# Patient Record
Sex: MALE | Race: WHITE
[De-identification: names, ages, dates, MRNs, and addresses within clinical notes are randomized per-mention and may not be internally consistent; named-entity substitution may affect disease eponyms.]

---

## 2017-01-23 ENCOUNTER — HOSPITAL ENCOUNTER (OUTPATIENT)
Dept: HOSPITAL 53 - M LAB REF | Age: 82
End: 2017-01-23
Attending: INTERNAL MEDICINE
Payer: MEDICARE

## 2017-01-23 DIAGNOSIS — E79.0: Primary | ICD-10-CM

## 2017-05-03 ENCOUNTER — HOSPITAL ENCOUNTER (OUTPATIENT)
Dept: HOSPITAL 53 - M LAB REF | Age: 82
End: 2017-05-03
Attending: INTERNAL MEDICINE
Payer: MEDICARE

## 2017-05-03 DIAGNOSIS — E79.0: Primary | ICD-10-CM

## 2017-07-17 ENCOUNTER — HOSPITAL ENCOUNTER (OUTPATIENT)
Dept: HOSPITAL 53 - M LABDRWAD | Age: 82
End: 2017-07-17
Attending: PHYSICIAN ASSISTANT
Payer: MEDICARE

## 2017-07-17 DIAGNOSIS — R53.83: Primary | ICD-10-CM

## 2017-07-17 LAB
ALBUMIN SERPL BCG-MCNC: 3.2 GM/DL (ref 3.2–5.2)
ALBUMIN/GLOB SERPL: 0.78 {RATIO} (ref 1–1.93)
ALP SERPL-CCNC: 79 U/L (ref 45–117)
ALT SERPL W P-5'-P-CCNC: 18 U/L (ref 12–78)
ANION GAP SERPL CALC-SCNC: 7 MEQ/L (ref 8–16)
AST SERPL-CCNC: 15 U/L (ref 15–37)
BASOPHILS # BLD AUTO: 0.1 K/MM3 (ref 0–0.2)
BASOPHILS NFR BLD AUTO: 0.8 % (ref 0–1)
BILIRUB SERPL-MCNC: 0.7 MG/DL (ref 0.2–1)
BUN SERPL-MCNC: 27 MG/DL (ref 7–18)
CALCIUM SERPL-MCNC: 9 MG/DL (ref 8.8–10.2)
CHLORIDE SERPL-SCNC: 104 MEQ/L (ref 98–107)
CO2 SERPL-SCNC: 27 MEQ/L (ref 21–32)
CREAT SERPL-MCNC: 1.67 MG/DL (ref 0.7–1.3)
EOSINOPHIL # BLD AUTO: 0.3 K/MM3 (ref 0–0.5)
EOSINOPHIL NFR BLD AUTO: 3.5 % (ref 0–3)
ERYTHROCYTE [DISTWIDTH] IN BLOOD BY AUTOMATED COUNT: 13.6 % (ref 11.5–14.5)
GFR SERPL CREATININE-BSD FRML MDRD: 42.4 ML/MIN/{1.73_M2} (ref 35–?)
GLUCOSE SERPL-MCNC: 96 MG/DL (ref 83–110)
LARGE UNSTAINED CELL #: 0.1 K/MM3 (ref 0–0.4)
LARGE UNSTAINED CELL %: 0.9 % (ref 0–4)
LYMPHOCYTES # BLD AUTO: 1.4 K/MM3 (ref 1.5–4.5)
LYMPHOCYTES NFR BLD AUTO: 16.7 % (ref 24–44)
MCH RBC QN AUTO: 33.7 PG (ref 27–33)
MCHC RBC AUTO-ENTMCNC: 32.7 G/DL (ref 32–36.5)
MCV RBC AUTO: 103 FL (ref 80–96)
MONOCYTES # BLD AUTO: 0.5 K/MM3 (ref 0–0.8)
MONOCYTES NFR BLD AUTO: 6.9 % (ref 0–5)
NEUTROPHILS # BLD AUTO: 5.6 K/MM3 (ref 1.8–7.7)
NEUTROPHILS NFR BLD AUTO: 71.3 % (ref 36–66)
PLATELET # BLD AUTO: 304 K/MM3 (ref 150–450)
POTASSIUM SERPL-SCNC: 4.3 MEQ/L (ref 3.5–5.1)
PROT SERPL-MCNC: 7.3 GM/DL (ref 6.4–8.2)
SODIUM SERPL-SCNC: 138 MEQ/L (ref 136–145)
WBC # BLD AUTO: 7.9 K/MM3 (ref 4–10)

## 2017-07-20 LAB
B BURGDOR IGG+IGM SER-ACNC: <0.91 ISR (ref 0–0.9)
B BURGDOR IGM SER IA-ACNC: 1.52 INDEX (ref 0–0.79)

## 2017-07-28 ENCOUNTER — HOSPITAL ENCOUNTER (EMERGENCY)
Dept: HOSPITAL 53 - M ED | Age: 82
Discharge: HOME | End: 2017-07-28
Payer: MEDICARE

## 2017-07-28 VITALS — DIASTOLIC BLOOD PRESSURE: 74 MMHG

## 2017-07-28 VITALS — WEIGHT: 166.34 LBS | BODY MASS INDEX: 26.73 KG/M2 | HEIGHT: 66 IN

## 2017-07-28 VITALS — SYSTOLIC BLOOD PRESSURE: 120 MMHG

## 2017-07-28 DIAGNOSIS — Z87.891: ICD-10-CM

## 2017-07-28 DIAGNOSIS — Z79.02: ICD-10-CM

## 2017-07-28 DIAGNOSIS — K21.9: ICD-10-CM

## 2017-07-28 DIAGNOSIS — Z86.73: ICD-10-CM

## 2017-07-28 DIAGNOSIS — Z79.82: ICD-10-CM

## 2017-07-28 DIAGNOSIS — A69.23: Primary | ICD-10-CM

## 2017-07-28 DIAGNOSIS — Z95.1: ICD-10-CM

## 2017-07-28 DIAGNOSIS — Z79.899: ICD-10-CM

## 2017-07-28 DIAGNOSIS — I10: ICD-10-CM

## 2017-07-28 LAB
ALBUMIN SERPL BCG-MCNC: 2.6 GM/DL (ref 3.2–5.2)
ALBUMIN/GLOB SERPL: 0.62 {RATIO} (ref 1–1.93)
ALP SERPL-CCNC: 78 U/L (ref 45–117)
ALT SERPL W P-5'-P-CCNC: 20 U/L (ref 12–78)
ANION GAP SERPL CALC-SCNC: 9 MEQ/L (ref 8–16)
AST SERPL-CCNC: 12 U/L (ref 15–37)
BASOPHILS # BLD AUTO: 0.1 K/MM3 (ref 0–0.2)
BASOPHILS NFR BLD AUTO: 0.6 % (ref 0–1)
BILIRUB CONJ SERPL-MCNC: 0.3 MG/DL (ref 0–0.2)
BILIRUB SERPL-MCNC: 0.9 MG/DL (ref 0.2–1)
BUN SERPL-MCNC: 28 MG/DL (ref 7–18)
CALCIUM SERPL-MCNC: 9.3 MG/DL (ref 8.8–10.2)
CHLORIDE SERPL-SCNC: 106 MEQ/L (ref 98–107)
CO2 SERPL-SCNC: 25 MEQ/L (ref 21–32)
CREAT SERPL-MCNC: 1.52 MG/DL (ref 0.7–1.3)
EOSINOPHIL # BLD AUTO: 0 K/MM3 (ref 0–0.5)
EOSINOPHIL NFR BLD AUTO: 0.2 % (ref 0–3)
ERYTHROCYTE [DISTWIDTH] IN BLOOD BY AUTOMATED COUNT: 13.7 % (ref 11.5–14.5)
ERYTHROCYTE [SEDIMENTATION RATE] IN BLOOD BY WESTERGREN METHOD: 80 MM/HR (ref 0–20)
GFR SERPL CREATININE-BSD FRML MDRD: 47.2 ML/MIN/{1.73_M2} (ref 35–?)
GLUCOSE SERPL-MCNC: 108 MG/DL (ref 83–110)
INR PPP: 1.28
LARGE UNSTAINED CELL #: 0.1 K/MM3 (ref 0–0.4)
LARGE UNSTAINED CELL %: 1 % (ref 0–4)
LYMPHOCYTES # BLD AUTO: 1.1 K/MM3 (ref 1.5–4.5)
LYMPHOCYTES NFR BLD AUTO: 10.7 % (ref 24–44)
MCH RBC QN AUTO: 33.5 PG (ref 27–33)
MCHC RBC AUTO-ENTMCNC: 32.9 G/DL (ref 32–36.5)
MCV RBC AUTO: 101.9 FL (ref 80–96)
MONOCYTES # BLD AUTO: 0.7 K/MM3 (ref 0–0.8)
MONOCYTES NFR BLD AUTO: 6.4 % (ref 0–5)
NEUTROPHILS # BLD AUTO: 8.5 K/MM3 (ref 1.8–7.7)
NEUTROPHILS NFR BLD AUTO: 81 % (ref 36–66)
PLATELET # BLD AUTO: 327 K/MM3 (ref 150–450)
POTASSIUM SERPL-SCNC: 4.2 MEQ/L (ref 3.5–5.1)
PROT SERPL-MCNC: 6.8 GM/DL (ref 6.4–8.2)
SODIUM SERPL-SCNC: 140 MEQ/L (ref 136–145)
WBC # BLD AUTO: 10.5 K/MM3 (ref 4–10)

## 2017-07-30 NOTE — ECGEPIP
Stationary ECG Study

                           [facility] - ED

                                       

                                       Test Date:    2017

Pat Name:     FELISA SANCHEZ           Department:   

Patient ID:   [id number]                 Room:         -

Gender:       M                        Technician:   AMY

:          [id number]               Requested By: Oneal Reid PA-C

Order Number: [id number]     Reading MD:   Toney De La Torre

                                 Measurements

Intervals                              Axis          

Rate:         69                       P:            -59

IL:           122                      QRS:          14

QRSD:         98                       T:            30

QT:           404                                    

QTc:          433                                    

                           Interpretive Statements

SINUS RHYTHM WITH OCCASIONAL VENTRICULAR PREMATURE COMPLEXES

NSTTW ABNORMALITIES

SIMILAR TO 10/29/12

Electronically Signed On 2017 2:14:08 EDT by Toney De La Torre

## 2017-08-02 ENCOUNTER — HOSPITAL ENCOUNTER (OUTPATIENT)
Dept: HOSPITAL 53 - M LAB REF | Age: 82
End: 2017-08-02
Attending: NURSE PRACTITIONER
Payer: MEDICARE

## 2017-08-02 DIAGNOSIS — R79.82: Primary | ICD-10-CM

## 2017-08-02 DIAGNOSIS — I12.9: ICD-10-CM

## 2017-08-02 DIAGNOSIS — N18.3: ICD-10-CM

## 2017-08-02 LAB — VIT B12 SERPL-MCNC: 257 PG/ML (ref 247–911)

## 2017-08-07 ENCOUNTER — HOSPITAL ENCOUNTER (INPATIENT)
Dept: HOSPITAL 53 - M ED | Age: 82
LOS: 4 days | Discharge: NURSING, REHAB OR LONG-TERM CARE | DRG: 65 | End: 2017-08-11
Attending: HOSPITALIST | Admitting: HOSPITALIST
Payer: MEDICARE

## 2017-08-07 VITALS — DIASTOLIC BLOOD PRESSURE: 70 MMHG | SYSTOLIC BLOOD PRESSURE: 153 MMHG

## 2017-08-07 VITALS — HEIGHT: 66 IN | BODY MASS INDEX: 24.59 KG/M2 | WEIGHT: 153 LBS

## 2017-08-07 VITALS — SYSTOLIC BLOOD PRESSURE: 137 MMHG | DIASTOLIC BLOOD PRESSURE: 87 MMHG

## 2017-08-07 VITALS — DIASTOLIC BLOOD PRESSURE: 65 MMHG | SYSTOLIC BLOOD PRESSURE: 126 MMHG

## 2017-08-07 VITALS — DIASTOLIC BLOOD PRESSURE: 67 MMHG | SYSTOLIC BLOOD PRESSURE: 140 MMHG

## 2017-08-07 VITALS — SYSTOLIC BLOOD PRESSURE: 99 MMHG | DIASTOLIC BLOOD PRESSURE: 55 MMHG

## 2017-08-07 DIAGNOSIS — Z79.02: ICD-10-CM

## 2017-08-07 DIAGNOSIS — E78.5: ICD-10-CM

## 2017-08-07 DIAGNOSIS — D50.9: ICD-10-CM

## 2017-08-07 DIAGNOSIS — K21.9: ICD-10-CM

## 2017-08-07 DIAGNOSIS — Z98.62: ICD-10-CM

## 2017-08-07 DIAGNOSIS — I12.9: ICD-10-CM

## 2017-08-07 DIAGNOSIS — N18.3: ICD-10-CM

## 2017-08-07 DIAGNOSIS — I95.1: ICD-10-CM

## 2017-08-07 DIAGNOSIS — Z79.82: ICD-10-CM

## 2017-08-07 DIAGNOSIS — G20: ICD-10-CM

## 2017-08-07 DIAGNOSIS — M48.06: ICD-10-CM

## 2017-08-07 DIAGNOSIS — M10.9: ICD-10-CM

## 2017-08-07 DIAGNOSIS — E55.9: ICD-10-CM

## 2017-08-07 DIAGNOSIS — A69.20: ICD-10-CM

## 2017-08-07 DIAGNOSIS — R29.6: ICD-10-CM

## 2017-08-07 DIAGNOSIS — R26.81: ICD-10-CM

## 2017-08-07 DIAGNOSIS — Z79.899: ICD-10-CM

## 2017-08-07 DIAGNOSIS — Z95.1: ICD-10-CM

## 2017-08-07 DIAGNOSIS — R63.4: ICD-10-CM

## 2017-08-07 DIAGNOSIS — F39: ICD-10-CM

## 2017-08-07 DIAGNOSIS — N40.0: ICD-10-CM

## 2017-08-07 DIAGNOSIS — K59.00: ICD-10-CM

## 2017-08-07 DIAGNOSIS — I63.9: Primary | ICD-10-CM

## 2017-08-07 LAB
ALBUMIN SERPL BCG-MCNC: 2.4 GM/DL (ref 3.2–5.2)
ALBUMIN/GLOB SERPL: 0.49 {RATIO} (ref 1–1.93)
ALP SERPL-CCNC: 81 U/L (ref 45–117)
ALT SERPL W P-5'-P-CCNC: 26 U/L (ref 12–78)
ANION GAP SERPL CALC-SCNC: 11 MEQ/L (ref 8–16)
AST SERPL-CCNC: 25 U/L (ref 15–37)
BASOPHILS # BLD AUTO: 0 K/MM3 (ref 0–0.2)
BASOPHILS NFR BLD AUTO: 0.3 % (ref 0–1)
BILIRUB CONJ SERPL-MCNC: 0.3 MG/DL (ref 0–0.2)
BILIRUB SERPL-MCNC: 0.7 MG/DL (ref 0.2–1)
BUN SERPL-MCNC: 31 MG/DL (ref 7–18)
CALCIUM SERPL-MCNC: 9.3 MG/DL (ref 8.8–10.2)
CHLORIDE SERPL-SCNC: 98 MEQ/L (ref 98–107)
CO2 SERPL-SCNC: 25 MEQ/L (ref 21–32)
CREAT SERPL-MCNC: 1.6 MG/DL (ref 0.7–1.3)
EOSINOPHIL # BLD AUTO: 0 K/MM3 (ref 0–0.5)
EOSINOPHIL NFR BLD AUTO: 0.4 % (ref 0–3)
ERYTHROCYTE [DISTWIDTH] IN BLOOD BY AUTOMATED COUNT: 13.9 % (ref 11.5–14.5)
ERYTHROCYTE [SEDIMENTATION RATE] IN BLOOD BY WESTERGREN METHOD: 107 MM/HR (ref 0–20)
FERRITIN SERPL-MCNC: 2691 NG/ML (ref 26–388)
FOLATE SERPL-MCNC: 6.8 NG/ML (ref 5.4–?)
GFR SERPL CREATININE-BSD FRML MDRD: 44.5 ML/MIN/{1.73_M2} (ref 35–?)
GLUCOSE SERPL-MCNC: 140 MG/DL (ref 83–110)
IRON SATN MFR SERPL: 20.4 % (ref 19.7–50)
LARGE UNSTAINED CELL #: 0.1 K/MM3 (ref 0–0.4)
LARGE UNSTAINED CELL %: 0.6 % (ref 0–4)
LYMPHOCYTES # BLD AUTO: 0.7 K/MM3 (ref 1.5–4.5)
LYMPHOCYTES NFR BLD AUTO: 6.1 % (ref 24–44)
MAGNESIUM SERPL-MCNC: 2.4 MG/DL (ref 1.8–2.4)
MCH RBC QN AUTO: 33.8 PG (ref 27–33)
MCHC RBC AUTO-ENTMCNC: 33 G/DL (ref 32–36.5)
MCV RBC AUTO: 102.5 FL (ref 80–96)
MONOCYTES # BLD AUTO: 0.8 K/MM3 (ref 0–0.8)
MONOCYTES NFR BLD AUTO: 7.6 % (ref 0–5)
NEUTROPHILS # BLD AUTO: 9.2 K/MM3 (ref 1.8–7.7)
NEUTROPHILS NFR BLD AUTO: 85 % (ref 36–66)
PLATELET # BLD AUTO: 396 K/MM3 (ref 150–450)
POTASSIUM SERPL-SCNC: 4.4 MEQ/L (ref 3.5–5.1)
PROT SERPL-MCNC: 7.3 GM/DL (ref 6.4–8.2)
SODIUM SERPL-SCNC: 134 MEQ/L (ref 136–145)
TIBC SERPL-MCNC: 137 UG/DL (ref 250–450)
VIT B12 SERPL-MCNC: 313 PG/ML (ref 247–911)
WBC # BLD AUTO: 10.8 K/MM3 (ref 4–10)

## 2017-08-07 RX ADMIN — ENOXAPARIN SODIUM SCH MG: 30 INJECTION SUBCUTANEOUS at 18:37

## 2017-08-07 RX ADMIN — MAGNESIUM OXIDE SCH MG: 400 TABLET ORAL at 21:52

## 2017-08-07 RX ADMIN — TAMSULOSIN HYDROCHLORIDE SCH MG: 0.4 CAPSULE ORAL at 18:34

## 2017-08-07 RX ADMIN — ASPIRIN SCH MG: 81 TABLET ORAL at 18:34

## 2017-08-07 RX ADMIN — ALLOPURINOL SCH MG: 300 TABLET ORAL at 18:35

## 2017-08-07 RX ADMIN — AMOXICILLIN SCH MG: 500 CAPSULE ORAL at 18:38

## 2017-08-07 RX ADMIN — SIMVASTATIN SCH MG: 20 TABLET, FILM COATED ORAL at 18:37

## 2017-08-07 RX ADMIN — AMOXICILLIN SCH MG: 500 CAPSULE ORAL at 21:51

## 2017-08-07 RX ADMIN — Medication SCH UNITS: at 18:35

## 2017-08-07 RX ADMIN — CLOPIDOGREL BISULFATE SCH MG: 75 TABLET ORAL at 18:35

## 2017-08-07 RX ADMIN — PANTOPRAZOLE SODIUM SCH MG: 40 TABLET, DELAYED RELEASE ORAL at 18:35

## 2017-08-07 RX ADMIN — DOCUSATE SODIUM SCH MG: 100 CAPSULE, LIQUID FILLED ORAL at 21:51

## 2017-08-07 RX ADMIN — CLOTRIMAZOLE SCH MG: 10 LOZENGE ORAL at 16:00

## 2017-08-07 RX ADMIN — CLOTRIMAZOLE SCH MG: 10 LOZENGE ORAL at 21:51

## 2017-08-07 RX ADMIN — MIRTAZAPINE SCH MG: 15 TABLET, FILM COATED ORAL at 21:51

## 2017-08-07 NOTE — REP
CT Head without contrast

 

HISTORY:  Trauma

 

COMPARISON: 08/02/2017

 

Areas of decreased attenuation are present in the periventricular white matter.

This represents small-vessel ischemic disease.  There is no intraparenchymal

hemorrhage, acute infarct,  mass or midline shift.  The ventricular system and

cortical sulci as well as subarachnoid space in the posterior fossa are dilated

consistent with moderate volume loss.  There is no extra cerebral collection.

There is no fracture.  The visualized sinuses are clear.

 

IMPRESSION:

1.  Small vessel ischemic disease.

2.  Moderate volume loss.

 

 

 

 

Signed by

Lc Lemus MD 08/07/2017 10:15 A

## 2017-08-07 NOTE — HPEPDOC
Medical History and Physical


Date of Admission


8/7/17





History and Physical





ATTENDING: Dr. Washington


PCP: Dr Wall





CC: Fall





HPI: 81yoM with a past medical history significant for HTN, HLD,GERD, who 

reports he fell in the bathroom and hit his back on the vanity, Dtr reported he 

had hit his head as well. Reported that he had fallen 3 times in past 1 week. 

Daughter states he has been getting up in the morning to go to the bathroom and 

not using his walker. He typically loses his balance and has been falling. They 

state his symptoms began approximately 1 week ago when he was seen in the 

emergency department 7/28/17 and was diagnosed with Lyme disease. His daughter 

states he has lost 20 pounds in the past 3 weeks. Generally he has been feeling 

fatigued and achy. He reports generalized weakness however no paresthesias. 

Denies blurred vision, diplopia, vertigo, dysarthria. He does report some 

difficulty swallowing. He does not cough when swallowing.


Denies any fevers, chills,HA, CP, SOB, cough, palpitations, abdominal pain, N/V/

D or changes in bowel or bladder habits. 


Upon presentation to the hospital the patient was found to have acute CVA, thus 

the hospitalist team was consulted.





PMHx: 


HTN


HLD


GERD


BPH


CKD3 baseline 1.4-1.6


H/O TIA 10/12


H/O kidney stones


unsteady gait/H/O fall. Pt declines to use walker at home


Lyme disease





PSHX:


CABG


Fempop bypass


AAA repair


Rt inguinal hernia


appendectomy








SOCHX:


Resides in:  [facility]


Marital Status:  


Kids: 5


Employment: retired milk hauler


Tobacco use: denies


ETOH: denies


Illicit Drugs: Denies


Recent travel: denies


Advanced directives: None





FAMHX:





Siblings: Alive, hypertension, CAD, lung cancer


Children: Alive, breast cancer


Unexpected deaths due to medical reasons: None.





ROS:


As noted in HPI, otherwise 11pt ROS of systems reviewed and remarkable only for 

pt seen in ED 


7/28/17 for joint pains and was treated for Lyme disease. 


                 


PE:      


GEN:  81yoM, appears stated age. Well-nourished, well developed. No acute 

distress. Alert and oriented x 3. Pleasant, interactive. 


HEENT: Normocephalic, atraumatic. Pupils are equal, round, and reactive to 

light. Extraocular movements are intact. No nystagmus appreciated. Sclera are 

nonicteric. Conjunctiva without injection. Nose midline. Nasal turbinates 

without bogginess.  No facial asymmetry. Moist mucous membranes. Dentition 

fair. Pharynx pink and moist, no cobblestoning. Neck supple, trachea midline. 

No lymphadenopathy or thyromegaly appreciated. 


CHEST: Regular rate and rhythm, +S1, +S2


LUNGS: Clear to auscultation bilaterally. No wheezes, rales, or rhonchi. 

Breathing appears symmetric and easy. Patient is speaking in full sentences. No 

accessory muscle use.


ABD: Round, soft, non-tender, non-distended. +Bowel sounds throughout. No 

rebound or guarding. No costovertebral angle tenderness.


EXT: Pulses 2+ bilaterally dorsalis pedis and radial. No lower extremity edema 

appreciated.


SKIN: Pink, dry, warm. Capillary refill <2sec. There  is an ecchymotic area 

noted upper thoracic area.


NEURO: Alert and oriented x 3. Cranial nerves III-XII are intact. No focal 

deficits appreciated. 





MRI brain


1.  Punctate acute right frontal lobe infarction .


 2.  Old left cerebellar lacunar infarction.


 3.  Small vessel ischemic disease.


 4.  Moderate volume loss.





CT: head


1.  Small vessel ischemic disease.


2.  Moderate volume loss





CT C spine


1.  There is no acute fracture or subluxation.


 2.  There is cervical spondylosis the C1-2 through C7-T1 levels.





EKG: pending





XR Thoracic


Diffuse degenerative changes without evidence of acute fracture or dislocation.








A&P:  81yoM with a past medical history significant for HTN, HLD,GERD, who 

reports he fell in the bathroomand hit his back on the vanity, Dtr reported he 

had hit his head as well. Reported that he had fallen 3 times in past 1 week


1.  The patient will be admitted to PCU for at least 2 midnights to Dr. Washington'

s service.


2. Acute CVA. 


Neurology consulted, spoke with Dr Eaton who will see the pt. 


Continue lovastatin 40 mg daily. Add fasting lipids. 


PT/OT/speech therapy. 


Carotid U/S pending


TTE pending. 


3. Lyme disease. Patient currently on amoxicillin 500 mg Q8hrs,D9/10. Tramadol 

as needed for joint pain. Possibly consider ID opinion when available. 


4. CAD/CABG. Serial CIP/Troponin. 


EKG pending. 


Continue statin. Continue aspirin 81 mg/Plavix 75 mg daily.


5. HTN. Hold HCTZ. Monitor blood pressures. Check orthostatic VS as well. 


6. HLD. Continue statin. Check fasting lipids in AM. 


7 BPH. Continue tamsulosin.


8. CKD 3. Baseline 1.4-1.6. Monitor. 


9. Gout. Continue allopurinol. 


10. Hypomagnesemia. Cont supplement. Check Mag level. 


11. Anemia. Check Fe Studies, B12. folate. Stool OB. No previous colonoscopy on 

record. 








DVT prophylaxis.


The patient is a Full code





Vital Signs





Vital Signs








  Date Time  Temp Pulse Resp B/P (MAP) Pulse Ox O2 Delivery O2 Flow Rate FiO2


 


8/7/17 12:52 99.2 76 16 122/61 (81) 97 Room Air  











Laboratory Data


Labs 24H


Laboratory Tests 2


8/7/17 09:35: 


Anion Gap 11, Glomerular Filtration Rate 44.5, Calcium Level 9.3, Aspartate 

Amino Transf (AST/SGOT) 25, Alanine Aminotransferase (ALT/SGPT) 26, Alkaline 

Phosphatase 81, Total Bilirubin 0.7, Direct Bilirubin 0.3H, C-Reactive Protein, 

Quantitative 14.00H, Total Protein 7.3, Albumin 2.4L, Albumin/Globulin Ratio 

0.49L


8/7/17 09:36: 


White Blood Count 10.8H, Red Blood Count 3.10L, Hemoglobin 10.5L, Hematocrit 

31.8L, Mean Corpuscular Volume 102.5H, Mean Corpuscular Hemoglobin 33.8H, Mean 

Corpuscular Hemoglobin Concent 33.0, Red Cell Distribution Width 13.9, Platelet 

Count 396, Neutrophils (%) (Auto) 85.0H, Lymphocytes (%) (Auto) 6.1L, Monocytes 

(%) (Auto) 7.6H, Eosinophils (%) (Auto) 0.4, Basophils (%) (Auto) 0.3, 

Neutrophils # (Auto) 9.2H, Lymphocytes # (Auto) 0.7L, Monocytes # (Auto) 0.8, 

Eosinophils # (Auto) 0.0, Basophils # (Auto) 0.0, Large Unclassified Cells % 0.6

, Large Unclassified Cells # 0.1, Erythrocyte Sedimentation Rate 107H


CBC/BMP











Item Value  Date Time


 


Lyme Disease IgG/IgM Antibodies <0.91 ISR 7/17/17 1403


 


Lyme Disease IgG Ab 18 kDa Band Absent 7/17/17 1403


 


Lyme Disease IgG Ab 23 kDa Band Absent 7/17/17 1403


 


Lyme Disease IgG Ab 28 kDa Band Absent 7/17/17 1403


 


Lyme Disease IgG Ab 30 kDa Band Absent 7/17/17 1403


 


Lyme Disease IgG Ab 39 kDa Band Absent 7/17/17 1403


 


Lyme Disease IgG Ab 41 kDa Band Absent 7/17/17 1403


 


Lyme Disease IgG Ab 45 kDa Band Absent 7/17/17 1403


 


Lyme Disease IgG Ab 58 kDa Band Absent 7/17/17 1403


 


Lyme Disease IgG Ab 66 kDa Band Absent 7/17/17 1403


 


Lyme Disease IgG Ab 93 kDa Band Absent 7/17/17 1403


 


Lyme Disease IgG West Blot Interp Negative 7/17/17 1403


 


Lyme Disease IgM Ab Quantitation 1.52 index H 7/17/17 1403


 


Lyme Disease IgM Ab 23 kDa Band Present A 7/17/17 1403


 


Lyme Disease IgM Ab 39 kDa Band Absent 7/17/17 1403


 


Lyme Disease IgM Ab 41 kDa Band Present A 7/17/17 1403


 


Lyme Disease IgM West Blot Interp Positive A 7/17/17 1403








Laboratory Tests


8/7/17 09:35








8/7/17 09:36








Red Blood Count 3.10 L, Mean Corpuscular Volume 102.5 H, Mean Corpuscular 

Hemoglobin 33.8 H, Mean Corpuscular Hemoglobin Concent 33.0, Red Cell 

Distribution Width 13.9, Neutrophils (%) (Auto) 85.0 H, Lymphocytes (%) (Auto) 

6.1 L, Monocytes (%) (Auto) 7.6 H, Eosinophils (%) (Auto) 0.4, Basophils (%) (

Auto) 0.3, Neutrophils # (Auto) 9.2 H, Lymphocytes # (Auto) 0.7 L, Monocytes # (

Auto) 0.8, Eosinophils # (Auto) 0.0, Basophils # (Auto) 0.0





Home Medications


Scheduled


Allopurinol (Zyloprim) 300 Mg Tab, 300 MG PO DAILY


Amoxicillin (Amoxicillin) 500 Mg Cap, 500 MG PO Q8H


   FILLED 7/28/17 FOR 10 DAYS 


Aspirin (Aspirin EC) 81 Mg Tab, 81 MG PO DAILY


Cholecalciferol (Vitamin D) 1,000 Unit Tab, 1,000 UNIT PO DAILY


Clopidogrel Bisulfate (Plavix) 75 Mg Tab, 75 MG PO DAILY


Clotrimazole (Clotrimazole) 10 Mg Troc, 10 MG MT TID


   FILLED 8/4/17 FOR 10 DAYS 


Colchicine (Colchicine) 0.6 Mg Tab, 0.6 MG PO DAILY


Docusate Sodium (Colace) 100 Mg Cap, 100 MG PO BID


Hydrochlorothiazide (Hydrochlorothiazide) 25 Mg Tab, 12.5 MG PO DAILY


Lansoprazole (Lansoprazole) 15 Mg Cap, 15 MG PO QPM


Lovastatin (Lovastatin) 40 Mg Tab, 40 MG PO DAILY


   HAS BEEN ON HOLD DUE TO JOINT PAIN AND TRYING TO DETERMINE IF THIS IS THE 

CAUSE 


Magnesium Oxide (Magnesium Oxide 400) 400 Mg Tab, 400 MG PO BID


Mirtazapine (Mirtazapine) 7.5 Mg Tab, 7.5 MG PO QHS


Tamsulosin Hydrochloride (Flomax) 0.4 Mg Cap, 0.4 MG PO DAILY





Scheduled PRN


Tramadol HCl (Tramadol HCl) 50 Mg Tab, 50 MG PO QID PRN for PAIN





Allergies


Coded Allergies:  


     No Known Drug Allergy (Verified  Allergy, Unknown, 8/7/17)











Keely Viveros Aug 7, 2017 14:15

## 2017-08-07 NOTE — REP
THORACIC SPINE:

 

Three views of the thoracic spine are performed in the AP and lateral

projections.  There is no compression fracture or malalignment  with normal

thoracic kyphosis.  Diffuse bridging osteophytosis is noted particularly in the

mid to lower thoracic spine. There is mild disc space narrowing and subchondral

sclerosis at all levels.   Posterior elements appear intact.

 

IMPRESSION:

 

Diffuse degenerative changes without evidence of acute fracture or dislocation.

 

 

Signed by

Samuel May MD 08/08/2017 08:38 A

## 2017-08-07 NOTE — REP
BILATERAL CAROTID DUPLEX ULTRASOUND:  08/07/2017.

 

Clinical history:  CVA

 

Findings:  No prior study.  Standard duplex techniques show the right common

carotid with scattered soft plaque and intimal thickening.  There is some mixed

plaque in the distal common carotid and bulb extending into the ICA and ECA.

 

The left internal carotid also shows intimal thickening with some soft plaque and

then mixed plaque in the mid and distal CCA, bulb and into both ICA and ECA on

the left.

 

Peak velocities chart

 

                        Right        Left

 

CCA systolic                   1.82 m/s                  0.88 m/s

 

ICA systolic            0.63 m/s     0.63 m/s

 

ICA diastolic                  0.17 m/s                  0.17 m/s

 

ECA systolic                   0.60 m/s                  0.88 m/s

 

ICA/CCA ratio                           0.35       0.71

 

The Doppler waveform analysis does not show significant spectral broadening or

filling in of the systolic window for either the right or the left internal

carotid.  However, there is an irregular rhythm.  Vertebral artery flow is

cranial direction on the left. It cannot be identified on the right.

 

Impression:

 

1.  Atherosclerotic plaque with less than 50% stenosis of the ICA on both sides,

no hemodynamically significant or flow restricting lesion.  High peak velocity in

the right CCA suggests a more proximal right CCA stenosis.  ICAs do not have such

data suggesting severe stenosis.

 

2.  Cranial direction of flow in the left vertebral artery, the right is not

seen.

 

3.  Atherosclerotic plaque in the distal CCA, bulb and proximal ICAs on both

sides.

 

 

Signed by

Alex Gibbs MD 08/08/2017 10:24 A

## 2017-08-07 NOTE — REP
CT CERVICAL SPINE WITHOUT CONTRAST:

 

HISTORY:  Trauma.

 

There is no acute fracture or subluxation.  Disc bulges are present at the C2-3

and C3-4 levels.  Disc bulges with associated osteophyte formation are present at

the C4-5 through C6-7 levels. There is minimal narrowing of the spinal canal.

Uncinate process and/or facet hypertrophy are present at the C2-3 through C7-T1

levels. These finding produce minimal to moderate narrowing of the neural

foramina.  The C3-4 through C7-T1 intervertebral discs are decreased in height

consistent with disc degeneration.  Bridging osteophytes are present on C3

through T1.  Anterior osteophytes are present at the C1-2 level.

 

IMPRESSION:

 

1.  There is no acute fracture or subluxation.

 

2.  There is cervical spondylosis the C1-2 through C7-T1 levels.

 

 

Signed by

Lc Lemus MD 08/07/2017 11:04 A

## 2017-08-07 NOTE — REP
MRI BRAIN WITHOUT CONTRAST:

 

HISTORY:  Infarction.

 

COMPARISON:  10/29/2012.

 

A punctate focus of increased signal intensity on diffusion and T2-weighted

images is present in the right frontal lobe.  This is decreased in signal

intensity on ADC images and is consistent with an acute infarction.  A small area

of increased signal intensity on T2-weighted images is present in the left

cerebellum. This represents an old lacunar infarction.  Several punctate areas of

increased signal intensity on T2-weighted images are present in the

periventricular and subcortical white matter and cerebellum.  This represents

small vessel ischemic disease.  There is no intraparenchymal hemorrhage, mass or

midline shift.  The ventricular system and cortical sulci, as well as

subarachnoid space of the posterior fossa are dilated consistent with moderate

volume loss. There is no extracerebral collection.  The sinuses are clear.

 

IMPRESSION:

 

1.  Punctate acute right frontal lobe infarction .

 

2.  Old left cerebellar lacunar infarction.

 

3.  Small vessel ischemic disease.

 

4.  Moderate volume loss.

 

 

Signed by

Lc Lemus MD 08/07/2017 12:30 P

## 2017-08-08 VITALS — DIASTOLIC BLOOD PRESSURE: 60 MMHG | SYSTOLIC BLOOD PRESSURE: 123 MMHG

## 2017-08-08 VITALS — DIASTOLIC BLOOD PRESSURE: 56 MMHG | SYSTOLIC BLOOD PRESSURE: 136 MMHG

## 2017-08-08 VITALS — SYSTOLIC BLOOD PRESSURE: 135 MMHG | DIASTOLIC BLOOD PRESSURE: 70 MMHG

## 2017-08-08 VITALS — SYSTOLIC BLOOD PRESSURE: 178 MMHG | DIASTOLIC BLOOD PRESSURE: 83 MMHG

## 2017-08-08 VITALS — SYSTOLIC BLOOD PRESSURE: 67 MMHG | DIASTOLIC BLOOD PRESSURE: 46 MMHG

## 2017-08-08 VITALS — DIASTOLIC BLOOD PRESSURE: 62 MMHG | SYSTOLIC BLOOD PRESSURE: 130 MMHG

## 2017-08-08 VITALS — DIASTOLIC BLOOD PRESSURE: 58 MMHG | SYSTOLIC BLOOD PRESSURE: 126 MMHG

## 2017-08-08 VITALS — DIASTOLIC BLOOD PRESSURE: 71 MMHG | SYSTOLIC BLOOD PRESSURE: 111 MMHG

## 2017-08-08 VITALS — SYSTOLIC BLOOD PRESSURE: 138 MMHG | DIASTOLIC BLOOD PRESSURE: 66 MMHG

## 2017-08-08 LAB
ALBUMIN SERPL BCG-MCNC: 2 GM/DL (ref 3.2–5.2)
ALBUMIN/GLOB SERPL: 0.5 {RATIO} (ref 1–1.93)
ALP SERPL-CCNC: 64 U/L (ref 45–117)
ALT SERPL W P-5'-P-CCNC: 17 U/L (ref 12–78)
ANION GAP SERPL CALC-SCNC: 9 MEQ/L (ref 8–16)
AST SERPL-CCNC: 15 U/L (ref 15–37)
BASOPHILS # BLD AUTO: 0 K/MM3 (ref 0–0.2)
BASOPHILS NFR BLD AUTO: 0.5 % (ref 0–1)
BILIRUB SERPL-MCNC: 0.5 MG/DL (ref 0.2–1)
BUN SERPL-MCNC: 27 MG/DL (ref 7–18)
CALCIUM SERPL-MCNC: 8.5 MG/DL (ref 8.8–10.2)
CHLORIDE SERPL-SCNC: 104 MEQ/L (ref 98–107)
CHOLEST SERPL-MCNC: 57 MG/DL (ref ?–200)
CO2 SERPL-SCNC: 26 MEQ/L (ref 21–32)
CREAT SERPL-MCNC: 1.3 MG/DL (ref 0.7–1.3)
EOSINOPHIL # BLD AUTO: 0.1 K/MM3 (ref 0–0.5)
EOSINOPHIL NFR BLD AUTO: 1.7 % (ref 0–3)
ERYTHROCYTE [DISTWIDTH] IN BLOOD BY AUTOMATED COUNT: 14.2 % (ref 11.5–14.5)
EST. AVERAGE GLUCOSE BLD GHB EST-MCNC: 120 MG/DL (ref 60–110)
GFR SERPL CREATININE-BSD FRML MDRD: 56.5 ML/MIN/{1.73_M2} (ref 35–?)
GLUCOSE SERPL-MCNC: 103 MG/DL (ref 83–110)
LARGE UNSTAINED CELL #: 0.1 K/MM3 (ref 0–0.4)
LARGE UNSTAINED CELL %: 0.7 % (ref 0–4)
LYMPHOCYTES # BLD AUTO: 0.9 K/MM3 (ref 1.5–4.5)
LYMPHOCYTES NFR BLD AUTO: 11 % (ref 24–44)
MAGNESIUM SERPL-MCNC: 2.4 MG/DL (ref 1.8–2.4)
MCH RBC QN AUTO: 32.7 PG (ref 27–33)
MCHC RBC AUTO-ENTMCNC: 32.2 G/DL (ref 32–36.5)
MCV RBC AUTO: 101.5 FL (ref 80–96)
MONOCYTES # BLD AUTO: 0.6 K/MM3 (ref 0–0.8)
MONOCYTES NFR BLD AUTO: 7.4 % (ref 0–5)
NEUTROPHILS # BLD AUTO: 6.2 K/MM3 (ref 1.8–7.7)
NEUTROPHILS NFR BLD AUTO: 78.8 % (ref 36–66)
PLATELET # BLD AUTO: 330 K/MM3 (ref 150–450)
POTASSIUM SERPL-SCNC: 4.1 MEQ/L (ref 3.5–5.1)
PROT SERPL-MCNC: 6 GM/DL (ref 6.4–8.2)
SODIUM SERPL-SCNC: 139 MEQ/L (ref 136–145)
TRIGL SERPL-MCNC: 61 MG/DL (ref ?–150)
URATE SERPL-MCNC: 3.4 MG/DL (ref 3.5–7.2)
WBC # BLD AUTO: 7.9 K/MM3 (ref 4–10)

## 2017-08-08 RX ADMIN — ALLOPURINOL SCH MG: 300 TABLET ORAL at 09:59

## 2017-08-08 RX ADMIN — PANTOPRAZOLE SODIUM SCH MG: 40 TABLET, DELAYED RELEASE ORAL at 09:58

## 2017-08-08 RX ADMIN — Medication SCH UNITS: at 09:59

## 2017-08-08 RX ADMIN — CLOTRIMAZOLE SCH MG: 10 LOZENGE ORAL at 10:02

## 2017-08-08 RX ADMIN — MIRTAZAPINE SCH MG: 15 TABLET, FILM COATED ORAL at 22:40

## 2017-08-08 RX ADMIN — AMOXICILLIN SCH MG: 500 CAPSULE ORAL at 06:01

## 2017-08-08 RX ADMIN — SIMVASTATIN SCH MG: 20 TABLET, FILM COATED ORAL at 09:59

## 2017-08-08 RX ADMIN — MAGNESIUM OXIDE SCH MG: 400 TABLET ORAL at 22:08

## 2017-08-08 RX ADMIN — FERROUS SULFATE TAB 325 MG (65 MG ELEMENTAL FE) SCH MG: 325 (65 FE) TAB at 22:07

## 2017-08-08 RX ADMIN — CLOPIDOGREL BISULFATE SCH MG: 75 TABLET ORAL at 09:58

## 2017-08-08 RX ADMIN — FERROUS SULFATE TAB 325 MG (65 MG ELEMENTAL FE) SCH MG: 325 (65 FE) TAB at 09:00

## 2017-08-08 RX ADMIN — ENOXAPARIN SODIUM SCH MG: 30 INJECTION SUBCUTANEOUS at 09:58

## 2017-08-08 RX ADMIN — DOCUSATE SODIUM SCH MG: 100 CAPSULE, LIQUID FILLED ORAL at 09:59

## 2017-08-08 RX ADMIN — ASPIRIN SCH MG: 81 TABLET ORAL at 09:59

## 2017-08-08 RX ADMIN — MAGNESIUM OXIDE SCH MG: 400 TABLET ORAL at 09:00

## 2017-08-08 RX ADMIN — TAMSULOSIN HYDROCHLORIDE SCH MG: 0.4 CAPSULE ORAL at 09:58

## 2017-08-08 RX ADMIN — CLOTRIMAZOLE SCH MG: 10 LOZENGE ORAL at 18:53

## 2017-08-08 RX ADMIN — DOCUSATE SODIUM SCH MG: 100 CAPSULE, LIQUID FILLED ORAL at 22:07

## 2017-08-08 RX ADMIN — CARBIDOPA AND LEVODOPA SCH TAB: 25; 100 TABLET ORAL at 18:52

## 2017-08-08 RX ADMIN — CLOTRIMAZOLE SCH MG: 10 LOZENGE ORAL at 22:08

## 2017-08-08 NOTE — IPN
DATE OF EXAMINATION:  08/08/2017

 

SUBJECTIVE:  The patient tells me that he is feeling weak.  He denies shortness

of breath, but he tells me that when he stands up or gets up, he does feel weak.

He denies lightheadedness, dizziness, chest pain, paralysis, paresthesias.

or slurred speech.

 

OBJECTIVE:

VITAL SIGNS:  Temperature is 98, pulse 65, respiratory rate 20, blood pressure

(BP) 123/60, oxygen (O2) saturation 97% on room air.

GENERAL:  He is a pleasant elderly  man sitting up in bed eating

breakfast.  He does not appear to be in acute distress.

HEENT:  Cranial nerves II-XII are grossly intact.  Appears to be mildly

dysarthric.  His tongue is midline.

CARDIOVASCULAR EXAMINATION:  S1, S2, regular.

RESPIRATORY EXAMINATION:  Is clear.

ABDOMINAL EXAMINATION:  Is benign.

EXTREMITIES:  No clubbing, cyanosis, or edema.  He has 5/5 strength in all four

extremities.

NEUROLOGICAL EXAMINATION:  Appears to be nonfocal.

 

LABORATORY STUDIES:

WBC 7.9, hemoglobin 8.8 down from 10.5, hematocrit 27.2, platelet count 330,

erythrocyte sedimentation rate (ESR) was 107 yesterday.

 

Chemistry panel:  Sodium 139, potassium 4.1, chloride 104, bicarbonate 26, BUN

27, creatinine 1.3 down from 1.6, hemoglobin A1c of 5.8.  He has a TSH within

normal limits.  Multiple sets of cardiac enzymes, which are negative.  A CRP

which was 14 yesterday.  Lipid panel with a low non-HDL.  His B12 and folate

appear to be within normal limits.

 

His MCV is elevated at 101.5.

 

His iron level is low.  His total iron-binding capacity (TIBC) is low.  His

ferritin is elevated.

 

IMAGING:

The patient did have a thoracic spine review that revealed diffuse degenerative

changes without evidence of acute fracture or dislocations.

 

He did have a CT scan of the head that revealed small vessel ischemic disease,

moderate volume loss.

 

He had a cervical spine CT that revealed no acute fracture or subluxation.  
There

was cervical spondyloses at C1-2 through C7-T1.

 

He did have an MRI of the brain that revealed punctate acute right frontal lobe

infarction, old left cerebellar lacunar infarction, small vessel ischemic

disease, and moderate volume loss.

 

He did have a vascular ultrasound that revealed no hemodynamically significant 
or

flow restricting lesions on the ICA of both sides.  High peak velocity in

the right CCA suggests a more proximal right CCA stenosis atherosclerotic plaque

in the distal CCA.

 

ASSESSMENT AND PLAN:  This is an 81-year-old man with multifactorial gait

difficulties.

 

PROBLEMS:

 

1.  Multifactorial gait difficulty and small right frontal acute ischemic 
stroke.

Dr. Eaton's help, of neurology, is greatly appreciated.  The patient has

lumbosacral spinal stenosis.  The patient was reportedly told he was not a

surgical candidate and failed pain management in the past.  The family declined

MRI of the lumbosacral spine, as they do not want to pursue any surgical 
options.

The patient is on aspirin, Plavix, and Zocor.  Physical therapy (PT) and

occupational therapy (OT) have been ordered, as well as a speech therapy

evaluation.  The patient remains on telemetry.  We are awaiting an

echocardiogram, which has been ordered.  Dr. Eaton could not exclude parkinsonism.

Sinemet will possibly be considered once his acute symptoms have resolved.  He

will have to followup with neurology on the outpatient setting.

 

2.  Constipation.  Continue with Colace.

 

3.  Insomnia.  Continue with Remeron.

 

4.  Lyme disease.  The patient is on amoxicillin.  Continue.

 

5.  Gout.  Continue with allopurinol.

 

6.  Vitamin D deficiency.  Continue with supplementation.

 

7.  Benign prostatic hypertrophy (BPH).  Continue with Flomax.

 

8.  Gastroesophageal reflux disease.  Continue with Protonix.

 

9.  Deep venous thrombosis (DVT) prophylaxis.  The patient is on Lovenox.

 

10.  Iron-deficiency anemia.  Will start the patient on iron supplementation.  
He

should have an outpatient colonoscopy.

SUAD

## 2017-08-08 NOTE — CR
DATE OF CONSULTATION: 08/07/2017

 

REFERRING PHYSICIAN:  Michelle Washington MD

 

REASON FOR CONSULTATION:  Imbalance and incoordination.

 

HISTORY OF PRESENT ILLNESS:  Rafael Lou is an 81-year-old man with history of

hypertension, acid reflux, chronic back pain, who was at his baseline state of

health until three weeks ago when he developed more joint pain and started

falling down.  He has fallen three times in the last one week.  They have been

more in the morning when he is trying to get out of bed to go to bathroom.  He

does not use walker.  He denies any loss of consciousness.  He was diagnosed with

Lyme disease 5 years ago and was appropriately treated.  He was again diagnosed

with Lyme disease in the emergency department and was started on doxycycline,

which was recently changed to amoxicillin.  He has difficulty swallowing.  He has

lost 20 pounds of weight over the last couple of months.  He denies any neck

pain.  He denies any headaches.  He denies any loss of consciousness or urinary

incontinence.  The patient has history of chronic back pain.  He was diagnosed

with lumbosacral spinal stenosis many years ago.  He was seen by Dr. Petey Queen,

who did not recommend lumbosacral laminectomy.  He was going to pain clinic for

epidural injections which did not help his back pain.

 

PAST MEDICAL HISTORY:

1.  Hypertension.

2.  Benign prostate enlargement

3.  Acid reflux

4.  Chronic kidney disease

5.  History of stroke in 2012

6.  Kidney stones.

5.  Lyme disease

6.  Coronary artery bypass graft

7.  Peripheral arterial bypass graft in legs

8.  Abdominal aortic aneurysm repair

9.  Inguinal hernia

10. Appendectomy.

 

SOCIAL HISTORY:  He denies smoking, alcohol or illicit drugs.  He lives with his

wife.

 

FAMILY HISTORY:  One of his daughters had breast cancer.  One sibling had lung

cancer.

 

REVIEW OF SYSTEMS:  All systems were reviewed and were found to be

noncontributory except as mentioned history present illness.

 

HOME MEDICATIONS:

-  allopurinol 300 mg by mouth daily

- amoxicillin 5 mg by mouth every 8 hours

- aspirin 81 mg by mouth daily

- Plavix 75 mg by mouth daily

- vitamin D3 1000 units by mouth daily

- colchicine 0.6 mg by mouth daily

- hydrochlorothiazide 25 mg by mouth half a tablet daily

- Prevacid 15 mg by mouth daily

- lovastatin 40 mg by mouth daily

- Remeron 7.5 mg by mouth at bedtime (q.h.s.)

- Flomax 0.4 mg by mouth daily

- tramadol 50 mg by mouth four times a day as needed.

 

PHYSICAL EXAMINATION:  Temperature 99.2, pulse 76, respiratory 16, blood pressure

122/61, 97% saturation on room air.

HEART:  Regular rate and rhythm.

LUNGS:  clear to auscultation.

ABDOMEN:  Soft, nontender, nondistended.

NEUROLOGIC EXAM:  The patient is awake, alert, oriented to place, person and

time.  Normal speech, comprehension and repetition.  Extraocular muscles are

intact.  No facial weakness. Tongue  Uvula midline.  5/5 strength in all four

extremities.  Deep tendon flexes are 2+ throughout.  Plantars are downgoing.  He

has normal light touch, pinprick, vibration sensation, etc in his feet.  His gait

is unsteady and mildly shuffling.  I do not see clear rigidity or tremor.  He

cannot do tandem walking.  His Romberg testing is mildly positive.

 

DIAGNOSTIC STUDIES:  His Magnetic Resonance Imaging (MRI) scan of brain showed a

small right frontal acute ischemic stroke.  His Lyme IgM Western blot is positive

in addition to positive MONICA.  His white blood count (WBC) was 10.8, hematocrit

31.8, platelet count 396, creatinine 1.6, vitamin B12 313, folic acid 6.8 and TSH

was 1.4.

 

ASSESSMENT:

1.  Multifactorial gait difficulty.

2.  Small right frontal acute ischemic stroke.

3.  Lyme disease which likely is not the cause of his imbalance and

incoordination.

4.  Lumbosacral spinal stenosis for which the patient was not a surgical

candidate in past and failed pain management.

 

PLAN:

1.  Physical and occupational therapy.

2.  Telemetry monitoring and echocardiogram.

3.  Continue aspirin 81 mg by mouth daily and Plavix 75 mg by mouth daily.

4.  I cannot exclude parkinsonism in his current state and that will be

reassessed during his hospitalization once his acute symptoms improve.  Trial of

Sinemet 25/100 by mouth four times a day will also be considered.

5.  Family declined Magnetic Resonance Imaging (MRI) scan of his lumbosacral

spine as they do not want to pursue any surgical options.  He has multilevel

degenerative cervical changes seen on CT scan of cervical spine.

6.  Follow with our office in 2-3 weeks after hospital discharge.

## 2017-08-09 VITALS — DIASTOLIC BLOOD PRESSURE: 71 MMHG | SYSTOLIC BLOOD PRESSURE: 137 MMHG

## 2017-08-09 VITALS — SYSTOLIC BLOOD PRESSURE: 107 MMHG | DIASTOLIC BLOOD PRESSURE: 68 MMHG

## 2017-08-09 VITALS — SYSTOLIC BLOOD PRESSURE: 119 MMHG | DIASTOLIC BLOOD PRESSURE: 58 MMHG

## 2017-08-09 VITALS — SYSTOLIC BLOOD PRESSURE: 102 MMHG | DIASTOLIC BLOOD PRESSURE: 80 MMHG

## 2017-08-09 VITALS — DIASTOLIC BLOOD PRESSURE: 66 MMHG | SYSTOLIC BLOOD PRESSURE: 130 MMHG

## 2017-08-09 VITALS — SYSTOLIC BLOOD PRESSURE: 102 MMHG | DIASTOLIC BLOOD PRESSURE: 60 MMHG

## 2017-08-09 LAB
ALBUMIN SERPL BCG-MCNC: 2 GM/DL (ref 3.2–5.2)
ALBUMIN/GLOB SERPL: 0.49 {RATIO} (ref 1–1.93)
ALP SERPL-CCNC: 66 U/L (ref 45–117)
ALT SERPL W P-5'-P-CCNC: 8 U/L (ref 12–78)
ANION GAP SERPL CALC-SCNC: 6 MEQ/L (ref 8–16)
AST SERPL-CCNC: 15 U/L (ref 15–37)
BASOPHILS # BLD AUTO: 0 K/MM3 (ref 0–0.2)
BASOPHILS NFR BLD AUTO: 0.7 % (ref 0–1)
BILIRUB SERPL-MCNC: 0.5 MG/DL (ref 0.2–1)
BUN SERPL-MCNC: 24 MG/DL (ref 7–18)
CALCIUM SERPL-MCNC: 8.3 MG/DL (ref 8.8–10.2)
CHLORIDE SERPL-SCNC: 105 MEQ/L (ref 98–107)
CO2 SERPL-SCNC: 27 MEQ/L (ref 21–32)
CREAT SERPL-MCNC: 1.3 MG/DL (ref 0.7–1.3)
EOSINOPHIL # BLD AUTO: 0.2 K/MM3 (ref 0–0.5)
EOSINOPHIL NFR BLD AUTO: 2.3 % (ref 0–3)
ERYTHROCYTE [DISTWIDTH] IN BLOOD BY AUTOMATED COUNT: 14.2 % (ref 11.5–14.5)
GFR SERPL CREATININE-BSD FRML MDRD: 56.5 ML/MIN/{1.73_M2} (ref 35–?)
GLUCOSE SERPL-MCNC: 103 MG/DL (ref 83–110)
LARGE UNSTAINED CELL #: 0.1 K/MM3 (ref 0–0.4)
LARGE UNSTAINED CELL %: 1.5 % (ref 0–4)
LYMPHOCYTES # BLD AUTO: 1.1 K/MM3 (ref 1.5–4.5)
LYMPHOCYTES NFR BLD AUTO: 15.6 % (ref 24–44)
MAGNESIUM SERPL-MCNC: 2.6 MG/DL (ref 1.8–2.4)
MCH RBC QN AUTO: 33 PG (ref 27–33)
MCHC RBC AUTO-ENTMCNC: 32.6 G/DL (ref 32–36.5)
MCV RBC AUTO: 101.1 FL (ref 80–96)
MONOCYTES # BLD AUTO: 0.5 K/MM3 (ref 0–0.8)
MONOCYTES NFR BLD AUTO: 7.4 % (ref 0–5)
NEUTROPHILS # BLD AUTO: 4.9 K/MM3 (ref 1.8–7.7)
NEUTROPHILS NFR BLD AUTO: 72.5 % (ref 36–66)
PLATELET # BLD AUTO: 340 K/MM3 (ref 150–450)
POTASSIUM SERPL-SCNC: 4.1 MEQ/L (ref 3.5–5.1)
PROT SERPL-MCNC: 6.1 GM/DL (ref 6.4–8.2)
SODIUM SERPL-SCNC: 138 MEQ/L (ref 136–145)
WBC # BLD AUTO: 6.7 K/MM3 (ref 4–10)

## 2017-08-09 PROCEDURE — 30253N1: ICD-10-PCS | Performed by: HOSPITALIST

## 2017-08-09 RX ADMIN — SIMVASTATIN SCH MG: 20 TABLET, FILM COATED ORAL at 10:00

## 2017-08-09 RX ADMIN — DOCUSATE SODIUM SCH MG: 100 CAPSULE, LIQUID FILLED ORAL at 10:00

## 2017-08-09 RX ADMIN — DOCUSATE SODIUM SCH MG: 100 CAPSULE, LIQUID FILLED ORAL at 21:00

## 2017-08-09 RX ADMIN — CARBIDOPA AND LEVODOPA SCH TAB: 25; 100 TABLET ORAL at 16:47

## 2017-08-09 RX ADMIN — ASPIRIN SCH MG: 81 TABLET ORAL at 10:00

## 2017-08-09 RX ADMIN — MIRTAZAPINE SCH MG: 15 TABLET, FILM COATED ORAL at 21:15

## 2017-08-09 RX ADMIN — CLOTRIMAZOLE SCH MG: 10 LOZENGE ORAL at 16:47

## 2017-08-09 RX ADMIN — TAMSULOSIN HYDROCHLORIDE SCH MG: 0.4 CAPSULE ORAL at 10:00

## 2017-08-09 RX ADMIN — CARBIDOPA AND LEVODOPA SCH TAB: 25; 100 TABLET ORAL at 13:43

## 2017-08-09 RX ADMIN — Medication SCH UNITS: at 10:00

## 2017-08-09 RX ADMIN — PANTOPRAZOLE SODIUM SCH MG: 40 TABLET, DELAYED RELEASE ORAL at 10:00

## 2017-08-09 RX ADMIN — CLOPIDOGREL BISULFATE SCH MG: 75 TABLET ORAL at 10:00

## 2017-08-09 RX ADMIN — ALLOPURINOL SCH MG: 300 TABLET ORAL at 10:00

## 2017-08-09 RX ADMIN — DOCUSATE SODIUM,SENNOSIDES SCH TAB: 50; 8.6 TABLET, FILM COATED ORAL at 21:15

## 2017-08-09 RX ADMIN — CARBIDOPA AND LEVODOPA SCH TAB: 25; 100 TABLET ORAL at 10:00

## 2017-08-09 RX ADMIN — MAGNESIUM OXIDE SCH MG: 400 TABLET ORAL at 09:00

## 2017-08-09 RX ADMIN — CLOTRIMAZOLE SCH MG: 10 LOZENGE ORAL at 21:15

## 2017-08-09 RX ADMIN — MAGNESIUM OXIDE SCH MG: 400 TABLET ORAL at 21:15

## 2017-08-09 RX ADMIN — CLOTRIMAZOLE SCH MG: 10 LOZENGE ORAL at 10:01

## 2017-08-09 RX ADMIN — FERROUS SULFATE TAB 325 MG (65 MG ELEMENTAL FE) SCH MG: 325 (65 FE) TAB at 10:00

## 2017-08-09 RX ADMIN — ENOXAPARIN SODIUM SCH MG: 30 INJECTION SUBCUTANEOUS at 09:59

## 2017-08-09 RX ADMIN — FERROUS SULFATE TAB 325 MG (65 MG ELEMENTAL FE) SCH MG: 325 (65 FE) TAB at 21:15

## 2017-08-09 RX ADMIN — DOCUSATE SODIUM,SENNOSIDES SCH TAB: 50; 8.6 TABLET, FILM COATED ORAL at 13:43

## 2017-08-09 RX ADMIN — POLYETHYLENE GLYCOL 3350 SCH PKT: 17 POWDER, FOR SOLUTION ORAL at 21:14

## 2017-08-09 NOTE — ECHO
DATE OF PROCEDURE: 08/08/2017

 

REFERRING PHYSICIAN:  Dr. Michelle Washington.

 

INDICATION:  Cerebrovascular accident.  History of coronary artery bypass graft

(CABG).

 

HEIGHT:  67 inches.

WEIGHT: 156 pounds.

 

DIMENSION:

IVS: 1.2

LV: 4.4

LVPW: 1.2

LA: 5.5

Aorta: 3.7

RV: 3.6

LV systolic: 3.2

 

FINDINGS: The study is of acceptable technical quality.  Left ventricle is of

normal size and grossly normal contractility.  There is septal wall motion

abnormality likely related to prior open heart surgery.  Right ventricle is also

normal size and systolic function.  Left atrium is severely enlarged.  Right

atrium appears grossly normal.  Aortic valve is mildly sclerotic but it has three

cusps and normal contractility.  Mitral and tricuspid valves appear normal.

Pulmonic valve was not well seen.  No pericardial effusion is noted.  Inferior

vena cava was not visualized.  Aortic root is normal.  Aortic arch and abdominal

aorta were not well seen.

 

Doppler interrogation reveals no significant aortic stenosis or insufficiency.

There is mild or mild-to-moderate mitral insufficiency and only trace tricuspid

insufficiency.  Estimated pulmonary artery pressure is within normal limits, but

based on fair quality of TR jet and consequently should not be considered

completely reliable.

 

Evaluation of the diastolic function is inconclusive.  There is normal mitral

inflow pattern and normal flow in pulmonary veins but tissue Doppler velocities

were not obtained.

 

CONCLUSIONS:

 

1.  Study is of acceptable technical quality.

2.  Normal LV size with mild left ventricular hypertrophy and grossly preserved

LV systolic function.

3.  Mild-to-moderate mitral insufficiency.

4.  No further significant valvular disease.

5.  Unable to estimate central venous pressure and pulmonary artery pressure.

 

COMMENT: Subacute bacterial endocarditis (SBE) prophylaxis is not recommended.

Study does not provide obvious explanation for cerebrovascular accident.

## 2017-08-09 NOTE — IPN
DATE:  08/09/2017

 

SUBJECTIVE:  Today the patient tells me that he feels fine.  He tells me that he

did have a fall last evening when he stood up from the toilet.  He denies

lightheadedness or dizziness upon standing and he did not lose consciousness or

hit his head.  He tells me that he does not feel lightheadedness or dizziness

when he stands this morning either and that he feels well without any 
complaints.

 

OBJECTIVE:  Vital signs while lying supine:  Blood pressure is 138/68 with a

pulse of 89.  While standing his blood pressure is 68/51 with pulse of 57.

Temperature is 98.9, pulse 55, respiratory rate 18, oxygen saturation 96% on 
room

air.

General:  He is a frail, elderly,  male lying flat in bed.  He did not

appear to be in any acute distress.  He is awake, alert, oriented times three.

HEENT:  Cranial nerves II through XII appear to be grossly intact.  He has moist

mucous membranes and elevation of CVP.

Cardiovascular exam:  S1, S2, regular.

Respiratory exam: Fairly clear.

Abdominal exam:  Benign.  Somewhat scaphoid.

Extremities:  No clubbing, cyanosis or edema.  He does appear wasted.

 

LABORATORY STUDIES:  WBC 6.7, hemoglobin 8.7, hematocrit 26.7, platelet count

340.  .  Chemistry panel: Sodium 138, potassium 4.1, chloride 105,

bicarbonate 27, BUN 24, creatinine 1.3.  Magnesium is 2.6.  Both sets of cardiac

enzymes are negative.  Folate, B12, and TSH levels are all within normal limits.

No new imaging.

 

ASSESSMENT AND PLAN:  This is an 81-year-old man with multifactorial gait

difficulties.

 

PROBLEMS:

1.  Multifactorial gait difficulties.  Dr. Eaton's help

was greatly appreciated.  The patient has a small right frontal acute ischemic

stroke.  Patient has lumbosacral spinal stenosis and he was told he is not a

surgical candidate.  Family declined an MRI of the lumbosacral region.  Patient

is also notably orthostatic.  There is also concern the patient may have

Parkinson's and as such, he has begun a trial of Sinemet.  The patient is also

anemic with a hemoglobin in the 8s and he does have symptoms of orthostasis. All

of the above are likely contributing factors to his gait difficulty and falls.

 

2.  Acute ischemic stroke.  No clear etiology is determined at this time.  He is

on Plavix, aspirin, Zocor.  Physical therapy and occupational therapy are 
working

with him.  Speech therapy has recommended mechanical soft diet.  Dr. Eaton's help

has been appreciated.

 

3.  Suspected Parkinsonism.  The patient is orthostatic, hypotensive.  He does

have some Parkinsonism features.  He has been started on Sinemet yesterday

evening.  He only received one dose and did not notice any significant change in

his symptoms.  Will continue to monitor closely.

 

4.  Symptomatic anemia.  The patient had a blood transfusion apparently 1 month

ago he tells me from his primary care provider Dr. Wall.  He tells me he had 
a

colonoscopy many, many years ago and does not remember the results.  Given that

he has had weight loss, poor appetite and is orthostatic, I will transfuse him

two units of packed red blood cells and I have asked Dr. Matamoros of general

surgery to evaluate him for the need for potential colonoscopy to rule out a

colonic malignancy given that he has symptomatic iron deficiency anemia and an

elderly man.

 

5.  Lumbosacral spinal stenosis.  Patient was told he was not a surgical

candidate in the past.  Family declined further work and further imaging.

Patient continues to work with physical therapy.

 

6.  Non-sustained ventricular tachycardia.  The patient had several episodes of

tachycardia and revealed a preserved ejection fraction.  His electrolytes have

been optimized.  Simply monitor for now.  He is asymptomatic during brief

episodes of PVCs.

 

7.  Chronic constipation, continue with bowel regimen.

 

8.  Lyme disease.  The patient completed a 10 day course of amoxicillin as per

outpatient provider.

 

9.  Mood disorder.  The patient is on Remeron at night.  This could be a

contributing factor to his orthostatics, however, he is on a very low dose.  For

now, we will try a trial of fluid resuscitation with blood products and continue

to monitor.  If his orthostatics fail to improve, could consider discontinuing

this medication.

 

10.  Gout.  Continue with allopurinol.

 

11.  Vitamin D deficiency.  Continue with supplementation.

 

12.  Decreased oral intake.  Unclear etiology.  He is on a soft mechanical diet.

He has no evidence of dysphagia.  He has had a swallow evaluation but I do have

concern for an occult malignancy and as such, colonoscopy has been requested by

Dr. Matamoros.

 

13.  Benign prostatic hypertrophy (BPH), the patient is on Flomax.

 

14.  Gastroesophageal reflux disease, patient is on Protonix.

 

15.  Deep venous thrombosis (DVT) prophylaxis.  Patient is on Lovenox.

 

Will continue to follow this patient very closely.

MTDD

## 2017-08-09 NOTE — CR
DATE OF CONSULTATION:  08/09/2017

 

REASON FOR CONSULTATION: Iron-deficient anemia.

 

HISTORY OF PRESENT ILLNESS: The patient is an 81-year-old male with a history of

hypertension, hyperlipidemia, gastroesophageal reflux disease (GERD), stroke, and

coronary artery disease presents to the hospital on 08/07/2017 with a history of

multiple falls and weakness as well as a 20-pound weight loss over the last 3

weeks.  He currently is being treated for a new onset of frontal lobe stroke.

However, during this admission they have noticed increasing drop of his

hemoglobin. He has had an outpatient transfusion by Dr. Wall just over a month

ago and his hemoglobin has now dropped back down to 8.7.  He is positive

orthostatic hypotension and will be receiving two more units of blood this

afternoon. Because of this anemia that is without a source, I was asked to see

him for possible colonoscopy and endoscopy.  There is no signs of difficulty

swallowing and coughing with swallowing from the records.  However, the patient

denies any difficulty with eating or swallowing to me and he says that he has

just had a lack of appetite for last 3 weeks.  He does admit to the weight loss,

but again he says it is not because he eating, it just because of lack of

interest in food. Denies any fevers or chills.  No night sweats.  No family

history of colon cancer or colon diseases.  He thinks he had a colonoscopy many

years ago, but is not sure of what it was or what they found. No signs of any

blood in his stool.  No coughing up blood.  No vomiting blood.  I talked to him

about a colonoscopy and at this point he is completely uninterested.

 

PAST MEDICAL HISTORY:

Hypertension.

Hyperlipidemia.

GERD.

Benign prostatic hypertrophy (BPH).

Chronic kidney disease.

History of transient ischemic attack (TIA).

History of kidney stones.

Unsteady gait.

Lyme disease.

 

PAST SURGICAL HISTORY:

Coronary artery bypass graft (CABG).

Fem-pop bypass.

Abdominal aortic aneurysm repair.

Right inguinal hernia.

Appendectomy.

 

SOCIAL HISTORY: Denies drug, alcohol, tobacco usage.

 

FAMILY HISTORY: Noncontributory.

 

REVIEW OF SYSTEMS: Pertinent positive and negatives stated in the HPI.

 

PHYSICAL EXAMINATION:

General:  Patient is awake and alert, oriented times three.  Vitals:  Temperature

98, pulse 80, respirations 18, blood pressure 119/58, pulse oximetry 94% on room

air.  HEENT:  Pupils equal, round, react to light and accommodation.  Heart:

S1-S2, regular rate and rhythm. Lungs:  Clear to auscultation bilaterally.

Abdomen:  Soft, nontender, nondistended. Bowel sounds positive. Extremities: No

clubbing, cyanosis or edema.

 

LABORATORY DATA: White count 6.7, hemoglobin 10.5 on admission, down to 8.7

today, platelets 340.  Iron is 28, total iron-binding concentration is 137,

albumin is 2.

 

IMAGING STUDIES:

Brain MRI shows a punctate acute right frontal lobe infarct, old left cerebellar

lacunar infarct. Small vessel ischemic disease and moderate volume loss.

 

ASSESSMENT/PLAN: Patient is an 81-year-old male currently being hospitalized for

stroke.  He has weight loss of over 20 pounds in the past month as well as loss

of appetite.  There is possibility of some dysphagia and he has got

iron-deficient anemia that he has already received one outpatient transfusion for

and he has receiving another one today due to orthostatic hypotension.  I was

asked to evaluate him for upper and lower endoscopy to find a source for this

anemia.  However, after discussing this with the patient, he is uninterested at

this time. I had this conversation with him as well as his wife and his daughter

in the room and none of them seemed interested in having any endoscopy completed.

So at this point we will continue to watch the patient; if he does well after

this transfusion then he will likely be discharged home.  However, if he shows

signs of continued blood loss then I would recommend he see me as an outpatient

to have this endoscopy completed.

## 2017-08-09 NOTE — ECGEPIP
Stationary ECG Study

                           [facility] - ED

                                       

                                       Test Date:    2017

Pat Name:     FELISA SANCHEZ           Department:   

Patient ID:   [id number]                 Room:         -

Gender:       M                        Technician:   rn

:          [id number]               Requested By: Toney DENNEY

Order Number: [id number]     Reading MD:   Maja Lundborg-Gray

                                 Measurements

Intervals                              Axis          

Rate:         69                       P:            

MO:           0                        QRS:          7

QRSD:         94                       T:            46

QT:           406                                    

QTc:          436                                    

                           Interpretive Statements

PROBABLE NSR WIHT PACS

MINIMAL ST DEPRESSION

ABNORMAL RHYTHM ECG

DELAYED R WAVE PROGRESSION

 

 17 - NONSPECIFIC ST T WAVE CHANGES

 

Electronically Signed On 2017 19:56:40 EDT by Maja Lundborg-Gray

## 2017-08-10 VITALS — SYSTOLIC BLOOD PRESSURE: 76 MMHG | DIASTOLIC BLOOD PRESSURE: 50 MMHG

## 2017-08-10 VITALS — DIASTOLIC BLOOD PRESSURE: 79 MMHG | SYSTOLIC BLOOD PRESSURE: 143 MMHG

## 2017-08-10 VITALS — DIASTOLIC BLOOD PRESSURE: 72 MMHG | SYSTOLIC BLOOD PRESSURE: 129 MMHG

## 2017-08-10 VITALS — SYSTOLIC BLOOD PRESSURE: 114 MMHG | DIASTOLIC BLOOD PRESSURE: 64 MMHG

## 2017-08-10 LAB
ALBUMIN SERPL BCG-MCNC: 2.1 GM/DL (ref 3.2–5.2)
ALBUMIN/GLOB SERPL: 0.49 {RATIO} (ref 1–1.93)
ALP SERPL-CCNC: 70 U/L (ref 45–117)
ALT SERPL W P-5'-P-CCNC: 9 U/L (ref 12–78)
ANION GAP SERPL CALC-SCNC: 9 MEQ/L (ref 8–16)
AST SERPL-CCNC: 14 U/L (ref 15–37)
BASOPHILS # BLD AUTO: 0.1 K/MM3 (ref 0–0.2)
BASOPHILS NFR BLD AUTO: 0.6 % (ref 0–1)
BILIRUB SERPL-MCNC: 0.9 MG/DL (ref 0.2–1)
BUN SERPL-MCNC: 20 MG/DL (ref 7–18)
CALCIUM SERPL-MCNC: 8.6 MG/DL (ref 8.8–10.2)
CHLORIDE SERPL-SCNC: 107 MEQ/L (ref 98–107)
CO2 SERPL-SCNC: 25 MEQ/L (ref 21–32)
CREAT SERPL-MCNC: 1.24 MG/DL (ref 0.7–1.3)
EOSINOPHIL # BLD AUTO: 0.1 K/MM3 (ref 0–0.5)
EOSINOPHIL NFR BLD AUTO: 0.7 % (ref 0–3)
ERYTHROCYTE [DISTWIDTH] IN BLOOD BY AUTOMATED COUNT: 16.4 % (ref 11.5–14.5)
GFR SERPL CREATININE-BSD FRML MDRD: 59.7 ML/MIN/{1.73_M2} (ref 35–?)
GLUCOSE SERPL-MCNC: 108 MG/DL (ref 83–110)
LARGE UNSTAINED CELL #: 0.1 K/MM3 (ref 0–0.4)
LARGE UNSTAINED CELL %: 0.9 % (ref 0–4)
LYMPHOCYTES # BLD AUTO: 1.1 K/MM3 (ref 1.5–4.5)
LYMPHOCYTES NFR BLD AUTO: 10.9 % (ref 24–44)
MAGNESIUM SERPL-MCNC: 2.6 MG/DL (ref 1.8–2.4)
MCH RBC QN AUTO: 32.7 PG (ref 27–33)
MCHC RBC AUTO-ENTMCNC: 32.8 G/DL (ref 32–36.5)
MCV RBC AUTO: 99.8 FL (ref 80–96)
MONOCYTES # BLD AUTO: 0.5 K/MM3 (ref 0–0.8)
MONOCYTES NFR BLD AUTO: 5.5 % (ref 0–5)
NEUTROPHILS # BLD AUTO: 7.6 K/MM3 (ref 1.8–7.7)
NEUTROPHILS NFR BLD AUTO: 81.3 % (ref 36–66)
PLATELET # BLD AUTO: 326 K/MM3 (ref 150–450)
POTASSIUM SERPL-SCNC: 4.3 MEQ/L (ref 3.5–5.1)
PROT SERPL-MCNC: 6.4 GM/DL (ref 6.4–8.2)
SODIUM SERPL-SCNC: 141 MEQ/L (ref 136–145)
WBC # BLD AUTO: 9.3 K/MM3 (ref 4–10)

## 2017-08-10 RX ADMIN — DOCUSATE SODIUM SCH MG: 100 CAPSULE, LIQUID FILLED ORAL at 20:30

## 2017-08-10 RX ADMIN — DOCUSATE SODIUM,SENNOSIDES SCH TAB: 50; 8.6 TABLET, FILM COATED ORAL at 20:30

## 2017-08-10 RX ADMIN — CLOTRIMAZOLE SCH MG: 10 LOZENGE ORAL at 17:12

## 2017-08-10 RX ADMIN — FERROUS SULFATE TAB 325 MG (65 MG ELEMENTAL FE) SCH MG: 325 (65 FE) TAB at 09:37

## 2017-08-10 RX ADMIN — CARBIDOPA AND LEVODOPA SCH TAB: 25; 100 TABLET ORAL at 12:18

## 2017-08-10 RX ADMIN — CARBIDOPA AND LEVODOPA SCH TAB: 25; 100 TABLET ORAL at 09:37

## 2017-08-10 RX ADMIN — DOCUSATE SODIUM SCH MG: 100 CAPSULE, LIQUID FILLED ORAL at 09:38

## 2017-08-10 RX ADMIN — ASPIRIN SCH MG: 81 TABLET ORAL at 09:38

## 2017-08-10 RX ADMIN — CLOTRIMAZOLE SCH MG: 10 LOZENGE ORAL at 20:30

## 2017-08-10 RX ADMIN — POLYETHYLENE GLYCOL 3350 SCH PKT: 17 POWDER, FOR SOLUTION ORAL at 09:36

## 2017-08-10 RX ADMIN — SIMVASTATIN SCH MG: 20 TABLET, FILM COATED ORAL at 09:38

## 2017-08-10 RX ADMIN — CARBIDOPA AND LEVODOPA SCH TAB: 25; 100 TABLET ORAL at 17:12

## 2017-08-10 RX ADMIN — TAMSULOSIN HYDROCHLORIDE SCH MG: 0.4 CAPSULE ORAL at 09:38

## 2017-08-10 RX ADMIN — CLOPIDOGREL BISULFATE SCH MG: 75 TABLET ORAL at 09:38

## 2017-08-10 RX ADMIN — ENOXAPARIN SODIUM SCH MG: 30 INJECTION SUBCUTANEOUS at 09:37

## 2017-08-10 RX ADMIN — DOCUSATE SODIUM,SENNOSIDES SCH TAB: 50; 8.6 TABLET, FILM COATED ORAL at 09:37

## 2017-08-10 RX ADMIN — MAGNESIUM OXIDE SCH MG: 400 TABLET ORAL at 12:10

## 2017-08-10 RX ADMIN — Medication SCH UNITS: at 09:38

## 2017-08-10 RX ADMIN — POLYETHYLENE GLYCOL 3350 SCH PKT: 17 POWDER, FOR SOLUTION ORAL at 20:30

## 2017-08-10 RX ADMIN — CLOTRIMAZOLE SCH MG: 10 LOZENGE ORAL at 09:38

## 2017-08-10 RX ADMIN — PANTOPRAZOLE SODIUM SCH MG: 40 TABLET, DELAYED RELEASE ORAL at 09:38

## 2017-08-10 RX ADMIN — ALLOPURINOL SCH MG: 300 TABLET ORAL at 09:38

## 2017-08-10 RX ADMIN — FERROUS SULFATE TAB 325 MG (65 MG ELEMENTAL FE) SCH MG: 325 (65 FE) TAB at 20:30

## 2017-08-10 NOTE — IPN
DATE:  08/10/2017

 

SUBJECTIVE:  Today the patient tells me that he still feels weak, although he

feels somewhat better after getting blood.  Denies chest pain, shortness of

breath, fevers, chills, nausea, vomiting.  He still complains of poor appetite,

unsteadiness on his feet.

 

OBJECTIVE:

VITAL SIGNS:  Temperature 98.6, pulse 84, respiratory rate 20, blood pressure

114/64, oxygen saturation 100% on room air.

General:  He is a pleasant, elderly,  male lying flat in bed.  He is

accompanied by his daughter, who is at [facility].  He does not appear to be in any

acute distress.

HEENT:  Cranial nerves II through XII are grossly intact.  He has moist mucous

membranes.  No elevation of central venous pressure.

Cardiovascular exam:  S1, S2, regular.

Respiratory exam: Fairly clear.

Abdominal exam:  Benign.

Extremities:  No clubbing, cyanosis or edema.

 

LABORATORY STUDIES:  WBC 9.3, hemoglobin 11.1, up from 8.7, hematocrit 33.8,

platelet count 326.

 

Chemistry panel:  Sodium 141, potassium 4.3, chloride 107, bicarbonate 25, BUN

20, creatinine 1.2.

 

Occult stool for blood was positive.

 

No new imaging.

 

ASSESSMENT AND PLAN:  This is an 81-year-old man with multifactorial gait

difficulties.

 

PROBLEMS:

1.  Multifactorial gait difficulties.  Dr. Eaton's help was greatly appreciated.

Many contributing factors.  He has had a small acute right frontal ischemic

stroke, he has lumbosacral spinal stenosis and declined interventions and further

evaluation regarding it in the past.  There is also concern that he may have

Parkinson's, as well as he has been orthostatic and iron deficiency anemia. He is

continuing to work with physical therapy.  I have encouraged physical therapy to

work with him today.

 

2.  Acute ischemic stroke.  No clear etiology.  He is on aspirin, Plavix and

Zocor. He continues to work with physical therapy and occupational therapy.  Dr. Eaton's help is appreciated.

 

3.  Suspected Parkinsonism.  He has orthostasis, maybe related to this.  He has

been started on Sinemet.  He does not notice any significant change in his

complaints.  We will defer to neurology.

 

4.  Symptomatic anemia.  He did receive a transfusion of 2 units yesterday.  He

is still orthostatic this morning following that.  As such, I feel that it is

less likely that he is volume depleted and this more likely related an autonomic

dysfunction. A B12 level and TSH were within normal limits.  At this time, I will

discontinue his mirtazapine, as it could be potential etiology.  If this fails to

improve, I suspect that it is likely related to autonomic dysfunction and will

warrant further outpatient workup.

 

5.  Lumbosacral spinal stenosis.  Patient has been told that he is not a surgical

candidate in the past.  Family declined further workup, including MRI at this

time. As outlined above.

 

6.  With weight loss, poor oral intake, decreased appetite, iron deficiency

anemia, occult stool positive for blood, there is certainly concern for colon

cancer. I have asked Dr. Matamoros to see the patient and consider a colonoscopy.

At this time, the patient declines it.  He is considering do not resuscitate and

do not intubate, but is not prepared to sign the paperwork.  It seems as though

he is very limited in what medical interventions he wants.  Given his advanced

age, it is certainly a conversation that should be completed; however, he is

declining this now and it can be considered in the outpatient setting.

 

7.  Lyme disease.  The patient completed a 10 day course of amoxicillin as per

outpatient provider.

 

8.  Mood disorder.  We are discontinuing his Remeron as outlined above.  Should

his orthostatics fail to improve after discontinuing this low dose of Remeron,

could consider restarting it.

 

9  Gout.  Continue with allopurinol.

 

10.  Vitamin D deficiency.  Continue with supplementation.

 

11.   Benign prostatic hypertrophy (BPH), the patient is on Flomax.

 

12.  Gastroesophageal reflux disease, patient is on Protonix.

 

13.  Deep venous thrombosis (DVT) prophylaxis.  He is on Lovenox.

 

Awaiting physical therapy (PT) recommendations.  He may be a candidate for

Physical Medicine and Rehabilitation (PM R).  WE will discuss further with

neurology.  He may be able to be discharged in the next 24 to 48 hours.

## 2017-08-11 ENCOUNTER — HOSPITAL ENCOUNTER (INPATIENT)
Dept: HOSPITAL 53 - M PM&R | Age: 82
LOS: 7 days | Discharge: HOME HEALTH | DRG: 57 | End: 2017-08-18
Attending: PHYSICAL MEDICINE & REHABILITATION | Admitting: PHYSICAL MEDICINE & REHABILITATION
Payer: MEDICARE

## 2017-08-11 VITALS — DIASTOLIC BLOOD PRESSURE: 69 MMHG | SYSTOLIC BLOOD PRESSURE: 132 MMHG

## 2017-08-11 VITALS — DIASTOLIC BLOOD PRESSURE: 70 MMHG | SYSTOLIC BLOOD PRESSURE: 114 MMHG

## 2017-08-11 VITALS — SYSTOLIC BLOOD PRESSURE: 142 MMHG | DIASTOLIC BLOOD PRESSURE: 66 MMHG

## 2017-08-11 VITALS — DIASTOLIC BLOOD PRESSURE: 63 MMHG | SYSTOLIC BLOOD PRESSURE: 134 MMHG

## 2017-08-11 VITALS — SYSTOLIC BLOOD PRESSURE: 124 MMHG | DIASTOLIC BLOOD PRESSURE: 73 MMHG

## 2017-08-11 VITALS — SYSTOLIC BLOOD PRESSURE: 154 MMHG | DIASTOLIC BLOOD PRESSURE: 75 MMHG

## 2017-08-11 DIAGNOSIS — Z79.899: ICD-10-CM

## 2017-08-11 DIAGNOSIS — N18.3: ICD-10-CM

## 2017-08-11 DIAGNOSIS — Z98.62: ICD-10-CM

## 2017-08-11 DIAGNOSIS — G20: ICD-10-CM

## 2017-08-11 DIAGNOSIS — I69.398: Primary | ICD-10-CM

## 2017-08-11 DIAGNOSIS — B37.0: ICD-10-CM

## 2017-08-11 DIAGNOSIS — K21.9: ICD-10-CM

## 2017-08-11 DIAGNOSIS — D50.9: ICD-10-CM

## 2017-08-11 DIAGNOSIS — I25.10: ICD-10-CM

## 2017-08-11 DIAGNOSIS — Z95.1: ICD-10-CM

## 2017-08-11 DIAGNOSIS — Z87.442: ICD-10-CM

## 2017-08-11 DIAGNOSIS — N40.1: ICD-10-CM

## 2017-08-11 DIAGNOSIS — R33.9: ICD-10-CM

## 2017-08-11 DIAGNOSIS — Z66: ICD-10-CM

## 2017-08-11 DIAGNOSIS — R26.81: ICD-10-CM

## 2017-08-11 DIAGNOSIS — Z79.02: ICD-10-CM

## 2017-08-11 DIAGNOSIS — E83.42: ICD-10-CM

## 2017-08-11 DIAGNOSIS — R29.6: ICD-10-CM

## 2017-08-11 DIAGNOSIS — E88.09: ICD-10-CM

## 2017-08-11 DIAGNOSIS — M48.07: ICD-10-CM

## 2017-08-11 DIAGNOSIS — Z79.891: ICD-10-CM

## 2017-08-11 LAB
ALBUMIN SERPL BCG-MCNC: 2.2 GM/DL (ref 3.2–5.2)
ALBUMIN/GLOB SERPL: 0.51 {RATIO} (ref 1–1.93)
ALP SERPL-CCNC: 72 U/L (ref 45–117)
ALT SERPL W P-5'-P-CCNC: 11 U/L (ref 12–78)
ANION GAP SERPL CALC-SCNC: 7 MEQ/L (ref 8–16)
AST SERPL-CCNC: 13 U/L (ref 15–37)
BASOPHILS # BLD AUTO: 0 K/MM3 (ref 0–0.2)
BASOPHILS NFR BLD AUTO: 0.6 % (ref 0–1)
BILIRUB SERPL-MCNC: 0.5 MG/DL (ref 0.2–1)
BUN SERPL-MCNC: 22 MG/DL (ref 7–18)
CALCIUM SERPL-MCNC: 8.4 MG/DL (ref 8.8–10.2)
CHLORIDE SERPL-SCNC: 109 MEQ/L (ref 98–107)
CO2 SERPL-SCNC: 28 MEQ/L (ref 21–32)
CREAT SERPL-MCNC: 1.28 MG/DL (ref 0.7–1.3)
EOSINOPHIL # BLD AUTO: 0.2 K/MM3 (ref 0–0.5)
EOSINOPHIL NFR BLD AUTO: 2.2 % (ref 0–3)
ERYTHROCYTE [DISTWIDTH] IN BLOOD BY AUTOMATED COUNT: 15.9 % (ref 11.5–14.5)
GFR SERPL CREATININE-BSD FRML MDRD: 57.6 ML/MIN/{1.73_M2} (ref 35–?)
GLUCOSE SERPL-MCNC: 99 MG/DL (ref 83–110)
LARGE UNSTAINED CELL #: 0.1 K/MM3 (ref 0–0.4)
LARGE UNSTAINED CELL %: 0.9 % (ref 0–4)
LYMPHOCYTES # BLD AUTO: 0.9 K/MM3 (ref 1.5–4.5)
LYMPHOCYTES NFR BLD AUTO: 12 % (ref 24–44)
MAGNESIUM SERPL-MCNC: 2.7 MG/DL (ref 1.8–2.4)
MCH RBC QN AUTO: 32.1 PG (ref 27–33)
MCHC RBC AUTO-ENTMCNC: 32 G/DL (ref 32–36.5)
MCV RBC AUTO: 100.2 FL (ref 80–96)
MONOCYTES # BLD AUTO: 0.6 K/MM3 (ref 0–0.8)
MONOCYTES NFR BLD AUTO: 7.3 % (ref 0–5)
NEUTROPHILS # BLD AUTO: 5.9 K/MM3 (ref 1.8–7.7)
NEUTROPHILS NFR BLD AUTO: 77 % (ref 36–66)
PLATELET # BLD AUTO: 398 K/MM3 (ref 150–450)
POTASSIUM SERPL-SCNC: 4.5 MEQ/L (ref 3.5–5.1)
PROT SERPL-MCNC: 6.5 GM/DL (ref 6.4–8.2)
SODIUM SERPL-SCNC: 144 MEQ/L (ref 136–145)
WBC # BLD AUTO: 7.6 K/MM3 (ref 4–10)

## 2017-08-11 RX ADMIN — DOCUSATE SODIUM,SENNOSIDES SCH TAB: 50; 8.6 TABLET, FILM COATED ORAL at 21:20

## 2017-08-11 RX ADMIN — CLOPIDOGREL BISULFATE SCH MG: 75 TABLET ORAL at 08:34

## 2017-08-11 RX ADMIN — DOCUSATE SODIUM SCH MG: 100 CAPSULE, LIQUID FILLED ORAL at 21:20

## 2017-08-11 RX ADMIN — CLOTRIMAZOLE SCH MG: 10 LOZENGE ORAL at 08:35

## 2017-08-11 RX ADMIN — CARBIDOPA AND LEVODOPA SCH TAB: 25; 100 TABLET ORAL at 12:23

## 2017-08-11 RX ADMIN — POLYETHYLENE GLYCOL 3350 SCH PKT: 17 POWDER, FOR SOLUTION ORAL at 08:35

## 2017-08-11 RX ADMIN — Medication SCH UNITS: at 08:34

## 2017-08-11 RX ADMIN — CARBIDOPA AND LEVODOPA SCH TAB: 25; 100 TABLET ORAL at 08:34

## 2017-08-11 RX ADMIN — ALLOPURINOL SCH MG: 300 TABLET ORAL at 08:34

## 2017-08-11 RX ADMIN — ENOXAPARIN SODIUM SCH MG: 30 INJECTION SUBCUTANEOUS at 08:35

## 2017-08-11 RX ADMIN — PANTOPRAZOLE SODIUM SCH MG: 40 TABLET, DELAYED RELEASE ORAL at 08:34

## 2017-08-11 RX ADMIN — DOCUSATE SODIUM SCH MG: 100 CAPSULE, LIQUID FILLED ORAL at 08:35

## 2017-08-11 RX ADMIN — FERROUS SULFATE TAB 325 MG (65 MG ELEMENTAL FE) SCH MG: 325 (65 FE) TAB at 21:20

## 2017-08-11 RX ADMIN — CARBIDOPA AND LEVODOPA SCH TAB: 25; 100 TABLET ORAL at 17:33

## 2017-08-11 RX ADMIN — SIMVASTATIN SCH MG: 20 TABLET, FILM COATED ORAL at 08:34

## 2017-08-11 RX ADMIN — FERROUS SULFATE TAB 325 MG (65 MG ELEMENTAL FE) SCH MG: 325 (65 FE) TAB at 08:35

## 2017-08-11 RX ADMIN — DOCUSATE SODIUM,SENNOSIDES SCH TAB: 50; 8.6 TABLET, FILM COATED ORAL at 08:35

## 2017-08-11 RX ADMIN — TAMSULOSIN HYDROCHLORIDE SCH MG: 0.4 CAPSULE ORAL at 08:34

## 2017-08-11 RX ADMIN — ASPIRIN SCH MG: 81 TABLET ORAL at 08:34

## 2017-08-12 VITALS — DIASTOLIC BLOOD PRESSURE: 77 MMHG | SYSTOLIC BLOOD PRESSURE: 121 MMHG

## 2017-08-12 VITALS — DIASTOLIC BLOOD PRESSURE: 68 MMHG | SYSTOLIC BLOOD PRESSURE: 116 MMHG

## 2017-08-12 VITALS — SYSTOLIC BLOOD PRESSURE: 108 MMHG | DIASTOLIC BLOOD PRESSURE: 61 MMHG

## 2017-08-12 LAB
ALBUMIN SERPL BCG-MCNC: 2.2 GM/DL (ref 3.2–5.2)
ALBUMIN/GLOB SERPL: 0.61 {RATIO} (ref 1–1.93)
ALP SERPL-CCNC: 73 U/L (ref 45–117)
ALT SERPL W P-5'-P-CCNC: 10 U/L (ref 12–78)
ANION GAP SERPL CALC-SCNC: 9 MEQ/L (ref 8–16)
AST SERPL-CCNC: 11 U/L (ref 15–37)
BASOPHILS # BLD AUTO: 0.1 K/MM3 (ref 0–0.2)
BASOPHILS NFR BLD AUTO: 0.8 % (ref 0–1)
BILIRUB SERPL-MCNC: 0.6 MG/DL (ref 0.2–1)
BUN SERPL-MCNC: 23 MG/DL (ref 7–18)
CALCIUM SERPL-MCNC: 8.7 MG/DL (ref 8.8–10.2)
CHLORIDE SERPL-SCNC: 105 MEQ/L (ref 98–107)
CO2 SERPL-SCNC: 26 MEQ/L (ref 21–32)
CREAT SERPL-MCNC: 1.19 MG/DL (ref 0.7–1.3)
EOSINOPHIL # BLD AUTO: 0.2 K/MM3 (ref 0–0.5)
EOSINOPHIL NFR BLD AUTO: 2.4 % (ref 0–3)
ERYTHROCYTE [DISTWIDTH] IN BLOOD BY AUTOMATED COUNT: 16.1 % (ref 11.5–14.5)
GFR SERPL CREATININE-BSD FRML MDRD: > 60 ML/MIN/{1.73_M2} (ref 35–?)
GLUCOSE SERPL-MCNC: 89 MG/DL (ref 83–110)
LARGE UNSTAINED CELL #: 0.1 K/MM3 (ref 0–0.4)
LARGE UNSTAINED CELL %: 0.8 % (ref 0–4)
LYMPHOCYTES # BLD AUTO: 1.1 K/MM3 (ref 1.5–4.5)
LYMPHOCYTES NFR BLD AUTO: 12.3 % (ref 24–44)
MAGNESIUM SERPL-MCNC: 2.4 MG/DL (ref 1.8–2.4)
MCH RBC QN AUTO: 31.8 PG (ref 27–33)
MCHC RBC AUTO-ENTMCNC: 31.9 G/DL (ref 32–36.5)
MCV RBC AUTO: 99.7 FL (ref 80–96)
MONOCYTES # BLD AUTO: 0.5 K/MM3 (ref 0–0.8)
MONOCYTES NFR BLD AUTO: 6.4 % (ref 0–5)
NEUTROPHILS # BLD AUTO: 6.5 K/MM3 (ref 1.8–7.7)
NEUTROPHILS NFR BLD AUTO: 77.4 % (ref 36–66)
PLATELET # BLD AUTO: 361 K/MM3 (ref 150–450)
POTASSIUM SERPL-SCNC: 4.8 MEQ/L (ref 3.5–5.1)
PROT SERPL-MCNC: 5.8 GM/DL (ref 6.4–8.2)
SODIUM SERPL-SCNC: 140 MEQ/L (ref 136–145)
WBC # BLD AUTO: 8.3 K/MM3 (ref 4–10)

## 2017-08-12 RX ADMIN — ASPIRIN SCH MG: 81 TABLET ORAL at 09:14

## 2017-08-12 RX ADMIN — DOCUSATE SODIUM,SENNOSIDES SCH TAB: 50; 8.6 TABLET, FILM COATED ORAL at 20:25

## 2017-08-12 RX ADMIN — ALLOPURINOL SCH MG: 300 TABLET ORAL at 09:14

## 2017-08-12 RX ADMIN — FERROUS SULFATE TAB 325 MG (65 MG ELEMENTAL FE) SCH MG: 325 (65 FE) TAB at 09:14

## 2017-08-12 RX ADMIN — DOCUSATE SODIUM,SENNOSIDES SCH TAB: 50; 8.6 TABLET, FILM COATED ORAL at 09:14

## 2017-08-12 RX ADMIN — ENOXAPARIN SODIUM SCH MG: 30 INJECTION SUBCUTANEOUS at 09:15

## 2017-08-12 RX ADMIN — SIMVASTATIN SCH MG: 40 TABLET, FILM COATED ORAL at 09:14

## 2017-08-12 RX ADMIN — FERROUS SULFATE TAB 325 MG (65 MG ELEMENTAL FE) SCH MG: 325 (65 FE) TAB at 20:25

## 2017-08-12 RX ADMIN — CLOPIDOGREL BISULFATE SCH MG: 75 TABLET ORAL at 09:14

## 2017-08-12 RX ADMIN — CARBIDOPA AND LEVODOPA SCH TAB: 25; 100 TABLET ORAL at 12:45

## 2017-08-12 RX ADMIN — Medication SCH UNITS: at 09:14

## 2017-08-12 RX ADMIN — TAMSULOSIN HYDROCHLORIDE SCH MG: 0.4 CAPSULE ORAL at 09:14

## 2017-08-12 RX ADMIN — CARBIDOPA AND LEVODOPA SCH TAB: 25; 100 TABLET ORAL at 09:14

## 2017-08-12 RX ADMIN — DOCUSATE SODIUM SCH MG: 100 CAPSULE, LIQUID FILLED ORAL at 09:14

## 2017-08-12 RX ADMIN — PANTOPRAZOLE SODIUM SCH MG: 40 TABLET, DELAYED RELEASE ORAL at 09:14

## 2017-08-12 RX ADMIN — CARBIDOPA AND LEVODOPA SCH TAB: 25; 100 TABLET ORAL at 16:39

## 2017-08-12 RX ADMIN — DOCUSATE SODIUM SCH MG: 100 CAPSULE, LIQUID FILLED ORAL at 20:25

## 2017-08-12 NOTE — DSES
DATE OF ADMISSION:  08/07/2917

DATE OF DISCHARGE:  08/11/2017

 

CONSULTS:

1.  Dr. Eaton, neurology.

2.  Dr. Matamoros, general surgery.

 

DISCHARGE DIAGNOSIS:  Multifactorial gait difficulties.

 

SECONDARY DIAGNOSES:

1.  Acute right frontal cerebrovascular accident (CVA).

2.  Suspected Parkinson's with orthostasis.

3.  Symptomatic anemia.

4.  Lumbosacral spinal stenosis.

5.  Lyme disease.

6.  Mood disorder.

7.  Gout.

8.  Vitamin D deficiency.

9.  Benign prostatic hypertrophy (BPH).

10.  Gastroesophageal reflux disease.

 

HOSPITAL COURSE:  The patient is an 81-year-old man who presented on 08/07/2017,

who had a fall in the bathroom and hit his back on the vanity.  Patient

reportedly had three falls in the last one week and then he has reportedly been

getting up and going to the bathroom without using his walker.  Patient has

recently been seen in the emergency room on 07/28/2017 and diagnosed with Lyme

disease.  He also has had significant weight loss over the last several weeks.

Patient was admitted to the hospitalist service on telemetry unit.  He did have a

supraventricular tachycardia (SVT).  An echocardiogram did not reveal any gross

abnormalities.  He had preserved ejection fraction, mild to moderate mitral

insufficiency.  He was seen by Dr. Eaton of neurology who thought that he had

multifactorial gait difficulty secondary to many of the above:  Parkinsonism,

lumbosacral spinal stenosis, orthostasis, symptomatic anemia, acute ischemic

stroke.

 

SUBJECTIVE:  This morning, the patient reports he is feeling well.  He has no

complaints.  He denies feeling lightheaded or dizzy when standing up.

 

OBJECTIVE:

VITAL SIGNS:  Temperature 97, pulse 82, respiratory rate 20, blood pressure

142/66 while laying supine and standing 89/50, oxygen saturation 99% on room

air.

GENERAL:  He is a pleasant, elderly  man sitting up eating breakfast.

He does not appear to be in any acute distress.  He is awake, alert, oriented

times three.

HEENT:  Cranial nerves II-XII are grossly intact.  He has moist mucous membranes.

He does have a flat, masklike face.

CARDIOVASCULAR EXAMINATION:  S1, S2.  Regular rhythm.

CHEST EXAMINATION:  Clear.

ABDOMINAL EXAMINATION:  Benign.

EXTREMITIES:  No clubbing, cyanosis or edema.  There is some cogwheel rigidity

appreciated.

 

LABORATORY STUDIES:

WBC 7.6, hemoglobin 11.2, .2, platelet count 398.

 

Chemistry panel:  Sodium 144, potassium 4.5, chloride 109, bicarbonate 28, BUN

22, creatinine 1.2.

 

He had three sets of cardiac enzymes, which were negative.

 

Folate and B12 levels are within normal limits.

 

He did have a stool occult for blood, which was positive.

 

He had a vascular ultrasound which revealed less than 50% stenosis of the

internal carotid artery (ICA).  No hemodynamically significant flow.  Proximal

right common carotid artery (CCA) stenosis with high peak velocity.  Cranial

direction flow of the left vertebral artery.  Atherosclerotic plaque in the

distal common carotid artery.

 

Patient had an MRI of the brain, which revealed punctate acute right frontal lobe

infarction, old left cerebellar lacunar infarction.

 

Cervical CT revealed spondylosis C1-2 through C7-T1.  No fractures or

subluxation.

 

Patient did have a CT scan of the head that reveals small vessel ischemic disease

and moderate volume loss.

 

He had a thoracic spine x-ray that revealed diffuse degenerative changes without

evidence of acute fracture or dislocation.

 

ASSESSMENT AND PLAN:

This is an 81-year-old man with an acute cerebrovascular accident (CVA) and

multifactorial gait difficulty.

 

PROBLEMS:

1.  Multifactorial gait difficulty.  Dr. Eaton's help is appreciated.  Many

contributing factors, including Parkinsonism, new diagnosis with orthostasis,

lumbosacral spinal stenosis, iron deficiency anemia.

 

2.  Acute cerebrovascular accident (CVA).  No clear etiology.  He is on aspirin,

Plavix and Zocor.  Dr. Eaton of neurology's help is greatly appreciated.  Will be

on secondary prevention.  He will follow up with neurology in the outpatient

setting.  He continues to work with physical therapy (PT) and occupational

therapy (OT).  A swallowing evaluation was conducted while he was here.  He will

be discharged to physical medicine and rehabilitation (PM and R) to continue

physical therapy.

 

3.  Suspected Parkinsonism.  He has been started on Sinemet as a trial.  He will

follow up with neurology.  His orthostasis may be related to this. All

medications have been reviewed and optimized.  He will require gait training with

physical medicine and rehabilitation (PM and R) and continued rehabilitation.

 

4.  Symptomatic anemia.  He received 2 units of packed red blood cells while in

hospital.  It has not improved his orthostasis, but he did feel better after it.

He has had weight loss and poor oral intake.  Given his occult stool for being

positive, there is certainly concern for an occult malignancy.  I did discuss

this in great length with the patient and his daughter.  They declined any

colonoscopy at this time, which was offered by Dr. Matamoros.  When explained the

severity of the situation, they suggested they might be more willing to do it on

the outpatient setting, but not while in the hospital.  I did discuss code status

with the patient.  He did express to me he wished to be a DO NOT RESUSCITATE/DO

NOT INTUBATE, but he was never prepared to sign any forms, despite numerous

visits and conversations.  He wanted to discuss further with family and, although

much of family has been bedside during these conversations, he wished to discuss

it with other family.

 

5.  Lyme disease.  The patient completed a 10-day course of amoxicillin, as per

prescribed prior to his hospitalization.

 

6.  Lumbosacral spinal stenosis.  Patient has been told he is not a surgical

candidate in the past and the family declined any further workup including MRI of

the lumbosacral spine at this time.

 

7.  Mood disorder.  Patient was admitted to the hospital on Remeron, but as he

did have orthostasis, we will discontinue this and monitor orthostatics in the

outpatient setting and see if his orthostatics improve with discontinuing of this

medication.

 

8.  Gout.  Continue allopurinol.

 

9.  Vitamin D deficiency.  Continue with supplementation.

 

10.  Benign prostatic hypertrophy (BPH).  Continue with Flomax.

 

11.  Gastroesophageal reflux disease.  He is on Protonix.

 

12.  Deep venous thrombosis (DVT) prophylaxis.  He has been on Lovenox.

 

DISPOSITION:  The patient is being discharged to physical medicine and

rehabilitation (PM and R).  I did give an oral assignment to Dr. Andino and

discussed the case with Dr. Eaton and Dr. Matamoros.  He is to follow up with

neurology in two weeks.  Follow up with Dr. Matamoros in one month and his primary

care provider (PCP) within seven days.  Activity as tolerated.  Diet is soft

mechanical.

 

MEDICATIONS AT TIME OF DISCHARGE:

-  Sinemet 250/100 one tablet three times a day

-  ferrous sulfate 325 mg twice a day

-  simvastatin 40 mg daily

-  allopurinol 300 mg daily

-  aspirin 81 mg daily

-  vitamin D 1000 units daily

-  Plavix 75 mg daily

-  clotrimazole 10 mg three times a day

-  Colace 100 mg twice a day

-  lansoprazole 50 mg every evening

-  magnesium oxide 400 mg twice a day

-  tamsulosin 0.4 mg daily

-  tramadol 50 mg four times a day

 

Greater than 30 spent organizing disposition.

## 2017-08-12 NOTE — PMRHPE
DATE OF ADMISSION:   08/11/2017

 

REASON FOR ADMISSION:  Rehabilitation of right frontal CVA with likely new onset

Parkinson's disease causing recurrent falls and physical trauma.  The patient is

also status post old cerebellar CVA.

 

HISTORY OF PRESENT ILLNESS:  The patient is an 81-year-old right handed white

male who has fallen three times in the last 2 weeks and even fell off the toilet

while inpatient.  The patient had hit his head.  CT scan showed the old

cerebellar and new small right frontal CVA, along with no hemorrhaging or signs

of traumatic brain injury.  However, the patient has been noted to have 
increased

tone in the upper and lower extremities, shaky gait, and has been plagued by

orthostatic hypotension when moved fairly rapidly from sitting to standing or

from lying to sitting.  The patient has been assessed for a variety of reasons

and no clear source has been found, that combined with tone and occasional

spontaneous movements suggest the patient may have Parkinson's disease and is 
now

being started on a trial of Sinemet.  The patient has recently been seen and 
felt

to have exposure to Lyme disease and has completed an antibiotic course for 
that.

 

 

PAST MEDICAL HISTORY:

Includes:

1.  Atherosclerotic cardiovascular disease, including hypertension,

hyperlipidemia, transient ischemic attack in October 2012.

2.  Gastroesophageal reflux disease (GERD).

3.  Benign prostatic hypertrophy (BPH).

4.  Chronic kidney disease stage III with baseline creatinine of 1.4 to 1.6.

5.  History of kidney stones.

6.  Recent Lyme disease.

 

PAST SURGICAL HISTORY:

Includes:

1.  Coronary artery bypass graft (CABG).  The family believes it is three

vessel.

2.  Status post femoral-popliteal bypass.

3.  Abdominal aortic aneurysm repair.

4.  Right inguinal hernia repair.

5.  Appendectomy.

 

ALLERGIES:  No known drug allergies.

 

MEDICATIONS:  On admission:

- Tylenol

- allopurinol for gout

- enteric coated aspirin

- Sinemet 25/100 three times a day

- Plavix 75 mg daily

- Senokot S one tablet twice a day

- Colace 100 mg twice a day

- Lovenox for deep vein thrombosis (DVT) prevention, 30 mg daily

- iron sulfate 325 mg twice a day for anemia

- milk of magnesia 30 mg as needed for constipation

- omeprazole 40 mg daily for GERD

- MiraLAX one packet daily as needed for constipation

- simvastatin 40 mg daily for hyperlipidemia

- tamsulosin 0.4 mg daily for benign prostatic hypertrophy (BPH)

- Tramadol 50 mg every 6 hours as needed for pain

- vitamin D 1000 units daily

 

REVIEW OF SYSTEMS:  Basically, those things reviewed in the history above.

Otherwise, negative 11-point review of systems except for the arthritis pain in

multiple joints related to Lyme disease and osteoarthritis consistent with age.

 

PHYSICAL EXAMINATION:

The patient is slightly below average height, 5 feet 7 inches, 72 kg, elderly,

white male who looks stated age with extensive balding with a little bit of hair

left at the temples and graying of the hair.

HEENT: Pupils are equal, round and reactive to light and accommodation at

approximately 4 mm in diameter.  Extraocular motions are intact.  Hearing is

fair.  Oropharynx shows a little bit of thrush on the posterior tongue, 
otherwise

no significant lesions.  Nasal passages are open with straight septum.

NECK:  Supple.  Thyroid is normal size and texture.  No carotid bruits are

appreciated.

LUNGS:  Clear in all fields to auscultation.

CORONARY:  Shows a regular rate and rhythm with normal S1, S2 with a little bit

of holosystolic murmur, about grade 3 present.  2/4 bilateral radial pulses.

ABDOMEN:  Benign with normal bowel sounds in all quadrants.  No masses present.

A little bit tympany distributed throughout.

EXTREMITIES:  Upper and lower extremities with functional range of motion;

however, the patient does have cogwheeling tone on movement to the upper and

also, to a lesser degree, lower extremities.

NEUROLOGIC:  The patient is alert and oriented to person, place, general time 
and

general situation.  Speech is clear, coherent and appropriate.  Affect is

pleasant and cooperative.  Memory was not tested.  Daughter tends to fill in the

answers for him anyway.  Motor strength is 4+ to 5 in the upper and lower

extremities.  Rapid alternating movements are decreased.  The patient tends to

have rolling continuous motions with his thumbs and questionable mild resting

tremor.  However, clearly cogwheeling in bilateral arms.  Deep tendon reflexes

are 1 to 2 out of 4 bilateral upper and lower extremities.  Light touch is 
intact

to bilateral upper and lower extremities.

 

ASSESSMENT AND PLAN:

1.  Rehabilitation of neurologic deficits leading to falls, most likely a

combination of CVA and new onset very likely Parkinson's disease.  The patient

chemically is being treated with anti Parkinson medication, Sinemet, but

additionally balance training with physical and occupational therapy using

adaptive equipment for better support, such as a walker and stretching and tone

control techniques should be beneficial to this gentleman.

 

2.  Atherosclerotic cardiovascular diseae.  Medicine consult has been sent.  
They

are familiar with the patient.  We will continue aspirin for clot prevention,

along with the Lovenox for deep vein thrombosis (DVT) prevention at this time 
and

the tamsulosin, which is also helping blood pressure, as well as the urinary

retention from the benign prostatic hypertrophy (BPH).  Also for prevention of

clots, the patient will continue on Plavix 75 mg daily.

 

3.  Chronic kidney disease with a history of kidney stones and elevated

creatinine.  We will continue the patient on current diet and have medicine

service advise if any changes need to be made.

 

POST ADMISSION PHYSICAL EVALUATION:  Patient is consistent with his preadmission

screening and definitely needs to learn appropriate compensation to avoid future

recurrent falls.  I am hopeful that we can get appropriate support hose to try

and keep him from having drop outs of his blood pressure and whether or not we

are able to get a good response to Parkinson medications for the autonomic

dysfunction, but also teaching the patient to make slow adjustments between

levels and other techniques, such as muscle tightening in the legs to prevent a

drop in pressure may be of help and avoid these falls.  I do think that the

patient should be able to tolerate the three hours of therapy today and

anticipating discharging him to home with family.  I think his prognosis is fair

to good.  I estimate his length of stay to be approximately 10 days.

 

Time spent on chart review, history and physical and documentation is greater

than 70 minutes.

SUAD

## 2017-08-13 VITALS — DIASTOLIC BLOOD PRESSURE: 76 MMHG | SYSTOLIC BLOOD PRESSURE: 150 MMHG

## 2017-08-13 VITALS — SYSTOLIC BLOOD PRESSURE: 126 MMHG | DIASTOLIC BLOOD PRESSURE: 69 MMHG

## 2017-08-13 VITALS — SYSTOLIC BLOOD PRESSURE: 121 MMHG | DIASTOLIC BLOOD PRESSURE: 61 MMHG

## 2017-08-13 RX ADMIN — FERROUS SULFATE TAB 325 MG (65 MG ELEMENTAL FE) SCH MG: 325 (65 FE) TAB at 09:17

## 2017-08-13 RX ADMIN — ALLOPURINOL SCH MG: 300 TABLET ORAL at 09:16

## 2017-08-13 RX ADMIN — DOCUSATE SODIUM,SENNOSIDES SCH TAB: 50; 8.6 TABLET, FILM COATED ORAL at 09:17

## 2017-08-13 RX ADMIN — CLOPIDOGREL BISULFATE SCH MG: 75 TABLET ORAL at 09:16

## 2017-08-13 RX ADMIN — CARBIDOPA AND LEVODOPA SCH TAB: 25; 100 TABLET ORAL at 16:12

## 2017-08-13 RX ADMIN — DOCUSATE SODIUM SCH MG: 100 CAPSULE, LIQUID FILLED ORAL at 09:16

## 2017-08-13 RX ADMIN — DOCUSATE SODIUM,SENNOSIDES SCH TAB: 50; 8.6 TABLET, FILM COATED ORAL at 19:47

## 2017-08-13 RX ADMIN — TAMSULOSIN HYDROCHLORIDE SCH MG: 0.4 CAPSULE ORAL at 09:16

## 2017-08-13 RX ADMIN — PANTOPRAZOLE SODIUM SCH MG: 40 TABLET, DELAYED RELEASE ORAL at 09:16

## 2017-08-13 RX ADMIN — FERROUS SULFATE TAB 325 MG (65 MG ELEMENTAL FE) SCH MG: 325 (65 FE) TAB at 19:47

## 2017-08-13 RX ADMIN — ASPIRIN SCH MG: 81 TABLET ORAL at 09:17

## 2017-08-13 RX ADMIN — Medication SCH UNITS: at 09:16

## 2017-08-13 RX ADMIN — ENOXAPARIN SODIUM SCH MG: 30 INJECTION SUBCUTANEOUS at 09:16

## 2017-08-13 RX ADMIN — CARBIDOPA AND LEVODOPA SCH TAB: 25; 100 TABLET ORAL at 09:16

## 2017-08-13 RX ADMIN — CARBIDOPA AND LEVODOPA SCH TAB: 25; 100 TABLET ORAL at 12:34

## 2017-08-13 RX ADMIN — DOCUSATE SODIUM SCH MG: 100 CAPSULE, LIQUID FILLED ORAL at 19:47

## 2017-08-13 RX ADMIN — SIMVASTATIN SCH MG: 40 TABLET, FILM COATED ORAL at 09:16

## 2017-08-14 VITALS — SYSTOLIC BLOOD PRESSURE: 114 MMHG | DIASTOLIC BLOOD PRESSURE: 76 MMHG

## 2017-08-14 VITALS — SYSTOLIC BLOOD PRESSURE: 123 MMHG | DIASTOLIC BLOOD PRESSURE: 64 MMHG

## 2017-08-14 LAB
ERYTHROCYTE [DISTWIDTH] IN BLOOD BY AUTOMATED COUNT: 16 % (ref 11.5–14.5)
MCH RBC QN AUTO: 32.3 PG (ref 27–33)
MCHC RBC AUTO-ENTMCNC: 32 G/DL (ref 32–36.5)
MCV RBC AUTO: 100.8 FL (ref 80–96)
PLATELET # BLD AUTO: 337 K/MM3 (ref 150–450)
WBC # BLD AUTO: 7 K/MM3 (ref 4–10)

## 2017-08-14 RX ADMIN — ASPIRIN SCH MG: 81 TABLET ORAL at 08:54

## 2017-08-14 RX ADMIN — FERROUS SULFATE TAB 325 MG (65 MG ELEMENTAL FE) SCH MG: 325 (65 FE) TAB at 08:54

## 2017-08-14 RX ADMIN — CARBIDOPA AND LEVODOPA SCH TAB: 25; 100 TABLET ORAL at 08:53

## 2017-08-14 RX ADMIN — PANTOPRAZOLE SODIUM SCH MG: 40 TABLET, DELAYED RELEASE ORAL at 08:54

## 2017-08-14 RX ADMIN — SIMVASTATIN SCH MG: 40 TABLET, FILM COATED ORAL at 08:53

## 2017-08-14 RX ADMIN — CARBIDOPA AND LEVODOPA SCH TAB: 25; 100 TABLET ORAL at 16:27

## 2017-08-14 RX ADMIN — FERROUS SULFATE TAB 325 MG (65 MG ELEMENTAL FE) SCH MG: 325 (65 FE) TAB at 21:11

## 2017-08-14 RX ADMIN — DOCUSATE SODIUM,SENNOSIDES SCH TAB: 50; 8.6 TABLET, FILM COATED ORAL at 08:53

## 2017-08-14 RX ADMIN — TAMSULOSIN HYDROCHLORIDE SCH MG: 0.4 CAPSULE ORAL at 08:53

## 2017-08-14 RX ADMIN — CARBIDOPA AND LEVODOPA SCH TAB: 25; 100 TABLET ORAL at 13:02

## 2017-08-14 RX ADMIN — DOCUSATE SODIUM SCH MG: 100 CAPSULE, LIQUID FILLED ORAL at 21:11

## 2017-08-14 RX ADMIN — DOCUSATE SODIUM SCH MG: 100 CAPSULE, LIQUID FILLED ORAL at 08:53

## 2017-08-14 RX ADMIN — ENOXAPARIN SODIUM SCH MG: 30 INJECTION SUBCUTANEOUS at 08:53

## 2017-08-14 RX ADMIN — DOCUSATE SODIUM,SENNOSIDES SCH TAB: 50; 8.6 TABLET, FILM COATED ORAL at 21:11

## 2017-08-14 RX ADMIN — Medication SCH UNITS: at 08:54

## 2017-08-14 RX ADMIN — CLOPIDOGREL BISULFATE SCH MG: 75 TABLET ORAL at 08:54

## 2017-08-14 RX ADMIN — ALLOPURINOL SCH MG: 300 TABLET ORAL at 08:53

## 2017-08-14 NOTE — IPNPDOC
Physiatrist Progress Note


DATE OF SERVICE:  8/14/17





DATE OF ADMISSION: Aug 11, 2017 at 14:55 


INPATIENT REHABILITATION ADMISSION DAY: #4





SUBJECTIVE: Patient is a The patient is an 81-year-old right handed white male 

who has fallen three times in the last 2 weeks and even fell off the toilet


while inpatient.  The patient had hit his head.  CT scan showed the old 

cerebellar and new small right frontal CVA, along with no hemorrhaging or signs


of traumatic brain injury. However, patient with Parkinsonian features and 

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





ALLERGIES: See Below





MEDICATIONS: Reviewed, see below.





OBJECTIVE:


VITAL SIGNS: Please see below.


PHYSICAL EXAMINATION:


GENERAL: All elderly white male who is alert and general well oriented in no 

acute distress. Patient seen in his room standing using a standard walker 

gaiting his blood pressure checked by occupational therapy.


HEENT: Normocephalic/atraumatic with no tremors or pill-rolling.


CARDIOVASCULAR: Regular rate and rhythm with a holosystolic murmur. 2 out 4 

bilateral radial pulses. Blood pressure on initial standing 84/37 with patient 

denying a lightheadedness and a heart rate of 82. After sitting for a minute 

blood pressure 100/57 with a pulse of 80.


LUNGS: All fields clear to auscultation.


ABDOMEN: Benign with normal bowel sounds in all quadrants.


NEUROLOGICAL: Patient alert and oriented to person place and most elements of 

time with good understanding of situation. Clock drawing test however shows 

organizational problems and skewing to the right. Patient continues to have 

cogwheeling tone in the upper extremities but improved from Friday. However 

balance was stable with the walker. There is a mild resting tremor and some 

element of intention tremor present on upper extremity actions.


SKIN: Grossly intact.





LABORATORY DATA: Reviewed. Please see below.





MICROBIOLOGY: Please see below.





IMAGING: No new imaging.





DVT prophylaxis ordered?: Lovenox and SANTO hose. We'll look to get 20 to 30 mm 

compression thigh high support hose which will will be more helpful and she'll 

also keep blood pressure from dropping as much when patient sits up and then 

stands up.





ASSESSMENT AND PLAN:


1. Rehabilitation of CVA and Parkinson's disease: Patient clearly has features 

of Parkinson's and very little of his deficits in mobility and safety seem to 

be consistent with right frontal lobe CVA. Patient is participating well in 

physical and occupational therapy and showing gains in endurance and balance.


Rehabilitation team rounds: In light of the clock drawing test and some other 

deficits patient has shown I will go ahead and have speech-language pathology 

did a more formal cognitive assessment of this gentleman. Overall patient is 

doing very well and should reach discharge to home goals with family around 

August 18th. It is anticipated he will need a commode. In light of the stroke 

and the very likely nature of having Parkinson's disease I do not feel patient 

is appropriate to return to driving until cleared by the state of [place]..





2. Hypotension: Patient seems to be not dropping as low and tolerating it better

, but definitely if given a little bit time at each level and doing some muscle 

contractions in the leg doesn't seem to drop blood pressure is much. I 

definitely think that support hose will make a difference for this gentleman 

will try to get him some appropriate thigh high 20-30 mg compression hose.





3. Anemia: Overall seems reasonably stable with a hemoglobin of 10.5 and 

hematocrit of 32.8%. Of concern is the macrocytosis which we will continue to 

watch.





4. Hypo-albumenemia: Currently 2.3 and we will watch this.





5. Magnesium: Currently elevated at 2.5 we will reassess this.





TIME SPENT: Chart Review, examination and documentation required greater than 

25 minutes.


Allergies


Coded Allergies:  


     No Known Drug Allergy (Verified  Allergy, Unknown, 8/7/17)





Vital Signs





Vital Signs








  Date Time  Temp Pulse Resp B/P (MAP) Pulse Ox O2 Delivery O2 Flow Rate FiO2


 


8/14/17 14:53 98.9 87 20 114/76 (89) 96 Room Air  











Laboratory Data


CBC/BMP


Laboratory Tests


8/14/17 07:43








Red Blood Count 3.26 L, Mean Corpuscular Volume 100.8 H, Mean Corpuscular 

Hemoglobin 32.3, Mean Corpuscular Hemoglobin Concent 32.0, Red Cell 

Distribution Width 16.0 H


Labs 24H


Laboratory Tests 2


8/14/17 07:43: Magnesium Level 2.5H





Current Medications


Current Medications





Current Medications


Acetaminophen (Tylenol Tab) 650 mg Q6HP  PRN PO PAIN OR FEVER;  Start 8/11/17 

at 15:45;  Stop 9/10/17 at 15:44


Allopurinol (Zyloprim) 300 mg DAILY PO  Last administered on 8/14/17at 08:53;  

Start 8/12/17 at 09:00;  Stop 9/11/17 at 08:59


Aspirin (Ecotrin) 81 mg DAILY PO  Last administered on 8/14/17at 08:54;  Start 8 /12/17 at 09:00;  Stop 9/11/17 at 08:59


Carbidopa/Levodopa (Sinemet 25/100) 1 tab TID@0800,1200,1600 PO  Last 

administered on 8/14/17at 16:27;  Start 8/11/17 at 16:00;  Stop 9/10/17 at 15:59


Clopidogrel Bisulfate (PLAVix) 75 mg DAILY PO  Last administered on 8/14/17at 08

:54;  Start 8/12/17 at 09:00;  Stop 9/11/17 at 08:59


Docusate Sodium (Colace) 100 mg BID PO  Last administered on 8/14/17at 08:53;  

Start 8/11/17 at 21:00;  Stop 9/10/17 at 20:59


Enoxaparin Sodium (Lovenox) 30 mg DAILY SC  Last administered on 8/14/17at 08:53

;  Start 8/12/17 at 09:00;  Stop 8/17/17 at 08:59


Ferrous Sulfate (Ferrous Sulfate) 325 mg BID PO  Last administered on 8/14/17at 

08:54;  Start 8/11/17 at 21:00;  Stop 9/10/17 at 20:59


Magnesium Hydroxide (Milk Of Magnesia) 30 ml BIDP  PRN PO CONSTIPATION;  Start 8 /11/17 at 15:45;  Stop 9/10/17 at 15:44


Pantoprazole Sodium (Protonix) 40 mg DAILY PO  Last administered on 8/14/17at 08

:54;  Start 8/12/17 at 09:00;  Stop 9/11/17 at 08:59


Polyethylene Glycol (Miralax) 1 pkt DAILY  PRN PO CONSTIPATION;  Start 8/11/17 

at 15:45;  Stop 9/10/17 at 15:44


Senna/Docusate Sodium (Senokot S) 1 tab BID PO  Last administered on 8/14/17at 

08:53;  Start 8/11/17 at 21:00;  Stop 9/10/17 at 20:59


Simvastatin (Zocor) 40 mg DAILY PO  Last administered on 8/14/17at 08:53;  

Start 8/12/17 at 09:00;  Stop 9/11/17 at 08:59


Tamsulosin HCl (Flomax) 0.4 mg DAILY PO  Last administered on 8/14/17at 08:53;  

Start 8/12/17 at 09:00;  Stop 9/11/17 at 08:59


Tramadol HCl (Ultram) 50 mg Q6HP  PRN PO PAIN;  Start 8/11/17 at 15:45;  Stop 8/ 18/17 at 15:44


Vitamin D (Vitamin D) 1,000 units DAILY PO  Last administered on 8/14/17at 08:54

;  Start 8/12/17 at 09:00;  Stop 9/11/17 at 08:59











ALEX MCWILLIAMS MD Aug 14, 2017 17:31

## 2017-08-14 NOTE — IPNPDOC
Date Seen


The patient was seen on 8/14/17.





Progress Note


HPI: 81yoM transferred to ARU to the care of Dr Andino S/P acute Right frontal 

CVA. Pt completed 10 day treatment for  Lyme disease. Was seen by Neurology 

with suspected Parkinsonism with orthostasis. 


Denies any fevers, chills,HA, CP, SOB, cough, palpitations, abdominal pain, N/V/

D or changes in bowel or bladder habits. 


Hospitalist consulted for medical management. 





PMHx: 


HTN


HLD


GERD


BPH


CKD3 baseline 1.4-1.6


H/O TIA 10/12


H/O kidney stones


unsteady gait/H/O fall. Pt declines to use walker at home


Lyme disease


Suspected Parkinson's disease


Fe def anemia. Declined colonoscopy. 


LS Spinal stenosis. 





PSHX:


CABG


Fempop bypass


AAA repair


Rt inguinal hernia


appendectomy





                 


PE:      


GEN:  81yoM, appears stated age. Well-nourished, well developed. No acute 

distress. Alert and oriented x 3. Pleasant, interactive. 


HEENT: Normocephalic, atraumatic. Sclera are nonicteric. Conjunctiva without 

injection. Nose midline.  No facial asymmetry. Moist mucous membranes. 

Dentition fair. Pharynx pink and moist. Neck supple, trachea midline. 


CHEST: Regular rate and rhythm, +S1, +S2


LUNGS: Clear to auscultation bilaterally. No wheezes, rales, or rhonchi. 


ABD: Round, soft, non-tender, non-distended. +Bowel sounds throughout. No 

rebound or guarding. No costovertebral angle tenderness.


EXT: Pulses 2+ bilaterally dorsalis pedis and radial. No lower extremity edema 

appreciated.


SKIN: Pink, dry, warm. Capillary refill <2sec. There  is an ecchymotic area 

noted upper thoracic area.


NEURO: Alert and oriented x 3. Cranial nerves III-XII are intact. No focal 

deficits appreciated. 





MRI brain


1.  Punctate acute right frontal lobe infarction .


 2.  Old left cerebellar lacunar infarction.


 3.  Small vessel ischemic disease.


 4.  Moderate volume loss.





CT: head


1.  Small vessel ischemic disease.


2.  Moderate volume loss





CT C spine


1.  There is no acute fracture or subluxation.


 2.  There is cervical spondylosis the C1-2 through C7-T1 levels.








XR Thoracic


Diffuse degenerative changes without evidence of acute fracture or dislocation.





Carotid U/S


1.  Atherosclerotic plaque with less than 50% stenosis of the ICA on both sides,


no hemodynamically significant or flow restricting lesion.  High peak velocity 

in


the right CCA suggests a more proximal right CCA stenosis.  ICAs do not have 

such


data suggesting severe stenosis.


 2.  Cranial direction of flow in the left vertebral artery, the right is not


seen.


 3.  Atherosclerotic plaque in the distal CCA, bulb and proximal ICAs on both


sides.


 








A&P:  81yoM transferred to ARU to the care of Dr Andino S/P acute Right frontal 

CVA. Pt completed treatment for  Lyme disease. Was seen by Neurology with 

suspected Parkinson's disease with orthostasis. 


1.  The patient is admitted to ARU to Dr. Andino's service.


2. Acute CVA. 


ASA 81mg daily


Plavix 75 mg daily. 


Zocor 40 mg daily


Outpt F/U with Neurology. 


PT/OT/speech therapy as per Dr Andino. 


pain control as per Dr Andino. 


DVT prophylaxis. As per Dr Andino. Lovenox. 


3. Lyme disease. Patient completed 10 day course of amoxicillin. 


4. CAD/CABG. 


Zocor 40 mg daily. 


Continue aspirin 81 mg/Plavix 75 mg daily.


5. HTN. Monitor blood pressures. Monitor orthostatic VS as well. 


6. HLD. Continue statin. 


7 BPH. Continue tamsulosin.


8. CKD 3. Baseline 1.4-1.6. Monitor. 


9. Gout. Continue allopurinol. 


10. Suspected Parkinsonism. 


Mgmt as per Neurology. Sinemet TID. Oupt f/u. 


Possibly also contributing to orthostasis. Apply TEDS during day. 


Monitor. 


11. Fe def Anemia. 


Cont Fe supplement. 2 u PRBC 8/9/17. Stool OB pos. No previous colonoscopy on 

record. This was discussed with Pt and Dtr as per D/C Summary. Pt was seen by 

Dr Matamoros. Pt declines colonoscopy however possibly considering as outpt.





VS, I&O, 24H, Fishbone


Vital Signs/I&O





Vital Signs








  Date Time  Temp Pulse Resp B/P (MAP) Pulse Ox O2 Delivery O2 Flow Rate FiO2


 


8/13/17 20:00 99.3 62 18 121/61 (81) 97 Room Air  














I&O- Last 24 Hours up to 6 AM 


 


 8/14/17





 05:59


 


Intake Total 1200 ml


 


Output Total 200 ml


 


Balance 1000 ml











Laboratory Data


24H LABS


Laboratory Tests 2


8/14/17 07:43: Magnesium Level 2.5H


CBC/BMP


Laboratory Tests


8/14/17 07:43








Red Blood Count 3.26 L, Mean Corpuscular Volume 100.8 H, Mean Corpuscular 

Hemoglobin 32.3, Mean Corpuscular Hemoglobin Concent 32.0, Red Cell 

Distribution Width 16.0 H











Keely Viveros Aug 14, 2017 11:15

## 2017-08-15 VITALS — SYSTOLIC BLOOD PRESSURE: 117 MMHG | DIASTOLIC BLOOD PRESSURE: 61 MMHG

## 2017-08-15 VITALS — SYSTOLIC BLOOD PRESSURE: 118 MMHG | DIASTOLIC BLOOD PRESSURE: 60 MMHG

## 2017-08-15 VITALS — DIASTOLIC BLOOD PRESSURE: 74 MMHG | SYSTOLIC BLOOD PRESSURE: 141 MMHG

## 2017-08-15 RX ADMIN — Medication SCH UNITS: at 08:23

## 2017-08-15 RX ADMIN — DOCUSATE SODIUM SCH MG: 100 CAPSULE, LIQUID FILLED ORAL at 20:33

## 2017-08-15 RX ADMIN — FERROUS SULFATE TAB 325 MG (65 MG ELEMENTAL FE) SCH MG: 325 (65 FE) TAB at 08:23

## 2017-08-15 RX ADMIN — DOCUSATE SODIUM,SENNOSIDES SCH TAB: 50; 8.6 TABLET, FILM COATED ORAL at 08:22

## 2017-08-15 RX ADMIN — ENOXAPARIN SODIUM SCH MG: 30 INJECTION SUBCUTANEOUS at 08:22

## 2017-08-15 RX ADMIN — TAMSULOSIN HYDROCHLORIDE SCH MG: 0.4 CAPSULE ORAL at 08:23

## 2017-08-15 RX ADMIN — SIMVASTATIN SCH MG: 40 TABLET, FILM COATED ORAL at 08:23

## 2017-08-15 RX ADMIN — CLOPIDOGREL BISULFATE SCH MG: 75 TABLET ORAL at 08:23

## 2017-08-15 RX ADMIN — CARBIDOPA AND LEVODOPA SCH TAB: 25; 100 TABLET ORAL at 08:23

## 2017-08-15 RX ADMIN — DOCUSATE SODIUM,SENNOSIDES SCH TAB: 50; 8.6 TABLET, FILM COATED ORAL at 20:33

## 2017-08-15 RX ADMIN — ALLOPURINOL SCH MG: 300 TABLET ORAL at 08:23

## 2017-08-15 RX ADMIN — CARBIDOPA AND LEVODOPA SCH TAB: 25; 100 TABLET ORAL at 12:12

## 2017-08-15 RX ADMIN — DOCUSATE SODIUM SCH MG: 100 CAPSULE, LIQUID FILLED ORAL at 08:23

## 2017-08-15 RX ADMIN — ASPIRIN SCH MG: 81 TABLET ORAL at 08:23

## 2017-08-15 RX ADMIN — FERROUS SULFATE TAB 325 MG (65 MG ELEMENTAL FE) SCH MG: 325 (65 FE) TAB at 20:33

## 2017-08-15 RX ADMIN — CARBIDOPA AND LEVODOPA SCH TAB: 25; 100 TABLET ORAL at 16:54

## 2017-08-15 RX ADMIN — PANTOPRAZOLE SODIUM SCH MG: 40 TABLET, DELAYED RELEASE ORAL at 08:23

## 2017-08-16 VITALS — SYSTOLIC BLOOD PRESSURE: 138 MMHG | DIASTOLIC BLOOD PRESSURE: 76 MMHG

## 2017-08-16 VITALS — SYSTOLIC BLOOD PRESSURE: 126 MMHG | DIASTOLIC BLOOD PRESSURE: 72 MMHG

## 2017-08-16 VITALS — DIASTOLIC BLOOD PRESSURE: 76 MMHG | SYSTOLIC BLOOD PRESSURE: 140 MMHG

## 2017-08-16 RX ADMIN — ALLOPURINOL SCH MG: 300 TABLET ORAL at 08:39

## 2017-08-16 RX ADMIN — PANTOPRAZOLE SODIUM SCH MG: 40 TABLET, DELAYED RELEASE ORAL at 08:39

## 2017-08-16 RX ADMIN — CARBIDOPA AND LEVODOPA SCH TAB: 25; 100 TABLET ORAL at 12:07

## 2017-08-16 RX ADMIN — DOCUSATE SODIUM SCH MG: 100 CAPSULE, LIQUID FILLED ORAL at 21:21

## 2017-08-16 RX ADMIN — ASPIRIN SCH MG: 81 TABLET ORAL at 08:39

## 2017-08-16 RX ADMIN — DOCUSATE SODIUM,SENNOSIDES SCH TAB: 50; 8.6 TABLET, FILM COATED ORAL at 21:21

## 2017-08-16 RX ADMIN — Medication SCH UNITS: at 08:39

## 2017-08-16 RX ADMIN — FERROUS SULFATE TAB 325 MG (65 MG ELEMENTAL FE) SCH MG: 325 (65 FE) TAB at 08:39

## 2017-08-16 RX ADMIN — FERROUS SULFATE TAB 325 MG (65 MG ELEMENTAL FE) SCH MG: 325 (65 FE) TAB at 21:21

## 2017-08-16 RX ADMIN — CARBIDOPA AND LEVODOPA SCH TAB: 25; 100 TABLET ORAL at 15:36

## 2017-08-16 RX ADMIN — ENOXAPARIN SODIUM SCH MG: 30 INJECTION SUBCUTANEOUS at 08:38

## 2017-08-16 RX ADMIN — DOCUSATE SODIUM,SENNOSIDES SCH TAB: 50; 8.6 TABLET, FILM COATED ORAL at 08:38

## 2017-08-16 RX ADMIN — CLOPIDOGREL BISULFATE SCH MG: 75 TABLET ORAL at 08:38

## 2017-08-16 RX ADMIN — DOCUSATE SODIUM SCH MG: 100 CAPSULE, LIQUID FILLED ORAL at 08:38

## 2017-08-16 RX ADMIN — TAMSULOSIN HYDROCHLORIDE SCH MG: 0.4 CAPSULE ORAL at 08:38

## 2017-08-16 RX ADMIN — CARBIDOPA AND LEVODOPA SCH TAB: 25; 100 TABLET ORAL at 08:38

## 2017-08-16 RX ADMIN — SIMVASTATIN SCH MG: 40 TABLET, FILM COATED ORAL at 08:38

## 2017-08-16 NOTE — IPNPDOC
Physiatrist Progress Note


DATE OF SERVICE:  8/16/17





DATE OF ADMISSION: Aug 11, 2017 at 14:55 


INPATIENT REHABILITATION ADMISSION DAY: #6 





SUBJECTIVE: Patient is a The patient is an 81-year-old right handed white male 

who has fallen three times in the last 2 weeks and even fell off the toilet


while inpatient.  The patient had hit his head.  CT scan showed the old 

cerebellar and new small right frontal CVA, along with no hemorrhaging or signs


of traumatic brain injury. However, patient with Parkinsonian features and 

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 He denies any recent 

lightheadedness or dizziness. No pain complaints.





ALLERGIES: See Below





MEDICATIONS: Reviewed, see below.





OBJECTIVE:


VITAL SIGNS: Please see below.


PHYSICAL EXAMINATION:


GENERAL: All elderly white male who is alert and general well oriented in no 

acute distress. Patient seen in his room standing using a standard walker 

gaiting his blood pressure checked by occupational therapy.


HEENT: Normocephalic/atraumatic with no tremors or pill-rolling.


CARDIOVASCULAR: Generally a regular rate and rhythm with a holosystolic murmur 

and today some PVC beats every 15 to 20 seconds. 2 out 4 bilateral radial 

pulses.


LUNGS: All fields clear to auscultation.


ABDOMEN: Benign with normal bowel sounds in all quadrants.


NEUROLOGICAL: Patient alert and oriented to person place and most elements of 

time with good understanding of situation. Clock drawing test however showed 

organizational problems and skewing to the right. Patient continues to have 

cogwheeling tone in the upper extremities. However balance was stable with the 

walker. There is a mild resting tremor and some element of intention tremor 

present on upper extremity actions.


SKIN: Grossly intact.





LABORATORY DATA: Reviewed. Please see below.





MICROBIOLOGY: Please see below.





IMAGING: No new imaging.





DVT prophylaxis ordered?: Lovenox and SANTO hose. We'll look to get 20 to 30 mm 

compression thigh high support hose which will will be more helpful and she'll 

also keep blood pressure from dropping as much when patient sits up and then 

stands up.





ASSESSMENT AND PLAN:


1. Rehabilitation of CVA and Parkinson's disease: Patient clearly has features 

of Parkinson's and very little of his deficits in mobility and safety seem to 

be consistent with right frontal lobe CVA. Patient is participating well in 

physical and occupational therapy and showing gains in endurance and balance.


Rehabilitation team rounds: In light of the clock drawing test and some other 

deficits patient has shown I will go ahead and have speech-language pathology 

did a more formal cognitive assessment of this gentleman. Overall patient is 

doing very well and should reach discharge to home goals with family around 

August 18th. It is anticipated he will need a commode. In light of the stroke 

and the very likely that he has Parkinson's disease, I do not feel patient is 

appropriate to return to driving until cleared by the state of [place]. 

Patient progressing well, and will look to d/c to home with family on Friday 8/ 18.





2. Hypotension: Patient seems to be not dropping as low and tolerating it better

, but definitely if given a little bit time at each level and doing some muscle 

contractions in the leg doesn't seem to drop blood pressure as much. I 

definitely think that support hose will make a difference for this gentleman 

will try to get him some appropriate thigh high 20-30 mg compression hose, but 

for now Ace wrapping. Thigh high SANTO hose inadequate, but since being on Sinemet

, BP not dropping as much and no complaints of lightheadedness.





3. Anemia: Overall seems reasonably stable with a hemoglobin of 10.5 and 

hematocrit of 32.8% on 8/14/17. Of concern is the macrocytosis which we will 

continue to watch.





4. Hypo-albuminemia:  On 8/14/17 was 2.3 and we will watch this.





5. Magnesium: 8/15/17 normal at 2.3, we will watch this.





TIME SPENT: Chart Review, examination and documentation required greater than 

25 minutes.


Allergies


Coded Allergies:  


     No Known Drug Allergy (Verified  Allergy, Unknown, 8/7/17)





Vital Signs





Vital Signs








  Date Time  Temp Pulse Resp B/P (MAP) Pulse Ox O2 Delivery O2 Flow Rate FiO2


 


8/16/17 14:00 97.5 84 17 126/72 (90) 99 Room Air  











Current Medications


Current Medications





Current Medications


Acetaminophen (Tylenol Tab) 650 mg Q6HP  PRN PO PAIN OR FEVER;  Start 8/11/17 

at 15:45;  Stop 9/10/17 at 15:44


Allopurinol (Zyloprim) 300 mg DAILY PO  Last administered on 8/16/17at 08:39;  

Start 8/12/17 at 09:00;  Stop 9/11/17 at 08:59


Aspirin (Ecotrin) 81 mg DAILY PO  Last administered on 8/16/17at 08:39;  Start 8 /12/17 at 09:00;  Stop 9/11/17 at 08:59


Carbidopa/Levodopa (Sinemet 25/100) 1 tab TID@0800,1200,1600 PO  Last 

administered on 8/16/17at 12:07;  Start 8/11/17 at 16:00;  Stop 9/10/17 at 15:59


Clopidogrel Bisulfate (PLAVix) 75 mg DAILY PO  Last administered on 8/16/17at 08

:38;  Start 8/12/17 at 09:00;  Stop 9/11/17 at 08:59


Docusate Sodium (Colace) 100 mg BID PO  Last administered on 8/16/17at 08:38;  

Start 8/11/17 at 21:00;  Stop 9/10/17 at 20:59


Enoxaparin Sodium (Lovenox) 30 mg DAILY SC  Last administered on 8/16/17at 08:38

;  Start 8/12/17 at 09:00;  Stop 8/22/17 at 23:55


Ferrous Sulfate (Ferrous Sulfate) 325 mg BID PO  Last administered on 8/16/17at 

08:39;  Start 8/11/17 at 21:00;  Stop 9/10/17 at 20:59


Magnesium Hydroxide (Milk Of Magnesia) 30 ml BIDP  PRN PO CONSTIPATION;  Start 8 /11/17 at 15:45;  Stop 9/10/17 at 15:44


Pantoprazole Sodium (Protonix) 40 mg DAILY PO  Last administered on 8/16/17at 08

:39;  Start 8/12/17 at 09:00;  Stop 9/11/17 at 08:59


Polyethylene Glycol (Miralax) 1 pkt DAILY  PRN PO CONSTIPATION;  Start 8/11/17 

at 15:45;  Stop 9/10/17 at 15:44


Senna/Docusate Sodium (Senokot S) 1 tab BID PO  Last administered on 8/16/17at 

08:38;  Start 8/11/17 at 21:00;  Stop 9/10/17 at 20:59


Simvastatin (Zocor) 40 mg DAILY PO  Last administered on 8/16/17at 08:38;  

Start 8/12/17 at 09:00;  Stop 9/11/17 at 08:59


Tamsulosin HCl (Flomax) 0.4 mg DAILY PO  Last administered on 8/16/17at 08:38;  

Start 8/12/17 at 09:00;  Stop 9/11/17 at 08:59


Tramadol HCl (Ultram) 50 mg Q6HP  PRN PO PAIN;  Start 8/11/17 at 15:45;  Stop 8/ 21/17 at 23:55


Vitamin D (Vitamin D) 1,000 units DAILY PO  Last administered on 8/16/17at 08:39

;  Start 8/12/17 at 09:00;  Stop 9/11/17 at 08:59











ALEX MCWILLIAMS MD Aug 16, 2017 14:56

## 2017-08-17 VITALS — DIASTOLIC BLOOD PRESSURE: 76 MMHG | SYSTOLIC BLOOD PRESSURE: 102 MMHG

## 2017-08-17 VITALS — SYSTOLIC BLOOD PRESSURE: 138 MMHG | DIASTOLIC BLOOD PRESSURE: 65 MMHG

## 2017-08-17 VITALS — DIASTOLIC BLOOD PRESSURE: 71 MMHG | SYSTOLIC BLOOD PRESSURE: 115 MMHG

## 2017-08-17 VITALS — DIASTOLIC BLOOD PRESSURE: 69 MMHG | SYSTOLIC BLOOD PRESSURE: 131 MMHG

## 2017-08-17 LAB
ERYTHROCYTE [DISTWIDTH] IN BLOOD BY AUTOMATED COUNT: 15.6 % (ref 11.5–14.5)
MCH RBC QN AUTO: 33 PG (ref 27–33)
MCHC RBC AUTO-ENTMCNC: 33.6 G/DL (ref 32–36.5)
MCV RBC AUTO: 98.4 FL (ref 80–96)
PLATELET # BLD AUTO: 300 K/MM3 (ref 150–450)
WBC # BLD AUTO: 7 K/MM3 (ref 4–10)

## 2017-08-17 RX ADMIN — CLOPIDOGREL BISULFATE SCH MG: 75 TABLET ORAL at 09:16

## 2017-08-17 RX ADMIN — DOCUSATE SODIUM SCH MG: 100 CAPSULE, LIQUID FILLED ORAL at 09:17

## 2017-08-17 RX ADMIN — TAMSULOSIN HYDROCHLORIDE SCH MG: 0.4 CAPSULE ORAL at 09:16

## 2017-08-17 RX ADMIN — DOCUSATE SODIUM,SENNOSIDES SCH TAB: 50; 8.6 TABLET, FILM COATED ORAL at 09:16

## 2017-08-17 RX ADMIN — CARBIDOPA AND LEVODOPA SCH TAB: 25; 100 TABLET ORAL at 15:47

## 2017-08-17 RX ADMIN — DOCUSATE SODIUM,SENNOSIDES SCH TAB: 50; 8.6 TABLET, FILM COATED ORAL at 20:25

## 2017-08-17 RX ADMIN — FERROUS SULFATE TAB 325 MG (65 MG ELEMENTAL FE) SCH MG: 325 (65 FE) TAB at 09:17

## 2017-08-17 RX ADMIN — ENOXAPARIN SODIUM SCH MG: 30 INJECTION SUBCUTANEOUS at 09:17

## 2017-08-17 RX ADMIN — CARBIDOPA AND LEVODOPA SCH TAB: 25; 100 TABLET ORAL at 11:27

## 2017-08-17 RX ADMIN — ALLOPURINOL SCH MG: 300 TABLET ORAL at 09:17

## 2017-08-17 RX ADMIN — FERROUS SULFATE TAB 325 MG (65 MG ELEMENTAL FE) SCH MG: 325 (65 FE) TAB at 20:25

## 2017-08-17 RX ADMIN — PANTOPRAZOLE SODIUM SCH MG: 40 TABLET, DELAYED RELEASE ORAL at 09:17

## 2017-08-17 RX ADMIN — ASPIRIN SCH MG: 81 TABLET ORAL at 09:16

## 2017-08-17 RX ADMIN — Medication SCH UNITS: at 09:16

## 2017-08-17 RX ADMIN — DOCUSATE SODIUM SCH MG: 100 CAPSULE, LIQUID FILLED ORAL at 20:25

## 2017-08-17 RX ADMIN — SIMVASTATIN SCH MG: 40 TABLET, FILM COATED ORAL at 09:17

## 2017-08-17 RX ADMIN — CARBIDOPA AND LEVODOPA SCH TAB: 25; 100 TABLET ORAL at 09:16

## 2017-08-17 NOTE — IPNPDOC
Physiatrist Progress Note


DATE OF SERVICE:  8/17/17


DATE OF ADMISSION: Aug 11, 2017 at 14:55 


INPATIENT REHABILITATION ADMISSION DAY: #7 





SUBJECTIVE: Patient is a The patient is an 81-year-old right handed white male 

who has fallen three times in the last 2 weeks and even fell off the toilet


while inpatient.  The patient had hit his head.  CT scan showed the old 

cerebellar and new small right frontal CVA, along with no hemorrhaging or signs


of traumatic brain injury. However, patient with Parkinsonian features and 

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 He denies any recent 

lightheadedness or dizziness. No pain complaints.





ALLERGIES: See Below





MEDICATIONS: Reviewed, see below.





OBJECTIVE:


VITAL SIGNS: Please see below.


PHYSICAL EXAMINATION:


GENERAL: All elderly white male who is alert and general well oriented in no 

acute distress. Patient seen in his room standing using a standard walker 

gaiting his blood pressure checked by occupational therapy.


HEENT: Normocephalic/atraumatic with no tremors or pill-rolling.


CARDIOVASCULAR: Generally a regular rate and rhythm with a holosystolic murmur 

and today some PVC beats every 15 to 20 seconds. 2 out 4 bilateral radial 

pulses.


LUNGS: All fields clear to auscultation.


ABDOMEN: Benign with normal bowel sounds in all quadrants.


NEUROLOGICAL: Patient alert and oriented to person place and most elements of 

time with good understanding of situation. Clock drawing test however showed 

organizational problems and skewing to the right, but less today in SLP. 

Patient continues to have cogwheeling tone in the upper extremities. However 

balance was stable with the walker. There is a mild resting tremor and some 

element of intention tremor present on upper extremity actions.


SKIN: Grossly intact.





LABORATORY DATA: Reviewed. Please see below.





MICROBIOLOGY: Please see below.





IMAGING: No new imaging.





DVT prophylaxis ordered?: Lovenox and SANTO hose. We'll looking to get 20 to 30 

mm compression thigh high support hose which will will be more helpful and they 

should also keep blood pressure from dropping as much when patient sits up and 

then stands up.





ASSESSMENT AND PLAN:


1. Rehabilitation of CVA and Parkinson's disease: Patient clearly has features 

of Parkinson's and very little of his deficits in mobility and safety seem to 

be consistent with right frontal lobe CVA. Patient is participating well in 

physical and occupational therapy and showing gains in endurance and balance.


Rehabilitation team rounds: In light of the clock drawing test and some other 

deficits patient has shown I will go ahead and have speech-language pathology 

did a more formal cognitive assessment of this gentleman. Overall patient is 

doing very well and should reach discharge to home goals with family around 

August 18th. It is anticipated he will need a commode. In light of the stroke 

and the very likely that he has Parkinson's disease, I do not feel patient is 

appropriate to return to driving until cleared by the state of [place]. 

Patient progressing well, and will look to d/c to home with family on Friday 8/ 18.





2. Hypotension: Patient seems to be not dropping as low and tolerating it better

, but definitely if given a little bit time at each level and doing some muscle 

contractions in the leg doesn't seem to drop blood pressure as much. I 

definitely think that support hose will make a difference for this gentleman 

will try to get him some appropriate thigh high 20-30 mg compression hose, but 

for now Ace wrapping. Thigh high SANTO hose inadequate, but since being on Sinemet

, BP not dropping as much and no complaints of lightheadedness.





3. Anemia: Overall seems reasonably stable with a hemoglobin of 10.5 and 

hematocrit of 31.3% on 8/17/17. Of concern is the macrocytosis which we will 

continue to watch.





4. Hypo-albuminemia:  On 8/14/17 was 2.3 and we will watch this.





5. Magnesium: 8/15/17 normal at 2.3, we will watch this.





TIME SPENT: Chart Review, examination and documentation required greater than 

25 minutes.


Allergies


Coded Allergies:  


     No Known Drug Allergy (Verified  Allergy, Unknown, 8/7/17)





Vital Signs





Vital Signs








  Date Time  Temp Pulse Resp B/P (MAP) Pulse Ox O2 Delivery O2 Flow Rate FiO2


 


8/17/17 06:00 99.1 63 18 131/69 (89) 97 Room Air  











Laboratory Data


CBC/BMP


Laboratory Tests


8/17/17 07:11








Red Blood Count 3.18 L, Mean Corpuscular Volume 98.4 H, Mean Corpuscular 

Hemoglobin 33.0, Mean Corpuscular Hemoglobin Concent 33.6, Red Cell 

Distribution Width 15.6 H





Current Medications


Current Medications





Current Medications


Acetaminophen (Tylenol Tab) 650 mg Q6HP  PRN PO PAIN OR FEVER;  Start 8/11/17 

at 15:45;  Stop 9/10/17 at 15:44


Allopurinol (Zyloprim) 300 mg DAILY PO  Last administered on 8/17/17at 09:17;  

Start 8/12/17 at 09:00;  Stop 9/11/17 at 08:59


Aspirin (Ecotrin) 81 mg DAILY PO  Last administered on 8/17/17at 09:16;  Start 8 /12/17 at 09:00;  Stop 9/11/17 at 08:59


Carbidopa/Levodopa (Sinemet 25/100) 1 tab TID@0800,1200,1600 PO  Last 

administered on 8/17/17at 09:16;  Start 8/11/17 at 16:00;  Stop 9/10/17 at 15:59


Clopidogrel Bisulfate (PLAVix) 75 mg DAILY PO  Last administered on 8/17/17at 09

:16;  Start 8/12/17 at 09:00;  Stop 9/11/17 at 08:59


Docusate Sodium (Colace) 100 mg BID PO  Last administered on 8/17/17at 09:17;  

Start 8/11/17 at 21:00;  Stop 9/10/17 at 20:59


Enoxaparin Sodium (Lovenox) 30 mg DAILY SC  Last administered on 8/17/17at 09:17

;  Start 8/12/17 at 09:00;  Stop 8/22/17 at 23:55


Ferrous Sulfate (Ferrous Sulfate) 325 mg BID PO  Last administered on 8/17/17at 

09:17;  Start 8/11/17 at 21:00;  Stop 9/10/17 at 20:59


Magnesium Hydroxide (Milk Of Magnesia) 30 ml BIDP  PRN PO CONSTIPATION;  Start 8 /11/17 at 15:45;  Stop 9/10/17 at 15:44


Pantoprazole Sodium (Protonix) 40 mg DAILY PO  Last administered on 8/17/17at 09

:17;  Start 8/12/17 at 09:00;  Stop 9/11/17 at 08:59


Polyethylene Glycol (Miralax) 1 pkt DAILY  PRN PO CONSTIPATION;  Start 8/11/17 

at 15:45;  Stop 9/10/17 at 15:44


Senna/Docusate Sodium (Senokot S) 1 tab BID PO  Last administered on 8/17/17at 

09:16;  Start 8/11/17 at 21:00;  Stop 9/10/17 at 20:59


Simvastatin (Zocor) 40 mg DAILY PO  Last administered on 8/17/17at 09:17;  

Start 8/12/17 at 09:00;  Stop 9/11/17 at 08:59


Tamsulosin HCl (Flomax) 0.4 mg DAILY PO  Last administered on 8/17/17at 09:16;  

Start 8/12/17 at 09:00;  Stop 9/11/17 at 08:59


Tramadol HCl (Ultram) 50 mg Q6HP  PRN PO PAIN;  Start 8/11/17 at 15:45;  Stop 8/ 21/17 at 23:55


Vitamin D (Vitamin D) 1,000 units DAILY PO  Last administered on 8/17/17at 09:16

;  Start 8/12/17 at 09:00;  Stop 9/11/17 at 08:59











ALEX MCWILLIAMS MD Aug 17, 2017 10:49

## 2017-08-18 VITALS — DIASTOLIC BLOOD PRESSURE: 64 MMHG | SYSTOLIC BLOOD PRESSURE: 130 MMHG

## 2017-08-18 RX ADMIN — CLOPIDOGREL BISULFATE SCH MG: 75 TABLET ORAL at 08:11

## 2017-08-18 RX ADMIN — Medication SCH UNITS: at 08:12

## 2017-08-18 RX ADMIN — ENOXAPARIN SODIUM SCH MG: 30 INJECTION SUBCUTANEOUS at 08:13

## 2017-08-18 RX ADMIN — DOCUSATE SODIUM SCH MG: 100 CAPSULE, LIQUID FILLED ORAL at 08:13

## 2017-08-18 RX ADMIN — FERROUS SULFATE TAB 325 MG (65 MG ELEMENTAL FE) SCH MG: 325 (65 FE) TAB at 08:11

## 2017-08-18 RX ADMIN — TAMSULOSIN HYDROCHLORIDE SCH MG: 0.4 CAPSULE ORAL at 08:11

## 2017-08-18 RX ADMIN — PANTOPRAZOLE SODIUM SCH MG: 40 TABLET, DELAYED RELEASE ORAL at 08:13

## 2017-08-18 RX ADMIN — ASPIRIN SCH MG: 81 TABLET ORAL at 08:11

## 2017-08-18 RX ADMIN — SIMVASTATIN SCH MG: 40 TABLET, FILM COATED ORAL at 08:12

## 2017-08-18 RX ADMIN — DOCUSATE SODIUM,SENNOSIDES SCH TAB: 50; 8.6 TABLET, FILM COATED ORAL at 08:12

## 2017-08-18 RX ADMIN — CARBIDOPA AND LEVODOPA SCH TAB: 25; 100 TABLET ORAL at 08:12

## 2017-08-18 RX ADMIN — ALLOPURINOL SCH MG: 300 TABLET ORAL at 08:11

## 2017-08-20 NOTE — PMRDS
DATE OF ADMISSION:  08/11/2017

DATE OF DISCHARGE: 08/18/2017

 

DISCHARGE DIAGNOSES:

1. Rehabilitation of right frontal cerebrovascular accident (CVA) with apparent

new-onset Parkinson's disease causing recurrent falls and physical trauma.

2. History of old cerebellar CVA along with atherosclerotic cardiovascular

disease including hypertension, hyperlipidemia, transient ischemic attack.

3. Recent Lyme disease completing a course of antibiotics during this admission.

4. Gastroesophageal reflux disease (GERD).

5. Benign prostatic hypertrophy (BPH) with urinary retention.

6. Chronic kidney disease stage III.

7. History of kidney disease.

8. Past surgical history includes coronary artery bypass grafting of three

vessels, status post femoral-popliteal bypass, status post abdominal aortic

aneurysm repairs, status post right inguinal hernia repair, and status post

appendectomy.

 

PROCEDURES PERFORMED DURING THIS ADMISSION: None.

 

DIAGNOSTIC LABORATORY DATA:

The patient with mild to moderate anemia consistent showing hemoglobin and

hematocrit (H and H) of around 10.5 and 31.3 for hematocrit with elevation of 
the

MCV most recently 98.4, and RDW elevated at 15.6, and platelet count normal at

300. Chemistry showing magnesium elevated at one point to 2.5, but otherwise has

been in the normal range, including most recent at 2.3 with normal electrolytes,

BUN of 23 and creatinine of 1.19 on admission to the unit. Normal liver function

tests and diminished albumin of 2.2, with secondary decline in calcium at 8.7.

 

HOSPITAL COURSE:

Patient admitted on 08/11/2017, for evaluation which includes physical therapy,

occupational therapy and speech therapy, along with rehabilitation nursing,

physiatry and medicine. Examination has basically been consistent with

Parkinson's disease, and there is some concern with the anemia of possibility of

bowel cancer; however, the patient's family have declined further evaluation and

workup though Dr. Matamoros would like a followup visit as an outpatient, and we

will arrange that. The patient in participating in occupational therapy (OT) has

gone from standby and contact guard assistance levels in mobility with only fair

plus standing balances to modified independent in transferring, standby

assistance to independent in bathing, dressing and grooming, and modified

independent in toileting with good sitting and standing balances. Physical

therapy showing ambulation distance initially of 150 feet with contact guard

assistance not up to attempting stairs, to standby assistance greater than 150

feet, increase in repetitive exercises, and able to do 14 stairs with a rail on

the right side ascending or bilateral rales both ascending and descending. 
Speech

therapy cognitive lingual assessment and work with patient to provide structure

and mnemonic techniques to facilitate memory and self organization.

 

DISCHARGE MEDICATIONS:

- allopurinol 300 mg for gout

- aspirin 81 mg a day for clot prevention

- vitamin D 1000 international units daily

- Plavix 75 mg daily for clot prevention and improved perfusion

- Colace 100 mg twice a day for bowel program

- iron sulfate 325 mg twice a day for anemia

- lansoprazole 15 mg a day for GERD protection

- simvastatin 40 mg daily for cholesterol control

- Sinemet 25/100 one tablet three times a day

- tamsulosin hydrochloride 0.4 mg at bedtime for urinary retention and BPH.

 

DISCHARGE INSTRUCTIONS:

The patient is to followup with his primary care, Dr. Matamoros, and also Dr. Eaton 
in

neurology for further assessment of what is probably Parkinson's disease. The

patient is discharged on a regular diet.

 

TIME SPENT ON DISCHARGE: Greater than 35 minutes.

[facility]

## 2017-08-21 ENCOUNTER — HOSPITAL ENCOUNTER (OUTPATIENT)
Dept: HOSPITAL 53 - M LAB REF | Age: 82
End: 2017-08-21
Attending: PHYSICAL MEDICINE & REHABILITATION
Payer: MEDICARE

## 2017-08-21 DIAGNOSIS — N17.9: ICD-10-CM

## 2017-08-21 DIAGNOSIS — D64.9: Primary | ICD-10-CM

## 2017-08-21 LAB
ANION GAP SERPL CALC-SCNC: 8 MEQ/L (ref 8–16)
BUN SERPL-MCNC: 27 MG/DL (ref 7–18)
CALCIUM SERPL-MCNC: 9.1 MG/DL (ref 8.8–10.2)
CHLORIDE SERPL-SCNC: 105 MEQ/L (ref 98–107)
CO2 SERPL-SCNC: 27 MEQ/L (ref 21–32)
CREAT SERPL-MCNC: 1.34 MG/DL (ref 0.7–1.3)
ERYTHROCYTE [DISTWIDTH] IN BLOOD BY AUTOMATED COUNT: 15.8 % (ref 11.5–14.5)
GFR SERPL CREATININE-BSD FRML MDRD: 54.6 ML/MIN/{1.73_M2} (ref 35–?)
GLUCOSE SERPL-MCNC: 127 MG/DL (ref 83–110)
MCH RBC QN AUTO: 31.9 PG (ref 27–33)
MCHC RBC AUTO-ENTMCNC: 31.4 G/DL (ref 32–36.5)
MCV RBC AUTO: 101.5 FL (ref 80–96)
PLATELET # BLD AUTO: 312 K/MM3 (ref 150–450)
POTASSIUM SERPL-SCNC: 4.5 MEQ/L (ref 3.5–5.1)
SODIUM SERPL-SCNC: 140 MEQ/L (ref 136–145)
WBC # BLD AUTO: 8.4 K/MM3 (ref 4–10)

## 2017-10-17 ENCOUNTER — HOSPITAL ENCOUNTER (OUTPATIENT)
Dept: HOSPITAL 53 - M WUC | Age: 82
End: 2017-10-17
Attending: PHYSICIAN ASSISTANT
Payer: MEDICARE

## 2017-10-17 DIAGNOSIS — M10.00: Primary | ICD-10-CM

## 2017-10-17 LAB
ALBUMIN SERPL BCG-MCNC: 3 GM/DL (ref 3.2–5.2)
ANION GAP SERPL CALC-SCNC: 8 MEQ/L (ref 8–16)
BUN SERPL-MCNC: 15 MG/DL (ref 7–18)
CALCIUM SERPL-MCNC: 9.5 MG/DL (ref 8.8–10.2)
CHLORIDE SERPL-SCNC: 105 MEQ/L (ref 98–107)
CO2 SERPL-SCNC: 25 MEQ/L (ref 21–32)
CREAT SERPL-MCNC: 1.28 MG/DL (ref 0.7–1.3)
GFR SERPL CREATININE-BSD FRML MDRD: 57.6 ML/MIN/{1.73_M2} (ref 35–?)
GLUCOSE SERPL-MCNC: 96 MG/DL (ref 83–110)
PHOSPHATE SERPL-MCNC: 3.4 MG/DL (ref 2.5–4.9)
POTASSIUM SERPL-SCNC: 4.5 MEQ/L (ref 3.5–5.1)
SODIUM SERPL-SCNC: 138 MEQ/L (ref 136–145)
URATE SERPL-MCNC: 3.8 MG/DL (ref 3.5–7.2)

## 2018-02-05 ENCOUNTER — HOSPITAL ENCOUNTER (OUTPATIENT)
Dept: HOSPITAL 53 - M LAB REF | Age: 83
End: 2018-02-05
Attending: INTERNAL MEDICINE
Payer: MEDICARE

## 2018-02-05 DIAGNOSIS — E79.0: Primary | ICD-10-CM

## 2018-02-05 DIAGNOSIS — D64.9: ICD-10-CM

## 2018-02-05 LAB
IRON (FE): 28 UG/DL (ref 65–175)
IRON SATN MFR SERPL: 17.6 % (ref 19.7–50)
TOTAL IRON BINDING CAPACITY: 159 UG/DL (ref 250–450)
URIC ACID: 3.6 MG/DL (ref 3.5–7.2)

## 2018-02-05 PROCEDURE — 83550 IRON BINDING TEST: CPT

## 2018-05-07 ENCOUNTER — HOSPITAL ENCOUNTER (OUTPATIENT)
Dept: HOSPITAL 53 - M LAB REF | Age: 83
End: 2018-05-07
Attending: INTERNAL MEDICINE
Payer: MEDICARE

## 2018-05-07 DIAGNOSIS — D64.9: Primary | ICD-10-CM

## 2018-05-07 LAB
IRON (FE): 45 UG/DL (ref 65–175)
IRON SATN MFR SERPL: 29 % (ref 19.7–50)
TOTAL IRON BINDING CAPACITY: 155 UG/DL (ref 250–450)

## 2018-05-07 PROCEDURE — 83550 IRON BINDING TEST: CPT

## 2018-09-24 ENCOUNTER — HOSPITAL ENCOUNTER (OUTPATIENT)
Dept: HOSPITAL 53 - M LAB REF | Age: 83
End: 2018-09-24
Attending: INTERNAL MEDICINE
Payer: MEDICARE

## 2018-09-24 DIAGNOSIS — E79.0: Primary | ICD-10-CM

## 2018-09-24 LAB — URIC ACID: 7.2 MG/DL (ref 3.5–7.2)

## 2018-09-24 PROCEDURE — 84550 ASSAY OF BLOOD/URIC ACID: CPT

## 2018-11-08 ENCOUNTER — HOSPITAL ENCOUNTER (OUTPATIENT)
Dept: HOSPITAL 53 - M SDC | Age: 83
Discharge: HOME | End: 2018-11-08
Attending: OPHTHALMOLOGY
Payer: MEDICARE

## 2018-11-08 DIAGNOSIS — N40.0: ICD-10-CM

## 2018-11-08 DIAGNOSIS — Z79.82: ICD-10-CM

## 2018-11-08 DIAGNOSIS — Z86.73: ICD-10-CM

## 2018-11-08 DIAGNOSIS — Z79.01: ICD-10-CM

## 2018-11-08 DIAGNOSIS — K21.9: ICD-10-CM

## 2018-11-08 DIAGNOSIS — H25.12: Primary | ICD-10-CM

## 2018-11-08 DIAGNOSIS — F03.90: ICD-10-CM

## 2018-11-08 DIAGNOSIS — M10.9: ICD-10-CM

## 2018-11-08 DIAGNOSIS — I10: ICD-10-CM

## 2018-11-08 PROCEDURE — 66984 XCAPSL CTRC RMVL W/O ECP: CPT

## 2018-11-08 RX ADMIN — PHENYLEPHRINE HYDROCHLORIDE 1 DROP: 25 SOLUTION/ DROPS OPHTHALMIC at 07:52

## 2018-11-08 RX ADMIN — BALANCED SALT SOLUTION 1 ML: 6.4; .75; .48; .3; 3.9; 1.7 SOLUTION OPHTHALMIC at 09:23

## 2018-11-08 RX ADMIN — TROPICAMIDE 1 DROP: 10 SOLUTION/ DROPS OPHTHALMIC at 07:52

## 2018-11-08 RX ADMIN — SODIUM CHONDROITIN SULFATE / SODIUM HYALURONATE 1 EA: 0.55-0.5 INJECTION INTRAOCULAR at 09:23

## 2018-11-08 RX ADMIN — PROPARACAINE HYDROCHLORIDE 1 DROP: 5 SOLUTION/ DROPS OPHTHALMIC at 07:52

## 2018-11-08 RX ADMIN — OFLOXACIN 1 DROP: 3 SOLUTION/ DROPS OPHTHALMIC at 07:52

## 2018-11-08 RX ADMIN — CEFUROXIME 1 MG: 750 INJECTION, POWDER, FOR SOLUTION INTRAMUSCULAR; INTRAVENOUS at 09:24

## 2018-11-08 RX ADMIN — Medication 1 ML: at 09:18

## 2018-11-08 RX ADMIN — POVIDONE-IODINE 1 DROP: 5 SOLUTION OPHTHALMIC at 09:13

## 2018-11-15 ENCOUNTER — HOSPITAL ENCOUNTER (OUTPATIENT)
Dept: HOSPITAL 53 - M SDC | Age: 83
Discharge: HOME | End: 2018-11-15
Attending: OPHTHALMOLOGY
Payer: MEDICARE

## 2018-11-15 DIAGNOSIS — Z95.1: ICD-10-CM

## 2018-11-15 DIAGNOSIS — I71.4: ICD-10-CM

## 2018-11-15 DIAGNOSIS — Z79.01: ICD-10-CM

## 2018-11-15 DIAGNOSIS — N40.0: ICD-10-CM

## 2018-11-15 DIAGNOSIS — Z87.891: ICD-10-CM

## 2018-11-15 DIAGNOSIS — H25.11: Primary | ICD-10-CM

## 2018-11-15 DIAGNOSIS — Z95.828: ICD-10-CM

## 2018-11-15 DIAGNOSIS — Z98.42: ICD-10-CM

## 2018-11-15 DIAGNOSIS — K21.9: ICD-10-CM

## 2018-11-15 DIAGNOSIS — Z79.82: ICD-10-CM

## 2018-11-15 DIAGNOSIS — Z79.899: ICD-10-CM

## 2018-11-15 DIAGNOSIS — I73.9: ICD-10-CM

## 2018-11-15 DIAGNOSIS — D64.9: ICD-10-CM

## 2018-11-15 DIAGNOSIS — M10.9: ICD-10-CM

## 2018-11-15 DIAGNOSIS — Z86.73: ICD-10-CM

## 2018-11-15 DIAGNOSIS — G20: ICD-10-CM

## 2018-11-15 DIAGNOSIS — F03.90: ICD-10-CM

## 2018-11-15 DIAGNOSIS — I10: ICD-10-CM

## 2018-11-15 DIAGNOSIS — M12.9: ICD-10-CM

## 2018-11-15 PROCEDURE — 66984 XCAPSL CTRC RMVL W/O ECP: CPT

## 2018-11-15 RX ADMIN — POVIDONE-IODINE 1 DROP: 5 SOLUTION OPHTHALMIC at 11:30

## 2018-11-15 RX ADMIN — PROPARACAINE HYDROCHLORIDE 1 DROP: 5 SOLUTION/ DROPS OPHTHALMIC at 10:24

## 2018-11-15 RX ADMIN — SODIUM CHONDROITIN SULFATE / SODIUM HYALURONATE 1 EA: 0.55-0.5 INJECTION INTRAOCULAR at 11:35

## 2018-11-15 RX ADMIN — PHENYLEPHRINE HYDROCHLORIDE 1 DROP: 25 SOLUTION/ DROPS OPHTHALMIC at 10:24

## 2018-11-15 RX ADMIN — CEFUROXIME 1 MG: 750 INJECTION, POWDER, FOR SOLUTION INTRAMUSCULAR; INTRAVENOUS at 11:35

## 2018-11-15 RX ADMIN — Medication 1 ML: at 11:35

## 2018-11-15 RX ADMIN — OFLOXACIN 1 DROP: 3 SOLUTION/ DROPS OPHTHALMIC at 10:25

## 2018-11-15 RX ADMIN — BALANCED SALT SOLUTION 1 ML: 6.4; .75; .48; .3; 3.9; 1.7 SOLUTION OPHTHALMIC at 11:35

## 2018-11-15 RX ADMIN — TROPICAMIDE 1 DROP: 10 SOLUTION/ DROPS OPHTHALMIC at 10:24

## 2019-09-10 ENCOUNTER — HOSPITAL ENCOUNTER (OUTPATIENT)
Dept: HOSPITAL 53 - M LAB REF | Age: 84
End: 2019-09-10
Attending: INTERNAL MEDICINE
Payer: MEDICARE

## 2019-09-10 DIAGNOSIS — D64.9: Primary | ICD-10-CM

## 2019-09-10 LAB
IRON SATN MFR SERPL: 37.4 % (ref 19.7–50)
IRON SERPL-MCNC: 86 UG/DL (ref 65–175)
TIBC SERPL-MCNC: 230 UG/DL (ref 250–450)

## 2019-09-22 ENCOUNTER — HOSPITAL ENCOUNTER (INPATIENT)
Dept: HOSPITAL 53 - M ED | Age: 84
LOS: 11 days | Discharge: HOME HEALTH | DRG: 371 | End: 2019-10-03
Attending: INTERNAL MEDICINE | Admitting: INTERNAL MEDICINE
Payer: MEDICARE

## 2019-09-22 VITALS — BODY MASS INDEX: 29.76 KG/M2 | HEIGHT: 67 IN | WEIGHT: 189.6 LBS

## 2019-09-22 DIAGNOSIS — Z95.1: ICD-10-CM

## 2019-09-22 DIAGNOSIS — A04.0: ICD-10-CM

## 2019-09-22 DIAGNOSIS — I13.0: ICD-10-CM

## 2019-09-22 DIAGNOSIS — N40.0: ICD-10-CM

## 2019-09-22 DIAGNOSIS — D53.9: ICD-10-CM

## 2019-09-22 DIAGNOSIS — G20: ICD-10-CM

## 2019-09-22 DIAGNOSIS — I73.9: ICD-10-CM

## 2019-09-22 DIAGNOSIS — I50.33: ICD-10-CM

## 2019-09-22 DIAGNOSIS — I48.0: ICD-10-CM

## 2019-09-22 DIAGNOSIS — I25.10: ICD-10-CM

## 2019-09-22 DIAGNOSIS — I27.81: ICD-10-CM

## 2019-09-22 DIAGNOSIS — G47.00: ICD-10-CM

## 2019-09-22 DIAGNOSIS — N18.3: ICD-10-CM

## 2019-09-22 DIAGNOSIS — I50.814: ICD-10-CM

## 2019-09-22 DIAGNOSIS — E86.0: ICD-10-CM

## 2019-09-22 DIAGNOSIS — A04.72: Primary | ICD-10-CM

## 2019-09-22 DIAGNOSIS — Z79.899: ICD-10-CM

## 2019-09-22 DIAGNOSIS — A04.6: ICD-10-CM

## 2019-09-22 DIAGNOSIS — Z79.02: ICD-10-CM

## 2019-09-22 DIAGNOSIS — M47.816: ICD-10-CM

## 2019-09-22 DIAGNOSIS — Z86.73: ICD-10-CM

## 2019-09-22 DIAGNOSIS — N17.9: ICD-10-CM

## 2019-09-22 LAB
ALBUMIN SERPL BCG-MCNC: 3.4 GM/DL (ref 3.2–5.2)
ALT SERPL W P-5'-P-CCNC: 9 U/L (ref 12–78)
BASOPHILS # BLD AUTO: 0.1 10^3/UL (ref 0–0.2)
BASOPHILS NFR BLD AUTO: 0.5 % (ref 0–1)
BILIRUB CONJ SERPL-MCNC: 0.2 MG/DL (ref 0–0.2)
BILIRUB SERPL-MCNC: 0.8 MG/DL (ref 0.2–1)
BUN SERPL-MCNC: 22 MG/DL (ref 7–18)
CALCIUM SERPL-MCNC: 8.8 MG/DL (ref 8.8–10.2)
CHLORIDE SERPL-SCNC: 107 MEQ/L (ref 98–107)
CK MB CFR.DF SERPL CALC: 1.22
CK MB SERPL-MCNC: 2.4 NG/ML (ref ?–3.6)
CK SERPL-CCNC: 197 U/L (ref 39–308)
CO2 SERPL-SCNC: 19 MEQ/L (ref 21–32)
CREAT SERPL-MCNC: 2.08 MG/DL (ref 0.7–1.3)
EOSINOPHIL # BLD AUTO: 0 10^3/UL (ref 0–0.5)
EOSINOPHIL NFR BLD AUTO: 0.2 % (ref 0–3)
ETHANOL SERPL-MCNC: 0 % (ref 0–0.01)
GFR SERPL CREATININE-BSD FRML MDRD: 32.7 ML/MIN/{1.73_M2} (ref 35–?)
GLUCOSE SERPL-MCNC: 103 MG/DL (ref 70–100)
HCT VFR BLD AUTO: 34.8 % (ref 42–52)
HGB BLD-MCNC: 10.9 G/DL (ref 13.5–17.5)
LYMPHOCYTES # BLD AUTO: 0.9 10^3/UL (ref 1.5–5)
LYMPHOCYTES NFR BLD AUTO: 9 % (ref 24–44)
MCH RBC QN AUTO: 33.7 PG (ref 27–33)
MCHC RBC AUTO-ENTMCNC: 31.3 G/DL (ref 32–36.5)
MCV RBC AUTO: 107.7 FL (ref 80–96)
MONOCYTES # BLD AUTO: 0.9 10^3/UL (ref 0–0.8)
MONOCYTES NFR BLD AUTO: 9.7 % (ref 0–5)
NEUTROPHILS # BLD AUTO: 7.6 10^3/UL (ref 1.5–8.5)
NEUTROPHILS NFR BLD AUTO: 79.6 % (ref 36–66)
PLATELET # BLD AUTO: 149 10^3/UL (ref 150–450)
POTASSIUM SERPL-SCNC: 4.3 MEQ/L (ref 3.5–5.1)
PROT SERPL-MCNC: 7.3 GM/DL (ref 6.4–8.2)
RBC # BLD AUTO: 3.23 10^6/UL (ref 4.3–6.1)
SODIUM SERPL-SCNC: 138 MEQ/L (ref 136–145)
TROPONIN I SERPL-MCNC: 0.04 NG/ML (ref ?–0.1)
TSH SERPL DL<=0.005 MIU/L-ACNC: 1.18 UIU/ML (ref 0.36–3.74)
WBC # BLD AUTO: 9.6 10^3/UL (ref 4–10)

## 2019-09-22 RX ADMIN — DIATRIZOATE MEGLUMINE AND DIATRIZOATE SODIUM SCH ML: 600; 100 SOLUTION ORAL; RECTAL at 23:12

## 2019-09-22 RX ADMIN — DIATRIZOATE MEGLUMINE AND DIATRIZOATE SODIUM SCH ML: 600; 100 SOLUTION ORAL; RECTAL at 22:20

## 2019-09-22 NOTE — REPVR
PROCEDURE INFORMATION:

  Exam:  US Duplex Bilateral Lower Extremity Veins

  Exam date and time:  9/22/2019 9:19 PM

  Clinical history:  83 years old, male; Edema, localized; Lower extremity, 

bilateral; Additional info: B/l lower extremity swelling



TECHNIQUE:

  Imaging protocol:  Real-time duplex ultrasound of the Bilateral Lower 

Extremities with 2-D gray scale, color Doppler flow and spectral waveform 

analysis with image documentation. Complete exam focused on the bilateral lower 

extremity veins.



COMPARISON:  No relevant prior studies available.



FINDINGS: 



Vascular structures evaluated include bilateral common femoral, superficial 

femoral, greater saphenous and popliteal veins into the proximal calf.



Doppler evaluation shows normal venous flow with respiratory variation and 

augmentation with distal compression. No intraluminal filling defect. Doppler 

waveforms and flow directionality are normal. 



No abnormal focal fluid collections present. 



IMPRESSION:



No evidence of deep venous thrombosis in visualized lower extremity veins.  





Electronically signed by: Joe Nieto On 09/22/2019  21:41:37 PM

## 2019-09-22 NOTE — REPVR
PROCEDURE INFORMATION: 

Exam: CT Head Without Contrast 

Exam date and time: 9/22/2019 8:12 PM 

Clinical history: 83 years old, male; Numbness / parasthesia and speech 

disturbance; Bilateral; Dysphasia; Additional info: Possible stroke 



TECHNIQUE: 

Imaging protocol: Computed tomography of the head without contrast. 

Radiation optimization: All CT scans at this facility use at least one of these 

dose optimization techniques: automated exposure control; mA and/or kV 

adjustment per patient size (includes targeted exams where dose is matched to 

clinical indication); or iterative reconstruction. 

Other technique: STROKE PROTOCOL was implemented. 



COMPARISON: 

CT Head without contrast 8/7/2017 9:57 AM 



FINDINGS: 

Brain: No intracranial mass, focal mass effect or midline shift. No acute 

intracranial hemorrhage. Mild decreased attenuation in periventricular/centrum 

semiovale white matter. No focal effacement of cortical sulci to indicate acute 

cortical infarct. Punctate remote left cerebellar lacunar infarct. 

Ventricles: Prominent ventricles and CSF spaces suggest parenchymal volume 

loss. 

Bones/joints: No calvarial fracture or destructive process. 

Sinuses: Visualized paranasal sinuses are unremarkable. 

Mastoid air cells: Mastoid air cells are normally aerated. 

Orbits: Visualized globes and orbits are unremarkable. 

Soft tissues: No focal extracranial soft tissue swelling. 



IMPRESSION: 

1. No acute intracranial abnormality. 

2. Atrophy and chronic microangiopathic change in supratentorial white matter. 



ASSESSMENT: 

ASPECTS ([place] Stroke Program Early CT Score) is 10



Electronically signed by: Joe Nieto On 09/22/2019  20:19:49 PM

## 2019-09-23 VITALS — SYSTOLIC BLOOD PRESSURE: 138 MMHG | DIASTOLIC BLOOD PRESSURE: 79 MMHG

## 2019-09-23 VITALS — DIASTOLIC BLOOD PRESSURE: 54 MMHG | SYSTOLIC BLOOD PRESSURE: 141 MMHG

## 2019-09-23 VITALS — DIASTOLIC BLOOD PRESSURE: 76 MMHG | SYSTOLIC BLOOD PRESSURE: 126 MMHG

## 2019-09-23 VITALS — DIASTOLIC BLOOD PRESSURE: 63 MMHG | SYSTOLIC BLOOD PRESSURE: 131 MMHG

## 2019-09-23 LAB
ALBUMIN SERPL BCG-MCNC: 2.9 GM/DL (ref 3.2–5.2)
ALT SERPL W P-5'-P-CCNC: 12 U/L (ref 12–78)
BILIRUB SERPL-MCNC: 0.6 MG/DL (ref 0.2–1)
BUN SERPL-MCNC: 21 MG/DL (ref 7–18)
CALCIUM SERPL-MCNC: 8.8 MG/DL (ref 8.8–10.2)
CHLORIDE SERPL-SCNC: 107 MEQ/L (ref 98–107)
CO2 SERPL-SCNC: 24 MEQ/L (ref 21–32)
CREAT SERPL-MCNC: 1.68 MG/DL (ref 0.7–1.3)
GFR SERPL CREATININE-BSD FRML MDRD: 41.9 ML/MIN/{1.73_M2} (ref 35–?)
GLUCOSE SERPL-MCNC: 90 MG/DL (ref 70–100)
HCT VFR BLD AUTO: 30.8 % (ref 42–52)
HCT VFR BLD AUTO: 30.8 % (ref 42–52)
HGB BLD-MCNC: 9.9 G/DL (ref 13.5–17.5)
MCH RBC QN AUTO: 33.9 PG (ref 27–33)
MCHC RBC AUTO-ENTMCNC: 32.1 G/DL (ref 32–36.5)
MCV RBC AUTO: 105.5 FL (ref 80–96)
PLATELET # BLD AUTO: 146 10^3/UL (ref 150–450)
POTASSIUM SERPL-SCNC: 4.4 MEQ/L (ref 3.5–5.1)
PROT SERPL-MCNC: 6.6 GM/DL (ref 6.4–8.2)
RBC # BLD AUTO: 2.92 10^6/UL (ref 4.3–6.1)
SODIUM SERPL-SCNC: 138 MEQ/L (ref 136–145)
VIT B12 SERPL-MCNC: 326 PG/ML (ref 247–911)
WBC # BLD AUTO: 7.3 10^3/UL (ref 4–10)

## 2019-09-23 RX ADMIN — VANCOMYCIN HYDROCHLORIDE SCH MG: KIT at 05:05

## 2019-09-23 RX ADMIN — VANCOMYCIN HYDROCHLORIDE SCH MG: KIT at 13:36

## 2019-09-23 RX ADMIN — CARBIDOPA AND LEVODOPA SCH TAB: 25; 100 TABLET ORAL at 18:33

## 2019-09-23 RX ADMIN — CIPROFLOXACIN SCH MG: 250 TABLET, FILM COATED ORAL at 18:33

## 2019-09-23 RX ADMIN — SODIUM CHLORIDE SCH MLS/HR: 9 INJECTION, SOLUTION INTRAVENOUS at 16:42

## 2019-09-23 RX ADMIN — VANCOMYCIN HYDROCHLORIDE SCH MG: KIT at 18:34

## 2019-09-23 RX ADMIN — ASPIRIN SCH MG: 81 TABLET ORAL at 09:25

## 2019-09-23 RX ADMIN — CARBIDOPA AND LEVODOPA SCH TAB: 25; 100 TABLET ORAL at 09:25

## 2019-09-23 RX ADMIN — TAMSULOSIN HYDROCHLORIDE SCH MG: 0.4 CAPSULE ORAL at 18:33

## 2019-09-23 RX ADMIN — FERROUS SULFATE TAB 325 MG (65 MG ELEMENTAL FE) SCH MG: 325 (65 FE) TAB at 09:25

## 2019-09-23 RX ADMIN — MIRTAZAPINE SCH MG: 15 TABLET, FILM COATED ORAL at 18:33

## 2019-09-23 RX ADMIN — CLOPIDOGREL BISULFATE SCH MG: 75 TABLET ORAL at 09:25

## 2019-09-23 RX ADMIN — ENOXAPARIN SODIUM SCH MG: 30 INJECTION SUBCUTANEOUS at 09:26

## 2019-09-23 NOTE — REP
Portable chest, 08:39 p.m., single AP view with the patient upright:

Comparison is 10/29/2012.

The lung fields are clear.  Cardiac size is upper normal for portable

positioning, unchanged.  The mynor, mediastinum, skeletal structures are

unchanged. There is a hiatal hernia.  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

unchanged.

There are sternotomy wires, unchanged.

Impression:

There are no acute cardiopulmonary findings.

 

 

 

Electronically Signed by

Samuel Diaz MD 09/23/2019 07:43 A

## 2019-09-23 NOTE — REPVR
PROCEDURE INFORMATION: 

Exam: CT Abdomen and Pelvis Without Contrast 

Exam date and time: 9/22/2019 11:37 PM 

Clinical history: 83 years old, male; Abdominal pain; Additional info: N/v/d, 

loss of appetite 



TECHNIQUE: 

Imaging protocol: Computed tomography of the abdomen and pelvis without 

contrast. 

Radiation optimization: All CT scans at this facility use at least one of these 

dose optimization techniques: automated exposure control; mA and/or kV 

adjustment per patient size (includes targeted exams where dose is matched to 

clinical indication); or iterative reconstruction. 



COMPARISON: 

No relevant prior studies available. 



FINDINGS: 

Lungs: Atelectatic change is present at the left lung base. 

Mediastinum: Hiatal hernia is present. 



Liver: Noncontrast liver shows no obvious lesion. 

Gallbladder and bile ducts: Gallstones are present in the gallbladder lumen. No 

adjacent fluid or duct dilatation. 

Pancreas: Noncontrast pancreas shows no obvious mass or adjacent fluid. 

Spleen: Noncontrast spleen shows no obvious focal deformity. 

Adrenals: Adrenal glands are normal in appearance. 

Kidneys and ureters: Kidneys demonstrate no obstruction. Punctate right renal 

stone Simple fluid density 2.3 cm exophytic right renal cyst 

Stomach and bowel: No evidence of small bowel obstruction. Diverticular changes 

are present within the colon without inflammation. 

Appendix: Appendix is not seen. No RLQ inflammation to suggest appendicitis. 

Intraperitoneal space: No pneumoperitoneum. 

Vasculature: Aortoiliac endograft is present, spanning a 5 cm abdominal aortic 

aneurysm. Femoral-femoral graft is present with adjacent probable scarring. 

Lymph nodes: No enlarged lymph nodes. 



Bladder: Urinary bladder appears normal. 

Bones/joints: Bony structures show no acute fracture or destructive process. 

Soft tissues: Unremarkable. 

Other findings: Evaluation of solid organs is limited without IV contrast. Exam 

is limited by artifact from patient being scanned with arms at the sides. 

Images are limited due to patient motion. 



IMPRESSION: 

1. No acute bowel process. 

2. Colonic diverticulosis without evidence of active inflammation. 

3. Nonobstructive right renal calculus. 

4. Large hiatal hernia with adjacent atelectasis. 



Electronically signed by: Joe Nieto On 09/23/2019  00:01:18 AM

## 2019-09-23 NOTE — ECGEPIP
[facility] - ED

                                       

                                       Test Date:    2019

Pat Name:     FELISA SANCHEZ           Department:   

Patient ID:   [id number]                 Room:         [place] [address]

Gender:       Male                     Technician:   COTY

:          1935               Requested By: FENG CLAYTON

Order Number: [id number]     Reading MD:   Kenan Diehl

                                 Measurements

Intervals                              Axis          

Rate:         81                       P:            

ND:           0                        QRS:          7

QRSD:         102                      T:            10

QT:           388                                    

QTc:          452                                    

                           Interpretive Statements

ATRIAL FIBRILLATION

MODERATE ST DEPRESSION

Previous tracing done 17 was sinus rhythm

Baseline artifact

Electronically Signed on 2019 21:17:44 EDT by Kenan Diehl

## 2019-09-23 NOTE — HPEPDOC
General


Date of Admission


Sep 23, 2019 at 01:13


Date of Service:  Sep 23, 2019


Chief Complaint


The patient is a 83-year-old male admitted with a reason for visit of Acute 

Kidney Injury.


Source:  Patient, Family


Exam Limitations:  No limitations


Timing/Duration:  Day(s)


Severity:  Moderate





History of Present Illness


Mr. Lou is an 83 years old man with hx/o Parkinson's disease, CVA and PAD. 

He was brought to ER tonight for evaluation of multiple problems: right arm 

difficulty, leaning to the right, unable to walk, three episodes of loose watery

diarrhea and poor oral intake (no dysphagia). This happened today. Recently he 

has been generally weak; baseline leg swelling has worsened; he used to take 

diuretic, but is not prescribed anymore. Pt has slurry voice which is old.





Pt denies chest pain, fever, chills, SOB or cough.





In ER, pt had normal vitals. Mental status is good. CT head, CXT are negative 

for any acute process. US of the legs are negative for DVT. Labs are significant

for Cr 2.08 (baseline 1.2) and Lactate of 3.4. Tongue and skin are dry. There is

JVD. Pt has received half liter of IV fluid int he ER.





Home Medications


Scheduled


Acetaminophen (Tylenol Extra Strength) 500 Mg Tablet, 1,000 MG PO BID, 

(Reported)


Aspirin (Aspirin EC) 81 Mg Tab, 81 MG PO DAILY, (Reported)


Carbidopa/Levodopa (Sinemet  mg Tablet) 1 Each Tablet, 1 TAB PO BID, 

(Reported)


Clopidogrel Bisulfate (Plavix) 75 Mg Tablet, 75 MG PO DAILY, (Reported)


Ferrous Sulfate (Ferrous Sulfate) 325 Mg Tablet, 325 MG PO DAILY, (Reported)


Lansoprazole (Lansoprazole) 15 Mg Cap, 15 MG PO QPM, (Reported)


Magnesium Oxide (Magnesium) 400 Mg Cap, 400 MG PO DAILY, (Reported)


Mirtazapine (Remeron) 15 Mg Tablet, 15 MG PO QPM, (Reported)


Tamsulosin HCl (Flomax) 0.4 Mg Capsule, 0.4 MG PO QPM, (Reported)





Allergies


Coded Allergies:  


     No Known Drug Allergies (Verified  Allergy, Unknown, 9/22/19)





Past Medical History


Medical History


Parkinson's disease, CVA, PAD, CKD 3, Anemia, BPH





Family History


Significant Family History:  No pertinent family hx





Social History


* Smoker:  Denies


Alcohol:  Denies


Drugs:  denies





A-FIB/CHADSVASC


A-FIB History


Current/History of A-Fib/PAF?:  No





Review of Systems


Constitutional:  Reports: Weakness; 


   Denies: Chills, Fever, Malaise


Eyes:  Denies: Pain, Vision change


ENT:  Denies: Head Aches, Ear Pain, Dysphagia, Sore Throat


Skin:  Denies: Rash, Lesions, Breakdown


Pulmonary:  Denies: Dyspnea, Cough, Pleuritic Chest Pain


Cardiovascular:  Reports: Edema; 


   Denies: Chest Pain, Palpitations, Paroxysmal Noc. Dyspnea


Gastrointestinal:  Reports: Diarrhea; 


   Denies: Nausea, Vomiting, Abdominal Pain


Genitourinary:  Denies: Dysuria, Frequency


Hematologic:  Denies: Bruising, Bleeding Excessively


Endocrine:  Denies: Polydipsia, Polyphagia


Musculoskeletal:  Denies: Neck Pain, Back Pain


Neurological:  Reports: Weakness; 


   Denies: Numbness, Change in speech, Confusion, Seizures


Psych:  Reports: Mood Normal; 


   Denies: Anxiety, Depression





Physical Examination


General Exam:  Positive: Alert, Cooperative, No Acute Distress


Eye Exam:  Positive: PERRLA, Conjunctiva & lids normal


ENT Exam:  Positive: Atraumatic, Other ENT (dry oral mucosa)


Neck Exam:  Positive: Supple, JVD


Chest Exam:  Positive: Clear to auscultation, Normal air movement


Heart Exam:  Positive: Rate Normal, Regular Rhythm; 


   Negative: Murmurs


Abdomen Exam:  Positive: Normal bowel sounds, Soft; 


   Negative: Tenderness


Extremity Exam:  Positive: Edema (3+ edema of both legs), Normal pulses; 


   Negative: Tenderness


Skin Exam:  Positive: Other skin issue (poor skin turgor); 


   Negative: Rash


Neuro Exam:  Positive: Normal Speech (chronic slurred speech), Strength at 5/5 

X4 ext, Normal Tone, Cranial Nerves 3-12 NL, Reflexes 2+


Psych Exam:  Positive: Mental status NL, Mood NL, Memory Intact, Oriented x 3; 


   Negative: Anxiety





Vital Signs





Vital Signs








  Date Time  Temp Pulse Resp B/P (MAP) Pulse Ox O2 Delivery O2 Flow Rate FiO2


 


9/23/19 01:45  63  151/78 (102) 94   


 


9/23/19 01:30   20     


 


9/22/19 20:04 99.0       


 


9/22/19 19:54      Room Air  











Laboratory Data


Labs 24H


Laboratory Tests 2


9/22/19 19:55: 


Immature Granulocyte % (Auto) 1.0, White Blood Count 9.6, Red Blood Count 3.23L,

Hemoglobin 10.9L, Hematocrit 34.8L, Mean Corpuscular Volume 107.7H, Mean 

Corpuscular Hemoglobin 33.7H, Mean Corpuscular Hemoglobin Concent 31.3L, Red 

Cell Distribution Width 14.2, Platelet Count 149L, Neutrophils (%) (Auto) 79.6H,

Lymphocytes (%) (Auto) 9.0L, Monocytes (%) (Auto) 9.7H, Eosinophils (%) (Auto) 

0.2, Basophils (%) (Auto) 0.5, Neutrophils # (Auto) 7.6, Lymphocytes # (Auto) 

0.9L, Monocytes # (Auto) 0.9H, Eosinophils # (Auto) 0.0, Basophils # (Auto) 0.1,

Nucleated Red Blood Cells % (auto) 0.0, Anion Gap 12, Glomerular Filtration Rate

32.7L, Lactic Acid Level 3.4*H, Calcium Level 8.8, Aspartate Amino Transf 

(AST/SGOT) 13, Alanine Aminotransferase (ALT/SGPT) 9L, Alkaline Phosphatase 74, 

Total Bilirubin 0.8, Direct Bilirubin 0.2, Total Creatine Kinase 197, Creatine 

Kinase MB 2.4, Creatine Kinase MB Relative Index 1.22, Troponin I 0.04, Total 

Protein 7.3, Albumin 3.4, Albumin/Globulin Ratio 0.87L, Thyroid Stimulating 

Hormone (TSH) 1.180, Ethyl Alcohol Level 0.003


9/22/19 20:36: Bedside Glucose (Misc Panel) 108


9/23/19 01:03: Lactic Acid Followup at 4 Hours 1.1


CBC/BMP


Laboratory Tests


9/22/19 19:55








Red Blood Count 3.23 L, Mean Corpuscular Volume 107.7 H, Mean Corpuscular Hemo

globin 33.7 H, Mean Corpuscular Hemoglobin Concent 31.3 L, Red Cell Distribution

Width 14.2, Neutrophils (%) (Auto) 79.6 H, Lymphocytes (%) (Auto) 9.0 L, 

Monocytes (%) (Auto) 9.7 H, Eosinophils (%) (Auto) 0.2, Basophils (%) (Auto) 

0.5, Neutrophils # (Auto) 7.6, Lymphocytes # (Auto) 0.9 L, Monocytes # (Auto) 

0.9 H, Eosinophils # (Auto) 0.0, Basophils # (Auto) 0.1


Microbiology





Microbiology


9/23/19 Blood Culture, Received


          Pending


9/22/19 Blood Culture, Received


          Pending


9/23/19 Gastrointestinal Tract Panel (PCR), Received


          Pending





 Assessment/Plan





Peripheral Edema, JVD in the setting of CALLUM; Suspect CHF


- Admit to inpatient


- Avoid IV fluid for now


- low dose Lasix


- Check BNP


- Echo in the morning


- I/O, daily weight





Balance Problem, worsening of Parkinson's or r/o stroke


- MRI brain in the morning


- neuro watch


- Pt/OT





Continue home meds for other chronic conditions.





Plan / VTE


VTE Prophylaxis Ordered?:  Yes





Plan


Diet:  Continue Current


Activity:  Continue Current


Therapy:  PT, OT


Diagnostics:  Repeat Labs in AM


Anticipated Discharge:  Home With Services











JAMES SPARKS MD                  Sep 23, 2019 02:15

## 2019-09-23 NOTE — REP
MRI brain without contrast:

 

History:  Right-sided balance problems.  Acute kidney injury.

 

Technique:  Axial and sagittal imaging planes are utilized for T1 and T2-weighted

scans.  Sequences include spin-echo, fast spin echo, FLAIR, and diffusion

weighted sequences.

 

Comparison MRI study of the brain is from a August 7, 2017.  Comparison CT study

September 22, 2019.

 

MRI findings:  No bony calvarial lesion is seen.  Craniocervical junction upper

cervical cord are unremarkable.  There is a lacunar infarct again noted in the

left cerebellar hemisphere.  This is unchanged.  There is generalized volume loss

as noted previously.  Diffusion weighted scans show no evidence to suggest acute

ischemia.  The recently noted punctate focus of a restricted diffusion in the

right frontal lobe is no longer visible.  No new evidence of restricted diffusion

is seen to suggest an acute infarct.  No mass or midline shift is observed.

 

Impression:

 

Moderate to marked generalized volume loss.  No evidence of acute infarct, bleed,

mass or midline shift.  Old lacunar infarct left cerebellar hemisphere.

 

 

Electronically Signed by

Venu Gamez MD 09/24/2019 08:47 A

## 2019-09-23 NOTE — IPNPDOC
Subjective


Date Seen


The patient was seen on 9/23/19.





Subjective


Chief Complaint/HPI


Diarrhea persists.  Afebrile, hemodynamics are stable, does not feel any better 

after admission.





Objective


Physical Examination


General Exam:  Positive: Alert, Cooperative, Other (appears ill)


Eye Exam:  Positive: PERRLA, Conjunctiva & lids normal


ENT Exam:  Positive: Atraumatic, Other ENT (very dry oral mucosa, oral ulcers 

noted)


Neck Exam:  Positive: Supple, JVD


Chest Exam:  Positive: Clear to auscultation, Normal air movement


Heart Exam:  Positive: Rate Normal, Regular Rhythm; 


   Negative: Murmurs


Telemetry:  Positive: Atrial fibrillation (rate controlled afib overnight)


Abdomen Exam:  Positive: Normal bowel sounds, Soft, Other (diarrhea); 


   Negative: Tenderness


Extremity Exam:  Positive: Edema (2+ edema of legs and ankles), Normal pulses; 


   Negative: Tenderness


Skin Exam:  Positive: Other skin issue (poor skin turgor); 


   Negative: Rash


Neuro Exam:  Positive: Normal Speech (chronic slurred speech), Strength at 5/5 

X4 ext, Normal Tone, Cranial Nerves 3-12 NL, Reflexes 2+


Psych Exam:  Positive: Mental status NL, Memory Intact, Oriented x 3; 


   Negative: Anxiety





Assessment /Plan


Assessment


# Acute Infectious diarrhea (GI panel +ve for c.diff, enterohemorrhagic e. coli,

and yersinia)


- continue vanco 125 mg q 6 orally


- cipro 250 mg bid


- no evidence of STEC continue with abx


- LA has normalized from 3.4 to 1


- gentle hydration ns @ 75 ml/hr





# Right sided/Generalized weakness


- MRI brain pending to r/o CVA


- Likely due to acute diarrhea/dehydration


- PT/OT


- May need placement at discharge





# Bilateral LE swelling 


- Echo 2017 reviewed and normal


- repeat Echo pending, NT-proBNP 4025, CXR clear


- holding lasix with acute volume loss associated with diarrhea 


- B/l venous duplex normal on admission





# Acute on Chronic renal failure/CKD stage 3 (baseline creat 1.4-1.6)


- creat improved overnight with fluid bolus


- start IVFs @ 75 ml/hr and monitor 


- avoid nephrotoxins





# Rate controlled Paroxysmal atrial fib likely exaccerbated by acute 

diarrhea/dehydration


- CHADS2-Vasc2 = 5, will need transition to coumadin or renal dose eliquis 

during this hospitalization once acute issues resolve


- Echo pending


- TSH 1.18


- not on rate control medications





# Parkinson's Disease


- continue Sinemet, monitor for toxic side effects





# Chronic CVA


- continue asa + Plavix, consider transition to OAC if patient desires.





# Chronic CAD - stable


# S/p 3V CABG


# S/p AAA repair


# S/p fem-pop bypass for PAD





# Chronic anemia with macrocytosis


- continue iron tablets


- check b12, folate levels





# BPH


- continue Flomax





# Insomnia


- continue Remeron





# Hx of Lyme disease


- treated with amoxicillin in the past





# DVT prophylaxis


- SCDs + lovenox 30 mg





Plan/VTE


VTE Prophylaxis Ordered?:  Yes


VTE Exclusion Mechanical Proph:  N/A:VTE Prophy Ordered


VTE Exclusion Pharmacological:  N/A:VTE Prophy Ordered





VS, I&O, 24H, Fishbone


Vital Signs/I&O





Vital Signs








  Date Time  Temp Pulse Resp B/P (MAP) Pulse Ox O2 Delivery O2 Flow Rate FiO2


 


9/23/19 14:00 98.9 62 18 131/63 (85) 92   


 


9/22/19 19:54      Room Air  














I&O- Last 24 Hours up to 6 AM 


 


 9/23/19





 06:00


 


Intake Total 500 ml


 


Output Total 500 ml


 


Balance 0 ml











Laboratory Data


24H LABS


Laboratory Tests 2


9/22/19 19:55: 


Immature Granulocyte % (Auto) 1.0, White Blood Count 9.6, Red Blood Count 3.23L,

Hemoglobin 10.9L, Hematocrit 34.8L, Mean Corpuscular Volume 107.7H, Mean 

Corpuscular Hemoglobin 33.7H, Mean Corpuscular Hemoglobin Concent 31.3L, Red 

Cell Distribution Width 14.2, Platelet Count 149L, Neutrophils (%) (Auto) 79.6H,

Lymphocytes (%) (Auto) 9.0L, Monocytes (%) (Auto) 9.7H, Eosinophils (%) (Auto) 

0.2, Basophils (%) (Auto) 0.5, Neutrophils # (Auto) 7.6, Lymphocytes # (Auto) 

0.9L, Monocytes # (Auto) 0.9H, Eosinophils # (Auto) 0.0, Basophils # (Auto) 0.1,

Nucleated Red Blood Cells % (auto) 0.0, Anion Gap 12, Glomerular Filtration Rate

32.7L, Lactic Acid Level 3.4*H, Calcium Level 8.8, Aspartate Amino Transf 

(AST/SGOT) 13, Alanine Aminotransferase (ALT/SGPT) 9L, Alkaline Phosphatase 74, 

Total Bilirubin 0.8, Direct Bilirubin 0.2, Total Creatine Kinase 197, Creatine 

Kinase MB 2.4, Creatine Kinase MB Relative Index 1.22, Troponin I 0.04, 

NT-Pro-B-Type Natriuretic Peptide 4025H, Total Protein 7.3, Albumin 3.4, 

Albumin/Globulin Ratio 0.87L, Thyroid Stimulating Hormone (TSH) 1.180, Ethyl 

Alcohol Level 0.003


9/22/19 20:36: Bedside Glucose (Misc Panel) 108


9/23/19 01:03: Lactic Acid Followup at 4 Hours 1.1


9/23/19 02:55: 


Urine Color YELLOW, Urine Appearance HAZY, Urine pH 5.0, Urine Specific Gravity 

1.015, Urine Protein NEGATIVE, Urine Glucose (UA) NEGATIVE, Urine Ketones 

NEGATIVE, Urine Blood 1+H, Urine Nitrite NEGATIVE, Urine Bilirubin NEGATIVE, 

Urine Urobilinogen 0.2, Urine Leukocyte Esterase TRACEH, Urine WBC (Auto) 2, 

Urine RBC (Auto) 2, Urine Hyaline Casts (Auto) 6, Urine Bacteria (Auto) 

NEGATIVE, Urine Squamous Epithelial Cells 0, Urine Transitional Epithelial Cells

<1, Urine Mucus (Auto) SMALL, Urine Sperm (Auto) 


9/23/19 08:08: 


Nucleated Red Blood Cells % (auto) 0.0, Anion Gap 7L, Glomerular Filtration Rate

41.9, Blood Urea Nitrogen 21H, Creatinine 1.68H, Sodium Level 138, Potassium 

Level 4.4, Chloride Level 107, Carbon Dioxide Level 24, Calcium Level 8.8, 

Aspartate Amino Transf (AST/SGOT) 17, Alanine Aminotransferase (ALT/SGPT) 12, 

Alkaline Phosphatase 62, Total Bilirubin 0.6, Total Protein 6.6, Albumin 2.9L, 

Albumin/Globulin Ratio 0.78L


CBC/BMP


Laboratory Tests


9/22/19 19:55








Red Blood Count 3.23 L, Mean Corpuscular Volume 107.7 H, Mean Corpuscular 

Hemoglobin 33.7 H, Mean Corpuscular Hemoglobin Concent 31.3 L, Red Cell 

Distribution Width 14.2, Neutrophils (%) (Auto) 79.6 H, Lymphocytes (%) (Auto) 

9.0 L, Monocytes (%) (Auto) 9.7 H, Eosinophils (%) (Auto) 0.2, Basophils (%) 

(Auto) 0.5, Neutrophils # (Auto) 7.6, Lymphocytes # (Auto) 0.9 L, Monocytes # 

(Auto) 0.9 H, Eosinophils # (Auto) 0.0, Basophils # (Auto) 0.1





9/23/19 08:08








Red Blood Count 2.92 L, Mean Corpuscular Volume 105.5 H, Mean Corpuscular 

Hemoglobin 33.9 H, Mean Corpuscular Hemoglobin Concent 32.1, Red Cell 

Distribution Width 14.0, Calcium Level 8.8, Aspartate Amino Transf (AST/SGOT) 

17, Alanine Aminotransferase (ALT/SGPT) 12, Alkaline Phosphatase 62, Total 

Bilirubin 0.6, Total Protein 6.6, Albumin 2.9 L


Microbiology





Microbiology


9/23/19 Blood Culture, Received


          Pending


9/22/19 Blood Culture, Received


          Pending


9/23/19 Gastrointestinal Tract Panel (PCR) - Final, Complete


          Clostridium Difficile A/B


          Yersinia Enterocolitica Sp


          Enteropathogenic E.coli


9/23/19 Urine Culture, Received


          Pending











MELVIN CERDA MD             Sep 23, 2019 16:49

## 2019-09-24 VITALS — DIASTOLIC BLOOD PRESSURE: 88 MMHG | SYSTOLIC BLOOD PRESSURE: 147 MMHG

## 2019-09-24 VITALS — SYSTOLIC BLOOD PRESSURE: 119 MMHG | DIASTOLIC BLOOD PRESSURE: 74 MMHG

## 2019-09-24 VITALS — SYSTOLIC BLOOD PRESSURE: 153 MMHG | DIASTOLIC BLOOD PRESSURE: 86 MMHG

## 2019-09-24 LAB
BUN SERPL-MCNC: 19 MG/DL (ref 7–18)
CALCIUM SERPL-MCNC: 9.1 MG/DL (ref 8.8–10.2)
CHLORIDE SERPL-SCNC: 106 MEQ/L (ref 98–107)
CO2 SERPL-SCNC: 23 MEQ/L (ref 21–32)
CREAT SERPL-MCNC: 1.62 MG/DL (ref 0.7–1.3)
FOLATE RBC-MCNC: 593 NG/ML (ref 280–791)
GFR SERPL CREATININE-BSD FRML MDRD: 43.6 ML/MIN/{1.73_M2} (ref 35–?)
GLUCOSE SERPL-MCNC: 84 MG/DL (ref 70–100)
HCT VFR BLD AUTO: 32.1 % (ref 42–52)
HGB BLD-MCNC: 10.2 G/DL (ref 13.5–17.5)
MAGNESIUM SERPL-MCNC: 2 MG/DL (ref 1.8–2.4)
MCH RBC QN AUTO: 33.9 PG (ref 27–33)
MCHC RBC AUTO-ENTMCNC: 31.8 G/DL (ref 32–36.5)
MCV RBC AUTO: 106.6 FL (ref 80–96)
PLATELET # BLD AUTO: 153 10^3/UL (ref 150–450)
POTASSIUM SERPL-SCNC: 4.3 MEQ/L (ref 3.5–5.1)
RBC # BLD AUTO: 3.01 10^6/UL (ref 4.3–6.1)
SODIUM SERPL-SCNC: 137 MEQ/L (ref 136–145)
WBC # BLD AUTO: 5.8 10^3/UL (ref 4–10)

## 2019-09-24 RX ADMIN — SODIUM CHLORIDE SCH MLS/HR: 9 INJECTION, SOLUTION INTRAVENOUS at 05:33

## 2019-09-24 RX ADMIN — TAMSULOSIN HYDROCHLORIDE SCH MG: 0.4 CAPSULE ORAL at 18:50

## 2019-09-24 RX ADMIN — CIPROFLOXACIN SCH MG: 250 TABLET, FILM COATED ORAL at 05:32

## 2019-09-24 RX ADMIN — VANCOMYCIN HYDROCHLORIDE SCH MG: KIT at 05:32

## 2019-09-24 RX ADMIN — CLOPIDOGREL BISULFATE SCH MG: 75 TABLET ORAL at 08:13

## 2019-09-24 RX ADMIN — CARBIDOPA AND LEVODOPA SCH TAB: 25; 100 TABLET ORAL at 18:49

## 2019-09-24 RX ADMIN — CIPROFLOXACIN SCH MG: 250 TABLET, FILM COATED ORAL at 18:50

## 2019-09-24 RX ADMIN — MIRTAZAPINE SCH MG: 15 TABLET, FILM COATED ORAL at 18:49

## 2019-09-24 RX ADMIN — FERROUS SULFATE TAB 325 MG (65 MG ELEMENTAL FE) SCH MG: 325 (65 FE) TAB at 08:12

## 2019-09-24 RX ADMIN — ENOXAPARIN SODIUM SCH MG: 30 INJECTION SUBCUTANEOUS at 08:13

## 2019-09-24 RX ADMIN — CARBIDOPA AND LEVODOPA SCH TAB: 25; 100 TABLET ORAL at 08:12

## 2019-09-24 RX ADMIN — VANCOMYCIN HYDROCHLORIDE SCH MG: KIT at 00:31

## 2019-09-24 RX ADMIN — VANCOMYCIN HYDROCHLORIDE SCH MG: KIT at 18:49

## 2019-09-24 RX ADMIN — VANCOMYCIN HYDROCHLORIDE SCH MG: KIT at 23:26

## 2019-09-24 RX ADMIN — ASPIRIN SCH MG: 81 TABLET ORAL at 08:12

## 2019-09-24 RX ADMIN — VANCOMYCIN HYDROCHLORIDE SCH MG: KIT at 12:35

## 2019-09-24 NOTE — IPNPDOC
Text Note


Date of Service


The patient was seen on 9/24/19.





NOTE


Subjective:


Feels better this morning


Afebrile, hemodynamically stable





Objective


Physical Examination


General Exam: Awake, alert, sitting up and visiting with family, cooperative


Eye Exam:  Positive: PERRLA, EOMI, anicteric


ENT Exam:  Atraumatic, MMM, shallow oral ulcers 


Neck Exam:  Supple


Chest Exam:  CTAB


Heart Exam:  Rate Normal, Regular Rhythm with a few skipped beats, no murmurs


Telemetry:  Atrial fibrillation (rate controlled afib overnight)


Abdomen Exam:  Normoactive bowel sounds, soft, non tender


Extremity Exam: 2+ LE edema to midshins, 2+ DP normal pulses, non tender


Neuro Exam:  Positive: Chronic slurred speech, otherwise unchanged, Strength at 

5/5 X4 ext, Normal Tone, Cranial Nerves 3-12 NL


Psych Exam:  Positive: Mental status NL, Memory Intact, Oriented x 3; 





Assessment /Plan


84 yo M who was admitted for infectious colitis with C.diff, yersinia and e.coli

on vanc and cipro with improvement of copious diarrhea and dehydration s/p 

fluids, whose couse has been c/b significant asymptomatic ectopy with paroxysmal

AFib and NSVT. This afternoon I discussed his risk for clots with the family and

patient and they were open to the idea of anticoagulation. Will speak with 

cardiology about management since he was placed on ASA/plavix.





Assessment


# Acute Infectious diarrhea (GI panel +ve for c.diff, enterohemorrhagic e. coli,

and yersinia)


- continue vanco 125 mg q 6 orally


- cipro 250 mg bid


- no evidence of STEC continue with abx


- DC gentle hydration ns @ 75 ml/hr given significant volume overloaded state 

and improved PO and no dryness anymore on exam





# Right sided/Generalized weakness


- MRI brain was w/o evidence of an acute CVA


- Likely due to acute diarrhea/dehydration


- PT/OT -> May need placement at discharge





# Bilateral LE swelling 


- Echo 2017 reviewed and normal


- repeat TTE pending, NT-proBNP 4025, CXR clear


- holding lasix with acute volume loss associated with diarrhea, although 

significant LE edema, remains stable on room air without vascular congestion.


- B/l venous duplex normal on admission





# Acute on Chronic renal failure/CKD stage 3 (baseline creat 1.4-1.6)


- creat improved with fluids


- DC fluids at this time


- avoid nephrotoxins





# Rate controlled Paroxysmal atrial fib likely exaccerbated by acute 

diarrhea/dehydration


- CHADS2-Vasc2 = 5, will need transition to renal dose eliquis during this 

hospitalization once acute issues resolve, to discuss this with cardiology since

he is on DAPT


- f/u TTE


- TSH 1.18


- has not required rate control, continue to monitor





# Parkinson's Disease


- continue Sinemet, monitor for toxic side effects





# Chronic CVA


- continue asa + Plavix, consider transition to ASA/eliquis, will discuss with 

cardiology first





# Chronic CAD - stable


# S/p 3V CABG


# S/p AAA repair


# S/p fem-pop bypass for PAD





# Chronic anemia with macrocytosis


- continue iron tablets


- check b12, folate levels





# BPH


- continue Flomax





# Insomnia


- continue Remeron





# Hx of Lyme disease


- treated with amoxicillin in the past





# DVT prophylaxis


- SCDs + lovenox 30 mg





Plan/VTE


VTE Prophylaxis Ordered?:  Yes


VTE Exclusion Mechanical Proph:  N/A:VTE Prophy Ordered


VTE Exclusion Pharmacological:  N/A:VTE Prophy Ordered





VS,Fishbone, I+O


VS, Fishbone, I+O


Laboratory Tests


9/24/19 08:59








Red Blood Count 3.01 L, Mean Corpuscular Volume 106.6 H, Mean Corpuscular 

Hemoglobin 33.9 H, Mean Corpuscular Hemoglobin Concent 31.8 L, Red Cell 

Distribution Width 13.9, Calcium Level 9.1








Vital Signs








  Date Time  Temp Pulse Resp B/P (MAP) Pulse Ox O2 Delivery O2 Flow Rate FiO2


 


9/24/19 14:00 98.0 95 20 153/86 (108) 86   


 


9/22/19 19:54      Room Air  














I&O- Last 24 Hours up to 6 AM 


 


 9/24/19





 06:00


 


Intake Total 1675 ml


 


Output Total 50 ml


 


Balance 1625 ml

















GALDINO BOSS MD   Sep 24, 2019 19:10

## 2019-09-25 VITALS — SYSTOLIC BLOOD PRESSURE: 154 MMHG | DIASTOLIC BLOOD PRESSURE: 82 MMHG

## 2019-09-25 VITALS — DIASTOLIC BLOOD PRESSURE: 69 MMHG | SYSTOLIC BLOOD PRESSURE: 118 MMHG

## 2019-09-25 VITALS — DIASTOLIC BLOOD PRESSURE: 69 MMHG | SYSTOLIC BLOOD PRESSURE: 127 MMHG

## 2019-09-25 LAB
BUN SERPL-MCNC: 22 MG/DL (ref 7–18)
CALCIUM SERPL-MCNC: 9.1 MG/DL (ref 8.8–10.2)
CHLORIDE SERPL-SCNC: 108 MEQ/L (ref 98–107)
CO2 SERPL-SCNC: 27 MEQ/L (ref 21–32)
CREAT SERPL-MCNC: 1.51 MG/DL (ref 0.7–1.3)
GFR SERPL CREATININE-BSD FRML MDRD: 47.3 ML/MIN/{1.73_M2} (ref 35–?)
GLUCOSE SERPL-MCNC: 79 MG/DL (ref 70–100)
HCT VFR BLD AUTO: 30 % (ref 42–52)
HGB BLD-MCNC: 9.6 G/DL (ref 13.5–17.5)
MAGNESIUM SERPL-MCNC: 2.1 MG/DL (ref 1.8–2.4)
MCH RBC QN AUTO: 33.2 PG (ref 27–33)
MCHC RBC AUTO-ENTMCNC: 32 G/DL (ref 32–36.5)
MCV RBC AUTO: 103.8 FL (ref 80–96)
PLATELET # BLD AUTO: 159 10^3/UL (ref 150–450)
POTASSIUM SERPL-SCNC: 4.2 MEQ/L (ref 3.5–5.1)
RBC # BLD AUTO: 2.89 10^6/UL (ref 4.3–6.1)
SODIUM SERPL-SCNC: 141 MEQ/L (ref 136–145)
WBC # BLD AUTO: 4.8 10^3/UL (ref 4–10)

## 2019-09-25 RX ADMIN — CIPROFLOXACIN SCH MG: 250 TABLET, FILM COATED ORAL at 05:21

## 2019-09-25 RX ADMIN — VANCOMYCIN HYDROCHLORIDE SCH MG: KIT at 11:38

## 2019-09-25 RX ADMIN — ASPIRIN SCH MG: 81 TABLET ORAL at 08:23

## 2019-09-25 RX ADMIN — CARBIDOPA AND LEVODOPA SCH TAB: 25; 100 TABLET ORAL at 08:23

## 2019-09-25 RX ADMIN — VANCOMYCIN HYDROCHLORIDE SCH MG: KIT at 05:21

## 2019-09-25 RX ADMIN — AMIODARONE HYDROCHLORIDE SCH MG: 200 TABLET ORAL at 17:55

## 2019-09-25 RX ADMIN — VANCOMYCIN HYDROCHLORIDE SCH MG: KIT at 17:54

## 2019-09-25 RX ADMIN — CLOPIDOGREL BISULFATE SCH MG: 75 TABLET ORAL at 08:22

## 2019-09-25 RX ADMIN — APIXABAN SCH MG: 2.5 TABLET, FILM COATED ORAL at 20:00

## 2019-09-25 RX ADMIN — TAMSULOSIN HYDROCHLORIDE SCH MG: 0.4 CAPSULE ORAL at 17:55

## 2019-09-25 RX ADMIN — FERROUS SULFATE TAB 325 MG (65 MG ELEMENTAL FE) SCH MG: 325 (65 FE) TAB at 08:23

## 2019-09-25 RX ADMIN — CARBIDOPA AND LEVODOPA SCH TAB: 25; 100 TABLET ORAL at 17:55

## 2019-09-25 RX ADMIN — MIRTAZAPINE SCH MG: 15 TABLET, FILM COATED ORAL at 17:55

## 2019-09-25 RX ADMIN — CIPROFLOXACIN SCH MG: 250 TABLET, FILM COATED ORAL at 17:55

## 2019-09-25 RX ADMIN — ENOXAPARIN SODIUM SCH MG: 30 INJECTION SUBCUTANEOUS at 08:23

## 2019-09-25 NOTE — ECGEPIP
[facility]

                                       

                                       Test Date:    2019

Pat Name:     FELISA SANCHEZ           Department:   

Patient ID:   [id number]                 Room:         [place] [address]

Gender:       Male                     Technician:   PAVITHRA

:          1935               Requested By: GALDINO WILSON

Order Number: [id number]     Reading MD:   Pankaj Person

                                 Measurements

Intervals                              Axis          

Rate:         72                       P:            -66

GA:           132                      QRS:          25

QRSD:         108                      T:            3

QT:           411                                    

QTc:          452                                    

                           Interpretive Statements

Atrial fibrillation with controlled ventricular response

Incomplete right bundle branch block

Delayed anterior R wave progression

Nonspecific ST-T wave abnormalities

No significant change when compared to prior tracing of 2019

Electronically Signed on 2019 6:04:13 EDT by Pankaj Person

## 2019-09-25 NOTE — IPNPDOC
Text Note


Date of Service


The patient was seen on 9/25/19.





NOTE


Subjective:


Feels well this morning


Afebrile, hemodynamically stable, no chest pain, shortness of breath or 

palpitations





Objective


Physical Examination


General Exam: Awake, alert, sitting up, eating 


Eye Exam:  Positive: PERRLA, EOMI, anicteric


ENT Exam:  Atraumatic, MMM


Neck Exam:  Supple


Chest Exam:  CTAB


Heart Exam:  Rate Normal, Regular Rhythm, no murmurs


Abdomen Exam:  Normoactive bowel sounds, soft, non tender


Extremity Exam: 2+ LE edema to ankles, 2+ DP normal pulses, non tender


Neuro Exam:  Positive: Normal speech with a slight slurring per baseline, 

Strength at 5/5 X4 ext, Normal Tone, Cranial Nerves 3-12 NL


Psych Exam:  Positive: Mental status NL, Memory Intact, Oriented x 3; 





Assessment /Plan


84 yo M who was admitted for infectious colitis with C.diff, yersinia and e.coli

on vanc and cipro with improvement of copious diarrhea and dehydration s/p 

fluids, whose couse has been c/b significant asymptomatic ectopy with paroxysmal

AFib and NSVT. Consulted cardiology about ectopy and paroxysmal AF management 

since he was placed on ASA/plavix.





Assessment


# Acute Infectious diarrhea (GI panel +ve for c.diff, enterohemorrhagic e. coli,

and yersinia)


- continue vanco 125 mg q 6 orally


- cipro 250 mg bid


- no evidence of STEC continue with abx





# Right sided/Generalized weakness: much improved


- MRI brain was w/o evidence of an acute CVA


- Likely due to acute diarrhea/dehydration


- PT/OT -> May need placement at discharge





# Bilateral LE swelling 


- Echo 2017 reviewed and normal


- repeat TTE pending, NT-proBNP 4025, CXR clear


- restart home lasix that was held with acute volume loss associated with 

diarrhea, that is now resolving


- B/l venous duplex normal on admission





# Acute on Chronic renal failure/CKD stage 3 (baseline creat 1.4-1.6): prerenal 

azotemia in the setting of GI losses 


- creat improved with fluids


- avoid nephrotoxins





# Rate controlled Paroxysmal atrial fib likely exacerbated by acute 

diarrhea/dehydration and infection


- CHADS2-Vasc2 = 5, in speaking with Dr. Mcgee, will start eliquis 2.5 BID and 

discontinue plavix, such that he will be on ASA/eliquis


-Dr. Mcgee also suggested 200 QID amio, but given the patient is also on cipro 

which is QT prolonging, will await his final recs. 


- f/u TTE


- TSH 1.18


- has not required rate control, continue to monitor





# Parkinson's Disease


- continue Sinemet, monitor for toxic side effects





# Chronic CVA


- continue asa, dc Plavix, now transitioning to ASA 81/eliquis 2.5 BID





# Chronic CAD - stable


# S/p 3V CABG


# S/p AAA repair


# S/p fem-pop bypass for PAD





# Chronic anemia with macrocytosis


- continue iron tablets


- check b12, folate levels





# BPH


- continue Flomax





# Insomnia


- continue Remeron





# Hx of Lyme disease


- treated with amoxicillin in the past





# DVT prophylaxis


- SCDs + lovenox 30 mg





DVT prophylaxis: on eliquis


Dispo: pending clinical improvement, stool starting to be formed.





VS,Fishbone, I+O


VS, Fishbone, I+O


Laboratory Tests


9/25/19 05:43








Red Blood Count 2.89 L, Mean Corpuscular Volume 103.8 H, Mean Corpuscular 

Hemoglobin 33.2 H, Mean Corpuscular Hemoglobin Concent 32.0, Red Cell 

Distribution Width 13.7, Calcium Level 9.1








Vital Signs








  Date Time  Temp Pulse Resp B/P (MAP) Pulse Ox O2 Delivery O2 Flow Rate FiO2


 


9/25/19 06:00 98.1 69 17 127/69 (88) 98   


 


9/22/19 19:54      Room Air  














I&O- Last 24 Hours up to 6 AM 


 


 9/25/19





 05:59


 


Intake Total 975 ml


 


Output Total 1025 ml


 


Balance -50 ml

















GALDINO BOSS MD   Sep 25, 2019 14:14

## 2019-09-26 VITALS — SYSTOLIC BLOOD PRESSURE: 158 MMHG | DIASTOLIC BLOOD PRESSURE: 77 MMHG

## 2019-09-26 VITALS — SYSTOLIC BLOOD PRESSURE: 156 MMHG | DIASTOLIC BLOOD PRESSURE: 70 MMHG

## 2019-09-26 VITALS — DIASTOLIC BLOOD PRESSURE: 87 MMHG | SYSTOLIC BLOOD PRESSURE: 157 MMHG

## 2019-09-26 LAB
BUN SERPL-MCNC: 24 MG/DL (ref 7–18)
CALCIUM SERPL-MCNC: 9 MG/DL (ref 8.8–10.2)
CHLORIDE SERPL-SCNC: 109 MEQ/L (ref 98–107)
CO2 SERPL-SCNC: 27 MEQ/L (ref 21–32)
CREAT SERPL-MCNC: 1.54 MG/DL (ref 0.7–1.3)
GFR SERPL CREATININE-BSD FRML MDRD: 46.3 ML/MIN/{1.73_M2} (ref 35–?)
GLUCOSE SERPL-MCNC: 83 MG/DL (ref 70–100)
HCT VFR BLD AUTO: 31.9 % (ref 42–52)
HGB BLD-MCNC: 10 G/DL (ref 13.5–17.5)
MAGNESIUM SERPL-MCNC: 2 MG/DL (ref 1.8–2.4)
MCH RBC QN AUTO: 33.3 PG (ref 27–33)
MCHC RBC AUTO-ENTMCNC: 31.3 G/DL (ref 32–36.5)
MCV RBC AUTO: 106.3 FL (ref 80–96)
PLATELET # BLD AUTO: 189 10^3/UL (ref 150–450)
POTASSIUM SERPL-SCNC: 4.4 MEQ/L (ref 3.5–5.1)
RBC # BLD AUTO: 3 10^6/UL (ref 4.3–6.1)
SODIUM SERPL-SCNC: 141 MEQ/L (ref 136–145)
WBC # BLD AUTO: 4.1 10^3/UL (ref 4–10)

## 2019-09-26 RX ADMIN — AMIODARONE HYDROCHLORIDE SCH MG: 200 TABLET ORAL at 05:13

## 2019-09-26 RX ADMIN — VANCOMYCIN HYDROCHLORIDE SCH MG: KIT at 05:13

## 2019-09-26 RX ADMIN — TAMSULOSIN HYDROCHLORIDE SCH MG: 0.4 CAPSULE ORAL at 17:19

## 2019-09-26 RX ADMIN — APIXABAN SCH MG: 2.5 TABLET, FILM COATED ORAL at 20:23

## 2019-09-26 RX ADMIN — MIRTAZAPINE SCH MG: 15 TABLET, FILM COATED ORAL at 17:19

## 2019-09-26 RX ADMIN — AMIODARONE HYDROCHLORIDE SCH MG: 200 TABLET ORAL at 00:38

## 2019-09-26 RX ADMIN — VANCOMYCIN HYDROCHLORIDE SCH MG: KIT at 17:20

## 2019-09-26 RX ADMIN — ASPIRIN SCH MG: 81 TABLET ORAL at 08:44

## 2019-09-26 RX ADMIN — APIXABAN SCH MG: 2.5 TABLET, FILM COATED ORAL at 08:44

## 2019-09-26 RX ADMIN — VANCOMYCIN HYDROCHLORIDE SCH MG: KIT at 13:11

## 2019-09-26 RX ADMIN — VANCOMYCIN HYDROCHLORIDE SCH MG: KIT at 00:38

## 2019-09-26 RX ADMIN — CARBIDOPA AND LEVODOPA SCH TAB: 25; 100 TABLET ORAL at 17:20

## 2019-09-26 RX ADMIN — FERROUS SULFATE TAB 325 MG (65 MG ELEMENTAL FE) SCH MG: 325 (65 FE) TAB at 08:44

## 2019-09-26 RX ADMIN — CARBIDOPA AND LEVODOPA SCH TAB: 25; 100 TABLET ORAL at 08:44

## 2019-09-26 RX ADMIN — CIPROFLOXACIN SCH MG: 250 TABLET, FILM COATED ORAL at 17:19

## 2019-09-26 RX ADMIN — CIPROFLOXACIN SCH MG: 250 TABLET, FILM COATED ORAL at 05:13

## 2019-09-26 NOTE — IPNPDOC
Text Note


Date of Service


The patient was seen on 9/26/19.





NOTE


Subjective:


Feels well this morning


Afebrile, hemodynamically stable, no chest pain, shortness of breath or 

palpitations





Interim events:


-Dc'd plavix, started on eliqius, also placed on 200 QID amio per Dr. Mcgee for

the frequent ectopy and pAF





Objective:


Physical Examination


General Exam: Awake, alert, sitting up, eating breakfast


Eye Exam:  Positive: PERRLA, EOMI, anicteric


ENT Exam:  Atraumatic, MMM


Neck Exam:  Supple


Chest Exam:  CTAB


Heart Exam:  RRR this AM, no mrg


Abdomen Exam:  Normoactive bowel sounds, soft, non tender


Extremity Exam: 2+ LE edema to ankles, 2+ DP normal pulses, non tender


Neuro Exam:  Normal speech, Strength at 5/5 X4 ext, Cranial Nerves 3-12 NL


Psych Exam:  Positive: Mental status NL, Memory Intact, Oriented x 3 





Assessment /Plan


84 yo M who was admitted for infectious colitis with C.diff, yersinia and e.coli

on vanc and cipro with improvement of copious diarrhea and dehydration s/p 

fluids, whose couse has been c/b significant asymptomatic ectopy with paroxysmal

AFib and NSVT. Consulted cardiology about ectopy and paroxysmal AF management 

since he was placed on ASA/plavix and per Dr. Mcgee, started on amio 200QID as 

well as dc'd plavix and started eliquis, tolerating it well.





Assessment


# Acute Infectious diarrhea (GI panel +ve for c.diff, enterohemorrhagic e. coli,

and yersinia)


- continue vanco 125 mg q 6 orally


- cipro 250 mg bid


- no evidence of STEC continue with abx





# Right sided/Generalized weakness: much improved


- MRI brain was w/o evidence of an acute CVA


- Likely due to acute diarrhea/dehydration


- PT/OT





# Bilateral LE swelling 


- f/u TTE report, NT-proBNP 4025, CXR clear


- B/l venous duplex normal on admission


- monitor, stable on room air





# Acute on Chronic renal failure/CKD stage 3 (baseline creat 1.4-1.6): prerenal 

azotemia in the setting of GI losses 


- creat improved with fluids


- avoid nephrotoxins





# Rate controlled Paroxysmal atrial fib likely exacerbated by acute 

diarrhea/dehydration and infection


- CHADS2-Vasc2 = 5, in speaking with Dr. Mcgee, will start eliquis 2.5 BID and 

discontinue plavix, such that he will be on ASA/eliquis


-Dr. Mcgee also suggested 200 QID amio, but given the patient is also on cipro 

which is QT prolonging, will await his final recs. 


- f/u TTE report


- TSH 1.18


- has not required rate control, continue to monitor, however started on amio 

200 QID per Dr. Mcgee consult 


-Dc'd plavix and started on eliquis 2.5 BID, therefore now on ASA81/eliquis 2.5 

BID





# Parkinson's Disease


- continue Sinemet, monitor for toxic side effects





# Chronic CVA


- continue asa, dc Plavix, now on ASA 81/eliquis 2.5 BID





# Chronic CAD - stable


# S/p 3V CABG


# S/p AAA repair


# S/p fem-pop bypass for PAD





# Chronic anemia with macrocytosis


- continue iron tablets


- check b12, folate levels





# BPH


- continue Flomax





# Insomnia


- continue Remeron





# Hx of Lyme disease


- treated with amoxicillin in the past





# DVT prophylaxis


- SCDs + dc'd lovenox





DVT prophylaxis: on eliquis


Dispo: pending clinical improvement





Stef HERMAN, I+O


VSStef, I+O


Laboratory Tests


9/26/19 06:40








Red Blood Count 3.00 L, Mean Corpuscular Volume 106.3 H, Mean Corpuscular 

Hemoglobin 33.3 H, Mean Corpuscular Hemoglobin Concent 31.3 L, Red Cell 

Distribution Width 13.7, Calcium Level 9.0








Vital Signs








  Date Time  Temp Pulse Resp B/P (MAP) Pulse Ox O2 Delivery O2 Flow Rate FiO2


 


9/26/19 06:00 98.1 77 18 158/77 (104) 94   


 


9/22/19 19:54      Room Air  














I&O- Last 24 Hours up to 6 AM 


 


 9/26/19





 06:00


 


Intake Total 1000 ml


 


Output Total 1025 ml


 


Balance -25 ml

















GALDINO BOSS MD   Sep 26, 2019 08:19

## 2019-09-26 NOTE — CR
DATE OF CONSULTATION:  09/26/2019

 

CARDIOLOGY CONSULTATION

 

REFERRING PHYSICIAN:  Dr. Asuncion Land

 

INDICATION:  Suspected atrial fibrillation and nonsustained ventricular

tachycardia (VT).

 

HISTORY:  This 83-year-old  father, retired resident of [place], [place] has been followed by Dr. Wall for multiple medical problems including

generalized vascular disease (carotid, coronary, aortic and peripheral vascular

disease) and longstanding hypertension.  Has also had a history of worsening

chronic renal insufficiency.  Current limitation is fatigue and lower leg

weakness with history of spinal stenosis.  



Presented to the emergency room September 22, 2019 with change in mental 

status, suspected cerebrovascular accident with possible right arm weakness.  

Had also been having a problem with loose bowel movements, anorexia and 

weakness. Was found to have significant lower leg swelling with chest x-ray 

showing cardiomegaly and pro-BNP level 4025. Was given a dose of Lasix by 

mouth and admitted to a telemetry floor.  An echocardiogram/Doppler study 

was ordered.  Cardiology consultation was placed in light of EKG interpretation 

of atrial fibrillation, suspected recent onset and nonsustained runs of 
ventricular 

tachycardia.

 

At this point, the patient is relatively sedentary, walks only short distances

limited by leg weakness and discomfort.  Will become dyspneic with effort but

denies orthopnea or nocturnal dyspnea.  Has noticed increasing lower leg

swelling.  Had previously been on diuretic therapy, but this was discontinued

some time ago.  He is unaware of a diagnosis of congestive heart failure or

cardiac enlargement.  Coronary bypass surgery 20 years ago without recurrent

chest pain.  Last stress study was years ago.  Treated hypertension since his

50s.  Denies any awareness of his heart action.  Falls have been related to

balance problems.  Denies dizziness or loss of consciousness.  History of

transient ischemic attack 2012 and cerebral infarction August 2017 with CT scans

and MRIs of the brain showing severe cerebral atrophy, evidence of chronic

small-vessel disease, and old left cerebellar lacunar infarct.  This was

unchanged from prior study dating back to 2017.  Denies any current 
claudication.

Unaware of prior rheumatic fever or heart murmur.

 

CORONARY RISK FACTORS:  Advanced age.  Male gender.  Chronic hypertension.

Weight problem.  Remote smoking history.  No diabetes mellitus.  Hyperlipidemia.

 

FAMILY HISTORY:  Not applicable.

 

OTHER PAST MEDICAL/SURGICAL HISTORY: Prior renal colic, osteoporosis, 
lumbosacral

spondylosis with degenerative disc disease and spinal stenosis.  Esophageal

reflux.  Benign prostatic hypertrophy.  Chronic renal insufficiency.  Peripheral

vascular disease.  Abdominal aortic aneurysm, status post endovascular grafting.

Prior iron deficiency anemia.  Prior gout.  Parkinson's disease.

 

MEDICATIONS:  At the time of admission, he was taking aspirin 81 mg daily, 
Plavix

75 mg daily, lansoprazole 15 mg daily, carbidopa-levadopa  one tablet 
twice

a day,  Remeron 15 mg nightly, Flomax 0.4 mg nightly, ferrous sulfate 325 mg by

mouth daily, magnesium oxide 400 mg daily and Tylenol Extra Strength by mouth

twice a day.

 

ALLERGIES:  He had no known allergies.

 

PHYSICAL EXAMINATION:

Constitutional:  Pleasant elderly male, slightly barrel-chested and mildly

overweight, lay comfortably flat.

Vital signs: Heart rate 75 beats per minute and regular with some irregularity.

Blood pressure 156/70 supine, respiratory rate 18, oxygen (O2) saturation 98% on

room air.  Afebrile.  Weight 190 pounds, height 67 inches, body mass index (BMI)

27.7.

Eyes:  Normal conjunctivae and lids.  No xanthelasma.

ENT/mouth:  Edentulous with normal oral moisture.  No central cyanosis.

Neck:  Trachea midline.  Neck veins were elevated 4- 6 cm above the sternal

angle.  Thyroid not enlarged.

Respiratory:  Slightly increased anteroposterior chest diameter with well-healed

sternotomy incision.  Fairly good air entry over both lung fields with no

inspiratory rales.  Slight prolongation of expiration but no audible wheeze.

Cardiovascular:  Apical impulse not palpable.  Heart sounds were somewhat

distant.  Unable to detect S2 splitting.  S4 gallop with systolic ejection 
murmur

grade 1-2 out of 6 along the left sternal border radiating to right base but not

to the neck.  No diastolic murmur or rub.  Normal carotid upstrokes and volume.

No bruits.  Abdominal aorta was not palpable.  Femoral pulses were symmetrical.

Pedal pulses were difficult to palpate through edema fluid.  Asymmetrical

swelling of both lower legs, especially the left related to his remote saphenous

vein donor site incision but also had pitting over his sacrum and lower lumbar

spine posteriorly.

Extremities:  No clubbing, peripheral cyanosis or splinter hemorrhages.

GI:  Soft, nontender abdomen with palpable liver edge but no splenomegaly.

Normal bowel sounds.  Rectal examination not indicated.

Musculoskeletal:  No obvious joint deformities.  Spinal curvature appeared to be

normal.  Proximal muscle weakness but normal tone.

Neurologic/psych:  Bright, alert and oriented, gave a fair history.  Eye, 
facial,

and extremity movements appear to be symmetrical and normal with no involuntary

movements.

Skin:  Some degenerative changes skin both lower legs but no rashes, ecchymotic

lesions, pallor or icterus.

 

INVESTIGATIONS:

Chest x-ray:  Portable upright study taken in the emergency room September 22, 2019 was reviewed independently and shows cardiomegaly even allowing for this

portable technique.  Obvious sternotomy wire sutures and multiple vascular clips

related to his remote bypass surgery.  Slightly prominent pulmonary vasculature

but no localized infiltrate or obvious pleural effusion.  Some degenerative

changes of his thoracic spine.

 

EKGs:  Serial studies were reviewed, officially interpreted as showing atrial

fibrillation in error.  On close scrutiny, there is a P-wave prior to each QRS

complex but of varying morphology consistent with multifocal atrial rhythm.

Somewhat low voltages with incomplete right bundle branch block.  No evidence of

prior infarction but diffuse ST/T-wave abnormalities that are not evolving.

 

ECHOCARDIOGRAM:  September 25, 2019 showed moderate concentric left ventricular

hypertrophy with septal wall motion abnormality due to right ventricular 
pressure

overload, but preserved global left ventricular systolic function, left

ventricular ejection fraction (LVEF) 60%.  Moderately dilated left atrium with

impairment of LV diastolic function and at least mildly elevated estimated mean

left atrial pressure.  Mildly dilated right heart chambers with normal wall

motion and at least moderate pulmonary hypertension.  Mildly dilated inferior

vena cava with reduced respiratory collapse in keeping with elevated central

venous pressure.  Mildly dilated aortic root measuring 4.2 cm with mild aortic

valvular sclerosis without functional abnormality.  There was moderate mitral

annular calcification with only very mild insufficiency.  Normal tricuspid valve

with very mild insufficiency.  No intracardiac mass or pericardial effusion.

 

LABORATORY DATA:  Blood work today shows a hemoglobin of 10.  Normal white blood

cell count and platelet count.  Chemistry today showed electrolyte balance with

BUN 24, creatinine 1.5.  These values are actually somewhat improved from

admission values of 22 and 2.0, albumin was 2.9 on admission but other liver

function studies were normal.  Ultrasensitive TSH was normal at 1.18.  Troponin 
I

level was indeterminate, meaningless in the setting his renal insufficiency.

Pro-BNP level 4025.

 

IMPRESSION/PLAN:

1.  Multifocal atrial rhythm:  Initially reported as atrial fibrillation and

started on amiodarone.  As discussed with her primary provider, there is no need

for amiodarone or oral anticoagulation here.  In fact, amiodarone may provoke

symptomatic bradyarrhythmia.  This rhythm disturbance is usually a manifestation

of sinus node dysfunction and likely a reflection of his cor pulmonale.  He will

be restarted on his combination Plavix and aspirin.

 

2.  Heart failure (diastolic/acute on chronic):  Has echocardiographic evidence

to suggest longstanding hypertensive heart disease as well as pulmonary heart

disease.  I suspect his gradually worsening renal insufficiency is contributing

to his congested state.  Condition of some chronicity in light of his lack of

respiratory symptoms.  I have placed him on a modest salt and fluid intake

restriction and have started low-dose torsemide 10 mg daily with spirolactone

12.5 mg daily.  Angiotensin-converting enzyme  (ACE) inhibitor or angiotensin

receptor blocker somewhat contraindicated in light of his renal insufficiency.

We will monitor his chemistry closely with you, but judging from his available

recorded weights, he has gained approximately 8 kg of fluid since last fall.

 

3.  Abnormal EKG:  Appearance the result of a combination of his body habitus,

pulmonary disease and hypertension.  Fortunately no serial repolarization

changes.

 

4.  Coronary artery disease (native vessel)/post CABG:  Has done remarkably well

with surgical revascularization 20 years ago.  No evidence of acute myocardial

ischemic event.  Beta blocker relatively contraindicated in light of his sinus

node dysfunction.  ACE inhibitor/angiotensin receptor blocker somewhat

contraindicated in light of his renal insufficiency.  We are resuming his

combination aspirin and Plavix therapies.  I would also suggest resuming at 
least

low-dose atorvastatin 20 mg daily.

 

5.  Hypertensive heart disease (benign with heart failure):  Current systolic

blood pressure is slightly suboptimally controlled, but I anticipate the

initiation of diuretic therapy, as mentioned above, will manage this.  We will

continue to monitor his electrolytes and renal function closely with you.

 

6.  Cor pulmonale/right heart failure:  Undoubtedly his right heart failure is

related to his left heart failure, but echocardiographic findings are clearly 
out

of keeping with his left ventricular disease alone.  Has a septal wall motion

abnormality due to right ventricular pressure overload.  We have requested a

nocturnal oximetry be performed.  With his current sedentary situation, we have

started him on low-dose Lovenox as we have stopped his oral anticoagulation.

 

We will plan on monitoring him closely with you for the time being and 
appreciate

the opportunity to participate in his care.

SUAD

## 2019-09-27 VITALS — DIASTOLIC BLOOD PRESSURE: 57 MMHG | SYSTOLIC BLOOD PRESSURE: 146 MMHG

## 2019-09-27 VITALS — DIASTOLIC BLOOD PRESSURE: 66 MMHG | SYSTOLIC BLOOD PRESSURE: 116 MMHG

## 2019-09-27 VITALS — DIASTOLIC BLOOD PRESSURE: 65 MMHG | SYSTOLIC BLOOD PRESSURE: 145 MMHG

## 2019-09-27 LAB
BUN SERPL-MCNC: 24 MG/DL (ref 7–18)
CALCIUM SERPL-MCNC: 8.7 MG/DL (ref 8.8–10.2)
CHLORIDE SERPL-SCNC: 106 MEQ/L (ref 98–107)
CK MB CFR.DF SERPL CALC: 3.28
CK MB SERPL-MCNC: 2 NG/ML (ref ?–3.6)
CK SERPL-CCNC: 61 U/L (ref 39–308)
CO2 SERPL-SCNC: 27 MEQ/L (ref 21–32)
CREAT SERPL-MCNC: 1.51 MG/DL (ref 0.7–1.3)
GFR SERPL CREATININE-BSD FRML MDRD: 47.3 ML/MIN/{1.73_M2} (ref 35–?)
GLUCOSE SERPL-MCNC: 84 MG/DL (ref 70–100)
HCT VFR BLD AUTO: 29.2 % (ref 42–52)
HGB BLD-MCNC: 9.5 G/DL (ref 13.5–17.5)
MAGNESIUM SERPL-MCNC: 2 MG/DL (ref 1.8–2.4)
MCH RBC QN AUTO: 33.2 PG (ref 27–33)
MCHC RBC AUTO-ENTMCNC: 32.5 G/DL (ref 32–36.5)
MCV RBC AUTO: 102.1 FL (ref 80–96)
PLATELET # BLD AUTO: 199 10^3/UL (ref 150–450)
POTASSIUM SERPL-SCNC: 4.1 MEQ/L (ref 3.5–5.1)
RBC # BLD AUTO: 2.86 10^6/UL (ref 4.3–6.1)
SODIUM SERPL-SCNC: 139 MEQ/L (ref 136–145)
TROPONIN I SERPL-MCNC: 0.04 NG/ML (ref ?–0.1)
WBC # BLD AUTO: 4.6 10^3/UL (ref 4–10)

## 2019-09-27 RX ADMIN — TORSEMIDE SCH MG: 10 TABLET ORAL at 08:30

## 2019-09-27 RX ADMIN — VANCOMYCIN HYDROCHLORIDE SCH MG: KIT at 00:03

## 2019-09-27 RX ADMIN — CIPROFLOXACIN SCH MG: 250 TABLET, FILM COATED ORAL at 05:56

## 2019-09-27 RX ADMIN — CARBIDOPA AND LEVODOPA SCH TAB: 25; 100 TABLET ORAL at 17:00

## 2019-09-27 RX ADMIN — ASPIRIN SCH MG: 81 TABLET ORAL at 08:30

## 2019-09-27 RX ADMIN — CIPROFLOXACIN SCH MG: 250 TABLET, FILM COATED ORAL at 17:00

## 2019-09-27 RX ADMIN — ATORVASTATIN CALCIUM SCH MG: 20 TABLET, FILM COATED ORAL at 20:05

## 2019-09-27 RX ADMIN — VANCOMYCIN HYDROCHLORIDE SCH MG: KIT at 17:01

## 2019-09-27 RX ADMIN — MIRTAZAPINE SCH MG: 15 TABLET, FILM COATED ORAL at 17:00

## 2019-09-27 RX ADMIN — CARBIDOPA AND LEVODOPA SCH TAB: 25; 100 TABLET ORAL at 08:29

## 2019-09-27 RX ADMIN — FERROUS SULFATE TAB 325 MG (65 MG ELEMENTAL FE) SCH MG: 325 (65 FE) TAB at 08:30

## 2019-09-27 RX ADMIN — VANCOMYCIN HYDROCHLORIDE SCH MG: KIT at 05:56

## 2019-09-27 RX ADMIN — SPIRONOLACTONE SCH MG: 25 TABLET, FILM COATED ORAL at 08:30

## 2019-09-27 RX ADMIN — TAMSULOSIN HYDROCHLORIDE SCH MG: 0.4 CAPSULE ORAL at 16:59

## 2019-09-27 RX ADMIN — CLOPIDOGREL BISULFATE SCH MG: 75 TABLET ORAL at 08:30

## 2019-09-27 RX ADMIN — VANCOMYCIN HYDROCHLORIDE SCH MG: KIT at 12:54

## 2019-09-27 NOTE — ECGEPIP
[facility]

                                       

                                       Test Date:    2019

Pat Name:     FELISA SANCHEZ           Department:   

Patient ID:   [id number]                 Room:         [place] [address]

Gender:       Male                     Technician:   

:          1935               Requested By: JANES RIVAS 

Order Number: [id number]     Reading MD:   Pankaj Person

                                 Measurements

Intervals                              Axis          

Rate:         80                       P:            

VA:           0                        QRS:          30

QRSD:         96                       T:            6

QT:           407                                    

QTc:          471                                    

                           Interpretive Statements

Atrial fibrillation with controlled ventricular response

Incomplete right bundle branch block

Delayed anterior R wave progression

Nonspecific ST-T wave abnormalities

No significant change when compared to prior tracing of 2019

Electronically Signed on 2019 21:13:19 EDT by Pankaj Person

## 2019-09-27 NOTE — IPNPDOC
Text Note


Date of Service


The patient was seen on 9/27/19.





NOTE


Subjective:


Feels well this morning


Afebrile, hemodynamically stable, no chest pain, shortness of breath or 

palpitations





Interim events:


-Was seen by Dr. Mcgee and determined to not have had any evidence of Afib but 

multifocal atrial arrythmias, therefore eliquis was stopped was restored back to

[id number]/[id number], and amiodarone was stopped as well





Objective:


Physical Examination


General Exam: Awake, alert, sitting up, eating breakfast


Eye Exam:  Positive: PERRLA, EOMI, anicteric


ENT Exam:  Atraumatic, MMM


Neck Exam:  Supple


Chest Exam:  CTAB


Heart Exam:  RRR, no mrg


Abdomen Exam:  Normoactive bowel sounds, soft, non tender


Extremity Exam: 2+ LE edema to ankles, 2+ DP normal pulses, non tender


Neuro Exam:  Normal speech, Strength at 5/5 X4 ext, Cranial Nerves 3-12 NL


Psych Exam:  Positive: Mental status NL, Memory Intact, Oriented x 3 





Assessment /Plan


84 yo M who was admitted for infectious colitis with C.diff, yersinia and e.coli

on vanc and cipro with improvement of copious diarrhea and dehydration s/p 

fluids, whose couse has been c/b significant asymptomatic ectopy with paroxysmal

AFib and NSVT. Consulted cardiology about ectopy and paroxysmal AF management 

since he was placed on ASA/plavix and briefly placed on amio 200QID as well as 

dc'd plavix and started eliquis, however after evaluation by Dr. Mcgee really 

did not have any evidence of Afib and thus amio and eliquis were stopped and 

restarted on plavix, as we as started heart failure optimization as stated 

below. Otherwise doing well, diarrhea resolved, tolerating diet.





Assessment


# Acute Infectious diarrhea (GI panel +ve for c.diff, enterohemorrhagic e. coli,

and yersinia)


- continue vanco 125 mg q 6 orally


- cipro 250 mg bid


- no evidence of STEC continue with abx








#Multifocal atrial rhythm:  Initially reported as atrial fibrillation and 

started on amiodarone and eliquis, now both discontinued per cardiology. 


-Per Dr. Mcgee, his MAT is likely a reflection of his cor pulmonale that was 

noted on TTE, he was therefore restarted on his ASA81/Plavix 75  


 


#Acute on chronic diastolic heart failure:  


-TTE suggestive of longstanding hypertensive heart disease as well as pulmonary 

heart disease and Dr. Mcgee thought his volume overload was also exercabated by

his ongoing CALLUM 


-Was placed on 2g salt restriction and fluid restriction and was started on 

torsemide 10 mg daily with spirolactone 12.5 mg daily.  


-No ACEi/ARB in the setting of his ongoing CALLUM.





# Acute on Chronic renal failure/CKD stage 3 (baseline creat 1.4-1.6): prerenal 

azotemia in the setting of GI losses 


- creat improved with fluids


- avoid nephrotoxins





# Right sided/Generalized weakness: much improved


- MRI brain was w/o evidence of an acute CVA


- Likely due to acute diarrhea/dehydration


- PT/OT





# Parkinson's Disease


- continue Sinemet, monitor for toxic side effects





# Chronic CVA


- continue asa, restart Plavix now that eliquis 2.5 BID is discontinued





# Chronic CAD - stable


# S/p 3V CABG, continue restart statin therapy, 20 lipitor, while on DAPT


# S/p AAA repair


# S/p fem-pop bypass for PAD





# Chronic anemia with macrocytosis


- continue iron tablets


- check b12, folate levels





# BPH


- continue Flomax





# Insomnia


- continue Remeron





# Hx of Lyme disease


- treated with amoxicillin in the past





# DVT prophylaxis


- SCDs + lovenox





DVT prophylaxis: on lovenox


Dispo: pending clinical improvement





VS,Fishbone, I+O


VS, Fishbone, I+O


Laboratory Tests


9/27/19 06:30








Red Blood Count 2.86 L, Mean Corpuscular Volume 102.1 H, Mean Corpuscular 

Hemoglobin 33.2 H, Mean Corpuscular Hemoglobin Concent 32.5, Red Cell 

Distribution Width 13.5, Calcium Level 8.7 L








Vital Signs








  Date Time  Temp Pulse Resp B/P (MAP) Pulse Ox O2 Delivery O2 Flow Rate FiO2


 


9/27/19 06:00 98.0 69 20 146/57 (86) 96   


 


9/22/19 19:54      Room Air  














I&O- Last 24 Hours up to 6 AM 


 


 9/27/19





 06:00


 


Intake Total 1380 ml


 


Output Total 1150 ml


 


Balance 230 ml

















GALDINO BOSS MD   Sep 27, 2019 08:14

## 2019-09-27 NOTE — IPN
DATE:  09/27/2019

 

CARDIOLOGY PROGRESS NOTE

 

SUBJECTIVE:  The patient's diarrhea has settled down for the most part.  He has

been up in the room without feeling dizzy.  Currently does not feel short of

breath.  He is happy that his lower leg swelling appears to have improved with

introduction of diuretic therapy earlier today.  Remains free of any chest

discomfort.

 

OBJECTIVE:  Pleasant, elderly male, slightly overweight, sat comfortably on the

edge of the bed.  No obvious pallor or cyanosis.  Normal oral moisture.  Heart

rate 76 beats per minute and irregular, blood pressure 145/65 sitting,

respiratory rate 18, oxygen saturation 100% on room air.  Afebrile.  Neck veins

remain elevated approximately 8 cm above the sternal angle, even visible with him

sitting.  Few bibasilar inspiratory crepitations.  Still has a degree of sacral

pitting but lower leg swelling has improved with 1 mm pitting one-half of the way

up both lower legs, more so on the left than the right related to his prior

saphenous vein surgery for his bypass operation.

 

LABORATORY DATA:  Blood work today showed hemoglobin of 9.5.  Normal white blood

cell count and platelet count.  His electrolytes were in balance, BUN 24,

creatinine 1.5.

 

IMPRESSION / PLAN:

1.  Heart failure (diastolic / acute on chronic):  Has remarkably few symptoms

despite his congestion but systemic edema appears less following introduction of

diuretic therapy earlier today.  No medication change was made today.  Remains on

low-dose torsemide and spironolactone.

 

2.  Multifocal atrial rhythm.  Continues to have a pulse irregularity with

telemetry showing multifocal atrial rhythm not atrial fibrillation.

 

3.  Abnormal EKG:  No repeat study was obtained today.

 

4.  Coronary artery disease (native vessel) / post CABG:  Remains free of

symptomatic myocardial ischemia.  Continues on combination low-dose atorvastatin,

aspirin and Plavix.

 

5.  Hypertensive heart disease (benign with heart failure):  Renal function has

remained stable.  Continues to have a slightly elevated systolic blood pressure

that we anticipate will improve with diuretic therapy.

 

6.  Cor pulmonale / right heart failure.  Nocturnal oxygen saturation / oximetry

will be checked tonight to assess for obstructive sleep apnea.

 

My associate Dr. Riddle will be assuming cardiology service as of [facility].

## 2019-09-28 VITALS — DIASTOLIC BLOOD PRESSURE: 72 MMHG | SYSTOLIC BLOOD PRESSURE: 138 MMHG

## 2019-09-28 VITALS — DIASTOLIC BLOOD PRESSURE: 66 MMHG | SYSTOLIC BLOOD PRESSURE: 126 MMHG

## 2019-09-28 VITALS — DIASTOLIC BLOOD PRESSURE: 75 MMHG | SYSTOLIC BLOOD PRESSURE: 135 MMHG

## 2019-09-28 LAB
BUN SERPL-MCNC: 29 MG/DL (ref 7–18)
CALCIUM SERPL-MCNC: 9.7 MG/DL (ref 8.8–10.2)
CHLORIDE SERPL-SCNC: 102 MEQ/L (ref 98–107)
CO2 SERPL-SCNC: 27 MEQ/L (ref 21–32)
CREAT SERPL-MCNC: 1.74 MG/DL (ref 0.7–1.3)
GFR SERPL CREATININE-BSD FRML MDRD: 40.2 ML/MIN/{1.73_M2} (ref 35–?)
GLUCOSE SERPL-MCNC: 102 MG/DL (ref 70–100)
HCT VFR BLD AUTO: 31.9 % (ref 42–52)
HGB BLD-MCNC: 10.2 G/DL (ref 13.5–17.5)
MAGNESIUM SERPL-MCNC: 2.3 MG/DL (ref 1.8–2.4)
MCH RBC QN AUTO: 32.8 PG (ref 27–33)
MCHC RBC AUTO-ENTMCNC: 32 G/DL (ref 32–36.5)
MCV RBC AUTO: 102.6 FL (ref 80–96)
PLATELET # BLD AUTO: 252 10^3/UL (ref 150–450)
POTASSIUM SERPL-SCNC: 4.4 MEQ/L (ref 3.5–5.1)
RBC # BLD AUTO: 3.11 10^6/UL (ref 4.3–6.1)
SODIUM SERPL-SCNC: 136 MEQ/L (ref 136–145)
WBC # BLD AUTO: 6.2 10^3/UL (ref 4–10)

## 2019-09-28 RX ADMIN — VANCOMYCIN HYDROCHLORIDE SCH MG: KIT at 13:14

## 2019-09-28 RX ADMIN — TAMSULOSIN HYDROCHLORIDE SCH MG: 0.4 CAPSULE ORAL at 18:09

## 2019-09-28 RX ADMIN — ATORVASTATIN CALCIUM SCH MG: 20 TABLET, FILM COATED ORAL at 21:56

## 2019-09-28 RX ADMIN — VANCOMYCIN HYDROCHLORIDE SCH MG: KIT at 05:29

## 2019-09-28 RX ADMIN — CARBIDOPA AND LEVODOPA SCH TAB: 25; 100 TABLET ORAL at 18:09

## 2019-09-28 RX ADMIN — ASPIRIN SCH MG: 81 TABLET ORAL at 09:27

## 2019-09-28 RX ADMIN — CLOPIDOGREL BISULFATE SCH MG: 75 TABLET ORAL at 09:27

## 2019-09-28 RX ADMIN — VANCOMYCIN HYDROCHLORIDE SCH MG: KIT at 18:09

## 2019-09-28 RX ADMIN — CIPROFLOXACIN SCH MG: 250 TABLET, FILM COATED ORAL at 18:09

## 2019-09-28 RX ADMIN — VANCOMYCIN HYDROCHLORIDE SCH MG: KIT at 00:12

## 2019-09-28 RX ADMIN — TORSEMIDE SCH MG: 10 TABLET ORAL at 09:27

## 2019-09-28 RX ADMIN — CARBIDOPA AND LEVODOPA SCH TAB: 25; 100 TABLET ORAL at 09:27

## 2019-09-28 RX ADMIN — CIPROFLOXACIN SCH MG: 250 TABLET, FILM COATED ORAL at 05:29

## 2019-09-28 RX ADMIN — VANCOMYCIN HYDROCHLORIDE SCH MG: KIT at 23:52

## 2019-09-28 RX ADMIN — MIRTAZAPINE SCH MG: 15 TABLET, FILM COATED ORAL at 18:09

## 2019-09-28 RX ADMIN — SPIRONOLACTONE SCH MG: 25 TABLET, FILM COATED ORAL at 09:27

## 2019-09-28 RX ADMIN — FERROUS SULFATE TAB 325 MG (65 MG ELEMENTAL FE) SCH MG: 325 (65 FE) TAB at 09:27

## 2019-09-28 NOTE — IPNPDOC
Text Note


Date of Service


The patient was seen on 9/28/19.





NOTE


Subjective:


Feels well this morning, continues to have asymptomatic ectopy on telemetry


Afebrile, hemodynamically stable, no chest pain, shortness of breath or 

palpitations





Objective:


Physical Examination


General Exam: Awake, alert, sitting up, eating breakfast


Eye Exam:  Positive: PERRLA, EOMI, anicteric


ENT Exam:  Atraumatic, MMM


Neck Exam:  Supple


Chest Exam:  CTAB


Heart Exam:  RRR, no mrg


Abdomen Exam:  Normoactive bowel sounds, soft, non tender


Extremity Exam: 2+ LE edema to ankles, 2+ DP normal pulses, non tender


Neuro Exam:  Normal speech, Strength at 5/5 X4 ext, Cranial Nerves 3-12 NL


Psych Exam:  Positive: Mental status NL, Memory Intact, Oriented x 3 





Assessment /Plan


82 yo M who was admitted for infectious colitis with C.diff, yersinia and e.coli

on vanc and cipro with improvement of copious diarrhea and dehydration s/p 

fluids, whose couse has been c/b significant asymptomatic ectopy with paroxysmal

AFib and NSVT. Consulted cardiology about ectopy and paroxysmal AF management 

since he was placed on ASA/plavix and briefly placed on amio 200QID as well as 

dc'd plavix and started eliquis, however after evaluation by Dr. Mcgee really 

did not have any evidence of Afib and thus amio and eliquis were stopped and 

restarted on plavix, as we as started heart failure optimization as stated 

below. Otherwise doing well, diarrhea resolved, tolerating diet.





Assessment


# Acute Infectious diarrhea (GI panel +ve for c.diff, enterohemorrhagic e. coli,

and yersinia)


- continue vanco 125 mg q 6 orally


- cipro 250 mg bid


- no evidence of STEC continue with abx








#Multifocal atrial rhythm:  Initially reported as atrial fibrillation and 

started on amiodarone and eliquis, now both discontinued per cardiology. Ectopic

activity persists. 


-Per Dr. Mcgee, his MAT is likely a reflection of his cor pulmonale that was 

noted on TTE, he was therefore restarted on his ASA81/Plavix 75  


 


#Acute on chronic diastolic heart failure:  


-TTE suggestive of longstanding hypertensive heart disease as well as pulmonary 

heart disease and Dr. Mcgee thought his volume overload was also exercabated by

his ongoing CALLUM 


-Was placed on 2g salt restriction and fluid restriction and was started on 

torsemide 10 mg daily with spirolactone 12.5 mg daily.  


-No ACEi/ARB in the setting of his ongoing CALLUM.





# Acute on Chronic renal failure/CKD stage 3 (baseline creat 1.4-1.6): prerenal 

azotemia in the setting of GI losses 


- creatinine improved with fluids


- avoid nephrotoxins





# Right sided/Generalized weakness: much improved


- MRI brain was w/o evidence of an acute CVA


- Likely due to acute diarrhea/dehydration


- PT/OT





# Parkinson's Disease


- continue Sinemet, monitor for toxic side effects





# Chronic CVA


- continue asa, restart Plavix now that eliquis 2.5 BID is discontinued





# Chronic CAD - stable


# S/p 3V CABG, continue restart statin therapy, 20 lipitor, while on DAPT


# S/p AAA repair


# S/p fem-pop bypass for PAD





# Chronic anemia with macrocytosis


- continue iron tablets


- check b12, folate levels





# BPH


- continue Flomax





# Insomnia


- continue Remeron





# Hx of Lyme disease


- treated with amoxicillin in the past





# DVT prophylaxis


- SCDs + lovenox





DVT prophylaxis: on lovenox


Dispo: pending clinical improvement





VS,Stef, I+O


VS, Fishbone, I+O


Laboratory Tests


9/28/19 06:31








Red Blood Count 3.11 L, Mean Corpuscular Volume 102.6 H, Mean Corpuscular 

Hemoglobin 32.8, Mean Corpuscular Hemoglobin Concent 32.0, Red Cell Distribution

Width 13.5








Vital Signs








  Date Time  Temp Pulse Resp B/P (MAP) Pulse Ox O2 Delivery O2 Flow Rate FiO2


 


9/28/19 06:00 97.8 75 16 138/72 (94) 97   


 


9/22/19 19:54      Room Air  














I&O- Last 24 Hours up to 6 AM 


 


 9/28/19





 05:59


 


Intake Total 1320 ml


 


Output Total 1650 ml


 


Balance -330 ml

















GALDINO BOSS MD   Sep 28, 2019 07:22

## 2019-09-29 VITALS — DIASTOLIC BLOOD PRESSURE: 75 MMHG | SYSTOLIC BLOOD PRESSURE: 143 MMHG

## 2019-09-29 VITALS — DIASTOLIC BLOOD PRESSURE: 54 MMHG | SYSTOLIC BLOOD PRESSURE: 102 MMHG

## 2019-09-29 VITALS — SYSTOLIC BLOOD PRESSURE: 121 MMHG | DIASTOLIC BLOOD PRESSURE: 61 MMHG

## 2019-09-29 LAB
BUN SERPL-MCNC: 29 MG/DL (ref 7–18)
CALCIUM SERPL-MCNC: 9.5 MG/DL (ref 8.8–10.2)
CHLORIDE SERPL-SCNC: 103 MEQ/L (ref 98–107)
CO2 SERPL-SCNC: 26 MEQ/L (ref 21–32)
CREAT SERPL-MCNC: 1.76 MG/DL (ref 0.7–1.3)
GFR SERPL CREATININE-BSD FRML MDRD: 39.7 ML/MIN/{1.73_M2} (ref 35–?)
GLUCOSE SERPL-MCNC: 89 MG/DL (ref 70–100)
HCT VFR BLD AUTO: 31.4 % (ref 42–52)
HGB BLD-MCNC: 10.2 G/DL (ref 13.5–17.5)
MAGNESIUM SERPL-MCNC: 2.3 MG/DL (ref 1.8–2.4)
MCH RBC QN AUTO: 34.1 PG (ref 27–33)
MCHC RBC AUTO-ENTMCNC: 32.5 G/DL (ref 32–36.5)
MCV RBC AUTO: 105 FL (ref 80–96)
PLATELET # BLD AUTO: 252 10^3/UL (ref 150–450)
POTASSIUM SERPL-SCNC: 4.5 MEQ/L (ref 3.5–5.1)
RBC # BLD AUTO: 2.99 10^6/UL (ref 4.3–6.1)
SODIUM SERPL-SCNC: 138 MEQ/L (ref 136–145)
WBC # BLD AUTO: 6.1 10^3/UL (ref 4–10)

## 2019-09-29 RX ADMIN — ASPIRIN SCH MG: 81 TABLET ORAL at 07:34

## 2019-09-29 RX ADMIN — VANCOMYCIN HYDROCHLORIDE SCH MG: KIT at 11:45

## 2019-09-29 RX ADMIN — CIPROFLOXACIN SCH MG: 250 TABLET, FILM COATED ORAL at 17:32

## 2019-09-29 RX ADMIN — ATORVASTATIN CALCIUM SCH MG: 20 TABLET, FILM COATED ORAL at 20:04

## 2019-09-29 RX ADMIN — TAMSULOSIN HYDROCHLORIDE SCH MG: 0.4 CAPSULE ORAL at 17:32

## 2019-09-29 RX ADMIN — CARBIDOPA AND LEVODOPA SCH TAB: 25; 100 TABLET ORAL at 07:34

## 2019-09-29 RX ADMIN — CIPROFLOXACIN SCH MG: 250 TABLET, FILM COATED ORAL at 05:29

## 2019-09-29 RX ADMIN — TORSEMIDE SCH MG: 10 TABLET ORAL at 07:34

## 2019-09-29 RX ADMIN — FERROUS SULFATE TAB 325 MG (65 MG ELEMENTAL FE) SCH MG: 325 (65 FE) TAB at 07:34

## 2019-09-29 RX ADMIN — MIRTAZAPINE SCH MG: 15 TABLET, FILM COATED ORAL at 17:33

## 2019-09-29 RX ADMIN — VANCOMYCIN HYDROCHLORIDE SCH MG: KIT at 17:33

## 2019-09-29 RX ADMIN — VANCOMYCIN HYDROCHLORIDE SCH MG: KIT at 05:29

## 2019-09-29 RX ADMIN — CARBIDOPA AND LEVODOPA SCH TAB: 25; 100 TABLET ORAL at 17:32

## 2019-09-29 RX ADMIN — CLOPIDOGREL BISULFATE SCH MG: 75 TABLET ORAL at 07:34

## 2019-09-29 NOTE — IPNPDOC
Text Note


Date of Service


The patient was seen on 9/29/19.





NOTE


Subjective:


Feels well this morning, unfortunately continues to have loose stools, frequency

has improved but diarrhea persists


Afebrile, hemodynamically stable, no chest pain, shortness of breath or 

palpitations





Objective:


Physical Examination


General Exam: Awake, alert, sitting up, eating breakfast


Eye Exam:  Positive: PERRLA, EOMI, anicteric


ENT Exam:  Atraumatic, MMM


Neck Exam:  Supple


Chest Exam:  CTAB


Heart Exam:  RRR, no mrg


Abdomen Exam:  Normoactive bowel sounds, soft, non tender


Extremity Exam: 2+ LE edema to ankles, 2+ DP normal pulses, non tender


Neuro Exam:  Normal speech, Strength at 5/5 X4 ext, Cranial Nerves 3-12 NL


Psych Exam:  Positive: Mental status NL, Memory Intact, Oriented x 3 





Assessment /Plan


84 yo M who was admitted for infectious colitis with C.diff, yersinia and e.coli

on vanc and cipro with improvement of copious diarrhea and dehydration s/p 

fluids, whose course has been c/b significant asymptomatic ectopy. Consulted 

cardiology about ectopy and presumed paroxysmal AF and briefly placed on amio 

200QID as well as dc'd plavix and started eliquis, however after evaluation by 

Dr. Mcgee really did not have any evidence of Afib and thus amio and eliquis 

were stopped and restarted on plavix, as we as started heart failure optimizatio

n as stated below. Otherwise doing well, diarrhea improved, tolerating diet.





Assessment


# Acute Infectious diarrhea (GI panel +ve for c.diff, enterohemorrhagic e. coli,

and yersinia)


- continue vanco 125 mg q 6 orally


- cipro 250 mg bid


- no evidence of STEC continue with abx








#Multifocal atrial rhythm:  Initially reported as atrial fibrillation and 

started on amiodarone and eliquis, now both discontinued per cardiology. Ectopic

activity persists. 


-Per Dr. Mcgee, his MAT is likely a reflection of his cor pulmonale that was 

noted on TTE, he was therefore restarted on his ASA81/Plavix 75  


 


#Acute on chronic diastolic heart failure:  


-TTE suggestive of longstanding hypertensive heart disease as well as pulmonary 

heart disease and Dr. Mcgee thought his volume overload was also exercabated by

his ongoing CALLUM 


-Was placed on 2g salt restriction and fluid restriction and was started on 

torsemide 10 mg daily with spirolactone 12.5 mg daily.  


-No ACEi/ARB in the setting of his ongoing CALLUM.





# Acute on Chronic renal failure/CKD stage 3 (baseline creat 1.4-1.6): prerenal 

azotemia in the setting of GI losses 


- creatinine improved with fluids


- avoid nephrotoxins





# Right sided/Generalized weakness: much improved


- MRI brain was w/o evidence of an acute CVA


- Likely due to acute diarrhea/dehydration


- PT/OT





# Parkinson's Disease


- continue Sinemet, monitor for toxic side effects





# Chronic CVA


- continue asa, restart Plavix now that eliquis 2.5 BID is discontinued





# Chronic CAD - stable


# S/p 3V CABG, continue restart statin therapy, 20 lipitor, while on DAPT


# S/p AAA repair


# S/p fem-pop bypass for PAD





# Chronic anemia with macrocytosis


- continue iron tablets


- check b12, folate levels





# BPH


- continue Flomax





# Insomnia


- continue Remeron





# Hx of Lyme disease


- treated with amoxicillin in the past





# DVT prophylaxis


- SCDs + lovenox





DVT prophylaxis: on lovenox


Dispo: pending clinical improvement





VS,Fishbone, I+O


VS, Fishbone, I+O


Laboratory Tests


9/29/19 07:14








Red Blood Count 2.99 L, Mean Corpuscular Volume 105.0 H, Mean Corpuscular 

Hemoglobin 34.1 H, Mean Corpuscular Hemoglobin Concent 32.5, Red Cell Distrib

ution Width 13.6








Vital Signs








  Date Time  Temp Pulse Resp B/P (MAP) Pulse Ox O2 Delivery O2 Flow Rate FiO2


 


9/29/19 06:00 98.2 71 18 143/75 (97) 94   














I&O- Last 24 Hours up to 6 AM 


 


 9/29/19





 05:59


 


Intake Total 1854 ml


 


Output Total 1350 ml


 


Balance 504 ml

















GALDINO BOSS MD   Sep 29, 2019 08:16

## 2019-09-30 VITALS — SYSTOLIC BLOOD PRESSURE: 131 MMHG | DIASTOLIC BLOOD PRESSURE: 76 MMHG

## 2019-09-30 VITALS — SYSTOLIC BLOOD PRESSURE: 138 MMHG | DIASTOLIC BLOOD PRESSURE: 70 MMHG

## 2019-09-30 VITALS — DIASTOLIC BLOOD PRESSURE: 70 MMHG | SYSTOLIC BLOOD PRESSURE: 138 MMHG

## 2019-09-30 VITALS — SYSTOLIC BLOOD PRESSURE: 137 MMHG | DIASTOLIC BLOOD PRESSURE: 81 MMHG

## 2019-09-30 LAB
BUN SERPL-MCNC: 33 MG/DL (ref 7–18)
CALCIUM SERPL-MCNC: 9.2 MG/DL (ref 8.8–10.2)
CHLORIDE SERPL-SCNC: 101 MEQ/L (ref 98–107)
CO2 SERPL-SCNC: 28 MEQ/L (ref 21–32)
CREAT SERPL-MCNC: 2.02 MG/DL (ref 0.7–1.3)
GFR SERPL CREATININE-BSD FRML MDRD: 33.8 ML/MIN/{1.73_M2} (ref 35–?)
GLUCOSE SERPL-MCNC: 98 MG/DL (ref 70–100)
HCT VFR BLD AUTO: 32 % (ref 42–52)
HGB BLD-MCNC: 10.4 G/DL (ref 13.5–17.5)
MAGNESIUM SERPL-MCNC: 2.4 MG/DL (ref 1.8–2.4)
MCH RBC QN AUTO: 33.3 PG (ref 27–33)
MCHC RBC AUTO-ENTMCNC: 32.5 G/DL (ref 32–36.5)
MCV RBC AUTO: 102.6 FL (ref 80–96)
PLATELET # BLD AUTO: 306 10^3/UL (ref 150–450)
POTASSIUM SERPL-SCNC: 4.7 MEQ/L (ref 3.5–5.1)
RBC # BLD AUTO: 3.12 10^6/UL (ref 4.3–6.1)
SODIUM SERPL-SCNC: 135 MEQ/L (ref 136–145)
WBC # BLD AUTO: 8.1 10^3/UL (ref 4–10)

## 2019-09-30 RX ADMIN — VANCOMYCIN HYDROCHLORIDE SCH MG: KIT at 05:34

## 2019-09-30 RX ADMIN — CIPROFLOXACIN SCH MG: 250 TABLET, FILM COATED ORAL at 17:33

## 2019-09-30 RX ADMIN — MIRTAZAPINE SCH MG: 15 TABLET, FILM COATED ORAL at 17:33

## 2019-09-30 RX ADMIN — VANCOMYCIN HYDROCHLORIDE SCH MG: KIT at 17:33

## 2019-09-30 RX ADMIN — VANCOMYCIN HYDROCHLORIDE SCH MG: KIT at 00:36

## 2019-09-30 RX ADMIN — ASPIRIN SCH MG: 81 TABLET ORAL at 09:28

## 2019-09-30 RX ADMIN — ATORVASTATIN CALCIUM SCH MG: 20 TABLET, FILM COATED ORAL at 20:08

## 2019-09-30 RX ADMIN — CARBIDOPA AND LEVODOPA SCH TAB: 25; 100 TABLET ORAL at 09:28

## 2019-09-30 RX ADMIN — FERROUS SULFATE TAB 325 MG (65 MG ELEMENTAL FE) SCH MG: 325 (65 FE) TAB at 09:28

## 2019-09-30 RX ADMIN — VANCOMYCIN HYDROCHLORIDE SCH MG: KIT at 12:16

## 2019-09-30 RX ADMIN — CARBIDOPA AND LEVODOPA SCH TAB: 25; 100 TABLET ORAL at 17:33

## 2019-09-30 RX ADMIN — VANCOMYCIN HYDROCHLORIDE SCH MG: KIT at 23:19

## 2019-09-30 RX ADMIN — TAMSULOSIN HYDROCHLORIDE SCH MG: 0.4 CAPSULE ORAL at 17:33

## 2019-09-30 RX ADMIN — CLOPIDOGREL BISULFATE SCH MG: 75 TABLET ORAL at 09:28

## 2019-09-30 RX ADMIN — CIPROFLOXACIN SCH MG: 250 TABLET, FILM COATED ORAL at 05:34

## 2019-09-30 NOTE — IPNPDOC
Text Note


Date of Service


The patient was seen on 9/30/19.





NOTE


Subjective:


Feels well this morning, diarrhea persists, sometimes is more formed.


Afebrile, hemodynamically stable, no chest pain, shortness of breath or 

palpitations





Objective:


Physical Examination


General Exam: Awake, alert, sitting up, eating breakfast


Eye Exam:  Positive: PERRLA, EOMI, anicteric


ENT Exam:  Atraumatic, MMM


Neck Exam:  Supple


Chest Exam:  CTAB


Heart Exam:  RRR, no mrg


Abdomen Exam:  Normoactive bowel sounds, soft, non tender


Extremity Exam: improved LE edema to ankles still 2+ but tense skin is begin to 

tent, 2+ DP normal pulses, non tender


Neuro Exam:  Normal speech, Strength at 5/5 X4 ext, Cranial Nerves 3-12 NL


Psych Exam:  Positive: Mental status NL, Memory Intact, Oriented x 3 





Assessment /Plan


84 yo M who was admitted for infectious colitis with C.diff, yersinia and e.coli

on vanc and cipro with improvement of copious diarrhea and dehydration s/p 

fluids, whose course has been c/b significant asymptomatic ectopy. Consulted 

cardiology about ectopy and presumed paroxysmal AF and briefly placed on amio 

200QID as well as dc'd plavix and started eliquis, however after evaluation by 

Dr. Mcgee really did not have any evidence of Afib and thus amio and eliquis 

were stopped and restarted on plavix, as we as started heart failure optimizati

on as stated below. Otherwise doing well, diarrhea mildly improved, tolerating 

diet.





Assessment


# Acute Infectious diarrhea (GI panel +ve for c.diff, enterohemorrhagic e. coli,

and yersinia)


- continue vanco 125 mg q 6 orally


- cipro 250 mg bid


- no evidence of STEC continue with abx








#Multifocal atrial rhythm:  Initially reported as atrial fibrillation and 

started on amiodarone and eliquis, now both discontinued per cardiology. Ectopic

activity persists. 


-Per Dr. Mcgee, his MAT is likely a reflection of his cor pulmonale that was 

noted on TTE, he was therefore restarted on his ASA81/Plavix 75  


 


#Acute on chronic diastolic heart failure:  


-TTE suggestive of longstanding hypertensive heart disease as well as pulmonary 

heart disease and Dr. Mcgee thought his volume overload was also exercabated by

his ongoing CALLUM 


-Was placed on 2g salt restriction and fluid restriction and was started on 

torsemide 10 mg daily with spirolactone 12.5 mg daily.  However will hold 

torsemide today given rising Cr.


-No ACEi/ARB in the setting of his ongoing CALLUM.





# Acute on Chronic renal failure/CKD stage 3 (baseline creat 1.4-1.6): prerenal 

azotemia in the setting of GI losses 


- creatinine initially improved with fluids, now uptrending since starting 

diuretics, will hold torsemide 


- avoid nephrotoxins





# Right sided/Generalized weakness: much improved


- MRI brain was w/o evidence of an acute CVA


- Likely due to acute diarrhea/dehydration


- PT/OT





# Parkinson's Disease


- continue Sinemet, monitor for toxic side effects





# Chronic CVA


- continue asa, restart Plavix now that eliquis 2.5 BID is discontinued





# Chronic CAD - stable


# S/p 3V CABG, continue restart statin therapy, 20 lipitor, while on DAPT


# S/p AAA repair


# S/p fem-pop bypass for PAD





# Chronic anemia with macrocytosis


- continue iron tablets


- check b12, folate levels





# BPH


- continue Flomax





# Insomnia


- continue Remeron





# Hx of Lyme disease


- treated with amoxicillin in the past





# DVT prophylaxis


- SCDs + lovenox





DVT prophylaxis: on lovenox


Dispo: pending clinical improvement





VSStef, I+O


VSStef, I+O


Laboratory Tests


9/30/19 05:49








Red Blood Count 3.12 L, Mean Corpuscular Volume 102.6 H, Mean Corpuscular 

Hemoglobin 33.3 H, Mean Corpuscular Hemoglobin Concent 32.5, Red Cell 

Distribution Width 13.5, Calcium Level 9.2








Vital Signs








  Date Time  Temp Pulse Resp B/P (MAP) Pulse Ox O2 Delivery O2 Flow Rate FiO2


 


9/30/19 06:00 98.5 79 16 137/81 (99) 93   














I&O- Last 24 Hours up to 6 AM 


 


 9/30/19





 06:00


 


Intake Total 660 ml


 


Output Total 1700 ml


 


Balance -1040 ml

















GALDINO BOSS MD   Sep 30, 2019 09:02

## 2019-10-01 VITALS — SYSTOLIC BLOOD PRESSURE: 136 MMHG | DIASTOLIC BLOOD PRESSURE: 72 MMHG

## 2019-10-01 VITALS — SYSTOLIC BLOOD PRESSURE: 135 MMHG | DIASTOLIC BLOOD PRESSURE: 72 MMHG

## 2019-10-01 VITALS — SYSTOLIC BLOOD PRESSURE: 138 MMHG | DIASTOLIC BLOOD PRESSURE: 72 MMHG

## 2019-10-01 LAB
BUN SERPL-MCNC: 37 MG/DL (ref 7–18)
CALCIUM SERPL-MCNC: 9.3 MG/DL (ref 8.8–10.2)
CHLORIDE SERPL-SCNC: 102 MEQ/L (ref 98–107)
CO2 SERPL-SCNC: 28 MEQ/L (ref 21–32)
CREAT SERPL-MCNC: 1.89 MG/DL (ref 0.7–1.3)
GFR SERPL CREATININE-BSD FRML MDRD: 36.5 ML/MIN/{1.73_M2} (ref 35–?)
GLUCOSE SERPL-MCNC: 93 MG/DL (ref 70–100)
HCT VFR BLD AUTO: 32.9 % (ref 42–52)
HGB BLD-MCNC: 10.5 G/DL (ref 13.5–17.5)
MAGNESIUM SERPL-MCNC: 2.5 MG/DL (ref 1.8–2.4)
MCH RBC QN AUTO: 33.2 PG (ref 27–33)
MCHC RBC AUTO-ENTMCNC: 31.9 G/DL (ref 32–36.5)
MCV RBC AUTO: 104.1 FL (ref 80–96)
PLATELET # BLD AUTO: 294 10^3/UL (ref 150–450)
POTASSIUM SERPL-SCNC: 4.9 MEQ/L (ref 3.5–5.1)
RBC # BLD AUTO: 3.16 10^6/UL (ref 4.3–6.1)
SODIUM SERPL-SCNC: 135 MEQ/L (ref 136–145)
WBC # BLD AUTO: 7.6 10^3/UL (ref 4–10)

## 2019-10-01 RX ADMIN — MIRTAZAPINE SCH MG: 15 TABLET, FILM COATED ORAL at 17:29

## 2019-10-01 RX ADMIN — CARBIDOPA AND LEVODOPA SCH TAB: 25; 100 TABLET ORAL at 17:28

## 2019-10-01 RX ADMIN — FERROUS SULFATE TAB 325 MG (65 MG ELEMENTAL FE) SCH MG: 325 (65 FE) TAB at 08:57

## 2019-10-01 RX ADMIN — ASPIRIN SCH MG: 81 TABLET ORAL at 08:57

## 2019-10-01 RX ADMIN — CLOPIDOGREL BISULFATE SCH MG: 75 TABLET ORAL at 08:57

## 2019-10-01 RX ADMIN — VANCOMYCIN HYDROCHLORIDE SCH MG: KIT at 17:28

## 2019-10-01 RX ADMIN — CIPROFLOXACIN SCH MG: 250 TABLET, FILM COATED ORAL at 17:29

## 2019-10-01 RX ADMIN — ATORVASTATIN CALCIUM SCH MG: 20 TABLET, FILM COATED ORAL at 20:02

## 2019-10-01 RX ADMIN — VANCOMYCIN HYDROCHLORIDE SCH MG: KIT at 23:27

## 2019-10-01 RX ADMIN — TAMSULOSIN HYDROCHLORIDE SCH MG: 0.4 CAPSULE ORAL at 17:28

## 2019-10-01 RX ADMIN — VANCOMYCIN HYDROCHLORIDE SCH MG: KIT at 11:29

## 2019-10-01 RX ADMIN — CARBIDOPA AND LEVODOPA SCH TAB: 25; 100 TABLET ORAL at 08:57

## 2019-10-01 RX ADMIN — VANCOMYCIN HYDROCHLORIDE SCH MG: KIT at 05:33

## 2019-10-01 RX ADMIN — CIPROFLOXACIN SCH MG: 250 TABLET, FILM COATED ORAL at 05:32

## 2019-10-01 NOTE — NOCOX
DATE OF PROCEDURE:   09/28/2019

 

The study was performed on room air.  The total valid sampling time was 6 hours

and 7 minutes.  The highest pulse was 106, lowest heart rate was 61.  The highest

oxygen saturation was 99% with a low of 86%.  The time spent with an oxygen

saturation less than 88% was 32 seconds.  Graphically patient was noted to have

periods of desaturation with a pattern consistent with possible Cheyne-James

respiration.

 

IMPRESSION:  Abnormal nocturnal oximetry study.  Patient did not have any

significant desaturation to qualify for nocturnal oxygen supplementation.

Graphically he did have evidence of possible Cheyne-James respirations with some

periodic brief desaturations overnight related to these respiration pattern.

Would correlate clinically.  If there is still suspicion for sleep apnea can

refer patient for more formal testing as an outpatient.

## 2019-10-01 NOTE — IPNPDOC
Text Note


Date of Service


The patient was seen on 10/1/19.





NOTE


Subjective:


Patient seen and examined at bedside. Feels his diarrhea is improving.





Objective: 


General: NAD, sitting comfortably in chair


HEENT: NC/AT, EOMI


Lungs: CTA B/L


Heart: +S1S2, RRR


Abd: soft, NT, +BS


Ext: no edema





Assessment /Plan


84 yo M who was admitted for infectious colitis with C.diff, yersinia and e.coli

on vanc/cipro, complicated with significant asymptomatic ectopy. Consulted 

cardiology about ectopy and presumed paroxysmal AF and briefly placed on amio 2

00QID as well as dc'd plavix and started eliquis, however after evaluation by 

Dr. Mcgee really did not have any evidence of Afib and thus amio and eliquis 

were stopped and restarted on plavix, in addition to optimization of heart 

failure medication.





# Acute Infectious diarrhea (GI panel +ve for c.diff, enterohemorrhagic e. coli,

and yersinia)


- continue vanco 125 mg q 6 orally


- cipro 250 mg bid


- no evidence of STEC continue with abx





#Multifocal atrial rhythm:  Initially reported as atrial fibrillation and 

started on amiodarone and eliquis, now both discontinued per cardiology. Ectopic

activity persists. 


-Per Dr. Mcgee, his MAT is likely a reflection of his cor pulmonale that was 

noted on TTE, he was therefore restarted on his ASA81/Plavix 75  


 


#Acute on chronic diastolic heart failure:  


-TTE suggestive of longstanding hypertensive heart disease as well as pulmonary 

heart disease and Dr. Mcgee thought his volume overload was also exercabated by

his ongoing CALLUM 


-Was placed on 2g salt restriction and fluid restriction and was started on 

torsemide 10 mg daily with spirolactone 12.5 mg daily.  However will hold 

torsemide today given rising Cr.


-No ACEi/ARB in the setting of his ongoing CALLUM.





# Acute on Chronic renal failure/CKD stage 3 (baseline creat 1.4-1.6): prerenal 

azotemia in the setting of GI losses 


- creatinine initially improved with fluids, now uptrending since starting 

diuretics, will hold torsemide 


- avoid nephrotoxins





# Right sided/Generalized weakness: much improved


- MRI brain was w/o evidence of an acute CVA


- Likely due to acute diarrhea/dehydration


- PT/OT





# Parkinson's Disease


- continue Sinemet, monitor for toxic side effects





# Chronic CVA


- continue asa, restart Plavix now that eliquis 2.5 BID is discontinued





# Chronic CAD/PAD


-S/p 3V CABG, continue restart statin therapy, 20 lipitor, while on DAPT


- S/p AAA repair


-S/p fem-pop bypass for PAD





# Chronic anemia with macrocytosis


- continue iron tablets


- check b12, folate levels





# BPH


- continue Flomax





# Insomnia


- continue Remeron





# Hx of Lyme disease


- treated with amoxicillin in the past





# DVT prophylaxis


- SCDs + lovenox





DVT prophylaxis: on lovenox


Dispo: pending clinical improvement





VS,B[place]ol[place], I+O


VS, [place], I+O


Laboratory Tests


10/1/19 05:46








Red Blood Count 3.16 L, Mean Corpuscular Volume 104.1 H, Mean Corpuscular 

Hemoglobin 33.2 H, Mean Corpuscular Hemoglobin Concent 31.9 L, Red Cell 

Distribution Width 13.4, Calcium Level 9.3








Vital Signs








  Date Time  Temp Pulse Resp B/P (MAP) Pulse Ox O2 Delivery O2 Flow Rate FiO2


 


10/1/19 06:00 98.3 75 18 136/72 (93) 92   














I&O- Last 24 Hours up to 6 AM 


 


 10/1/19





 06:00


 


Intake Total 1355 ml


 


Output Total 1100 ml


 


Balance 255 ml

















ALEX FAIRBANKS MD               Oct 1, 2019 12:35

## 2019-10-02 VITALS — DIASTOLIC BLOOD PRESSURE: 69 MMHG | SYSTOLIC BLOOD PRESSURE: 131 MMHG

## 2019-10-02 VITALS — SYSTOLIC BLOOD PRESSURE: 147 MMHG | DIASTOLIC BLOOD PRESSURE: 86 MMHG

## 2019-10-02 VITALS — SYSTOLIC BLOOD PRESSURE: 143 MMHG | DIASTOLIC BLOOD PRESSURE: 76 MMHG

## 2019-10-02 RX ADMIN — VANCOMYCIN HYDROCHLORIDE SCH MG: KIT at 18:20

## 2019-10-02 RX ADMIN — TAMSULOSIN HYDROCHLORIDE SCH MG: 0.4 CAPSULE ORAL at 18:20

## 2019-10-02 RX ADMIN — CIPROFLOXACIN SCH MG: 250 TABLET, FILM COATED ORAL at 18:20

## 2019-10-02 RX ADMIN — MIRTAZAPINE SCH MG: 15 TABLET, FILM COATED ORAL at 18:20

## 2019-10-02 RX ADMIN — ASPIRIN SCH MG: 81 TABLET ORAL at 08:04

## 2019-10-02 RX ADMIN — CARBIDOPA AND LEVODOPA SCH TAB: 25; 100 TABLET ORAL at 18:20

## 2019-10-02 RX ADMIN — VANCOMYCIN HYDROCHLORIDE SCH MG: KIT at 05:09

## 2019-10-02 RX ADMIN — CLOPIDOGREL BISULFATE SCH MG: 75 TABLET ORAL at 08:04

## 2019-10-02 RX ADMIN — CARBIDOPA AND LEVODOPA SCH TAB: 25; 100 TABLET ORAL at 08:04

## 2019-10-02 RX ADMIN — VANCOMYCIN HYDROCHLORIDE SCH MG: KIT at 12:28

## 2019-10-02 RX ADMIN — ATORVASTATIN CALCIUM SCH MG: 20 TABLET, FILM COATED ORAL at 21:10

## 2019-10-02 RX ADMIN — CIPROFLOXACIN SCH MG: 250 TABLET, FILM COATED ORAL at 05:09

## 2019-10-02 RX ADMIN — FERROUS SULFATE TAB 325 MG (65 MG ELEMENTAL FE) SCH MG: 325 (65 FE) TAB at 08:04

## 2019-10-02 NOTE — IPNPDOC
Text Note


Date of Service


The patient was seen on 10/2/19.





NOTE


Subjective:


Patient seen and examined at bedside. Feels his diarrhea is improving.





Objective: 


General: NAD, sitting comfortably in chair


HEENT: NC/AT, EOMI


Lungs: CTA B/L


Heart: +S1S2, RRR


Abd: soft, NT, +BS


Ext: no edema





Assessment /Plan


82 yo M who was admitted for infectious colitis with C.diff, yersinia and e.coli

on vanc/cipro, complicated with significant asymptomatic ectopy. Consulted 

cardiology about ectopy and presumed paroxysmal AF and briefly placed on amio 

200QID as well as dc'd plavix and started eliquis, however after evaluation by 

Dr. Mcgee really did not have any evidence of Afib and thus amio and eliquis 

were stopped and restarted on plavix, in addition to optimization of heart 

failure medication.





# Acute Infectious diarrhea (GI panel +ve for c.diff, enterohemorrhagic e. coli,

and yersinia)


- continue vanco 125 mg q 6 orally - completing 10 day course today


- cipro 250 mg bid


- no evidence of STEC continue with abx





#Multifocal atrial rhythm:  Initially reported as atrial fibrillation and sta

rted on amiodarone and eliquis, now both discontinued per cardiology. Ectopic 

activity persists. 


-Per Dr. Mcgee, his MAT is likely a reflection of his cor pulmonale that was 

noted on TTE, he was therefore restarted on his ASA81/Plavix 75  


 


#Acute on chronic diastolic heart failure:  


-TTE suggestive of longstanding hypertensive heart disease as well as pulmonary 

heart disease and Dr. Mcgee thought his volume overload was also exercabated by

his ongoing CALLUM 


-Was placed on 2g salt restriction and fluid restriction and was started on 

torsemide 10 mg daily with spirolactone 12.5 mg daily.  However will hold 

torsemide today given rising Cr.


-No ACEi/ARB in the setting of his ongoing CALLUM.





# Acute on Chronic renal failure/CKD stage 3 (baseline creat 1.4-1.6): prerenal 

azotemia in the setting of GI losses 


- creatinine initially improved with fluids, now uptrending since starting diur

etics, will hold torsemide 


- avoid nephrotoxins





# Right sided/Generalized weakness: much improved


- MRI brain was w/o evidence of an acute CVA


- Likely due to acute diarrhea/dehydration


- PT/OT





# Parkinson's Disease


- continue Sinemet, monitor for toxic side effects





# Chronic CVA


- continue asa, restart Plavix now that eliquis 2.5 BID is discontinued





# Chronic CAD/PAD


-S/p 3V CABG, continue restart statin therapy, 20 lipitor, while on DAPT


- S/p AAA repair


-S/p fem-pop bypass for PAD





# Chronic anemia with macrocytosis


- continue iron tablets


- check b12, folate levels





# BPH


- continue Flomax





# Insomnia


- continue Remeron





# Hx of Lyme disease


- treated with amoxicillin in the past





# DVT prophylaxis


- SCDs + lovenox





DVT prophylaxis: on lovenox





Dispo: anticipate discharge in 24 hours





VS,Fishbone, I+O


VS, Fishbone, I+O





Vital Signs








  Date Time  Temp Pulse Resp B/P (MAP) Pulse Ox O2 Delivery O2 Flow Rate FiO2


 


10/2/19 06:00 97.4 81 20 147/86 (106) 94   














I&O- Last 24 Hours up to 6 AM 


 


 10/2/19





 05:59


 


Intake Total 1580 ml


 


Output Total 1175 ml


 


Balance 405 ml

















ALEX FAIRBANKS MD               Oct 2, 2019 11:21

## 2019-10-03 VITALS — DIASTOLIC BLOOD PRESSURE: 74 MMHG | SYSTOLIC BLOOD PRESSURE: 142 MMHG

## 2019-10-03 RX ADMIN — VANCOMYCIN HYDROCHLORIDE SCH MG: KIT at 11:29

## 2019-10-03 RX ADMIN — VANCOMYCIN HYDROCHLORIDE SCH MG: KIT at 00:17

## 2019-10-03 RX ADMIN — FERROUS SULFATE TAB 325 MG (65 MG ELEMENTAL FE) SCH MG: 325 (65 FE) TAB at 08:23

## 2019-10-03 RX ADMIN — CARBIDOPA AND LEVODOPA SCH TAB: 25; 100 TABLET ORAL at 08:23

## 2019-10-03 RX ADMIN — VANCOMYCIN HYDROCHLORIDE SCH MG: KIT at 05:02

## 2019-10-03 RX ADMIN — CIPROFLOXACIN SCH MG: 250 TABLET, FILM COATED ORAL at 05:02

## 2019-10-03 RX ADMIN — CLOPIDOGREL BISULFATE SCH MG: 75 TABLET ORAL at 08:23

## 2019-10-03 RX ADMIN — ASPIRIN SCH MG: 81 TABLET ORAL at 08:22

## 2019-11-06 ENCOUNTER — HOSPITAL ENCOUNTER (OUTPATIENT)
Dept: HOSPITAL 53 - M MS4PR | Age: 84
Setting detail: OBSERVATION
LOS: 1 days | Discharge: HOME | End: 2019-11-07
Attending: INTERNAL MEDICINE | Admitting: INTERNAL MEDICINE
Payer: MEDICARE

## 2019-11-06 VITALS — HEIGHT: 67 IN | WEIGHT: 170.35 LBS | BODY MASS INDEX: 26.74 KG/M2

## 2019-11-06 VITALS — SYSTOLIC BLOOD PRESSURE: 151 MMHG | DIASTOLIC BLOOD PRESSURE: 73 MMHG

## 2019-11-06 VITALS — SYSTOLIC BLOOD PRESSURE: 138 MMHG | DIASTOLIC BLOOD PRESSURE: 68 MMHG

## 2019-11-06 VITALS — DIASTOLIC BLOOD PRESSURE: 67 MMHG | SYSTOLIC BLOOD PRESSURE: 143 MMHG

## 2019-11-06 DIAGNOSIS — I50.30: ICD-10-CM

## 2019-11-06 DIAGNOSIS — D64.9: ICD-10-CM

## 2019-11-06 DIAGNOSIS — K22.2: Primary | ICD-10-CM

## 2019-11-06 DIAGNOSIS — Z79.02: ICD-10-CM

## 2019-11-06 DIAGNOSIS — G20: ICD-10-CM

## 2019-11-06 DIAGNOSIS — I25.10: ICD-10-CM

## 2019-11-06 DIAGNOSIS — R13.10: ICD-10-CM

## 2019-11-06 DIAGNOSIS — N18.3: ICD-10-CM

## 2019-11-06 DIAGNOSIS — I73.9: ICD-10-CM

## 2019-11-06 DIAGNOSIS — M10.9: ICD-10-CM

## 2019-11-06 DIAGNOSIS — Z86.73: ICD-10-CM

## 2019-11-06 DIAGNOSIS — K21.9: ICD-10-CM

## 2019-11-06 DIAGNOSIS — Z95.1: ICD-10-CM

## 2019-11-06 DIAGNOSIS — Z79.82: ICD-10-CM

## 2019-11-06 DIAGNOSIS — N40.0: ICD-10-CM

## 2019-11-06 DIAGNOSIS — Z79.899: ICD-10-CM

## 2019-11-06 DIAGNOSIS — E78.49: ICD-10-CM

## 2019-11-06 LAB
ALBUMIN SERPL BCG-MCNC: 3.6 GM/DL (ref 3.2–5.2)
ALT SERPL W P-5'-P-CCNC: 19 U/L (ref 12–78)
APTT BLD: 30.8 SECONDS (ref 25–38.4)
BILIRUB SERPL-MCNC: 0.5 MG/DL (ref 0.2–1)
BUN SERPL-MCNC: 22 MG/DL (ref 7–18)
CALCIUM SERPL-MCNC: 8.9 MG/DL (ref 8.8–10.2)
CHLORIDE SERPL-SCNC: 109 MEQ/L (ref 98–107)
CO2 SERPL-SCNC: 25 MEQ/L (ref 21–32)
CREAT SERPL-MCNC: 1.77 MG/DL (ref 0.7–1.3)
GFR SERPL CREATININE-BSD FRML MDRD: 39.4 ML/MIN/{1.73_M2} (ref 35–?)
GLUCOSE SERPL-MCNC: 91 MG/DL (ref 70–100)
HCT VFR BLD AUTO: 34 % (ref 42–52)
HGB BLD-MCNC: 10.4 G/DL (ref 13.5–17.5)
INR PPP: 1.2
MCH RBC QN AUTO: 33.7 PG (ref 27–33)
MCHC RBC AUTO-ENTMCNC: 30.6 G/DL (ref 32–36.5)
MCV RBC AUTO: 110 FL (ref 80–96)
PLATELET # BLD AUTO: 186 10^3/UL (ref 150–450)
POTASSIUM SERPL-SCNC: 5 MEQ/L (ref 3.5–5.1)
PROT SERPL-MCNC: 7.5 GM/DL (ref 6.4–8.2)
PROTHROMBIN TIME: 14.9 SECONDS (ref 11.8–14)
RBC # BLD AUTO: 3.09 10^6/UL (ref 4.3–6.1)
SODIUM SERPL-SCNC: 141 MEQ/L (ref 136–145)
WBC # BLD AUTO: 5.1 10^3/UL (ref 4–10)

## 2019-11-06 PROCEDURE — 96361 HYDRATE IV INFUSION ADD-ON: CPT

## 2019-11-06 PROCEDURE — 85027 COMPLETE CBC AUTOMATED: CPT

## 2019-11-06 PROCEDURE — 85730 THROMBOPLASTIN TIME PARTIAL: CPT

## 2019-11-06 PROCEDURE — 80048 BASIC METABOLIC PNL TOTAL CA: CPT

## 2019-11-06 PROCEDURE — 96374 THER/PROPH/DIAG INJ IV PUSH: CPT

## 2019-11-06 PROCEDURE — 96376 TX/PRO/DX INJ SAME DRUG ADON: CPT

## 2019-11-06 PROCEDURE — 80053 COMPREHEN METABOLIC PANEL: CPT

## 2019-11-06 PROCEDURE — 85610 PROTHROMBIN TIME: CPT

## 2019-11-06 PROCEDURE — 36415 COLL VENOUS BLD VENIPUNCTURE: CPT

## 2019-11-06 RX ADMIN — SODIUM CHLORIDE SCH MLS/HR: 9 INJECTION, SOLUTION INTRAVENOUS at 17:57

## 2019-11-06 RX ADMIN — DEXTROSE MONOHYDRATE SCH MG: 50 INJECTION, SOLUTION INTRAVENOUS at 14:08

## 2019-11-06 NOTE — HPE
DATE OF ADMISSION:  11/06/2019

 

PRIMARY CARE PROVIDER:  Dr. Bora Wall

 

ATTENDING PHYSICIAN:  Dr. Tammi Terrell

 

CHIEF COMPLAINT:   Esophageal obstruction.

 

HISTORY:  Rafael Lou is an 83-year-old who was referred by Dr. Wall for

direction admission for esophageal obstruction.  He has been having progressive

problems swallowing for over 2 weeks, according to his daughter who is present.

She says that he initially was having trouble with solids and now solids and

liquids are problematic.  He tries to swallow and he cannot complete the

ingestion, has to vomit up the swallowed contents.  He has lost weight from this.

Denies any hematemesis.  I ordered a recent CT of the abdomen and pelvis, which

showed a hiatal hernia but no esophageal abnormality.

 

PAST MEDICAL HISTORY:

Atrial arrhythmia, for which he was recently hospitalized in 09/2019, coronary

artery bypass graft (CABG) 20 years ago.  Last stress test was many years ago.

Hypertensive heart disease, history of transient ischemic attack in 2012, stroke

in 2017.  MRI showed severe cerebral atrophy, chronic small vessel disease and

left cerebellar lacunar infarct.

 

Other past history shows chronic kidney disease stage III, history of

gastroesophageal reflux disease (GERD), benign prostatic hypertrophy (BPH),

peripheral arterial disease, abdominal aortic aneurysm, status post endovascular

stenting, history of iron deficiency anemia, Parkinson's disease and gout.

 

He has lumbosacral degenerative disc disease and spinal stenosis.

 

He has a history of congestive heart failure (CHF) with preserved ejection

fraction.  Echocardiogram 09/2019 showed an ejection fraction of 60%, left atrial

enlargement of 48 mm, diastolic dysfunction noted, dilated right heart chambers

with at least moderate pulmonary hypertension noted, mitral annular calcification

with very mild insufficiency and no stenosis, aortic sclerosis without stenosis,

dilated aortic root of 42 mm.

 

SOCIAL HISTORY:   He does not smoke or drink any alcohol.  He is .

 

FAMILY HISTORY:   Unremarkable.

 

REVIEW OF SYSTEMS:   As above, otherwise negative.

 

MEDICATIONS:

- Tylenol

- aspirin 81 mg daily

- atorvastatin 20 mg daily

- Sinemet 25/100 one twice a day

- Plavix 75 mg daily

- iron supplementation

- mirtazapine 15 mg at bedtime

- tamsulosin 0.4 mg daily

- Lansoprazole 15 mg daily

- magnesium oxide 400 mg daily

 

ALLERGIES:  None known.

 

PHYSICAL EXAMINATION:

VITAL SIGNS:  Not yet recorded.  He is alert, conversant and in no distress.

HEENT:  Pupils are equal and reactive to light.  Tympanic membranes normal.

Oropharynx benign.

NECK:  No masses.

LUNGS:  Clear.

HEART:  Regular rhythm with frequent ectopy.  1/6 systolic ejection murmur.

ABDOMEN:  Soft, nontender.  No masses.  Nondistended.

EXTREMITIES:  No clubbing or cyanosis.  Trace peripheral edema. Normal strength

in the arms and legs.

 

LABORATORIES:  Pending.

 

His baseline GFR is in the mid 30s with a creatinine of 1.9.  Baseline hemoglobin

is around 10.  He had iron studies done in 09/2019 that suggested anemia of

chronic disease with low TIBC and high ferritin.

 

IMPRESSION:

1.  Esophageal obstruction.  Case discussed with Dr. Gonzalez of GI service.  He

will see the patient in consultation.  The patient is nothing by mouth pending

procedure.  IV Protonix has been ordered.  IV fluids have been ordered starting

with normal saline with supplemental potassium and this can be changed based upon

results of the pending lab work.

 

2.  Diastolic congestive heart failure (CHF)/heart failure with preserved

ejection fraction.  He is compensated at this time.  We need to watch his volume

status on the IV fluids.

 

3.  Coronary artery disease/cerebrovascular disease with history of stroke.

Aspirin and Plavix are on hold pending procedure.

 

4.  Hyperlipidemia.  Statin is held until the patient is taking orally again.

 

5.  Parkinson's disease.  Sinemet is on hold until the patient is taking orally

again.

 

6.  Chronic anemia, looks like anemia of chronic disease.  He is on iron

supplement, but iron studies did not show any iron deficiency.  Would advise

against restarting this after discharge.

 

The hospitalist service will be following this patient after admission.

## 2019-11-07 VITALS — SYSTOLIC BLOOD PRESSURE: 144 MMHG | DIASTOLIC BLOOD PRESSURE: 64 MMHG

## 2019-11-07 VITALS — DIASTOLIC BLOOD PRESSURE: 62 MMHG | SYSTOLIC BLOOD PRESSURE: 133 MMHG

## 2019-11-07 LAB
BUN SERPL-MCNC: 19 MG/DL (ref 7–18)
CALCIUM SERPL-MCNC: 8.1 MG/DL (ref 8.8–10.2)
CHLORIDE SERPL-SCNC: 114 MEQ/L (ref 98–107)
CO2 SERPL-SCNC: 24 MEQ/L (ref 21–32)
CREAT SERPL-MCNC: 1.62 MG/DL (ref 0.7–1.3)
GFR SERPL CREATININE-BSD FRML MDRD: 43.6 ML/MIN/{1.73_M2} (ref 35–?)
GLUCOSE SERPL-MCNC: 81 MG/DL (ref 70–100)
HCT VFR BLD AUTO: 28.9 % (ref 42–52)
HGB BLD-MCNC: 8.8 G/DL (ref 13.5–17.5)
MCH RBC QN AUTO: 33.5 PG (ref 27–33)
MCHC RBC AUTO-ENTMCNC: 30.4 G/DL (ref 32–36.5)
MCV RBC AUTO: 109.9 FL (ref 80–96)
PLATELET # BLD AUTO: 151 10^3/UL (ref 150–450)
POTASSIUM SERPL-SCNC: 4.7 MEQ/L (ref 3.5–5.1)
RBC # BLD AUTO: 2.63 10^6/UL (ref 4.3–6.1)
SODIUM SERPL-SCNC: 142 MEQ/L (ref 136–145)
WBC # BLD AUTO: 3.8 10^3/UL (ref 4–10)

## 2019-11-07 RX ADMIN — SODIUM CHLORIDE SCH MLS/HR: 9 INJECTION, SOLUTION INTRAVENOUS at 01:33

## 2019-11-07 RX ADMIN — SODIUM CHLORIDE SCH MLS/HR: 9 INJECTION, SOLUTION INTRAVENOUS at 10:28

## 2019-11-07 RX ADMIN — DEXTROSE MONOHYDRATE SCH MG: 50 INJECTION, SOLUTION INTRAVENOUS at 08:50

## 2019-11-07 NOTE — CR.PDOC
General


Date of Consultation:  Nov 6, 2019


Referring Provider:  Chas Xavier MD


Attending Physician:  SOUTH REHMAN MD





Consultation


Primary physician/ hospitalist: Dr. Xavier, Dr. Levin / PCP Dr. Wall.


Reason for consult: Esophageal obstruction.





HPI: 83-year-old male patient with HTn, PVD ( remote h/o CVA no residual 

deficits), CAD s/p CABG many years ago, Diastolic CHF, Multifocal atrial 

tachycardia ( currently on ASA 81 mg and Plavix 75 mg), parkinson' sdisease, 

recent hospitalization for Diarrhea ( treated for C. difficile and symptoms 

resolved), was admitted directly from PCP clinic for difficulty in swallowing. 

Patient is AAO x 3 and able to provide detailed history. Patient reports having 

gradually worsening symptoms of difficulty in swallowing for past 4 weeks, and 

is currently only tolerated liquid to soft diet only with few episodes of 

vomiting, last being around 2 weeks ago. Patient had Esophagogram in [facility] ( report mentioning possible Achalasia, images not available to review

now). Patient also reports chronic h/o acid reflux symptoms which are well 

controlled by acid suppressants. Patient denies any significant weight loss due 

to this as he has good appetite and able to get some food down. 





Pertinent negative GI symptoms:


Patient denies fever, sick contacts, recent travel, nausea, diarrhea, abdominal 

pain, loss of appetite, early satiety or unintentional weight loss. No history 

of hematemesis, melena or hematochezia. 





Review of Systems:


GI: as stated above 


CVS: No chest pain, No palpitations, No leg swelling.


RS: No Shortness of breath, No Wheezing, no cough


CNS: No dizziness, No motor weakness, No sensory problems 


Hematology: No bruising, No gum bleeding, 


Musculoskeletal: No joint pain, ambulating well.


Skin: No rash 


: No hematuria, No burning sensation of the urine 


ENT:  No ear discharge/ pain, No dysphagia.


Eyes: No photophobia. Jaundice





Home medications: reviewed. Antithrombotic agents - ASA 81 mg, Plavix 75 mg.


Medical h/o: As above.


Surgical h/o: no scars on abdomen. 


Social h/o: Alcohol - denies , smoking- denies, IVDA/ drugs - denies. 


Family h/o of GI cancers - None


Prior Endoscopies: None.





Prior GI evaluations: None in [facility].





Exam: 


Vitals: reviewed 


General: Alert and oriented x 3, not in distress


HEENT: NO pallor, no icterus. Normal oropharynx,  NO cervical lymph nodes.


Chest: symmetric with bilateral clear air entry,


CVS: S1, S2 heard, normal, no murmurs .


Abdomen:  non-distended, no surgical scars, soft, non-tender, no palpable 

masses, normal bowel sounds heard.


Rectal exam: Patient refused.


Extremities: no pedal edema, pulses palpable.


CNS: no focal motor or sensory deficits. Moves all extremities


Skin: no rash.





Labs: reviewed.


Imaging: 


-- will review the radiology images from [facility].





Impression:





- Progressive dysphagia with prior Esophagogram showing possible Achalasia. -- 

needs EGD with dilation ( while off Plavix). other possible differentials 

include -- possible GERD with esophagitis and stricture vs barretts esophagus vs

Esophageal cancer.





Recommendations:





- Patient educated about the test results, possible differential diagnoses and 

All questions answered.


- to Continue omeprazole 40 mg daily.


- Follow strictly the Anti-reflux measures. 


- Avoid NSAIDs.


- Discussed the need for being off anti-coagulants for atleast 7- 10 days prior 

to EGD with dilation as it had elevated risk of bleeding. 


- Patient to hold plavix for 10 days after clearance from primary team.


- Patient will be scheduled for EGD with dilation electively. The procedure, 

indications, risks (bleeding, perforation, infection, hypotension, respiratory 

depression, allergy, need for endotracheal intubation, surgery, colostomy, 

cardiac arrest, even death), benefits, limitations (e.g., missing a lesion), and

all other alternatives (including no intervention) were explained to the patient

who understood and agreed for the procedure.


- As per request of the patient, the above plan was also discussed with 

patient's daughter.





Plan of care discussed with patient and primary team. Patient verbalized 

understanding and agreed with the plan.





Vital Signs/I&O





Vital Signs








  Date Time  Temp Pulse Resp B/P (MAP) Pulse Ox O2 Delivery O2 Flow Rate FiO2


 


11/6/19 12:00 97.1 72 18 143/67 (92) 100 Room Air  











Laboratory Data


Labs 24H


Laboratory Tests 2


11/6/19 11:56: 


Nucleated Red Blood Cells % (auto) 0.0, Prothrombin Time 14.9H, Prothromb Time 

International Ratio 1.20, Activated Partial Thromboplast Time 30.8, Anion Gap 

7L, Glomerular Filtration Rate 39.4, Calcium Level 8.9, Total Bilirubin 0.5, 

Aspartate Amino Transf (AST/SGOT) 16, Alanine Aminotransferase (ALT/SGPT) 19, 

Alkaline Phosphatase 70, Total Protein 7.5, Albumin 3.6, Albumin/Globulin Ratio 

0.92L


CBC/BMP


Laboratory Tests


11/6/19 11:56











Allergies


Coded Allergies:  


     No Known Drug Allergies (Verified  Allergy, Unknown, 9/22/19)





Home Medications


Scheduled


Acetaminophen (Tylenol Extra Strength) 500 Mg Tablet, 1,000 MG PO BID, 

(Reported)


Aspirin (Aspirin EC) 81 Mg Tab, 81 MG PO DAILY, (Reported)


Carbidopa/Levodopa (Sinemet  mg Tablet) 1 Each Tablet, 1 TAB PO BID, 

(Reported)


Ferrous Sulfate (Ferrous Sulfate) 325 Mg Tablet, 325 MG PO BID, (Reported)


Magnesium Oxide (Magnesium) 400 Mg Cap, 400 MG PO DAILY, (Reported)


Mirtazapine (Remeron) 15 Mg Tablet, 15 MG PO QPM, (Reported)


Omeprazole (Omeprazole) 40 Mg Capsule.dr, 40 MG PO DAILY for 30 Days, #30


Tamsulosin HCl (Flomax) 0.4 Mg Capsule, 0.4 MG PO QPM, (Reported)











SOUTH REHMAN MD       Nov 6, 2019 14:43

## 2019-11-07 NOTE — DS.PDOC
Discharge Summary


General


Date of Admission


Nov 6, 2019 at 11:03


Date of Discharge


11/07/19





Discharge Summary


PROCEDURES PERFORMED DURING STAY: [None].





ADMITTING DIAGNOSES: 


Esophageal obstruction


Diastolic congestive heart failure (CHF)/heart failure with preserved


ejection fraction


Coronary artery disease/cerebrovascular disease with history of stroke


Hyperlipidemia


Parkinson's disease


Chronic anemia





DISCHARGE DIAGNOSES:


Esophageal obstruction


Diastolic congestive heart failure (CHF)/heart failure with preserved


ejection fraction


Coronary artery disease/cerebrovascular disease with history of stroke


Hyperlipidemia


Parkinson's disease


Chronic anemia





COMPLICATIONS/CHIEF COMPLAINT: Esophageal Obstruction.





HISTORY OF PRESENT ILLNESS:Rafael Lou is an 83-year-old who was referred by 

Dr. Wall for


direction admission for esophageal obstruction.  He has been having progressive


problems swallowing for over 2 weeks, according to his daughter who is present.


She says that he initially was having trouble with solids and now solids and


liquids are problematic.  He tries to swallow and he cannot complete the


ingestion, has to vomit up the swallowed contents.  He has lost weight from 

this.Recent CT of the abdomen and pelvis, which showed a hiatal hernia but no 

esophageal abnormality 


Denies any hematemesis.





HOSPITAL COURSE: EGD with possible stricture dilatation was postponed because olga pham was on  Plavix. The procedure will be done in 10 days after Plavix 

discontinuation





DISCHARGE MEDICATIONS: Please see below.


 


ALLERGIES: Please see below.





PHYSICAL EXAMINATION ON DISCHARGE:


VITAL SIGNS: Please see below.


General: Alert and oriented x 3, not in distress


HEENT: NO pallor, no icterus. Normal oropharynx,  NO cervical lymph nodes.


Chest: symmetric with bilateral clear air entry,


CVS: S1, S2 heard, normal, no murmurs .


Abdomen:  non-distended, no surgical scars, soft, non-tender, no palpable 

masses, normal bowel sounds heard.


Rectal exam: Patient refused / Deferred at this time in view of scheduled 

colonoscopy.


Extremities: no pedal edema, pulses palpable.


CNS: no focal motor or sensory deficits. Moves all extremities


Skin: no rash.





LABORATORY DATA: Please see below.





PROGNOSIS: Favorable





ACTIVITY: As tolerated.





DIET: Cardiac





DISCHARGE PLAN: Home, follow-up with gastroenterologist for upcoming procedure





DISPOSITION: 01 Home, Self-Care.





DISCHARGE INSTRUCTIONS:


Soft mechanical diet


ITEMS TO FOLLOWUP ON ON OUTPATIENT:


PCP in 3-5 days





DISCHARGE CONDITION: Stable





TIME SPENT ON DISCHARGE: Greater than 20 minutes.





Vital Signs/I&Os





Vital Signs








  Date Time  Temp Pulse Resp B/P (MAP) Pulse Ox O2 Delivery O2 Flow Rate FiO2


 


11/7/19 06:00 97.8 72 18 144/64 (90) 97 Room Air  














I&O- Last 24 Hours up to 6 AM 


 


 11/7/19





 06:00


 


Intake Total 1000 ml


 


Output Total 700 ml


 


Balance 300 ml











Laboratory Data


Labs 24H


Laboratory Tests 2


11/7/19 06:29: 


Nucleated Red Blood Cells % (auto) 0.0, Anion Gap 4L, Glomerular Filtration Rate

 43.6, Calcium Level 8.1L


CBC/BMP


Laboratory Tests


11/7/19 06:29











Discharge Medications


Scheduled


Acetaminophen (Tylenol Extra Strength) 500 Mg Tablet, 1,000 MG PO BID, 

(Reported)


Aspirin (Aspirin EC) 81 Mg Tab, 81 MG PO DAILY, (Reported)


Carbidopa/Levodopa (Sinemet  mg Tablet) 1 Each Tablet, 1 TAB PO BID, 

(Reported)


Ferrous Sulfate (Ferrous Sulfate) 325 Mg Tablet, 325 MG PO BID, (Reported)


Magnesium Oxide (Magnesium) 400 Mg Cap, 400 MG PO DAILY, (Reported)


Mirtazapine (Remeron) 15 Mg Tablet, 15 MG PO QPM, (Reported)


Omeprazole (Omeprazole) 40 Mg Capsule.dr, 40 MG PO DAILY


Tamsulosin HCl (Flomax) 0.4 Mg Capsule, 0.4 MG PO QPM, (Reported)





Allergies


Coded Allergies:  


     No Known Drug Allergies (Verified  Allergy, Unknown, 9/22/19)











HAI GUZMAN DO             Nov 7, 2019 15:22

## 2019-11-19 ENCOUNTER — HOSPITAL ENCOUNTER (INPATIENT)
Dept: HOSPITAL 53 - M MS4PR | Age: 84
LOS: 1 days | Discharge: HOME | DRG: 392 | End: 2019-11-20
Attending: INTERNAL MEDICINE | Admitting: INTERNAL MEDICINE
Payer: MEDICARE

## 2019-11-19 VITALS — SYSTOLIC BLOOD PRESSURE: 166 MMHG | DIASTOLIC BLOOD PRESSURE: 77 MMHG

## 2019-11-19 VITALS — HEIGHT: 66 IN | BODY MASS INDEX: 29.62 KG/M2 | WEIGHT: 184.31 LBS

## 2019-11-19 VITALS — SYSTOLIC BLOOD PRESSURE: 154 MMHG | DIASTOLIC BLOOD PRESSURE: 74 MMHG

## 2019-11-19 VITALS — SYSTOLIC BLOOD PRESSURE: 133 MMHG | DIASTOLIC BLOOD PRESSURE: 65 MMHG

## 2019-11-19 VITALS — SYSTOLIC BLOOD PRESSURE: 135 MMHG | DIASTOLIC BLOOD PRESSURE: 66 MMHG

## 2019-11-19 DIAGNOSIS — N40.0: ICD-10-CM

## 2019-11-19 DIAGNOSIS — D64.9: ICD-10-CM

## 2019-11-19 DIAGNOSIS — N18.3: ICD-10-CM

## 2019-11-19 DIAGNOSIS — K44.9: ICD-10-CM

## 2019-11-19 DIAGNOSIS — M10.9: ICD-10-CM

## 2019-11-19 DIAGNOSIS — I13.0: ICD-10-CM

## 2019-11-19 DIAGNOSIS — R29.6: ICD-10-CM

## 2019-11-19 DIAGNOSIS — I50.30: ICD-10-CM

## 2019-11-19 DIAGNOSIS — Z95.1: ICD-10-CM

## 2019-11-19 DIAGNOSIS — I73.9: ICD-10-CM

## 2019-11-19 DIAGNOSIS — R13.10: ICD-10-CM

## 2019-11-19 DIAGNOSIS — G20: ICD-10-CM

## 2019-11-19 DIAGNOSIS — M81.0: ICD-10-CM

## 2019-11-19 DIAGNOSIS — Z79.899: ICD-10-CM

## 2019-11-19 DIAGNOSIS — K22.2: Primary | ICD-10-CM

## 2019-11-19 DIAGNOSIS — I25.10: ICD-10-CM

## 2019-11-19 DIAGNOSIS — Z86.73: ICD-10-CM

## 2019-11-19 DIAGNOSIS — K21.9: ICD-10-CM

## 2019-11-19 DIAGNOSIS — Z66: ICD-10-CM

## 2019-11-19 DIAGNOSIS — Z87.442: ICD-10-CM

## 2019-11-19 DIAGNOSIS — Z79.82: ICD-10-CM

## 2019-11-19 LAB
ALBUMIN SERPL BCG-MCNC: 3.7 GM/DL (ref 3.2–5.2)
ALT SERPL W P-5'-P-CCNC: 18 U/L (ref 12–78)
APTT BLD: 30.3 SECONDS (ref 25–38.4)
BILIRUB SERPL-MCNC: 0.3 MG/DL (ref 0.2–1)
BUN SERPL-MCNC: 23 MG/DL (ref 7–18)
CALCIUM SERPL-MCNC: 9.3 MG/DL (ref 8.8–10.2)
CHLORIDE SERPL-SCNC: 109 MEQ/L (ref 98–107)
CO2 SERPL-SCNC: 27 MEQ/L (ref 21–32)
CREAT SERPL-MCNC: 1.8 MG/DL (ref 0.7–1.3)
GFR SERPL CREATININE-BSD FRML MDRD: 38.6 ML/MIN/{1.73_M2} (ref 35–?)
GLUCOSE SERPL-MCNC: 97 MG/DL (ref 70–100)
HCT VFR BLD AUTO: 34 % (ref 42–52)
HGB BLD-MCNC: 10.7 G/DL (ref 13.5–17.5)
INR PPP: 1.18
MCH RBC QN AUTO: 34.7 PG (ref 27–33)
MCHC RBC AUTO-ENTMCNC: 31.5 G/DL (ref 32–36.5)
MCV RBC AUTO: 110.4 FL (ref 80–96)
PLATELET # BLD AUTO: 175 10^3/UL (ref 150–450)
POTASSIUM SERPL-SCNC: 5.3 MEQ/L (ref 3.5–5.1)
PROT SERPL-MCNC: 7.7 GM/DL (ref 6.4–8.2)
PROTHROMBIN TIME: 14.7 SECONDS (ref 11.8–14)
RBC # BLD AUTO: 3.08 10^6/UL (ref 4.3–6.1)
SODIUM SERPL-SCNC: 140 MEQ/L (ref 136–145)
WBC # BLD AUTO: 4.3 10^3/UL (ref 4–10)

## 2019-11-19 PROCEDURE — 0DB78ZZ EXCISION OF STOMACH, PYLORUS, VIA NATURAL OR ARTIFICIAL OPENING ENDOSCOPIC: ICD-10-PCS | Performed by: INTERNAL MEDICINE

## 2019-11-19 PROCEDURE — 0DB38ZZ EXCISION OF LOWER ESOPHAGUS, VIA NATURAL OR ARTIFICIAL OPENING ENDOSCOPIC: ICD-10-PCS | Performed by: INTERNAL MEDICINE

## 2019-11-19 RX ADMIN — DEXTROSE AND SODIUM CHLORIDE SCH MLS/HR: 5; 450 INJECTION, SOLUTION INTRAVENOUS at 22:25

## 2019-11-19 RX ADMIN — ACETAMINOPHEN SCH MG: 500 TABLET ORAL at 12:33

## 2019-11-19 RX ADMIN — PANTOPRAZOLE SODIUM SCH MG: 40 TABLET, DELAYED RELEASE ORAL at 22:25

## 2019-11-19 RX ADMIN — FERROUS SULFATE TAB 325 MG (65 MG ELEMENTAL FE) SCH MG: 325 (65 FE) TAB at 12:33

## 2019-11-19 RX ADMIN — ACETAMINOPHEN SCH MG: 500 TABLET ORAL at 22:25

## 2019-11-19 RX ADMIN — CARBIDOPA AND LEVODOPA SCH TAB: 25; 100 TABLET ORAL at 22:25

## 2019-11-19 RX ADMIN — PROBIOTIC PRODUCT - TAB SCH EA: TAB at 20:00

## 2019-11-19 RX ADMIN — CARBIDOPA AND LEVODOPA SCH TAB: 25; 100 TABLET ORAL at 12:33

## 2019-11-19 RX ADMIN — FERROUS SULFATE TAB 325 MG (65 MG ELEMENTAL FE) SCH MG: 325 (65 FE) TAB at 22:26

## 2019-11-19 RX ADMIN — DEXTROSE AND SODIUM CHLORIDE SCH MLS/HR: 5; 450 INJECTION, SOLUTION INTRAVENOUS at 11:00

## 2019-11-19 NOTE — ROOR
________________________________________________________________________________

Patient Name: Rafael Lou           Procedure Date: 11/19/2019 6:39 PM

MRN: [id number]                          Account Number: [id number]

YOB: 1935              Age: 84

Room: Main OR                          Gender: Male

Note Status: Finalized                 

________________________________________________________________________________

 

Procedure:           Upper GI endoscopy

Indications:         Dysphagia

Providers:           Pritesh Gonzalez MD

Referring MD:        2. Inpatient 2. Inpatient

Requesting Provider: 

Medicines:           Monitored Anesthesia Care

Complications:       No immediate complications.

________________________________________________________________________________

Procedure:           Pre-Anesthesia Assessment:

                     - Prior to the procedure, a History and Physical was 

                     performed, and patient medications and allergies were 

                     reviewed. The patient is competent. The risks and 

                     benefits of the procedure and the sedation options and 

                     risks were discussed with the patient. All questions were 

                     answered and informed consent was obtained. Patient 

                     identification and proposed procedure were verified by 

                     the physician, the nurse and the anesthesiologist in the 

                     procedure room. Mental Status Examination: alert and 

                     oriented. Airway Examination: normal oropharyngeal airway 

                     and neck mobility. Respiratory Examination: clear to 

                     auscultation. CV Examination: normal. Prophylactic 

                     Antibiotics: The patient does not require prophylactic 

                     antibiotics. Prior Anticoagulants: The patient has taken 

                     no previous anticoagulant or antiplatelet agents. ASA 

                     Grade Assessment: III - A patient with severe systemic 

                     disease. After reviewing the risks and benefits, the 

                     patient was deemed in satisfactory condition to undergo 

                     the procedure. The anesthesia plan was to use monitored 

                     anesthesia care (MAC). Immediately prior to 

                     administration of medications, the patient was 

                     re-assessed for adequacy to receive sedatives. The heart 

                     rate, respiratory rate, oxygen saturations, blood 

                     pressure, adequacy of pulmonary ventilation, and response 

                     to care were monitored throughout the procedure. The 

                     physical status of the patient was re-assessed after the 

                     procedure.

                     The Endoscope was introduced through the mouth, and 

                     advanced to the second part of duodenum. The upper GI 

                     endoscopy was accomplished without difficulty. The 

                     patient tolerated the procedure well.

                                                                                

Findings:

     One benign-appearing, intrinsic moderate (circumferential scarring or 

     stenosis; an endoscope may pass) stenosis was found in the distal 

     esophagus. This stenosis measured 8 mm (inner diameter). The stenosis was 

     traversed. Biopsies were taken with a cold forceps for histology. 

     Verification of patient identification for the specimen was done by the 

     physician and nurse using the patient's name, birth date and medical 

     record number. Estimated blood loss was minimal. A TTS dilator was passed 

     through the scope. Dilation with a 10-11-12 mm balloon and a 15-16.5-18 

     mm balloon dilator was performed to 15 mm under fluoroscopic guidance. 

     The dilation site was examined following endoscope reinsertion and showed 

     mild mucosal disruption, moderate improvement in luminal narrowing and no 

     perforation. Estimated blood loss was minimal.

     A large hiatal hernia was present.

     Patchy mild inflammation characterized by erythema was found in the 

     gastric antrum. Biopsies were taken with a cold forceps for Helicobacter 

     pylori testing.

     Two localized erosions without bleeding were found in the duodenal bulb.

     The second portion of the duodenum was normal.

                                                                                

Impression:          - Benign-appearing esophageal stenosis. Biopsied. Dilated.

                     - Large hiatal hernia.

                     - Gastritis. Biopsied.

                     - Duodenal erosions without bleeding.

                     - Normal second portion of the duodenum.

Recommendation:      - Patient has a contact number available for emergencies. 

                     The signs and symptoms of potential delayed complications 

                     were discussed with the patient. Return to normal 

                     activities tomorrow. Written discharge instructions were 

                     provided to the patient.

                     - Full liquid diet today, then advance as tolerated to 

                     chopped diet.

                     - Continue present medications.

                     - Use Protonix (pantoprazole) 40 mg PO twice daily - to 

                     be taken in morning (1/2 hour before breakfast) and at 

                     bedtime ( atleast 3 hours after last meal) for 3 months.

                     - Await pathology results.

                     - If Biopsy shows H. pylori will need therapy with 

                     antibiotic course..

                     - Observe patient's clinical course.

                     - Repeat upper endoscopy in 3 months depending on the 

                     symptoms and clinical response.

                     - Return to GI clinic in [facility] 

                     (address [address], [address], [place], [facility]) 

                     in 4 -- 6 weeks. Please call GI clinic @ [phone] for 

                     apppointment date and time.

                     - Return to primary care physician.

                                                                                

 

Pritesh Gonzalez MD

_______________________

Pritesh Gonzalez MD

11/19/2019 7:37:16 PM

Electronically signed by Pritesh Gonzalez MD

Number of Addenda: 0

 

Note Initiated On: 11/19/2019 6:39 PM

Estimated Blood Loss:

     Estimated blood loss was minimal.

## 2019-11-19 NOTE — ECGEPIP
[facility]

                                       

                                       Test Date:    2019

Pat Name:     FELISA SANCHEZ           Department:   

Patient ID:   [id number]                 Room:         [place] [address]

Gender:       Male                     Technician:   PAVITHRA

:          1935               Requested By: SOUTH ALCARAZ

Order Number: [id number]     Reading MD:   Matteo Moore

                                 Measurements

Intervals                              Axis          

Rate:         68                       P:            -31

SD:           187                      QRS:          17

QRSD:         97                       T:            23

QT:           405                                    

QTc:          431                                    

                           Interpretive Statements

SINUS RHYTHM

POSSIBLE RIGHT VENTRICULAR CONDUCTION DELAY

MINIMAL ST DEPRESSION

COMPARED TO THE 5 TRACINGS IN THE SYSTEM, PATIENT SEEMS TO HAVE PRIOR

EPISODES OF 

ATRIAL FIBRILLATION

Electronically Signed on 2019 21:50:45 EST by Matteo Moore

## 2019-11-19 NOTE — HPE
DATE OF ADMISSION:   11/19/2019

 

PRIMARY CARE PROVIDER:  Dr. Wall

 

CHIEF COMPLAINT:   Dysphagia, odynophagia, weight loss of 10 pounds.

 

HISTORY OF PRESENT ILLNESS:   This is an 84-year-old male with a history of

dysphagia for a few months, before the Clostridium (C.) difficile was diagnosis

here.  He has been having dysphagia to solids and often times gags when he tries

to drink liquids after eating something solid.  He has had a 10 pound weight 
loss

and complains of odynophagia when the food gets stuck in his throat and has a 
lot

of phlegm when he tries to get it out.  No shortness of breath, fever or chills.

The patient denies any bright red blood per rectum, melena, black tarry stools.

No history of Grant's esophagus, peptic ulcer disease.  He does have a history

of reflux disease, coronary artery disease, diastolic heart failure.  The 
patient

has been off anticoagulation/antiplatelet therapy for 10 days and is admitted

currently for elective esophagogastroduodenoscopy (EGD) with possible dilation,

biopsy extraction.  The patient denies any current chest pain, pressure,

tightness, lightheadedness, dizziness.  No nausea, vomiting, diarrhea, abdominal

pain.  Denies any fevers, chills, sore throat, ear pain, ear discharge, phlegm

production aside from when he tries to cough out his food when he feels like it

is getting stuck.  The patient denies any changes in appetite, but has been on a

soft diet and liquids and has had a 10 pound weight loss. Denies any 
hematemesis,

bright red blood per rectum, black tarry stools.  The hospitalist was asked to

admit.  Gastroenterologist, Dr. Gonzalez, is to do an EGD with dilation with

biopsy at 2:30 this afternoon.

 

PAST MEDICAL HISTORY:

1.  Hypertensive heart disease.

2.  Abdominal aortic aneurysm, status post endovascular grafting.

3.  Lumbosacral spondylolysis.

4.  Degenerative joint disease.

5.  Spinal stenosis.

6.  Diastolic heart failure.

7.  Peripheral vascular disease.

8.  Carotid disease.

9.  Coronary artery disease.

10.  Prior iron deficiency anemia.

11.  Parkinson's disease.

12.  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

13.  History of kidney stones.

14.  Renal colic.

15.  Osteoporosis.

16.  Reflux.

17.  Benign prostatic hypertrophy (BPH).

18.  Chronic kidney disease stage III, baseline creatinine 1.4 to 1.6.

19.  Unsteady gait.

20.  History of falls.  Does not use a walker at home.

21.  Lyme disease.

22.  Gout.

 

PAST SURGICAL HISTORY:

1.  Coronary artery bypass graft (CABG).

2.  Fem-pop bypass.

3.  Abdominal aortic aneurysm repair.

4.  Right inguinal hernia repair.

5.  Appendectomy.

 

SOCIAL HISTORY:   Resides in [place].  , has five children.  Retired

milk hauler.  Denies tobacco, alcohol or illicit drug use.  No recent travel.

The patient is DO NOT RESUSCITATE, DO NOT INTUBATE.

 

FAMILY HISTORY:   Siblings with hypertension, coronary artery disease, cancer of

the lung.  Children, one with breast cancer.  Unexpected death due to heart

disease is none.

 

REVIEW OF SYSTEMS:   As per history of present illness.  12-point review of

systems otherwise negative.

 

PHYSICAL EXAMINATION:

Temperature 97.5, pulse 70, respiratory rate 20, blood pressure 154/74, 98% on

room air.

GENERAL:  The patient is awake, alert, oriented to person, place and time.

Answering questions appropriately.  No jugular venous distention (JVD).  No

thyromegaly.  No cervical lymphadenopathy.  Face is symmetric.  The patient

speaks in full sentences.

LUNGS:  Clear to auscultation.  No wheezing, rales or rhonchi.

HEART:  S1, S2.  Sinus rhythm.  Systolic ejection murmur at the left lower

sternal border.  Increased AP diameter.  Sternotomy scar.

ABDOMEN:  Soft, nontender, nondistended.  Positive bowel sounds times four

quadrants.  No rebound or guarding.  No hepatosplenomegaly.

EXTREMITIES:  Lower extremity, the patient has saphenous graft site incision 
that

is well healed.  Otherwise, trace edema.  Multiple ecchymotic areas.

SKIN:  The patient has erythematous patches on bilateral arms and neck, which 
the

daughter says has been there for years.

 

Electrocardiogram (EKG) showed sinus rhythm, ventricular rate 68, right

ventricular conduction delay, moderate ST depressions, chronic incomplete right

bundle branch block, diffuse ST-T wave changes.  Previous echocardiogram in

September 2019, moderate LVH, ejection fraction of 60%, diastolic dysfunction,

moderate pulmonary hypertension, septal wall motion abnormality due to right

ventricle pressure load.

 

LABORATORY DATA:

White count 4.3, hemoglobin 10, hematocrit 34, platelet count 175.  Sodium 140,

potassium 5.2, chloride 109, bicarbonate 27, BUN 23, creatinine 1.8, glucose 97,

calcium 9.3, total bilirubin 0.3, AST 20, ALT 18, alkaline phosphatase 70, total

protein 7.7, albumin 3.7.

 

Chest x-ray is pending.

 

ASSESSMENT AND PLAN:   This is an 84-year-old, DO NOT RESUSCITATE, DO NOT

INTUBATE, male with a history of hypertension, hyperlipidemia, reflux, benign

prostatic hypertrophy (BPH), chronic kidney disease, CVA without residual, left

cerebellar lacunar infarct, AAA with endovascular grafting, diastolic heart

failure, ejection fraction of 65%, coronary artery disease, kidney stones, Lyme

disease, gout, coronary artery bypass graft (CABG), fem-pop, admitted for

elective EGD, biopsy and possible dilation due to complaints of dysphagia and

weight loss.

 

ACUTE ISSUES:

1.  Esophagela obstruction with complaints of 

Dysphagia, weight loss with episodes of odynophagia when the food gets stuck.

The patient has alarm symptoms of weight loss of 10 pound, undergoing EGD with

biopsy and dilation today with Dr. Gonzalez.  He is kept nothing by mouth on IV

fluids with fingersticks every 6 hours while waiting for the procedure and

hyperglycemic protocol.  Differential diagnosis includes achalasia, stricture,

Zenker's diverticulum, defer to GI for further management.

 

2.  Preoperative medical clearance.  The patient currently denies any acute

ischemic symptoms, chest pain, pressure, tightness, shortness of breath.  Not

fluid overloaded on examination, has clear lungs and no lower extremity edema.

The patient is medically optimized to proceed with his EGD.  His EKG has chronic

right bundle branch block, which is incomplete, otherwise no acute ST-T wave

changes.  Chest x-ray shows no acute edema.

 

3.  History of coronary artery disease and CABG, currently not complaining of 
any

acute ischemic symptoms.  Aspirin, antiplatelet and anticoagulation have been

held due to EGD today.

 

4.  Diastolic heart failure with preserved ejection fraction, not acutely volume

overloaded.

 

5.  Hypertension.  Resume on home medications post EGD.

 

Currently nothing by mouth status with IV fluids and hyperglycemic protocol.

 

6.  Parkinson's.  On carbidopa levodopa.

 

7.  Chronic anemia.  On ferrous sulfate.

 

8.  Benign prostatic hypertrophy (BPH).  On Flomax.

 

9.  Deep vein thrombosis (DVT) prophylaxis.  Compression stockings as the 
patient

is going for an EGD and biopsy.

MTDD

## 2019-11-19 NOTE — CR.PDOC
General


Date of Consultation:  Nov 19, 2019


Referring Provider:  OSWALDO PALACIO MD


Attending Physician:  SOUTH REHMAN MD





Consultation


Primary physician/ hospitalist: Dr. Palacio / PCP - Dr. Wall.


Reason for consult: Esophageal obstruction, weight loss.





HPI: 


84-year-old male patient with HTN, PVD ( remote h/o CVA no residual deficits), 

CAD s/p CABG many years ago, Diastolic CHF, Multifocal atrial tachycardia ( 

currently on ASA 81 mg and Plavix 75 mg), parkinson' s disease, recent 

hospitalization for Diarrhea ( treated for C. difficile and symptoms resolved), 

was initially seen by me on his hospital admission around 1 week ago for 

progressive difficulty in swallowing and esophagogram reported as achalasia ( 

esophagogram done in [facility], but report obtained). As patient was on

plavix, after extensive discussion, patient was planned for EGD after holding 

plavix for atleast 7- 10 days. Today patient is admitted for observation, for 

planned EGD procedure. Patient is AAO x 3 and able to provide detailed history. 

Patient reports having gradually worsening symptoms of difficulty in swallowing 

for past 4 weeks, and is currently only tolerated liquid to soft diet, a

ssociated with few episodes of vomiting. Patient also reports he brings up 

phlegm intermittently. Patient also reports chronic h/o acid reflux symptoms 

which are well controlled by acid suppressants. Patient denied any significant 

weight loss in past but in this visit, reports around 10 pounds weight loss due 

to dysphagia.  Patient reports stopping his plavix since 11/7/2019.





Pertinent negative GI symptoms:


Patient denies fever, sick contacts, recent travel, nausea, diarrhea, abdominal 

pain, loss of appetite, early satiety. No history of hematemesis, melena or 

hematochezia. 





Review of Systems:


GI: as stated above 


CVS: No chest pain, No palpitations, No leg swelling.


RS: No Shortness of breath, No Wheezing, no cough


CNS: No dizziness, No motor weakness, No sensory problems 


Hematology: No bruising, No gum bleeding, 


Musculoskeletal: No joint pain, ambulating well.


Skin: No rash 


: No hematuria, No burning sensation of the urine 


ENT:  No ear discharge/ pain, No dysphagia.


Eyes: No photophobia. Jaundice





Home medications: reviewed. Antithrombotic agents - ASA 81 mg, Plavix 75 mg.


Medical h/o: As above.


Surgical h/o: no scars on abdomen. 


Social h/o: Alcohol - denies , smoking- denies, IVDA/ drugs - denies. 


Family h/o of GI cancers - None


Prior Endoscopies: None.





Prior GI evaluations: None in [facility].





Exam: 


Vitals: reviewed 


General: Alert and oriented x 3, not in distress


HEENT: NO pallor, no icterus. Normal oropharynx,  NO cervical lymph nodes.


Chest: symmetric with bilateral clear air entry,


CVS: S1, S2 heard, normal, no murmurs .


Abdomen:  non-distended, no surgical scars, soft, non-tender, no palpable 

masses, normal bowel sounds heard.


Rectal exam: Patient refused.


Extremities: no pedal edema, pulses palpable.


CNS: no focal motor or sensory deficits. Moves all extremities


Skin: no rash.





Labs: reviewed.


Imaging: 





Impression:





- Progressive dysphagia with prior Esophagogram showing possible Achalasia. 

other possible differentials include -- possible GERD with esophagitis and 

stricture vs barretts esophagus vs Esophageal cancer. Needs further evaluation.


- Anemia - macrocytic / normocytic with normal Iron studies in recent past. -- 

Likely anemia of CKD vs rule out chronic GI blood loss.





Recommendations:





- Patient educated about the test results, possible differential diagnoses and 

All questions answered.


- to Continue omeprazole 40 mg daily.


- NO Contra- indication for use of aspirin 81 mg from GI stand point. Continue 

to hold plavix for now until procedure ( cleared by Primary team in past).


- Avoid NSAIDs.


- Will proceed with EGD with biopsy and possible achalasia balloon dilation 

today. The procedure, indications, risks (bleeding, perforation, infection, 

hypotension, respiratory depression, allergy, need for endotracheal intubation, 

surgery, colostomy, cardiac arrest, even death), benefits, limitations (e.g., 

missing a lesion), and all other alternatives (including no intervention) were 

explained to the patient who understood and agreed for the procedure.


- As per request of the patient, the above plan was also discussed with 

patient's daughter.


- Further work up for anemia based on the EGD findings.


- Follow procedure note for post procedure recommendations.





Plan of care discussed with patient and primary team. Patient verbalized 

understanding and agreed with the plan





Laboratory Data


CBC/BMP


Laboratory Tests


11/19/19 09:45











Allergies


Coded Allergies:  


     No Known Drug Allergies (Verified  Allergy, Unknown, 9/22/19)





Home Medications


Scheduled


Acetaminophen (Tylenol Extra Strength) 500 Mg Tablet, 1,000 MG PO BID, 

(Reported)


Aspirin (Aspirin EC) 81 Mg Tab, 81 MG PO DAILY, (Reported)


Carbidopa/Levodopa (Sinemet  mg Tablet) 1 Each Tablet, 1 TAB PO BID, 

(Reported)


Famotidine (Famotidine) 40 Mg Tablet, 40 MG PO DAILY, (Reported)


Ferrous Sulfate (Ferrous Sulfate) 325 Mg Tablet, 325 MG PO BID, (Reported)


Magnesium Oxide (Magnesium) 400 Mg Cap, 400 MG PO DAILY, (Reported)


Mirtazapine (Remeron) 15 Mg Tablet, 15 MG PO QPM, (Reported)


Tamsulosin HCl (Flomax) 0.4 Mg Capsule, 0.4 MG PO QPM, (Reported)











SOUTH REHMAN MD      Nov 19, 2019 17:45

## 2019-11-19 NOTE — REP
Two-view chest:  11/19/2019.

 

Indication:  Preoperative assessment.

 

Comparison:  New 9th 22, 19.

 

Findings:

 

Postoperative sequelae are present.  No focal airspace consolidation is present.

There is no pleural effusion or pneumothorax.  Bibasilar atelectasis is present,

but improved compared to the previous study.  Right greater than left

glenohumeral joint degenerative sequelae are present. Hiatal hernia is noted.

 

Impression:

 

No acute cardiopulmonary process.

 

 

Electronically Signed by

Mauro Johnson DO 11/19/2019 11:58 A

## 2019-11-20 VITALS — DIASTOLIC BLOOD PRESSURE: 65 MMHG | SYSTOLIC BLOOD PRESSURE: 132 MMHG

## 2019-11-20 VITALS — SYSTOLIC BLOOD PRESSURE: 129 MMHG | DIASTOLIC BLOOD PRESSURE: 62 MMHG

## 2019-11-20 LAB
ALBUMIN SERPL BCG-MCNC: 3.2 GM/DL (ref 3.2–5.2)
ALT SERPL W P-5'-P-CCNC: 13 U/L (ref 12–78)
BILIRUB SERPL-MCNC: 0.7 MG/DL (ref 0.2–1)
BUN SERPL-MCNC: 18 MG/DL (ref 7–18)
CALCIUM SERPL-MCNC: 8.6 MG/DL (ref 8.8–10.2)
CHLORIDE SERPL-SCNC: 109 MEQ/L (ref 98–107)
CO2 SERPL-SCNC: 25 MEQ/L (ref 21–32)
CREAT SERPL-MCNC: 1.56 MG/DL (ref 0.7–1.3)
FOLATE SERPL-MCNC: 6.2 NG/ML (ref 5.4–?)
GFR SERPL CREATININE-BSD FRML MDRD: 45.5 ML/MIN/{1.73_M2} (ref 35–?)
GLUCOSE SERPL-MCNC: 112 MG/DL (ref 70–100)
HCT VFR BLD AUTO: 31.1 % (ref 42–52)
HGB BLD-MCNC: 9.9 G/DL (ref 13.5–17.5)
MCH RBC QN AUTO: 33.9 PG (ref 27–33)
MCHC RBC AUTO-ENTMCNC: 31.8 G/DL (ref 32–36.5)
MCV RBC AUTO: 106.5 FL (ref 80–96)
PLATELET # BLD AUTO: 167 10^3/UL (ref 150–450)
POTASSIUM SERPL-SCNC: 4.8 MEQ/L (ref 3.5–5.1)
PROT SERPL-MCNC: 7.2 GM/DL (ref 6.4–8.2)
RBC # BLD AUTO: 2.92 10^6/UL (ref 4.3–6.1)
SODIUM SERPL-SCNC: 140 MEQ/L (ref 136–145)
VIT B12 SERPL-MCNC: 257 PG/ML (ref 247–911)
WBC # BLD AUTO: 10.5 10^3/UL (ref 4–10)

## 2019-11-20 RX ADMIN — PROBIOTIC PRODUCT - TAB SCH EA: TAB at 08:45

## 2019-11-20 RX ADMIN — ACETAMINOPHEN SCH MG: 500 TABLET ORAL at 08:46

## 2019-11-20 RX ADMIN — FERROUS SULFATE TAB 325 MG (65 MG ELEMENTAL FE) SCH MG: 325 (65 FE) TAB at 08:45

## 2019-11-20 RX ADMIN — PANTOPRAZOLE SODIUM SCH MG: 40 TABLET, DELAYED RELEASE ORAL at 08:45

## 2019-11-20 RX ADMIN — CARBIDOPA AND LEVODOPA SCH TAB: 25; 100 TABLET ORAL at 08:50

## 2019-11-20 NOTE — REP
CHEST SERIES:  11 VIEWS.

 

HISTORY:  Intraprocedural imaging.  EGD with balloon dilation and fluoroscopy.

 

5 seconds of fluoroscopy time is reported.

 

FINDINGS:  A sequence of 11, last image hold, fluoroscopically obtained spot

radiographs of the chest document endoscopic cannulation, balloon dilation, and

contrast injection of the esophagus.

 

 

Electronically Signed by

Venu Gamez MD 11/20/2019 09:49 A

## 2019-11-20 NOTE — DS.PDOC
Discharge Summary


General


Date of Admission


Nov 19, 2019 at 09:10


Date of Discharge


11/20/2019


Attending Physician:  GONDAL,KHUBAIB N. MD





Discharge Summary


PROCEDURES PERFORMED DURING STAY: None.





ADMITTING DIAGNOSES: 


1. Esophageal stenosis.





DISCHARGE DIAGNOSES:


1. Esophageal stenosis.





COMPLICATIONS/CHIEF COMPLAINT: Esophageal Obstruction.





HISTORY OF PRESENT ILLNESS: 84-year-old male with past medical history of 

coronary artery disease status post CABG, CHF, hypertension, Parkinson's 

disease, chronic kidney disease, CVA and dysphagia was admitted for EGD. Patient

was having odynophagia, dysphagia 10 pound weight loss over the past few months,

status post EGD, which showed distal esophageal obstruction, biopsies were 

taken, benign-appearing, dilated, no acute complications and dilation. Patient 

did well overnight, no complaints at this time, denies any shortness of breath, 

chest pain, nausea, vomiting, abdominal pain or diarrhea, tolerating liquids 

overnight, atraumatic. Some the morning without difficulty. He does not have any

odynophagia or dysphagia this time to solids or liquids. Patient is clinically 

and hemodynamically stable for discharge with outpatient follow-up with GI and 

PCP.





HOSPITAL COURSE: As above. 





DISCHARGE MEDICATIONS: Please see below.


 


ALLERGIES: Please see below.





PHYSICAL EXAMINATION:


VITAL SIGNS: Please see below.


GENERAL: No distress


HEENT: Normocephalic, atraumatic, moist mucous membranes


NECK: Supple


CARDIOVASCULAR EXAMINATION: S1, S2, no murmurs


RESPIRATORY EXAMINATION: Clear to auscultation, no wheezing


ABDOMINAL EXAMINATION: Soft, nontender, nondistended, positive bowel sounds


EXTREMITIES: Range of motion intact


SKIN: No rash


NEUROLOGICAL EXAMINATION: Alert and oriented 3, no focal deficits 


PSYCHIATRIC EXAMINATION: Calm and cooperative





LABORATORY DATA: Please see below.





PROGNOSIS: Fair





ACTIVITY: As tolerated.





DIET: Cardiac





DISCHARGE PLAN: Follow-up with GI in 4-6 weeks and PCP 1-2 weeks





DISPOSITION: Home.





DISCHARGE INSTRUCTIONS:


1. As above.





DISCHARGE CONDITION: Stable.





TIME SPENT ON DISCHARGE: Greater than 34 minutes.





Vital Signs/I&Os





Vital Signs








  Date Time  Temp Pulse Resp B/P (MAP) Pulse Ox O2 Delivery O2 Flow Rate FiO2


 


11/20/19 06:30 97.6 72 16 129/62 (84) 93 Room Air  


 


11/19/19 19:35       10 














I&O- Last 24 Hours up to 6 AM 


 


 11/20/19





 06:00


 


Intake Total 480 ml


 


Output Total 600 ml


 


Balance -120 ml











Laboratory Data


Labs 24H


Laboratory Tests 2


11/19/19 12:10: Bedside Glucose (Misc Panel) 77L


11/20/19 00:20: Bedside Glucose (Misc Panel) 101


11/20/19 06:36: 


Nucleated Red Blood Cells % (auto) 0.0, Anion Gap 6L, Glomerular Filtration Rate

45.5, Calcium Level 8.6L, Total Bilirubin 0.7#, Aspartate Amino Transf 

(AST/SGOT) 16, Alanine Aminotransferase (ALT/SGPT) 13, Alkaline Phosphatase 65, 

Total Protein 7.2, Albumin 3.2, Albumin/Globulin Ratio 0.80L, Vitamin B12 Level 

257, Folate 6.2


CBC/BMP


Laboratory Tests


11/20/19 06:36








FSBS





Laboratory Tests








Test


 11/19/19


12:10 11/20/19


00:20 Range/Units


 


 


Bedside Glucose (Misc Panel) 77 101   MG/DL











Discharge Medications


Scheduled


Acetaminophen (Tylenol Extra Strength) 500 Mg Tablet, 1,000 MG PO BID, 

(Reported)


Aspirin (Aspirin EC) 81 Mg Tab, 81 MG PO DAILY, (Reported)


Carbidopa/Levodopa (Sinemet  mg Tablet) 1 Each Tablet, 1 TAB PO BID, 

(Reported)


Famotidine (Famotidine) 40 Mg Tablet, 40 MG PO DAILY, (Reported)


Ferrous Sulfate (Ferrous Sulfate) 325 Mg Tablet, 325 MG PO BID, (Reported)


Magnesium Oxide (Magnesium) 400 Mg Cap, 400 MG PO DAILY, (Reported)


Mirtazapine (Remeron) 15 Mg Tablet, 15 MG PO QPM, (Reported)


Pantoprazole Sodium (Pantoprazole Sodium) 40 Mg Tablet.dr, 40 MG PO BID


Tamsulosin HCl (Flomax) 0.4 Mg Capsule, 0.4 MG PO QPM, (Reported)





Allergies


Coded Allergies:  


     No Known Drug Allergies (Verified  Allergy, Unknown, 9/22/19)











GONDAL,KHUBAIB N. MD           Nov 20, 2019 11:16

## 2019-11-28 ENCOUNTER — HOSPITAL ENCOUNTER (EMERGENCY)
Dept: HOSPITAL 53 - M ED | Age: 84
Discharge: HOME | End: 2019-11-28
Payer: MEDICARE

## 2019-11-28 VITALS — DIASTOLIC BLOOD PRESSURE: 84 MMHG | SYSTOLIC BLOOD PRESSURE: 152 MMHG

## 2019-11-28 VITALS — BODY MASS INDEX: 26.74 KG/M2 | WEIGHT: 170.35 LBS | HEIGHT: 67 IN

## 2019-11-28 DIAGNOSIS — Z79.82: ICD-10-CM

## 2019-11-28 DIAGNOSIS — I50.9: ICD-10-CM

## 2019-11-28 DIAGNOSIS — Z79.899: ICD-10-CM

## 2019-11-28 DIAGNOSIS — G20: ICD-10-CM

## 2019-11-28 DIAGNOSIS — N18.9: ICD-10-CM

## 2019-11-28 DIAGNOSIS — I11.0: ICD-10-CM

## 2019-11-28 DIAGNOSIS — E86.0: Primary | ICD-10-CM

## 2019-11-28 LAB
ALBUMIN SERPL BCG-MCNC: 3.4 GM/DL (ref 3.2–5.2)
ALT SERPL W P-5'-P-CCNC: 17 U/L (ref 12–78)
BASOPHILS # BLD AUTO: 0 10^3/UL (ref 0–0.2)
BASOPHILS NFR BLD AUTO: 0.7 % (ref 0–1)
BILIRUB CONJ SERPL-MCNC: < 0.1 MG/DL (ref 0–0.2)
BILIRUB SERPL-MCNC: 0.3 MG/DL (ref 0.2–1)
BUN SERPL-MCNC: 19 MG/DL (ref 7–18)
CALCIUM SERPL-MCNC: 8.8 MG/DL (ref 8.8–10.2)
CHLORIDE SERPL-SCNC: 115 MEQ/L (ref 98–107)
CO2 SERPL-SCNC: 24 MEQ/L (ref 21–32)
CREAT SERPL-MCNC: 1.78 MG/DL (ref 0.7–1.3)
EOSINOPHIL # BLD AUTO: 0.2 10^3/UL (ref 0–0.5)
EOSINOPHIL NFR BLD AUTO: 3.5 % (ref 0–3)
GFR SERPL CREATININE-BSD FRML MDRD: 39.1 ML/MIN/{1.73_M2} (ref 35–?)
GLUCOSE SERPL-MCNC: 104 MG/DL (ref 70–100)
HCT VFR BLD AUTO: 33.6 % (ref 42–52)
HGB BLD-MCNC: 10.2 G/DL (ref 13.5–17.5)
LIPASE SERPL-CCNC: 136 U/L (ref 73–393)
LYMPHOCYTES # BLD AUTO: 0.8 10^3/UL (ref 1.5–5)
LYMPHOCYTES NFR BLD AUTO: 13.3 % (ref 24–44)
MCH RBC QN AUTO: 33.3 PG (ref 27–33)
MCHC RBC AUTO-ENTMCNC: 30.4 G/DL (ref 32–36.5)
MCV RBC AUTO: 109.8 FL (ref 80–96)
MONOCYTES # BLD AUTO: 0.6 10^3/UL (ref 0–0.8)
MONOCYTES NFR BLD AUTO: 9.5 % (ref 0–5)
NEUTROPHILS # BLD AUTO: 4.2 10^3/UL (ref 1.5–8.5)
NEUTROPHILS NFR BLD AUTO: 69.8 % (ref 36–66)
PLATELET # BLD AUTO: 210 10^3/UL (ref 150–450)
POTASSIUM SERPL-SCNC: 4.4 MEQ/L (ref 3.5–5.1)
PROT SERPL-MCNC: 7.7 GM/DL (ref 6.4–8.2)
RBC # BLD AUTO: 3.06 10^6/UL (ref 4.3–6.1)
SODIUM SERPL-SCNC: 144 MEQ/L (ref 136–145)
WBC # BLD AUTO: 6 10^3/UL (ref 4–10)

## 2020-01-11 ENCOUNTER — HOSPITAL ENCOUNTER (INPATIENT)
Dept: HOSPITAL 53 - M ED | Age: 85
LOS: 9 days | Discharge: NURSING, REHAB OR LONG-TERM CARE | DRG: 689 | End: 2020-01-20
Attending: INTERNAL MEDICINE | Admitting: INTERNAL MEDICINE
Payer: MEDICARE

## 2020-01-11 VITALS — DIASTOLIC BLOOD PRESSURE: 90 MMHG | SYSTOLIC BLOOD PRESSURE: 129 MMHG

## 2020-01-11 VITALS — SYSTOLIC BLOOD PRESSURE: 180 MMHG | DIASTOLIC BLOOD PRESSURE: 84 MMHG

## 2020-01-11 VITALS — SYSTOLIC BLOOD PRESSURE: 134 MMHG | DIASTOLIC BLOOD PRESSURE: 58 MMHG

## 2020-01-11 VITALS — DIASTOLIC BLOOD PRESSURE: 98 MMHG | SYSTOLIC BLOOD PRESSURE: 154 MMHG

## 2020-01-11 VITALS — BODY MASS INDEX: 27.46 KG/M2 | WEIGHT: 191.8 LBS | HEIGHT: 70 IN

## 2020-01-11 DIAGNOSIS — Z90.49: ICD-10-CM

## 2020-01-11 DIAGNOSIS — I25.10: ICD-10-CM

## 2020-01-11 DIAGNOSIS — Z79.899: ICD-10-CM

## 2020-01-11 DIAGNOSIS — Z86.73: ICD-10-CM

## 2020-01-11 DIAGNOSIS — M43.06: ICD-10-CM

## 2020-01-11 DIAGNOSIS — Z95.828: ICD-10-CM

## 2020-01-11 DIAGNOSIS — N17.9: ICD-10-CM

## 2020-01-11 DIAGNOSIS — I71.4: ICD-10-CM

## 2020-01-11 DIAGNOSIS — Z98.62: ICD-10-CM

## 2020-01-11 DIAGNOSIS — I16.1: ICD-10-CM

## 2020-01-11 DIAGNOSIS — M10.9: ICD-10-CM

## 2020-01-11 DIAGNOSIS — Z95.820: ICD-10-CM

## 2020-01-11 DIAGNOSIS — N18.3: ICD-10-CM

## 2020-01-11 DIAGNOSIS — I73.9: ICD-10-CM

## 2020-01-11 DIAGNOSIS — G20: ICD-10-CM

## 2020-01-11 DIAGNOSIS — M81.0: ICD-10-CM

## 2020-01-11 DIAGNOSIS — Z79.82: ICD-10-CM

## 2020-01-11 DIAGNOSIS — I50.32: ICD-10-CM

## 2020-01-11 DIAGNOSIS — K21.9: ICD-10-CM

## 2020-01-11 DIAGNOSIS — N40.0: ICD-10-CM

## 2020-01-11 DIAGNOSIS — Z95.1: ICD-10-CM

## 2020-01-11 DIAGNOSIS — B96.4: ICD-10-CM

## 2020-01-11 DIAGNOSIS — I13.0: ICD-10-CM

## 2020-01-11 DIAGNOSIS — R29.6: ICD-10-CM

## 2020-01-11 DIAGNOSIS — R26.81: ICD-10-CM

## 2020-01-11 DIAGNOSIS — N39.0: ICD-10-CM

## 2020-01-11 DIAGNOSIS — N13.6: Primary | ICD-10-CM

## 2020-01-11 DIAGNOSIS — G93.41: ICD-10-CM

## 2020-01-11 LAB
ALBUMIN SERPL BCG-MCNC: 4.1 GM/DL (ref 3.2–5.2)
ALT SERPL W P-5'-P-CCNC: 10 U/L (ref 12–78)
BASOPHILS # BLD AUTO: 0 10^3/UL (ref 0–0.2)
BASOPHILS NFR BLD AUTO: 0.4 % (ref 0–1)
BILIRUB CONJ SERPL-MCNC: 0.4 MG/DL (ref 0–0.2)
BILIRUB SERPL-MCNC: 1 MG/DL (ref 0.2–1)
BUN SERPL-MCNC: 23 MG/DL (ref 7–18)
CALCIUM SERPL-MCNC: 9.1 MG/DL (ref 8.8–10.2)
CHLORIDE SERPL-SCNC: 106 MEQ/L (ref 98–107)
CO2 SERPL-SCNC: 22 MEQ/L (ref 21–32)
CREAT SERPL-MCNC: 2.37 MG/DL (ref 0.7–1.3)
EOSINOPHIL # BLD AUTO: 0 10^3/UL (ref 0–0.5)
EOSINOPHIL NFR BLD AUTO: 0.1 % (ref 0–3)
GFR SERPL CREATININE-BSD FRML MDRD: 28.1 ML/MIN/{1.73_M2} (ref 35–?)
GLUCOSE SERPL-MCNC: 136 MG/DL (ref 70–100)
HCT VFR BLD AUTO: 35 % (ref 42–52)
HGB BLD-MCNC: 10.7 G/DL (ref 13.5–17.5)
LIPASE SERPL-CCNC: 102 U/L (ref 73–393)
LYMPHOCYTES # BLD AUTO: 0.7 10^3/UL (ref 1.5–5)
LYMPHOCYTES NFR BLD AUTO: 6.7 % (ref 24–44)
MCH RBC QN AUTO: 32.7 PG (ref 27–33)
MCHC RBC AUTO-ENTMCNC: 30.6 G/DL (ref 32–36.5)
MCV RBC AUTO: 107 FL (ref 80–96)
MONOCYTES # BLD AUTO: 0.8 10^3/UL (ref 0–0.8)
MONOCYTES NFR BLD AUTO: 8 % (ref 0–5)
NEUTROPHILS # BLD AUTO: 8.5 10^3/UL (ref 1.5–8.5)
NEUTROPHILS NFR BLD AUTO: 83.9 % (ref 36–66)
PLATELET # BLD AUTO: 189 10^3/UL (ref 150–450)
POTASSIUM SERPL-SCNC: 4.6 MEQ/L (ref 3.5–5.1)
PROT SERPL-MCNC: 8.3 GM/DL (ref 6.4–8.2)
RBC # BLD AUTO: 3.27 10^6/UL (ref 4.3–6.1)
SODIUM SERPL-SCNC: 138 MEQ/L (ref 136–145)
WBC # BLD AUTO: 10.1 10^3/UL (ref 4–10)

## 2020-01-11 RX ADMIN — METOPROLOL TARTRATE SCH MG: 25 TABLET, FILM COATED ORAL at 20:26

## 2020-01-11 RX ADMIN — TAMSULOSIN HYDROCHLORIDE SCH MG: 0.4 CAPSULE ORAL at 20:26

## 2020-01-11 RX ADMIN — MIRTAZAPINE SCH MG: 15 TABLET, FILM COATED ORAL at 20:26

## 2020-01-11 RX ADMIN — FERROUS SULFATE TAB 325 MG (65 MG ELEMENTAL FE) SCH MG: 325 (65 FE) TAB at 20:26

## 2020-01-11 RX ADMIN — CARBIDOPA AND LEVODOPA SCH TAB: 25; 100 TABLET ORAL at 18:16

## 2020-01-11 RX ADMIN — SODIUM CHLORIDE SCH UNITS: 4.5 INJECTION, SOLUTION INTRAVENOUS at 20:26

## 2020-01-11 RX ADMIN — SODIUM CHLORIDE SCH MLS/HR: 9 INJECTION, SOLUTION INTRAVENOUS at 15:48

## 2020-01-11 RX ADMIN — PANTOPRAZOLE SODIUM SCH MG: 40 TABLET, DELAYED RELEASE ORAL at 20:26

## 2020-01-11 RX ADMIN — SODIUM CHLORIDE SCH MLS/HR: 9 INJECTION, SOLUTION INTRAVENOUS at 23:42

## 2020-01-11 RX ADMIN — DIATRIZOATE MEGLUMINE AND DIATRIZOATE SODIUM SCH ML: 600; 100 SOLUTION ORAL; RECTAL at 09:30

## 2020-01-11 RX ADMIN — ASPIRIN SCH MG: 81 TABLET ORAL at 16:23

## 2020-01-11 RX ADMIN — DIATRIZOATE MEGLUMINE AND DIATRIZOATE SODIUM SCH ML: 600; 100 SOLUTION ORAL; RECTAL at 08:56

## 2020-01-11 RX ADMIN — CLOPIDOGREL BISULFATE SCH MG: 75 TABLET ORAL at 16:22

## 2020-01-11 NOTE — REP
CT ABDOMEN AND PELVIS WITHOUT CONTRAST WITH ORAL CONTRAST:

 

CT abdomen and pelvis performed with oral contrast only.  Sagittal and coronal

reconstruction images are performed.  Comparison is made with prior CT abdomen

and pelvis 09/22/2019.

 

There is bibasilar fibroatelectatic change.  There is moderate cardiomegaly.

There is ectasia of the ascending thoracic aorta with atherosclerotic

calcification.  Maximum AP diameter is 4.6 cm.

 

There is a large hiatal hernia.

 

5 cm abdominal aortic aneurysm is present.  Scattered atherosclerotic

calcifications are seen of the abdominal aorta.  There is again noted an

aortobiiliac stent graft.  The aneurysm is unchanged in size.  There is also

dilatation of the common iliac arteries.  There is a crossover graft connecting

the common femoral arteries.  Liver demonstrates probable tiny cyst on the right

lobe.  Gallbladder contains gallstones and there is no evidence of gallbladder

wall edema.  I do not see a definite biliary dilatation.  The spleen is normal in

size with no definite intrinsic abnormality.  The left adrenal gland is normal.

The right adrenal gland demonstrates a small adenoma unchanged since the CT of

03/29/2008.  Pancreas is grossly unremarkable.  There is bilateral cortical

thinning of the kidneys.  Three subcentimeter calculi are seen in the left renal

collecting system mid to upper aspect.  There is no left hydroureteronephrosis.

There is a nodule 1.4 cm in diameter of the upper pole of the left kidney

medially.  This is not definitely measuring water density and could represent a

complex cyst or solid nodule.  Recommend followup ultrasound.  A 3 mm stone is

seen at the right ureterovesical junction causing moderate degree of right

hydroureteronephrosis.  There are two cysts in the mid to lower right kidney, the

largest in the mid aspect is exophytic measuring 2.2 cm in maximum diameter.  I

see no adenopathy.  There is no free air or free fluid.  There is no bowel wall

thickening.  There is no evidence of appendicitis.  There is sigmoid and left

colonic diverticulosis without acute diverticulitis.  Urinary bladder is

otherwise unremarkable.  There are degenerative changes of the spine.  There is a

stable old compression deformity of L2.

 

IMPRESSION:

 

There is a 3 mm calculus at the right ureterovesical junction causing moderate

right hydroureteronephrosis.  There are three subcentimeter intrarenal calculi in

the left.  In addition exophytic nodule of the upper pole of the left kidney

measures 1.4 cm in maximum diameter.  This is not definitely a cyst.  It could

represent a solid nodule or complex cyst.  Recommend followup ultrasound to

further evaluate.

 

There are other chronic findings as discussed in detail above. No other acute

finding.

 

 

Electronically Signed by

Samuel May MD 01/11/2020 04:01 P

## 2020-01-11 NOTE — REP
ABDOMINAL SERIES:

 

Supine and erect views of the abdomen demonstrate no free air and no compelling

evidence for obstruction.  Air is scattered throughout the GI tract in a

nonspecific pattern.  Multiple sigmoid and left colonic diverticula are seen,

presumably due to residual barium from upper GI series 10/31/2019.  There is

aortobiiliac stent again noted.  Metallic clips are seen in both inguinal

regions.  There are degenerative changes of the spine and hips.

 

An accompanying view of the chest demonstrates chronic bibasilar fibroatelectatic

changes which are stable.  There is a large hiatal hernia.  Cardiomediastinal

silhouette  is unchanged.  There are multiple sternal wires and mediastinal clips

present.

 

IMPRESSION:

 

No free air or obstruction. Nonspecific bowel gas pattern.  Chronic stable

findings in the lungs.

 

 

Electronically Signed by

Samuel May MD 01/11/2020 03:38 P

## 2020-01-11 NOTE — HPEPDOC
General


Date of Admission


1/11/20


Date of Service:  Jan 11, 2020


Chief Complaint


The patient is a 84-year-old male admitted with a reason for visit of Abdominal 

Pain.


Source:  Patient, Family


Exam Limitations:  No limitations


Timing/Duration:  Day(s)


Associated Symptoms:  Loss of appetite, Nausea, Vomiting





History of Present Illness


Patient is 84 years old male with past history of Parkinson diseases, nephrolit

hiasis, diastolic CHF, coronary artery diseases presented hospital with mild 

confusion and right flank and abdominal pain. His family at bedside, patient and

family provided history that for past 2 days patient has been nauseated, had a 

few episodes of vomiting. Also he developed diffuse abdominal pain in the lower 

quadrants and right flank pain. Today patient developed some visual 

hallucinations, he saw cows in his yard. Family stated that he didn't eat for 2 

days and didn't have enough fluid intake. Patient didn't have any diarrhea, 

fever, chest pain, palpitations. In ER patient was found to have leukocytes 

count of 10.1, hemoglobin 10.7, lactic acid 3.5, potassium 4.6, creatinine 2.37.

Abdomen/pelvis CT showed 3 mm calculus at the right ureterovesical junction 

causing moderate right hydroureteronephrosis. During my interview patient alert,

awake, not confused





Home Medications


Scheduled


Acetaminophen (Tylenol Extra Strength) 500 Mg Tablet, 1,000 MG PO BID, 

(Reported)


Aspirin (Aspirin EC) 81 Mg Tab, 81 MG PO DAILY, (Reported)


Carbidopa/Levodopa (Sinemet  mg Tablet) 1 Each Tablet, 1 TAB PO BID, 

(Reported)


Clopidogrel Bisulfate (Clopidogrel) 75 Mg Tablet, 75 MG PO DAILY, (Reported)


Ferrous Sulfate (Ferrous Sulfate) 325 Mg Tablet, 325 MG PO BID, (Reported)


Magnesium Oxide (Magnesium) 400 Mg Cap, 400 MG PO DAILY, (Reported)


Mirtazapine (Remeron) 15 Mg Tablet, 15 MG PO QHS, (Reported)


Pantoprazole Sodium (Pantoprazole Sodium) 40 Mg Tablet.dr, 40 MG PO BID, 

(Reported)


Tamsulosin HCl (Flomax) 0.4 Mg Capsule, 0.4 MG PO QPM, (Reported)





Allergies


Coded Allergies:  


     No Known Drug Allergies (Verified  Allergy, Unknown, 9/22/19)





Past Medical History


Medical History


1.  Hypertensive heart disease.


2.  Abdominal aortic aneurysm, status post endovascular grafting.


3.  Lumbosacral spondylolysis.


4.  Degenerative joint disease.


5.  Spinal stenosis.


6.  Diastolic heart failure.


7.  Peripheral vascular disease.


8.  Carotid disease.


9.  Coronary artery disease.


10.  Prior iron deficiency anemia.


11.  Parkinson's disease.


12.  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


13.  History of kidney stones.


14.  Renal colic.


15.  Osteoporosis.


16.  Reflux.


17.  Benign prostatic hypertrophy (BPH).


18.  Chronic kidney disease stage III, baseline creatinine 1.4 to 1.6.


19.  Unsteady gait.


20.  History of falls.  Does not use a walker at home.


21.  Lyme disease.


22.  Gout.


Surgical History


PAST SURGICAL HISTORY:


1.  Coronary artery bypass graft (CABG).


2.  Fem-pop bypass.


3.  Abdominal aortic aneurysm repair.


4.  Right inguinal hernia repair.


5.  Appendectomy





Family History


 Siblings with hypertension, coronary artery disease, cancer of


the lung.  Children, one with breast cancer.  Unexpected death due to heart


disease is none.





Social History


* Smoker:  Denies


Alcohol:  Denies


Drugs:  denies





A-FIB/CHADSVASC


A-FIB History


Current/History of A-Fib/PAF?:  No


Current PO Anticoag Therapy:  No





Review of Systems


Constitutional:  Reports: Weakness


Eyes:  Denies: Pain, Vision change


ENT:  Denies: Head Aches


Skin:  Denies: Rash


Pulmonary:  Denies: Dyspnea, Cough


Cardiovascular:  Denies: Chest Pain, Palpitations


Gastrointestinal:  Reports: Nausea, Vomiting, Abdominal Pain


Genitourinary:  Denies: Dysuria


Hematologic:  Denies: Bruising


Endocrine:  Denies: Polydipsia, Polyphagia


Musculoskeletal:  Denies: Neck Pain


Neurological:  Denies: Weakness, Numbness


Psych:  Reports: Mood Normal





Physical Examination


General Exam:  Positive: Alert, Cooperative


Eye Exam:  Positive: PERRLA


ENT Exam:  Positive: Atraumatic, Mucous membr. moist/pink


Neck Exam:  Positive: Supple; 


   Negative: JVD


Chest Exam:  Positive: Clear to auscultation


Heart Exam:  Positive: Rate Normal


Telemetry:  Positive: No significant arrhythmia


Abdomen Exam:  Positive: Normal bowel sounds, Tenderness (lower right quadrant 

tenderness)


Extremity Exam:  Negative: Clubbing, Cyanosis


Skin Exam:  Positive: Nl turgor and temperature


Neuro Exam:  Positive: Normal Gait, Cranial Nerves 3-12 NL


Psych Exam:  Positive: Mental status NL





Vital Signs





Vital Signs








  Date Time  Temp Pulse Resp B/P (MAP) Pulse Ox O2 Delivery O2 Flow Rate FiO2


 


1/11/20 12:33 98.5 75 12 175/78 (110) 87 Room Air  











Laboratory Data


Labs 24H


Laboratory Tests 2


1/11/20 07:36: 


Immature Granulocyte % (Auto) 0.9, Neutrophils (%) (Auto) 83.9H, Lymphocytes (%)

(Auto) 6.7L, Monocytes (%) (Auto) 8.0H, Eosinophils (%) (Auto) 0.1, Basophils 

(%) (Auto) 0.4, Neutrophils # (Auto) 8.5, Lymphocytes # (Auto) 0.7L, Monocytes #

(Auto) 0.8, Eosinophils # (Auto) 0.0, Basophils # (Auto) 0.0, Nucleated Red 

Blood Cells % (auto) 0.0, Anion Gap 10, Glomerular Filtration Rate 28.1L, 

Calcium Level 9.1, Total Bilirubin 1.0, Direct Bilirubin 0.4H, Aspartate Amino 

Transf (AST/SGOT) 11, Alanine Aminotransferase (ALT/SGPT) 10L, Alkaline 

Phosphatase 72, Total Protein 8.3H, Albumin 4.1, Albumin/Globulin Ratio 0.98L, 

Lipase 102


1/11/20 07:41: Lactic Acid Level 3.5*H


CBC/BMP


Laboratory Tests


1/11/20 07:36











 Assessment/Plan


Patient is 84 years old male with past history of Parkinson diseases, 

nephrolithiasis, diastolic CHF, coronary artery diseases presented hospital with

mild confusion and right flank and abdominal pain. Abdomen/pelvis CT showed 3 mm

calculus at the right ureterovesical junction causing moderate right 

hydroureteronephrosis. During my interview patient alert, awake, not confused





Problems





(1) Ureterolithiasis


Status:  Acute


Problem Text:  Continue gentle IV hydration due to diastolic heart failure


Continue Flomax


Appreciate/agree with urologist consult


Pain management


Zofran IV when necessary





(2) Acute kidney injury


Status:  Acute


Problem Text:  Prerenal


Most likely secondary to dehydration due to vomiting and poor oral intake


Continue gentle IV hydration





(3) Parkinson's disease


Status:  Chronic


Problem Text:  Continue home meds





(4) Hypertensive emergency


Status:  Acute


Problem Text:  Hydralazine IV when necessary with parameters


Amlodipine 10 mg


Patient has a history of coronary artery diseases and diastolic CHF, he will 

benefit from beta blockers


Metoprolol 12.5 twice a day


Continue to monitor vital signs








Plan / VTE


VTE Prophylaxis Ordered?:  Yes











HAI GUZMAN DO            Jan 11, 2020 13:45

## 2020-01-11 NOTE — SMCUROLCON
Urology Consultation


General


Date of Consultation


1/11/20


Reason For Consultation


This patient is seen for Dehydration,Ureterolithiasis.





History of Present Illness


The patient is a 84-year-old white male with a past medical history for multiple

medical problems including requiring anticoagulation, with history of 

ureterolithiasis presents today with right flank and abdominal pain. The pain 

has been severe. He also has had nausea, vomiting and has been unable to keep 

down much food or fluids.  The prior stone episode resulted in a spontaneously 

passed stone.





Past Medical History


Medical History


Reviewed


Surgical Hstory


Reviewed





Family History


Significant Family History:  Noncontributory





Social History


* Smoker:  former Smoker


Alcohol:  Denies





Medications


Current Medications





Current Medications








 Medications


  (Trade)  Dose


 Ordered  Sig/Karin


 Route


 PRN Reason  Start Time


 Stop Time Status Last Admin


Dose Admin


 


 Acetaminophen


  (Tylenol Tab)  650 mg  Q6H  PRN


 PO


 moderate pain  1/11/20 14:00


     





 


 Acetaminophen


  (Tylenol Tab)  1,000 mg  BID


 PO


   1/11/20 21:00


 1/11/20 13:49 DC  





 


 Aspirin


  (Ecotrin)  81 mg  DAILY


 PO


   1/11/20 09:00


     





 


 Carbidopa/Levodopa


  (Sinemet 25/100)  1 tab  BID@0600,1800


 PO


   1/11/20 18:00


     





 


 Clopidogrel


 Bisulfate


  (PLAVix)  75 mg  DAILY


 PO


   1/11/20 09:00


     





 


 Diatrizoate


 Meglum/


 Diatrizoate Sod


  (Gastrografin)  10 ml  Q30M


 PO


   1/11/20 09:00


 1/11/20 09:31 DC 1/11/20 09:30





 


 Ferrous Sulfate


  (Ferrous Sulfate)  325 mg  BID


 PO


   1/11/20 21:00


     





 


 Heparin Sodium


  (Porcine)


  (Heparin)  5,000 units  Q12H


 SC


   1/11/20 21:00


     





 


 Home Med


  (Med Rec


 Complete!)    ASDIRECTED


 XX


   1/11/20 08:30


 1/11/20 08:28 DC  





 


 Mirtazapine


  (Remeron)  15 mg  QHS


 PO


   1/11/20 21:00


     





 


 Morphine Sulfate


  (Morphine


 Sulfate Inj)  2 mg  Q4H  PRN


 IV


 severe pain  1/11/20 14:00


    1/11/20 14:01





 


 Ondansetron HCl


  (ZOFRAN


 INJection)  4 mg  Q6H  PRN


 IV


 NAUSEA  1/11/20 13:30


     





 


 Pantoprazole


 Sodium


  (Protonix)  40 mg  BID


 PO


   1/11/20 21:00


     





 


 Sodium Chloride  1,000 ml @ 


 90 mls/hr  Q11H7M


 IV


   1/11/20 13:20


     





 


 Sodium Chloride  1,000 ml @ 


 100 mls/hr  Q10H


 IV


   1/11/20 12:00


 1/11/20 13:50 DC 1/11/20 12:38





 


 Tamsulosin HCl


  (Flomax)  0.4 mg  QPM


 PO


   1/11/20 21:00


     














Allergies


Allergies:  


Coded Allergies:  


     No Known Drug Allergies (Verified  Allergy, Unknown, 9/22/19)





Review of Systems


General:  Denies: Chills, Night Sweats


Constitutional:  Denies: Fever, Chills


Eyes:  Denies: Pain


Pulmonary:  Denies: Dyspnea


Cardiovascular:  Denies Chest Pain


Gastrointestinal:  Reports: Nausea, Abdominal Pain; 


   Denies: Vomiting, Diarrhea, Constipation


Genitourinary:  Reports: Frequency, Hematuria; 


   Denies: Dysuria


Hematologic:  Denies: Bruising


Endocrine:  Denies: Polydipsia


Psych:  Reports: Mood Normal





Physical Examination


General Exam:  Cooperative, No Acute Distress


Neck Exam:  Supple


Abdomen Exam:  Soft; 


   No: Tenderness


Male  Exam


Bladder is not distended


Extremity Exam:  No: Clubbing


Skin Exam:  Nl turgor and temperature; 


   No: Rash


Psych Exam:  Mental status NL, Mood NL; 


   No: Anxiety





Vital Signs/I&O





Vital Signs








  Date Time  Temp Pulse Resp B/P (MAP) Pulse Ox O2 Delivery O2 Flow Rate FiO2


 


1/11/20 14:01  80 12 199/92 95 Room Air  


 


1/11/20 12:33 98.5       











Laboratory Data


24H Labs


Laboratory Tests 2


1/11/20 07:36: 


Immature Granulocyte % (Auto) 0.9, Neutrophils (%) (Auto) 83.9H, Lymphocytes (%)

(Auto) 6.7L, Monocytes (%) (Auto) 8.0H, Eosinophils (%) (Auto) 0.1, Basophils 

(%) (Auto) 0.4, Neutrophils # (Auto) 8.5, Lymphocytes # (Auto) 0.7L, Monocytes #

(Auto) 0.8, Eosinophils # (Auto) 0.0, Basophils # (Auto) 0.0, Nucleated Red 

Blood Cells % (auto) 0.0, Anion Gap 10, Glomerular Filtration Rate 28.1L, Vidal

cium Level 9.1, Total Bilirubin 1.0, Direct Bilirubin 0.4H, Aspartate Amino 

Transf (AST/SGOT) 11, Alanine Aminotransferase (ALT/SGPT) 10L, Alkaline 

Phosphatase 72, Total Protein 8.3H, Albumin 4.1, Albumin/Globulin Ratio 0.98L, 

Lipase 102


1/11/20 07:41: Lactic Acid Level 3.5*H


CBC/BMP


Laboratory Tests


1/11/20 07:36











Assessment


3 mm right ureterovesical junction stone, elevated serum creatinine, 

dehydration, and CT scan consistent with bilateral renal atrophy





Plan


At the present time there is no need for surgical intervention. Recommend 

supportive care such as IV fluid hydration and pain management. Will recheck 

creatinine tomorrow and allow for spontaneous stone passage





Time Spent on Consult:


Time Spent / Consult (Minutes):  45











PHYLLIS SIU MD           Jan 11, 2020 14:17

## 2020-01-12 VITALS — SYSTOLIC BLOOD PRESSURE: 138 MMHG | DIASTOLIC BLOOD PRESSURE: 63 MMHG

## 2020-01-12 VITALS — DIASTOLIC BLOOD PRESSURE: 68 MMHG | SYSTOLIC BLOOD PRESSURE: 136 MMHG

## 2020-01-12 VITALS — DIASTOLIC BLOOD PRESSURE: 69 MMHG | SYSTOLIC BLOOD PRESSURE: 144 MMHG

## 2020-01-12 VITALS — DIASTOLIC BLOOD PRESSURE: 62 MMHG | SYSTOLIC BLOOD PRESSURE: 133 MMHG

## 2020-01-12 VITALS — DIASTOLIC BLOOD PRESSURE: 66 MMHG | SYSTOLIC BLOOD PRESSURE: 150 MMHG

## 2020-01-12 VITALS — DIASTOLIC BLOOD PRESSURE: 61 MMHG | SYSTOLIC BLOOD PRESSURE: 132 MMHG

## 2020-01-12 LAB
BUN SERPL-MCNC: 23 MG/DL (ref 7–18)
CALCIUM SERPL-MCNC: 8.5 MG/DL (ref 8.8–10.2)
CHLORIDE SERPL-SCNC: 109 MEQ/L (ref 98–107)
CO2 SERPL-SCNC: 23 MEQ/L (ref 21–32)
CREAT SERPL-MCNC: 2.51 MG/DL (ref 0.7–1.3)
GFR SERPL CREATININE-BSD FRML MDRD: 26.3 ML/MIN/{1.73_M2} (ref 35–?)
GLUCOSE SERPL-MCNC: 109 MG/DL (ref 70–100)
HCT VFR BLD AUTO: 30.9 % (ref 42–52)
HGB BLD-MCNC: 9.5 G/DL (ref 13.5–17.5)
MAGNESIUM SERPL-MCNC: 2.1 MG/DL (ref 1.8–2.4)
MCH RBC QN AUTO: 33.2 PG (ref 27–33)
MCHC RBC AUTO-ENTMCNC: 30.7 G/DL (ref 32–36.5)
MCV RBC AUTO: 108 FL (ref 80–96)
PLATELET # BLD AUTO: 165 10^3/UL (ref 150–450)
POTASSIUM SERPL-SCNC: 5 MEQ/L (ref 3.5–5.1)
RBC # BLD AUTO: 2.86 10^6/UL (ref 4.3–6.1)
SODIUM SERPL-SCNC: 140 MEQ/L (ref 136–145)
WBC # BLD AUTO: 10 10^3/UL (ref 4–10)

## 2020-01-12 RX ADMIN — METOPROLOL TARTRATE SCH MG: 25 TABLET, FILM COATED ORAL at 21:07

## 2020-01-12 RX ADMIN — METOPROLOL TARTRATE SCH MG: 25 TABLET, FILM COATED ORAL at 08:21

## 2020-01-12 RX ADMIN — PANTOPRAZOLE SODIUM SCH MG: 40 TABLET, DELAYED RELEASE ORAL at 08:21

## 2020-01-12 RX ADMIN — TAMSULOSIN HYDROCHLORIDE SCH MG: 0.4 CAPSULE ORAL at 21:07

## 2020-01-12 RX ADMIN — SODIUM CHLORIDE SCH UNITS: 4.5 INJECTION, SOLUTION INTRAVENOUS at 08:19

## 2020-01-12 RX ADMIN — CARBIDOPA AND LEVODOPA SCH TAB: 25; 100 TABLET ORAL at 05:06

## 2020-01-12 RX ADMIN — SODIUM CHLORIDE SCH UNITS: 4.5 INJECTION, SOLUTION INTRAVENOUS at 21:08

## 2020-01-12 RX ADMIN — ASPIRIN SCH MG: 81 TABLET ORAL at 08:21

## 2020-01-12 RX ADMIN — SODIUM CHLORIDE SCH MLS/HR: 9 INJECTION, SOLUTION INTRAVENOUS at 21:08

## 2020-01-12 RX ADMIN — FERROUS SULFATE TAB 325 MG (65 MG ELEMENTAL FE) SCH MG: 325 (65 FE) TAB at 08:21

## 2020-01-12 RX ADMIN — FERROUS SULFATE TAB 325 MG (65 MG ELEMENTAL FE) SCH MG: 325 (65 FE) TAB at 21:07

## 2020-01-12 RX ADMIN — SODIUM CHLORIDE SCH MLS/HR: 9 INJECTION, SOLUTION INTRAVENOUS at 12:32

## 2020-01-12 RX ADMIN — PANTOPRAZOLE SODIUM SCH MG: 40 TABLET, DELAYED RELEASE ORAL at 21:07

## 2020-01-12 RX ADMIN — CARBIDOPA AND LEVODOPA SCH TAB: 25; 100 TABLET ORAL at 18:02

## 2020-01-12 RX ADMIN — CARBIDOPA AND LEVODOPA SCH TAB: 25; 100 TABLET ORAL at 18:00

## 2020-01-12 RX ADMIN — CLOPIDOGREL BISULFATE SCH MG: 75 TABLET ORAL at 08:20

## 2020-01-12 RX ADMIN — MIRTAZAPINE SCH MG: 15 TABLET, FILM COATED ORAL at 21:07

## 2020-01-12 NOTE — IPNPDOC
Text Note


Date of Service


The patient was seen on 1/12/20.





NOTE


Subjective: Patient developed confusion, he is  not oriented in time and in p

lace. Overnight patient didn't have any pain. Also he developed some urinary 

incontinence, UA was not sent for analysis





Objective:


VITAL SIGNS:  Please see below.


GENERAL APPEARANCE: Well-nourished, well-developed, not in apparent distress


HEENT: Normocephalic, atraumatic. Mucous members moist and pink


CARDIOVASCULAR: Regular rate and rhythm. No murmurs, rubs or gallops. Radial 

pulses are intact. There is no lower extremity edema


LUNGS: Diminished lung sounds


ABDOMEN: Abdomen is soft and nontender. 


MUSCULOSKELETAL: Range of motion is intact in all 4 extremities


NEUROLOGICAL: Cranial nerves II-12 are grossly intact. No nuchal rigidity. 

Patient was not oriented in place and in time








Assessment/Plan


Patient is 84 years old male with past history of Parkinson diseases, 

nephrolithiasis, diastolic CHF, coronary artery diseases presented hospital with

mild confusion and right flank and abdominal pain. Abdomen/pelvis CT showed 3 mm

calculus at the right ureterovesical junction causing moderate right 

hydroureteronephrosis. Urologist recommended conservative treatment. On 10/12/19

patient developed altered mental status most likely secondary to UTI. Await UA 

results. Patient had C. difficile infection the past, I'm reluctant to start 

antibiotics without UA.





Problems





(1) Ureterolithiasis


The pain resolved today. Most likely patient passed the stone.


 Continue gentle IV hydration due to diastolic heart failure


Continue Flomax


Pain management


Zofran IV when necessary





(2) Acute kidney injury


Most likely secondary to dehydration and ureters stone


Continue gentle IV hydration





(3) Parkinson's disease


Continue home meds





(4) Hypertensive emergency


Resolved 


Blood pressures under control


Hydralazine IV when necessary with parameters


Amlodipine 10 mg


Patient has a history of coronary artery diseases and diastolic CHF, he will 

benefit from beta blockers


Metoprolol 12.5 twice a day


Continue to monitor vital signs








Metabolic encephalopathy


Could be associated with UTI


There is also possibility for delirium due to hospital environment and pain


Urine analysis was not sent to lab yesterday


Patient had C. difficile infection the past, I'm reluctant to start antibiotics 

without UA





VS,Fishbone, I+O


VS, Fishbone, I+O


Laboratory Tests


1/12/20 04:47











Vital Signs








  Date Time  Temp Pulse Resp B/P (MAP) Pulse Ox O2 Delivery O2 Flow Rate FiO2


 


1/12/20 08:21  66  132/61    


 


1/12/20 08:00 97.9  18  94 Nasal Cannula 1.0 














I&O- Last 24 Hours up to 6 AM 


 


 1/12/20





 06:00


 


Intake Total 2130 ml


 


Output Total 0 ml


 


Balance 2130 ml

















HAI GUZMAN DO            Jan 12, 2020 12:06

## 2020-01-13 VITALS — DIASTOLIC BLOOD PRESSURE: 77 MMHG | SYSTOLIC BLOOD PRESSURE: 134 MMHG

## 2020-01-13 VITALS — SYSTOLIC BLOOD PRESSURE: 128 MMHG | DIASTOLIC BLOOD PRESSURE: 60 MMHG

## 2020-01-13 VITALS — DIASTOLIC BLOOD PRESSURE: 74 MMHG | SYSTOLIC BLOOD PRESSURE: 139 MMHG

## 2020-01-13 VITALS — SYSTOLIC BLOOD PRESSURE: 150 MMHG | DIASTOLIC BLOOD PRESSURE: 82 MMHG

## 2020-01-13 VITALS — DIASTOLIC BLOOD PRESSURE: 80 MMHG | SYSTOLIC BLOOD PRESSURE: 155 MMHG

## 2020-01-13 VITALS — SYSTOLIC BLOOD PRESSURE: 132 MMHG | DIASTOLIC BLOOD PRESSURE: 62 MMHG

## 2020-01-13 LAB
BUN SERPL-MCNC: 33 MG/DL (ref 7–18)
CALCIUM SERPL-MCNC: 8.3 MG/DL (ref 8.8–10.2)
CHLORIDE SERPL-SCNC: 112 MEQ/L (ref 98–107)
CO2 SERPL-SCNC: 21 MEQ/L (ref 21–32)
CREAT SERPL-MCNC: 2.37 MG/DL (ref 0.7–1.3)
GFR SERPL CREATININE-BSD FRML MDRD: 28.1 ML/MIN/{1.73_M2} (ref 35–?)
GLUCOSE SERPL-MCNC: 104 MG/DL (ref 70–100)
HCT VFR BLD AUTO: 30.8 % (ref 42–52)
HGB BLD-MCNC: 9.4 G/DL (ref 13.5–17.5)
MCH RBC QN AUTO: 33.6 PG (ref 27–33)
MCHC RBC AUTO-ENTMCNC: 30.5 G/DL (ref 32–36.5)
MCV RBC AUTO: 110 FL (ref 80–96)
PLATELET # BLD AUTO: 135 10^3/UL (ref 150–450)
POTASSIUM SERPL-SCNC: 4.6 MEQ/L (ref 3.5–5.1)
PTH-INTACT SERPL-MCNC: 121.4 PG/ML (ref 18.5–88)
RBC # BLD AUTO: 2.8 10^6/UL (ref 4.3–6.1)
SODIUM SERPL-SCNC: 141 MEQ/L (ref 136–145)
WBC # BLD AUTO: 8.7 10^3/UL (ref 4–10)

## 2020-01-13 RX ADMIN — FERROUS SULFATE TAB 325 MG (65 MG ELEMENTAL FE) SCH MG: 325 (65 FE) TAB at 08:34

## 2020-01-13 RX ADMIN — CARBIDOPA AND LEVODOPA SCH TAB: 25; 100 TABLET ORAL at 17:52

## 2020-01-13 RX ADMIN — METOPROLOL TARTRATE SCH MG: 25 TABLET, FILM COATED ORAL at 20:35

## 2020-01-13 RX ADMIN — METOPROLOL TARTRATE SCH MG: 25 TABLET, FILM COATED ORAL at 08:34

## 2020-01-13 RX ADMIN — PANTOPRAZOLE SODIUM SCH MG: 40 TABLET, DELAYED RELEASE ORAL at 20:35

## 2020-01-13 RX ADMIN — PANTOPRAZOLE SODIUM SCH MG: 40 TABLET, DELAYED RELEASE ORAL at 08:33

## 2020-01-13 RX ADMIN — TAMSULOSIN HYDROCHLORIDE SCH MG: 0.4 CAPSULE ORAL at 20:35

## 2020-01-13 RX ADMIN — FERROUS SULFATE TAB 325 MG (65 MG ELEMENTAL FE) SCH MG: 325 (65 FE) TAB at 20:35

## 2020-01-13 RX ADMIN — SODIUM CHLORIDE SCH UNITS: 4.5 INJECTION, SOLUTION INTRAVENOUS at 08:34

## 2020-01-13 RX ADMIN — SODIUM CHLORIDE SCH MLS/HR: 9 INJECTION, SOLUTION INTRAVENOUS at 19:45

## 2020-01-13 RX ADMIN — SODIUM CHLORIDE SCH UNITS: 4.5 INJECTION, SOLUTION INTRAVENOUS at 20:36

## 2020-01-13 RX ADMIN — CLOPIDOGREL BISULFATE SCH MG: 75 TABLET ORAL at 08:33

## 2020-01-13 RX ADMIN — ASPIRIN SCH MG: 81 TABLET ORAL at 08:33

## 2020-01-13 RX ADMIN — MIRTAZAPINE SCH MG: 15 TABLET, FILM COATED ORAL at 20:35

## 2020-01-13 RX ADMIN — SODIUM CHLORIDE SCH MLS/HR: 9 INJECTION, SOLUTION INTRAVENOUS at 08:33

## 2020-01-13 RX ADMIN — CARBIDOPA AND LEVODOPA SCH TAB: 25; 100 TABLET ORAL at 05:21

## 2020-01-13 NOTE — IPN
DATE:  01/13/2020

 

Rafael is on the hospitalist service, admitted with a renal stone and altered

mental status.  His daughter was at bedside.  She indicates that his mental

status is improved from yesterday, but still quite confused and not anywhere near

baseline.  We are still determining whether he has a urinary tract infection. He

has not been given empiric antibiotics due to his history of C. difficile

colitis.  Waiting for urine culture, which should be back today (discussed the

case at bedside with his nurse and Brandyn will notify me when the urine culture

comes back).  We suspect he passed the urinary stone for which he was admitted.

Unfortunately, no labs were ordered for today.  The patient is being treated for

acute kidney injury.

 

PHYSICAL EXAMINATION:

Afebrile, vital signs stable, 120/60.

Alert and conversant in no distress.

Lungs clear.

Heart regular rhythm.

Abdomen soft, nontender.

Trace peripheral edema.

 

Labs:  None ordered - I have put in for CBC and BMP.

 

IMPRESSION:

1.  Altered mental status.  Etiology is unknown.  Waiting for results of urine

culture.  Will prescribe only if urine culture returns positive.  The patient has

a history of C. Difficile.

 

2.  Acute kidney injury.  Morning labs were ordered on rounds and are pending.

 

3.  History of diastolic congestive heart failure.  He is currently on IV fluids,

waiting for his morning labs before we discontinue these.

 

4.  Coronary artery disease (CAD).  Stable on current regimen, which we will

continue.

 

Edited 01/13/2020 @ 0924 [facility]

## 2020-01-13 NOTE — ECGEPIP
[facility] - ED

                                       

                                       Test Date:    2020

Pat Name:     FELISA SANCHEZ           Department:   

Patient ID:   [id number]                 Room:         -

Gender:       Male                     Technician:   AMY

:          1935               Requested By: Toney DENNEY

Order Number: [id number]     Reading MD:   Maja Lundborg-Gray

                                 Measurements

Intervals                              Axis          

Rate:         72                       P:            -49

MT:           157                      QRS:          23

QRSD:         95                       T:            38

QT:           412                                    

QTc:          453                                    

                           Interpretive Statements

SINUS RHYTHM WITH OCCASIONAL SUPRAVENTRICULAR PREMATURE COMPLEXES

POSSIBLE RIGHT VENTRICULAR CONDUCTION DELAY

MODERATE ST DEPRESSION

BORDERLINE PROLONGED QTC

DELAYED R WAVE PROGRESSION

 

CW 19 RATE INCREASED

NONSPECIFIC ST T WAVE CHANGES

Electronically Signed on 2020 6:18:44 EST by Maja Lundborg-Gray

## 2020-01-14 VITALS — SYSTOLIC BLOOD PRESSURE: 132 MMHG | DIASTOLIC BLOOD PRESSURE: 63 MMHG

## 2020-01-14 VITALS — SYSTOLIC BLOOD PRESSURE: 144 MMHG | DIASTOLIC BLOOD PRESSURE: 76 MMHG

## 2020-01-14 VITALS — DIASTOLIC BLOOD PRESSURE: 86 MMHG | SYSTOLIC BLOOD PRESSURE: 164 MMHG

## 2020-01-14 VITALS — DIASTOLIC BLOOD PRESSURE: 63 MMHG | SYSTOLIC BLOOD PRESSURE: 128 MMHG

## 2020-01-14 VITALS — DIASTOLIC BLOOD PRESSURE: 71 MMHG | SYSTOLIC BLOOD PRESSURE: 141 MMHG

## 2020-01-14 VITALS — SYSTOLIC BLOOD PRESSURE: 122 MMHG | DIASTOLIC BLOOD PRESSURE: 70 MMHG

## 2020-01-14 LAB
BUN SERPL-MCNC: 37 MG/DL (ref 7–18)
CALCIUM SERPL-MCNC: 9 MG/DL (ref 8.8–10.2)
CHLORIDE SERPL-SCNC: 111 MEQ/L (ref 98–107)
CO2 SERPL-SCNC: 21 MEQ/L (ref 21–32)
CREAT SERPL-MCNC: 2.39 MG/DL (ref 0.7–1.3)
GFR SERPL CREATININE-BSD FRML MDRD: 27.8 ML/MIN/{1.73_M2} (ref 35–?)
GLUCOSE SERPL-MCNC: 105 MG/DL (ref 70–100)
HCT VFR BLD AUTO: 31.3 % (ref 42–52)
HGB BLD-MCNC: 9.7 G/DL (ref 13.5–17.5)
MAGNESIUM SERPL-MCNC: 2.2 MG/DL (ref 1.8–2.4)
MCH RBC QN AUTO: 33.8 PG (ref 27–33)
MCHC RBC AUTO-ENTMCNC: 31 G/DL (ref 32–36.5)
MCV RBC AUTO: 109.1 FL (ref 80–96)
PLATELET # BLD AUTO: 152 10^3/UL (ref 150–450)
POTASSIUM SERPL-SCNC: 4.6 MEQ/L (ref 3.5–5.1)
RBC # BLD AUTO: 2.87 10^6/UL (ref 4.3–6.1)
SODIUM SERPL-SCNC: 139 MEQ/L (ref 136–145)
WBC # BLD AUTO: 10.8 10^3/UL (ref 4–10)

## 2020-01-14 RX ADMIN — METOPROLOL TARTRATE SCH MG: 25 TABLET, FILM COATED ORAL at 20:23

## 2020-01-14 RX ADMIN — CARBIDOPA AND LEVODOPA SCH TAB: 25; 100 TABLET ORAL at 05:35

## 2020-01-14 RX ADMIN — CARBIDOPA AND LEVODOPA SCH TAB: 25; 100 TABLET ORAL at 17:12

## 2020-01-14 RX ADMIN — MIRTAZAPINE SCH MG: 15 TABLET, FILM COATED ORAL at 20:22

## 2020-01-14 RX ADMIN — CEFUROXIME AXETIL SCH MG: 500 TABLET ORAL at 20:22

## 2020-01-14 RX ADMIN — PANTOPRAZOLE SODIUM SCH MG: 40 TABLET, DELAYED RELEASE ORAL at 20:22

## 2020-01-14 RX ADMIN — CLOPIDOGREL BISULFATE SCH MG: 75 TABLET ORAL at 08:36

## 2020-01-14 RX ADMIN — SODIUM CHLORIDE SCH MLS/HR: 9 INJECTION, SOLUTION INTRAVENOUS at 17:12

## 2020-01-14 RX ADMIN — FERROUS SULFATE TAB 325 MG (65 MG ELEMENTAL FE) SCH MG: 325 (65 FE) TAB at 20:22

## 2020-01-14 RX ADMIN — TAMSULOSIN HYDROCHLORIDE SCH MG: 0.4 CAPSULE ORAL at 20:23

## 2020-01-14 RX ADMIN — METOPROLOL TARTRATE SCH MG: 25 TABLET, FILM COATED ORAL at 08:36

## 2020-01-14 RX ADMIN — CEFUROXIME AXETIL SCH MG: 500 TABLET ORAL at 12:28

## 2020-01-14 RX ADMIN — SODIUM CHLORIDE SCH MLS/HR: 9 INJECTION, SOLUTION INTRAVENOUS at 08:35

## 2020-01-14 RX ADMIN — PANTOPRAZOLE SODIUM SCH MG: 40 TABLET, DELAYED RELEASE ORAL at 08:36

## 2020-01-14 RX ADMIN — ASPIRIN SCH MG: 81 TABLET ORAL at 08:36

## 2020-01-14 RX ADMIN — SODIUM CHLORIDE SCH UNITS: 4.5 INJECTION, SOLUTION INTRAVENOUS at 20:24

## 2020-01-14 RX ADMIN — FERROUS SULFATE TAB 325 MG (65 MG ELEMENTAL FE) SCH MG: 325 (65 FE) TAB at 08:36

## 2020-01-14 RX ADMIN — SODIUM CHLORIDE SCH UNITS: 4.5 INJECTION, SOLUTION INTRAVENOUS at 08:36

## 2020-01-14 NOTE — IPNPDOC
Subjective


Date Seen


The patient was seen on 1/14/20.





Subjective


Chief Complaint/HPI


Oriented to person, place and time.  No family at the bedside.  Feels less 

confused, not complaining of any pain.





Objective


Physical Examination


General Exam:  Positive: Alert, Cooperative, No Acute Distress


Eye Exam:  Positive: PERRLA, Conjunctiva & lids normal, EOMI, Sclera icteric


ENT Exam:  Positive: Atraumatic, Mucous membr. moist/pink


Neck Exam:  Positive: Supple; 


   Negative: JVD


Chest Exam:  Positive: Clear to auscultation


Heart Exam:  Positive: Rate Normal


Abdomen Exam:  Positive: Normal bowel sounds, Soft


Extremity Exam:  Negative: Clubbing, Cyanosis


Skin Exam:  Positive: Nl turgor and temperature


Neuro Exam:  Positive: Normal Gait, Normal Speech, Strength at 5/5 X4 ext, 

Cranial Nerves 3-12 NL


Psych Exam:  Positive: Mental status NL





Assessment /Plan


Assessment


# R UVJ hydroureteronephrosis


- repeat renal ULS


- urology following


- creatinine mildly improved





# Acute on CKD stage 3 


- review of past labs shows creatinine baseline 1.5-2.5


- avoid nephrotoxins





# Altered mental status likely due to Proteus M. UTI


- improving


- ceftin 500 mg bid





# Non-ruptured AAA 5 cm


- unchanged in size





# Chronic diastolic CHF


- currently compensated.





# CAD


- stable





Plan/VTE


VTE Prophylaxis Ordered?:  Yes (hep sq bid)





VS, I&O, 24H, Fishbone


Vital Signs/I&O





Vital Signs








  Date Time  Temp Pulse Resp B/P (MAP) Pulse Ox O2 Delivery O2 Flow Rate FiO2


 


1/14/20 11:19 97.8 86 20 128/63 (84) 95   


 


1/14/20 08:00       1.0 


 


1/14/20 07:53      Nasal Cannula  














I&O- Last 24 Hours up to 6 AM 


 


 1/14/20





 06:00


 


Intake Total 2380 ml


 


Output Total 0 ml


 


Balance 2380 ml











Laboratory Data


24H LABS


Laboratory Tests 2


1/14/20 05:42: 


Nucleated Red Blood Cells % (auto) 0.0, Anion Gap 7L, Glomerular Filtration Rate

27.8L, Calcium Level 9.0, Magnesium Level 2.2


CBC/BMP


Laboratory Tests


1/14/20 05:42








Microbiology





Microbiology


1/12/20 Urine Culture - Final, Complete


          Proteus Mirabilis











MELVIN CERDA MD             Jan 14, 2020 11:47

## 2020-01-14 NOTE — REPVR
PROCEDURE INFORMATION: 

Exam: US Retroperitoneal Limited, Kidneys 

Exam date and time: 1/14/2020 6:32 PM 

Age: 84 years old 

Clinical indication: Hydro seen on CT; Additional info: F/u for 

hydroureteronephrosis. 



TECHNIQUE: 

Imaging protocol: Real-time ultrasound of the retroperitoneum with image 

documentation. Examination was focused on the kidneys. 



COMPARISON: 

CT ABD/PEL W/PO CONTRAST ONLY 1/11/2020 10:19 AM 

(The report from this study was not available for review at the time of this 

interpretation.) 



FINDINGS: 

Right kidney: The right kidney measures 12.5 cm in length. There is right renal 

cortical thinning. The renal cortical echogenicity is increased. There is a 1.8 

cm x 1.8 cm x 2 cm simple cyst in the lower pole of the right kidney and a 2.6 

cm x 1.9 cm x 2.4 cm simple cyst in the midpole of the right kidney for which 

follow-up is not necessary. There is mild to moderate hydronephrosis, which is 

fairly similar in appearance compared to the prior CT on 01/11/2020. 

Left kidney: The left kidney measures 12 cm in length. There is left renal 

cortical thinning. The renal cortical echogenicity is within increased. No 

renal lesion is seen. There is no hydronephrosis. There is a mildly dilated 

left extrarenal pelvis, which was also present in the prior CT on 01/11/2020. 

Bladder: The urinary bladder was only partially distended at the time of the 

examination. 



IMPRESSION: 

Mild-to-moderate right hydronephrosis, which is stable compared to the prior CT 

on 01/11/2020. 



Electronically signed by: Moshe Michelle On 01/14/2020  19:50:54 PM

## 2020-01-15 VITALS — DIASTOLIC BLOOD PRESSURE: 87 MMHG | SYSTOLIC BLOOD PRESSURE: 188 MMHG

## 2020-01-15 VITALS — SYSTOLIC BLOOD PRESSURE: 138 MMHG | DIASTOLIC BLOOD PRESSURE: 84 MMHG

## 2020-01-15 VITALS — SYSTOLIC BLOOD PRESSURE: 133 MMHG | DIASTOLIC BLOOD PRESSURE: 68 MMHG

## 2020-01-15 VITALS — DIASTOLIC BLOOD PRESSURE: 76 MMHG | SYSTOLIC BLOOD PRESSURE: 133 MMHG

## 2020-01-15 VITALS — DIASTOLIC BLOOD PRESSURE: 88 MMHG | SYSTOLIC BLOOD PRESSURE: 154 MMHG

## 2020-01-15 VITALS — DIASTOLIC BLOOD PRESSURE: 80 MMHG | SYSTOLIC BLOOD PRESSURE: 160 MMHG

## 2020-01-15 VITALS — DIASTOLIC BLOOD PRESSURE: 64 MMHG | SYSTOLIC BLOOD PRESSURE: 137 MMHG

## 2020-01-15 LAB
BUN SERPL-MCNC: 39 MG/DL (ref 7–18)
CALCIUM SERPL-MCNC: 8.3 MG/DL (ref 8.8–10.2)
CHLORIDE SERPL-SCNC: 114 MEQ/L (ref 98–107)
CO2 SERPL-SCNC: 22 MEQ/L (ref 21–32)
CREAT SERPL-MCNC: 2.27 MG/DL (ref 0.7–1.3)
GFR SERPL CREATININE-BSD FRML MDRD: 29.5 ML/MIN/{1.73_M2} (ref 35–?)
GLUCOSE SERPL-MCNC: 98 MG/DL (ref 70–100)
HCT VFR BLD AUTO: 31.1 % (ref 42–52)
HGB BLD-MCNC: 9.3 G/DL (ref 13.5–17.5)
MCH RBC QN AUTO: 32.9 PG (ref 27–33)
MCHC RBC AUTO-ENTMCNC: 29.9 G/DL (ref 32–36.5)
MCV RBC AUTO: 109.9 FL (ref 80–96)
PLATELET # BLD AUTO: 176 10^3/UL (ref 150–450)
POTASSIUM SERPL-SCNC: 4.5 MEQ/L (ref 3.5–5.1)
RBC # BLD AUTO: 2.83 10^6/UL (ref 4.3–6.1)
SODIUM SERPL-SCNC: 142 MEQ/L (ref 136–145)
WBC # BLD AUTO: 8.3 10^3/UL (ref 4–10)

## 2020-01-15 RX ADMIN — PANTOPRAZOLE SODIUM SCH MG: 40 TABLET, DELAYED RELEASE ORAL at 20:11

## 2020-01-15 RX ADMIN — CARBIDOPA AND LEVODOPA SCH TAB: 25; 100 TABLET ORAL at 18:10

## 2020-01-15 RX ADMIN — FERROUS SULFATE TAB 325 MG (65 MG ELEMENTAL FE) SCH MG: 325 (65 FE) TAB at 20:11

## 2020-01-15 RX ADMIN — SODIUM CHLORIDE SCH UNITS: 4.5 INJECTION, SOLUTION INTRAVENOUS at 09:40

## 2020-01-15 RX ADMIN — ASPIRIN SCH MG: 81 TABLET ORAL at 09:40

## 2020-01-15 RX ADMIN — METOPROLOL TARTRATE SCH MG: 25 TABLET, FILM COATED ORAL at 09:40

## 2020-01-15 RX ADMIN — FERROUS SULFATE TAB 325 MG (65 MG ELEMENTAL FE) SCH MG: 325 (65 FE) TAB at 09:40

## 2020-01-15 RX ADMIN — SODIUM CHLORIDE SCH UNITS: 4.5 INJECTION, SOLUTION INTRAVENOUS at 20:10

## 2020-01-15 RX ADMIN — CEFUROXIME AXETIL SCH MG: 500 TABLET ORAL at 20:11

## 2020-01-15 RX ADMIN — MIRTAZAPINE SCH MG: 15 TABLET, FILM COATED ORAL at 20:12

## 2020-01-15 RX ADMIN — SODIUM CHLORIDE SCH MLS/HR: 9 INJECTION, SOLUTION INTRAVENOUS at 05:07

## 2020-01-15 RX ADMIN — CARBIDOPA AND LEVODOPA SCH TAB: 25; 100 TABLET ORAL at 05:07

## 2020-01-15 RX ADMIN — TAMSULOSIN HYDROCHLORIDE SCH MG: 0.4 CAPSULE ORAL at 20:11

## 2020-01-15 RX ADMIN — CLOPIDOGREL BISULFATE SCH MG: 75 TABLET ORAL at 09:40

## 2020-01-15 RX ADMIN — PANTOPRAZOLE SODIUM SCH MG: 40 TABLET, DELAYED RELEASE ORAL at 09:40

## 2020-01-15 RX ADMIN — CEFUROXIME AXETIL SCH MG: 500 TABLET ORAL at 09:40

## 2020-01-15 RX ADMIN — METOPROLOL TARTRATE SCH MG: 25 TABLET, FILM COATED ORAL at 20:11

## 2020-01-15 NOTE — IPNPDOC
Subjective


Date Seen


The patient was seen on 1/15/20.





Subjective


Chief Complaint/HPI


Daughter at bedside.  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 

morning.  Otherwise he's oriented.





Objective


Physical Examination


General Exam:  Positive: Alert, Cooperative, No Acute Distress


Eye Exam:  Positive: PERRLA, Conjunctiva & lids normal, EOMI; 


   Negative: Sclera icteric


ENT Exam:  Positive: Atraumatic, Mucous membr. moist/pink


Neck Exam:  Positive: Supple; 


   Negative: JVD


Chest Exam:  Positive: Clear to auscultation


Heart Exam:  Positive: Rate Normal


Abdomen Exam:  Positive: Normal bowel sounds, Soft


Extremity Exam:  Negative: Clubbing, Cyanosis, Edema (trace pitting edema on 

vein harvest leg)


Skin Exam:  Positive: Nl turgor and temperature


Neuro Exam:  Positive: Normal Speech, Strength at 5/5 X4 ext, Cranial Nerves 3-

12 NL


Psych Exam:  Positive: Mood NL





Assessment /Plan


Assessment


# R UVJ hydroureteronephrosis


- renal ULS results reviewed, has mild to moderate right hydro unchanged from CT

scan


- Contacted Urology office to speak to Dr. Ng regarding ULS findings, 

awaiting return phone call





# CALLUM on CKD stage 3 


- review of past labs shows creatinine baseline 1.5-2.5


- avoid nephrotoxins





# Metabolic encephalopathy in the setting of Proteus M. UTI


- improving


- ceftin 500 mg bid day # 2/5





# Non-ruptured AAA 5 cm


- unchanged in size





# Chronic diastolic CHF


- currently compensated.





# CAD


- stable





Dispo: PT to re-eval today for dispo needs, Plan d/w Daughter





Plan/VTE


VTE Prophylaxis Ordered?:  Yes (hep sq bid)


VTE Exclusion Mechanical Proph:  N/A:VTE Prophy Ordered


VTE Exclusion Pharmacological:  N/A:VTE Prophy Ordered





VS, I&O, 24H, Fishbone


Vital Signs/I&O





Vital Signs








  Date Time  Temp Pulse Resp B/P (MAP) Pulse Ox O2 Delivery O2 Flow Rate FiO2


 


1/15/20 09:40  87  188/87    


 


1/15/20 07:37 98.0  20  98 Nasal Cannula 1.0 














I&O- Last 24 Hours up to 6 AM 


 


 1/15/20





 06:00


 


Intake Total 1470 ml


 


Output Total 0 ml


 


Balance 1470 ml











Laboratory Data


24H LABS


Laboratory Tests 2


1/15/20 05:39: 


Nucleated Red Blood Cells % (auto) 0.0, Anion Gap 6L, Glomerular Filtration Rate

29.5L, Calcium Level 8.3L


CBC/BMP


Laboratory Tests


1/15/20 05:39








Microbiology





Microbiology


1/12/20 Urine Culture - Final, Complete


          Proteus Mirabilis











MELVIN CERDA MD             Roland 15, 2020 11:49

## 2020-01-15 NOTE — REP
Portable chest x-ray:  Single view.

 

History:  Wheezing.  Rule out CHF.

 

Comparison study:  January 11, 2020.

 

Findings:  There is a very large hiatal hernia behind the heart.  Heart is

enlarged as before.  Prior sternotomy wires are seen.  Monitoring electrodes are

noted.  There is coarse linear fibrosis in the left base.  There is very slight

blunting of the right lateral pleural angle.  No acute infiltrate is seen.

 

 

Electronically Signed by

Venu Gamez MD 01/15/2020 05:40 P

## 2020-01-16 VITALS — DIASTOLIC BLOOD PRESSURE: 71 MMHG | SYSTOLIC BLOOD PRESSURE: 132 MMHG

## 2020-01-16 VITALS — DIASTOLIC BLOOD PRESSURE: 70 MMHG | SYSTOLIC BLOOD PRESSURE: 151 MMHG

## 2020-01-16 VITALS — SYSTOLIC BLOOD PRESSURE: 137 MMHG | DIASTOLIC BLOOD PRESSURE: 87 MMHG

## 2020-01-16 VITALS — SYSTOLIC BLOOD PRESSURE: 134 MMHG | DIASTOLIC BLOOD PRESSURE: 72 MMHG

## 2020-01-16 LAB
BUN SERPL-MCNC: 44 MG/DL (ref 7–18)
CALCIUM SERPL-MCNC: 8.5 MG/DL (ref 8.8–10.2)
CHLORIDE SERPL-SCNC: 112 MEQ/L (ref 98–107)
CO2 SERPL-SCNC: 22 MEQ/L (ref 21–32)
CREAT SERPL-MCNC: 2.47 MG/DL (ref 0.7–1.3)
GFR SERPL CREATININE-BSD FRML MDRD: 26.8 ML/MIN/{1.73_M2} (ref 35–?)
GLUCOSE SERPL-MCNC: 89 MG/DL (ref 70–100)
HCT VFR BLD AUTO: 26.9 % (ref 42–52)
HGB BLD-MCNC: 8.3 G/DL (ref 13.5–17.5)
MCH RBC QN AUTO: 33.6 PG (ref 27–33)
MCHC RBC AUTO-ENTMCNC: 30.9 G/DL (ref 32–36.5)
MCV RBC AUTO: 108.9 FL (ref 80–96)
PLATELET # BLD AUTO: 168 10^3/UL (ref 150–450)
POTASSIUM SERPL-SCNC: 4.4 MEQ/L (ref 3.5–5.1)
RBC # BLD AUTO: 2.47 10^6/UL (ref 4.3–6.1)
SODIUM SERPL-SCNC: 142 MEQ/L (ref 136–145)
WBC # BLD AUTO: 6.9 10^3/UL (ref 4–10)

## 2020-01-16 RX ADMIN — FERROUS SULFATE TAB 325 MG (65 MG ELEMENTAL FE) SCH MG: 325 (65 FE) TAB at 09:12

## 2020-01-16 RX ADMIN — CEFUROXIME AXETIL SCH MG: 500 TABLET ORAL at 09:12

## 2020-01-16 RX ADMIN — PANTOPRAZOLE SODIUM SCH MG: 40 TABLET, DELAYED RELEASE ORAL at 09:11

## 2020-01-16 RX ADMIN — FERROUS SULFATE TAB 325 MG (65 MG ELEMENTAL FE) SCH MG: 325 (65 FE) TAB at 20:49

## 2020-01-16 RX ADMIN — SODIUM CHLORIDE SCH UNITS: 4.5 INJECTION, SOLUTION INTRAVENOUS at 20:48

## 2020-01-16 RX ADMIN — CARBIDOPA AND LEVODOPA SCH TAB: 25; 100 TABLET ORAL at 18:25

## 2020-01-16 RX ADMIN — MIRTAZAPINE SCH MG: 15 TABLET, FILM COATED ORAL at 20:49

## 2020-01-16 RX ADMIN — CLOPIDOGREL BISULFATE SCH MG: 75 TABLET ORAL at 09:12

## 2020-01-16 RX ADMIN — PANTOPRAZOLE SODIUM SCH MG: 40 TABLET, DELAYED RELEASE ORAL at 20:49

## 2020-01-16 RX ADMIN — CARBIDOPA AND LEVODOPA SCH TAB: 25; 100 TABLET ORAL at 05:02

## 2020-01-16 RX ADMIN — SODIUM CHLORIDE SCH UNITS: 4.5 INJECTION, SOLUTION INTRAVENOUS at 09:11

## 2020-01-16 RX ADMIN — METOPROLOL TARTRATE SCH MG: 25 TABLET, FILM COATED ORAL at 09:12

## 2020-01-16 RX ADMIN — SODIUM CHLORIDE SCH UNITS: 4.5 INJECTION, SOLUTION INTRAVENOUS at 20:54

## 2020-01-16 RX ADMIN — TAMSULOSIN HYDROCHLORIDE SCH MG: 0.4 CAPSULE ORAL at 20:49

## 2020-01-16 RX ADMIN — ASPIRIN SCH MG: 81 TABLET ORAL at 09:11

## 2020-01-16 RX ADMIN — CEFUROXIME AXETIL SCH MG: 500 TABLET ORAL at 20:49

## 2020-01-16 RX ADMIN — METOPROLOL TARTRATE SCH MG: 25 TABLET, FILM COATED ORAL at 20:48

## 2020-01-16 NOTE — IPNPDOC
Subjective


Date Seen


The patient was seen on 1/16/20.





Subjective


Chief Complaint/HPI


Much less confused, walked better today with PT.  Appetite picking up.  Daughter

at bedside.





Objective


Physical Examination


General Exam:  Positive: Alert, Cooperative, No Acute Distress


Eye Exam:  Positive: PERRLA, Conjunctiva & lids normal, EOMI; 


   Negative: Sclera icteric


ENT Exam:  Positive: Atraumatic, Mucous membr. moist/pink


Neck Exam:  Positive: Supple; 


   Negative: JVD


Chest Exam:  Positive: Clear to auscultation


Heart Exam:  Positive: Rate Normal


Abdomen Exam:  Positive: Normal bowel sounds, Soft


Extremity Exam:  Negative: Clubbing, Cyanosis, Edema (trace pitting edema on 

vein harvest leg)


Skin Exam:  Positive: Nl turgor and temperature


Neuro Exam:  Positive: Normal Speech, Strength at 5/5 X4 ext, Cranial Nerves 3-

12 NL


Psych Exam:  Positive: Mood NL





Assessment /Plan


Assessment


# R UVJ hydroureteronephrosis


- renal ULS results reviewed, has mild to moderate right hydro unchanged from CT

scan


- D/w Dr. Sneed, will have him f/u as outpt





# CALLUM on CKD stage 3 


- review of past labs shows creatinine baseline 1.5-2.5


- creat remains within his historical range


- avoid nephrotoxins


- encouraged patient to drink more fluids








# Metabolic encephalopathy in the setting of Proteus M. UTI


- improving


- ceftin 500 mg bid day # 3/5





# Non-ruptured AAA 5 cm


- unchanged in size





# Chronic diastolic CHF


- currently compensated


- cxr reviewed and stable





# CAD


- stable





# Dispo: awaiting eval for inpatient rehab, family willing to have him go there,

but will decline any other facility and prefer to take him home





Plan/VTE


VTE Prophylaxis Ordered?:  Yes (hep sq bid)


VTE Exclusion Mechanical Proph:  N/A:VTE Prophy Ordered


VTE Exclusion Pharmacological:  N/A:VTE Prophy Ordered





VS, I&O, 24H, Fishbone


Vital Signs/I&O





Vital Signs








  Date Time  Temp Pulse Resp B/P (MAP) Pulse Ox O2 Delivery O2 Flow Rate FiO2


 


1/16/20 09:12  78  137/87    


 


1/16/20 08:05 98.0  24  99 Nasal Cannula 1.0 














I&O- Last 24 Hours up to 6 AM 


 


 1/16/20





 06:00


 


Intake Total 1167 ml


 


Output Total 0 ml


 


Balance 1167 ml











Laboratory Data


24H LABS


Laboratory Tests 2


1/16/20 04:50: 


Nucleated Red Blood Cells % (auto) 0.0, Anion Gap 8, Glomerular Filtration Rate 

26.8L, Calcium Level 8.5L


CBC/BMP


Laboratory Tests


1/16/20 04:50








Microbiology





Microbiology


1/12/20 Urine Culture - Final, Complete


          Proteus Mirabilis











MELVIN CERDA MD             Jan 16, 2020 13:12

## 2020-01-17 VITALS — SYSTOLIC BLOOD PRESSURE: 130 MMHG | DIASTOLIC BLOOD PRESSURE: 53 MMHG

## 2020-01-17 VITALS — DIASTOLIC BLOOD PRESSURE: 65 MMHG | SYSTOLIC BLOOD PRESSURE: 149 MMHG

## 2020-01-17 VITALS — DIASTOLIC BLOOD PRESSURE: 60 MMHG | SYSTOLIC BLOOD PRESSURE: 128 MMHG

## 2020-01-17 VITALS — DIASTOLIC BLOOD PRESSURE: 56 MMHG | SYSTOLIC BLOOD PRESSURE: 111 MMHG

## 2020-01-17 LAB
BUN SERPL-MCNC: 40 MG/DL (ref 7–18)
CALCIUM SERPL-MCNC: 8.7 MG/DL (ref 8.8–10.2)
CHLORIDE SERPL-SCNC: 107 MEQ/L (ref 98–107)
CO2 SERPL-SCNC: 23 MEQ/L (ref 21–32)
CREAT SERPL-MCNC: 1.81 MG/DL (ref 0.7–1.3)
GFR SERPL CREATININE-BSD FRML MDRD: 38.3 ML/MIN/{1.73_M2} (ref 35–?)
GLUCOSE SERPL-MCNC: 81 MG/DL (ref 70–100)
HCT VFR BLD AUTO: 27.9 % (ref 42–52)
HGB BLD-MCNC: 8.8 G/DL (ref 13.5–17.5)
MCH RBC QN AUTO: 33.3 PG (ref 27–33)
MCHC RBC AUTO-ENTMCNC: 31.5 G/DL (ref 32–36.5)
MCV RBC AUTO: 105.7 FL (ref 80–96)
PLATELET # BLD AUTO: 184 10^3/UL (ref 150–450)
POTASSIUM SERPL-SCNC: 4.2 MEQ/L (ref 3.5–5.1)
RBC # BLD AUTO: 2.64 10^6/UL (ref 4.3–6.1)
SODIUM SERPL-SCNC: 139 MEQ/L (ref 136–145)
WBC # BLD AUTO: 5.3 10^3/UL (ref 4–10)

## 2020-01-17 RX ADMIN — CLOPIDOGREL BISULFATE SCH MG: 75 TABLET ORAL at 09:02

## 2020-01-17 RX ADMIN — PANTOPRAZOLE SODIUM SCH MG: 40 TABLET, DELAYED RELEASE ORAL at 09:01

## 2020-01-17 RX ADMIN — MIRTAZAPINE SCH MG: 15 TABLET, FILM COATED ORAL at 22:18

## 2020-01-17 RX ADMIN — FERROUS SULFATE TAB 325 MG (65 MG ELEMENTAL FE) SCH MG: 325 (65 FE) TAB at 09:02

## 2020-01-17 RX ADMIN — SODIUM CHLORIDE SCH UNITS: 4.5 INJECTION, SOLUTION INTRAVENOUS at 09:03

## 2020-01-17 RX ADMIN — CEFUROXIME AXETIL SCH MG: 500 TABLET ORAL at 09:03

## 2020-01-17 RX ADMIN — CEFUROXIME AXETIL SCH MG: 500 TABLET ORAL at 22:18

## 2020-01-17 RX ADMIN — TAMSULOSIN HYDROCHLORIDE SCH MG: 0.4 CAPSULE ORAL at 22:18

## 2020-01-17 RX ADMIN — PANTOPRAZOLE SODIUM SCH MG: 40 TABLET, DELAYED RELEASE ORAL at 22:18

## 2020-01-17 RX ADMIN — FERROUS SULFATE TAB 325 MG (65 MG ELEMENTAL FE) SCH MG: 325 (65 FE) TAB at 22:18

## 2020-01-17 RX ADMIN — SODIUM CHLORIDE SCH UNITS: 4.5 INJECTION, SOLUTION INTRAVENOUS at 22:15

## 2020-01-17 RX ADMIN — CARBIDOPA AND LEVODOPA SCH TAB: 25; 100 TABLET ORAL at 05:02

## 2020-01-17 RX ADMIN — ASPIRIN SCH MG: 81 TABLET ORAL at 09:02

## 2020-01-17 RX ADMIN — METOPROLOL TARTRATE SCH MG: 25 TABLET, FILM COATED ORAL at 22:17

## 2020-01-17 RX ADMIN — METOPROLOL TARTRATE SCH MG: 25 TABLET, FILM COATED ORAL at 09:03

## 2020-01-17 RX ADMIN — CARBIDOPA AND LEVODOPA SCH TAB: 25; 100 TABLET ORAL at 17:49

## 2020-01-17 NOTE — IPNPDOC
Subjective


Date Seen


The patient was seen on 1/17/20.





Subjective


Chief Complaint/HPI


Mr. Lou is doing fine this morning.  His two daughters are at the bedside 

and have been briefed by me.





Objective


Physical Examination


General Exam:  Positive: Alert, Cooperative, No Acute Distress


Eye Exam:  Positive: PERRLA, Conjunctiva & lids normal, EOMI; 


   Negative: Sclera icteric


ENT Exam:  Positive: Atraumatic, Mucous membr. moist/pink


Neck Exam:  Positive: Supple; 


   Negative: JVD


Chest Exam:  Positive: Clear to auscultation


Heart Exam:  Positive: Rate Normal


Abdomen Exam:  Positive: Normal bowel sounds, Soft


Extremity Exam:  Negative: Clubbing, Cyanosis, Edema (trace pitting edema on 

vein harvest leg)


Skin Exam:  Positive: Nl turgor and temperature


Neuro Exam:  Positive: Normal Speech


Psych Exam:  Positive: Mood NL





Assessment /Plan


Assessment


# R UVJ hydroureteronephrosis


- renal ULS results reviewed, has mild to moderate right hydro unchanged from CT

scan


- D/w Dr. Sneed, will have him f/u as outpt





# CALLUM on CKD stage 3 


- review of past labs shows creatinine baseline 1.5-2.5


- creat remains within his historical range, and has improved this morning to 

1.8 mg/dl


- avoid nephrotoxins





# Metabolic encephalopathy in the setting of Proteus M. UTI


- improving


- ceftin 500 mg bid day # 4/5





# Non-ruptured AAA 5 cm


- unchanged in size





# Chronic diastolic CHF


- currently compensated


- cxr reviewed and stable





# CAD


- stable





# Dispo: awaiting placement





Plan/VTE


VTE Prophylaxis Ordered?:  Yes (hep sq bid)


VTE Exclusion Mechanical Proph:  N/A:VTE Prophy Ordered


VTE Exclusion Pharmacological:  N/A:VTE Prophy Ordered





VS, I&O, 24H, Fishbone


Vital Signs/I&O





Vital Signs








  Date Time  Temp Pulse Resp B/P (MAP) Pulse Ox O2 Delivery O2 Flow Rate FiO2


 


1/17/20 10:10 98.7 63 17 149/65 (93) 96 Room Air  


 


1/16/20 23:59        














I&O- Last 24 Hours up to 6 AM 


 


 1/17/20





 06:00


 


Intake Total 1220 ml


 


Output Total 0 ml


 


Balance 1220 ml











Laboratory Data


24H LABS


Laboratory Tests 2


1/17/20 04:50: 


Nucleated Red Blood Cells % (auto) 0.0, Anion Gap 9, Glomerular Filtration Rate 

38.3, Calcium Level 8.7L


CBC/BMP


Laboratory Tests


1/17/20 04:50








Microbiology





Microbiology


1/12/20 Urine Culture - Final, Complete


          Proteus Mirabilis











MELVIN CERDA MD             Jan 17, 2020 12:12

## 2020-01-18 VITALS — DIASTOLIC BLOOD PRESSURE: 78 MMHG | SYSTOLIC BLOOD PRESSURE: 115 MMHG

## 2020-01-18 VITALS — SYSTOLIC BLOOD PRESSURE: 134 MMHG | DIASTOLIC BLOOD PRESSURE: 67 MMHG

## 2020-01-18 VITALS — DIASTOLIC BLOOD PRESSURE: 70 MMHG | SYSTOLIC BLOOD PRESSURE: 143 MMHG

## 2020-01-18 RX ADMIN — MIRTAZAPINE SCH MG: 15 TABLET, FILM COATED ORAL at 22:03

## 2020-01-18 RX ADMIN — CARBIDOPA AND LEVODOPA SCH TAB: 25; 100 TABLET ORAL at 18:30

## 2020-01-18 RX ADMIN — PANTOPRAZOLE SODIUM SCH MG: 40 TABLET, DELAYED RELEASE ORAL at 09:39

## 2020-01-18 RX ADMIN — PANTOPRAZOLE SODIUM SCH MG: 40 TABLET, DELAYED RELEASE ORAL at 22:04

## 2020-01-18 RX ADMIN — CARBIDOPA AND LEVODOPA SCH TAB: 25; 100 TABLET ORAL at 05:44

## 2020-01-18 RX ADMIN — METOPROLOL TARTRATE SCH MG: 25 TABLET, FILM COATED ORAL at 09:40

## 2020-01-18 RX ADMIN — NYSTATIN SCH DOSE: 100000 CREAM TOPICAL at 22:02

## 2020-01-18 RX ADMIN — CLOPIDOGREL BISULFATE SCH MG: 75 TABLET ORAL at 09:39

## 2020-01-18 RX ADMIN — TAMSULOSIN HYDROCHLORIDE SCH MG: 0.4 CAPSULE ORAL at 22:03

## 2020-01-18 RX ADMIN — Medication SCH EA: at 09:00

## 2020-01-18 RX ADMIN — ASPIRIN SCH MG: 81 TABLET ORAL at 09:39

## 2020-01-18 RX ADMIN — FERROUS SULFATE TAB 325 MG (65 MG ELEMENTAL FE) SCH MG: 325 (65 FE) TAB at 09:39

## 2020-01-18 RX ADMIN — CEFUROXIME AXETIL SCH MG: 500 TABLET ORAL at 22:03

## 2020-01-18 RX ADMIN — SODIUM CHLORIDE SCH UNITS: 4.5 INJECTION, SOLUTION INTRAVENOUS at 22:02

## 2020-01-18 RX ADMIN — SODIUM CHLORIDE SCH UNITS: 4.5 INJECTION, SOLUTION INTRAVENOUS at 09:39

## 2020-01-18 RX ADMIN — FERROUS SULFATE TAB 325 MG (65 MG ELEMENTAL FE) SCH MG: 325 (65 FE) TAB at 22:04

## 2020-01-18 RX ADMIN — METOPROLOL TARTRATE SCH MG: 25 TABLET, FILM COATED ORAL at 22:03

## 2020-01-18 RX ADMIN — CEFUROXIME AXETIL SCH MG: 500 TABLET ORAL at 09:39

## 2020-01-18 NOTE — IPNPDOC
Subjective


Date Seen


The patient was seen on 1/18/20.





Subjective


Chief Complaint/HPI


Ed is resting in bed, denies any issues and is awaiting placement on monday.





Objective


Physical Examination


General Exam:  Positive: No Acute Distress


Eye Exam:  Negative: Sclera icteric


ENT Exam:  Positive: Mucous membr. moist/pink


Neck Exam:  Positive: Supple; 


   Negative: JVD


Chest Exam:  Positive: Clear to auscultation


Heart Exam:  Positive: Rate Normal


Abdomen Exam:  Positive: Normal bowel sounds, Soft


Extremity Exam:  Negative: Clubbing, Cyanosis, Edema (trace pitting edema on 

vein harvest leg)


Skin Exam:  Positive: Nl turgor and temperature


Neuro Exam:  Positive: Normal Speech


Psych Exam:  Positive: Mood NL





Assessment /Plan


Assessment


# R UVJ hydroureteronephrosis


- renal ULS results reviewed, has mild to moderate right hydro unchanged from CT

scan


- D/w Dr. Sneed, will have him f/u as outpt





# CALLUM on CKD stage 3 


- review of past labs shows creatinine baseline 1.5-2.5


- creat remains within his historical range, and is improved


- avoid nephrotoxins





# Metabolic encephalopathy in the setting of Proteus M. UTI


- improving


- ceftin 500 mg bid day # 5/5





# Non-ruptured AAA 5 cm


- unchanged in size





# Chronic diastolic CHF


- currently compensated


- cxr reviewed and stable





# CAD


- stable





# Dispo: awaiting placement





Plan/VTE


VTE Prophylaxis Ordered?:  Yes (hep sq bid)


VTE Exclusion Mechanical Proph:  N/A:VTE Prophy Ordered


VTE Exclusion Pharmacological:  N/A:VTE Prophy Ordered





VS, I&O, 24H, Fishbone


Vital Signs/I&O





Vital Signs








  Date Time  Temp Pulse Resp B/P (MAP) Pulse Ox O2 Delivery O2 Flow Rate FiO2


 


1/18/20 09:40  83  145/68    


 


1/18/20 06:00 98.0  21  94 Room Air  


 


1/16/20 23:59        














I&O- Last 24 Hours up to 6 AM 


 


 1/18/20





 06:00


 


Intake Total 1137 ml


 


Output Total 450 ml


 


Balance 687 ml











Laboratory Data


Microbiology





Microbiology


1/12/20 Urine Culture - Final, Complete


          Proteus Mirabilis











MELVIN CERDA MD             Jan 18, 2020 12:12

## 2020-01-19 VITALS — SYSTOLIC BLOOD PRESSURE: 129 MMHG | DIASTOLIC BLOOD PRESSURE: 76 MMHG

## 2020-01-19 VITALS — SYSTOLIC BLOOD PRESSURE: 106 MMHG | DIASTOLIC BLOOD PRESSURE: 60 MMHG

## 2020-01-19 LAB
BUN SERPL-MCNC: 28 MG/DL (ref 7–18)
CALCIUM SERPL-MCNC: 8.7 MG/DL (ref 8.8–10.2)
CHLORIDE SERPL-SCNC: 107 MEQ/L (ref 98–107)
CO2 SERPL-SCNC: 25 MEQ/L (ref 21–32)
CREAT SERPL-MCNC: 1.56 MG/DL (ref 0.7–1.3)
GFR SERPL CREATININE-BSD FRML MDRD: 45.5 ML/MIN/{1.73_M2} (ref 35–?)
GLUCOSE SERPL-MCNC: 85 MG/DL (ref 70–100)
HCT VFR BLD AUTO: 27.4 % (ref 42–52)
HGB BLD-MCNC: 8.7 G/DL (ref 13.5–17.5)
MCH RBC QN AUTO: 33.7 PG (ref 27–33)
MCHC RBC AUTO-ENTMCNC: 31.8 G/DL (ref 32–36.5)
MCV RBC AUTO: 106.2 FL (ref 80–96)
PLATELET # BLD AUTO: 219 10^3/UL (ref 150–450)
POTASSIUM SERPL-SCNC: 4.9 MEQ/L (ref 3.5–5.1)
RBC # BLD AUTO: 2.58 10^6/UL (ref 4.3–6.1)
SODIUM SERPL-SCNC: 140 MEQ/L (ref 136–145)
WBC # BLD AUTO: 6 10^3/UL (ref 4–10)

## 2020-01-19 RX ADMIN — SODIUM CHLORIDE SCH UNITS: 4.5 INJECTION, SOLUTION INTRAVENOUS at 08:44

## 2020-01-19 RX ADMIN — CARBIDOPA AND LEVODOPA SCH TAB: 25; 100 TABLET ORAL at 17:20

## 2020-01-19 RX ADMIN — CLOPIDOGREL BISULFATE SCH MG: 75 TABLET ORAL at 08:44

## 2020-01-19 RX ADMIN — FERROUS SULFATE TAB 325 MG (65 MG ELEMENTAL FE) SCH MG: 325 (65 FE) TAB at 08:44

## 2020-01-19 RX ADMIN — PANTOPRAZOLE SODIUM SCH MG: 40 TABLET, DELAYED RELEASE ORAL at 21:15

## 2020-01-19 RX ADMIN — METOPROLOL TARTRATE SCH MG: 25 TABLET, FILM COATED ORAL at 08:42

## 2020-01-19 RX ADMIN — CARBIDOPA AND LEVODOPA SCH TAB: 25; 100 TABLET ORAL at 06:05

## 2020-01-19 RX ADMIN — ASPIRIN SCH MG: 81 TABLET ORAL at 08:44

## 2020-01-19 RX ADMIN — NYSTATIN SCH DOSE: 100000 CREAM TOPICAL at 08:44

## 2020-01-19 RX ADMIN — PANTOPRAZOLE SODIUM SCH MG: 40 TABLET, DELAYED RELEASE ORAL at 08:43

## 2020-01-19 RX ADMIN — FERROUS SULFATE TAB 325 MG (65 MG ELEMENTAL FE) SCH MG: 325 (65 FE) TAB at 21:16

## 2020-01-19 RX ADMIN — NYSTATIN SCH DOSE: 100000 CREAM TOPICAL at 21:16

## 2020-01-19 RX ADMIN — MIRTAZAPINE SCH MG: 15 TABLET, FILM COATED ORAL at 21:15

## 2020-01-19 RX ADMIN — TAMSULOSIN HYDROCHLORIDE SCH MG: 0.4 CAPSULE ORAL at 21:15

## 2020-01-19 RX ADMIN — METOPROLOL TARTRATE SCH MG: 25 TABLET, FILM COATED ORAL at 21:15

## 2020-01-19 RX ADMIN — Medication SCH EA: at 09:00

## 2020-01-19 RX ADMIN — SODIUM CHLORIDE SCH UNITS: 4.5 INJECTION, SOLUTION INTRAVENOUS at 21:16

## 2020-01-19 NOTE — IPNPDOC
Subjective


Date Seen


The patient was seen on 1/19/20.





Subjective


Chief Complaint/HPI


Edjody is well this morning, he's sitting in the chair eating breakfast.  

Awaiting placement in am.





Objective


Physical Examination


General Exam:  Positive: No Acute Distress


Eye Exam:  Negative: Sclera icteric


ENT Exam:  Positive: Mucous membr. moist/pink


Neck Exam:  Positive: Supple; 


   Negative: JVD


Chest Exam:  Positive: Clear to auscultation


Heart Exam:  Positive: Rate Normal


Abdomen Exam:  Positive: Normal bowel sounds, Soft


Extremity Exam:  Negative: Clubbing, Cyanosis, Edema (trace pitting edema on 

vein harvest leg)


Skin Exam:  Negative: Rash


Neuro Exam:  Positive: Normal Speech


Psych Exam:  Positive: Mood NL





Assessment /Plan


Assessment


# R UVJ hydroureteronephrosis


- renal ULS results reviewed, has mild to moderate right hydro unchanged from CT

scan


- D/w Dr. Sneed, will have him f/u as outpt





# CALLUM on CKD stage 3 


- review of past labs shows creatinine baseline 1.5-2.5


- creat stable this morning


- avoid nephrotoxins





# Metabolic encephalopathy in the setting of Proteus M. UTI


- improving


- completed ceftin 500 mg bid day # 5/5





# Non-ruptured AAA 5 cm


- unchanged in size





# Chronic diastolic CHF


- currently compensated


- cxr reviewed and stable





# CAD


- stable





# Dispo: awaiting placement





Plan/VTE


VTE Prophylaxis Ordered?:  Yes (hep sq bid)


VTE Exclusion Mechanical Proph:  N/A:VTE Prophy Ordered


VTE Exclusion Pharmacological:  N/A:VTE Prophy Ordered





VS, I&O, 24H, Fishbone


Vital Signs/I&O





Vital Signs








  Date Time  Temp Pulse Resp B/P (MAP) Pulse Ox O2 Delivery O2 Flow Rate FiO2


 


1/19/20 08:42  89  141/71    


 


1/19/20 06:00 97.5  18  92 Room Air  


 


1/16/20 23:59        














I&O- Last 24 Hours up to 6 AM 


 


 1/19/20





 06:00


 


Intake Total 440 ml


 


Output Total 0 ml


 


Balance 440 ml











Laboratory Data


24H LABS


Laboratory Tests 2


1/19/20 05:32: 


Nucleated Red Blood Cells % (auto) 0.0, Anion Gap 8, Glomerular Filtration Rate 

45.5, Calcium Level 8.7L


CBC/BMP


Laboratory Tests


1/19/20 05:32








Microbiology





Microbiology


1/12/20 Urine Culture - Final, Complete


          Proteus Mirabilis











MELVIN CERDA MD             Jan 19, 2020 09:23

## 2020-01-20 ENCOUNTER — HOSPITAL ENCOUNTER (INPATIENT)
Dept: HOSPITAL 53 - M PM&R | Age: 85
LOS: 8 days | Discharge: HOME HEALTH | DRG: 57 | End: 2020-01-28
Attending: PHYSICAL MEDICINE & REHABILITATION | Admitting: PHYSICAL MEDICINE & REHABILITATION
Payer: MEDICARE

## 2020-01-20 VITALS — DIASTOLIC BLOOD PRESSURE: 70 MMHG | SYSTOLIC BLOOD PRESSURE: 133 MMHG

## 2020-01-20 VITALS — DIASTOLIC BLOOD PRESSURE: 52 MMHG | SYSTOLIC BLOOD PRESSURE: 111 MMHG

## 2020-01-20 VITALS — SYSTOLIC BLOOD PRESSURE: 140 MMHG | DIASTOLIC BLOOD PRESSURE: 71 MMHG

## 2020-01-20 VITALS — BODY MASS INDEX: 25.41 KG/M2 | WEIGHT: 177.47 LBS | HEIGHT: 70 IN

## 2020-01-20 VITALS — SYSTOLIC BLOOD PRESSURE: 120 MMHG | DIASTOLIC BLOOD PRESSURE: 59 MMHG

## 2020-01-20 VITALS — DIASTOLIC BLOOD PRESSURE: 63 MMHG | SYSTOLIC BLOOD PRESSURE: 132 MMHG

## 2020-01-20 DIAGNOSIS — Z79.82: ICD-10-CM

## 2020-01-20 DIAGNOSIS — N18.3: ICD-10-CM

## 2020-01-20 DIAGNOSIS — F32.9: ICD-10-CM

## 2020-01-20 DIAGNOSIS — R53.1: ICD-10-CM

## 2020-01-20 DIAGNOSIS — Z87.442: ICD-10-CM

## 2020-01-20 DIAGNOSIS — I13.0: ICD-10-CM

## 2020-01-20 DIAGNOSIS — G93.40: ICD-10-CM

## 2020-01-20 DIAGNOSIS — K44.9: ICD-10-CM

## 2020-01-20 DIAGNOSIS — Z66: ICD-10-CM

## 2020-01-20 DIAGNOSIS — I50.32: ICD-10-CM

## 2020-01-20 DIAGNOSIS — Z79.899: ICD-10-CM

## 2020-01-20 DIAGNOSIS — I25.10: ICD-10-CM

## 2020-01-20 DIAGNOSIS — G20: Primary | ICD-10-CM

## 2020-01-20 DIAGNOSIS — Z86.73: ICD-10-CM

## 2020-01-20 DIAGNOSIS — M10.9: ICD-10-CM

## 2020-01-20 DIAGNOSIS — F03.90: ICD-10-CM

## 2020-01-20 DIAGNOSIS — G47.00: ICD-10-CM

## 2020-01-20 DIAGNOSIS — R29.6: ICD-10-CM

## 2020-01-20 LAB
BUN SERPL-MCNC: 28 MG/DL (ref 7–18)
CALCIUM SERPL-MCNC: 9 MG/DL (ref 8.8–10.2)
CHLORIDE SERPL-SCNC: 108 MEQ/L (ref 98–107)
CO2 SERPL-SCNC: 26 MEQ/L (ref 21–32)
CREAT SERPL-MCNC: 1.57 MG/DL (ref 0.7–1.3)
GFR SERPL CREATININE-BSD FRML MDRD: 45.1 ML/MIN/{1.73_M2} (ref 35–?)
GLUCOSE SERPL-MCNC: 99 MG/DL (ref 70–100)
HCT VFR BLD AUTO: 27.9 % (ref 42–52)
HGB BLD-MCNC: 8.5 G/DL (ref 13.5–17.5)
MCH RBC QN AUTO: 32.8 PG (ref 27–33)
MCHC RBC AUTO-ENTMCNC: 30.5 G/DL (ref 32–36.5)
MCV RBC AUTO: 107.7 FL (ref 80–96)
PLATELET # BLD AUTO: 220 10^3/UL (ref 150–450)
POTASSIUM SERPL-SCNC: 4.6 MEQ/L (ref 3.5–5.1)
RBC # BLD AUTO: 2.59 10^6/UL (ref 4.3–6.1)
SODIUM SERPL-SCNC: 141 MEQ/L (ref 136–145)
WBC # BLD AUTO: 5.5 10^3/UL (ref 4–10)

## 2020-01-20 RX ADMIN — PANTOPRAZOLE SODIUM SCH MG: 40 TABLET, DELAYED RELEASE ORAL at 20:56

## 2020-01-20 RX ADMIN — WHITE PETROLATUM SCH DOSE: 57; 17 PASTE TOPICAL at 21:03

## 2020-01-20 RX ADMIN — FERROUS SULFATE TAB 325 MG (65 MG ELEMENTAL FE) SCH MG: 325 (65 FE) TAB at 20:56

## 2020-01-20 RX ADMIN — NYSTATIN SCH DOSE: 100000 CREAM TOPICAL at 09:48

## 2020-01-20 RX ADMIN — METOPROLOL TARTRATE SCH MG: 25 TABLET, FILM COATED ORAL at 09:48

## 2020-01-20 RX ADMIN — IPRATROPIUM BROMIDE AND ALBUTEROL SULFATE SCH ML: .5; 3 SOLUTION RESPIRATORY (INHALATION) at 19:29

## 2020-01-20 RX ADMIN — WHITE PETROLATUM SCH DOSE: 57; 17 PASTE TOPICAL at 17:14

## 2020-01-20 RX ADMIN — FERROUS SULFATE TAB 325 MG (65 MG ELEMENTAL FE) SCH MG: 325 (65 FE) TAB at 09:48

## 2020-01-20 RX ADMIN — DOCUSATE SODIUM SCH MG: 100 CAPSULE, LIQUID FILLED ORAL at 20:56

## 2020-01-20 RX ADMIN — CARBIDOPA AND LEVODOPA SCH TAB: 25; 100 TABLET ORAL at 05:57

## 2020-01-20 RX ADMIN — CLOPIDOGREL BISULFATE SCH MG: 75 TABLET ORAL at 09:48

## 2020-01-20 RX ADMIN — METOPROLOL TARTRATE SCH MG: 25 TABLET, FILM COATED ORAL at 20:56

## 2020-01-20 RX ADMIN — CARBIDOPA AND LEVODOPA SCH TAB: 25; 100 TABLET ORAL at 17:14

## 2020-01-20 RX ADMIN — PANTOPRAZOLE SODIUM SCH MG: 40 TABLET, DELAYED RELEASE ORAL at 09:48

## 2020-01-20 RX ADMIN — NYSTATIN SCH DOSE: 100000 CREAM TOPICAL at 20:55

## 2020-01-20 RX ADMIN — SODIUM CHLORIDE SCH UNITS: 4.5 INJECTION, SOLUTION INTRAVENOUS at 09:47

## 2020-01-20 RX ADMIN — ASPIRIN SCH MG: 81 TABLET ORAL at 09:47

## 2020-01-20 RX ADMIN — SODIUM CHLORIDE SCH UNITS: 4.5 INJECTION, SOLUTION INTRAVENOUS at 20:55

## 2020-01-20 RX ADMIN — MIRTAZAPINE SCH MG: 15 TABLET, FILM COATED ORAL at 20:56

## 2020-01-20 RX ADMIN — TAMSULOSIN HYDROCHLORIDE SCH MG: 0.4 CAPSULE ORAL at 20:56

## 2020-01-20 RX ADMIN — ACETAMINOPHEN PRN MG: 325 TABLET ORAL at 20:56

## 2020-01-20 NOTE — IPNPDOC
Subjective


Date Seen


The patient was seen on 1/20/20.





Subjective


Chief Complaint/HPI


Remains stable in no distress, not confused.  Family at bedside this am.  No 

events overnight.





Objective


Physical Examination


General Exam:  Positive: No Acute Distress


Eye Exam:  Negative: Sclera icteric


ENT Exam:  Positive: Mucous membr. moist/pink


Neck Exam:  Positive: Supple; 


   Negative: JVD


Chest Exam:  Positive: Clear to auscultation


Heart Exam:  Positive: Rate Normal


Abdomen Exam:  Positive: Normal bowel sounds, Soft


Extremity Exam:  Negative: Clubbing, Cyanosis, Edema (trace pitting edema on 

vein harvest leg)


Skin Exam:  Negative: Rash


Neuro Exam:  Positive: Normal Speech


Psych Exam:  Positive: Mood NL





Assessment /Plan


Assessment


# R UVJ hydroureteronephrosis


- renal ULS results reviewed, has mild to moderate right hydro unchanged from CT

scan


- D/w Dr. Sneed, will have him f/u as outpt


- would repeat ULS in 1 week to document resolution of Hydronephrosis





# CALLUM on CKD stage 3 


- review of past labs shows creatinine baseline 1.5-2.5


- creat stable this morning


- avoid nephrotoxins





# Metabolic encephalopathy in the setting of Proteus M. UTI


- improved


- completed ceftin 500 mg bid day # 5/5





# Non-ruptured AAA 5 cm


- unchanged in size





# Chronic diastolic CHF


- currently compensated


- cxr reviewed and stable





# CAD


- stable





# Dispo: ARU today





Plan/VTE


VTE Prophylaxis Ordered?:  Yes (hep sq bid)


VTE Exclusion Mechanical Proph:  N/A:VTE Prophy Ordered


VTE Exclusion Pharmacological:  N/A:VTE Prophy Ordered





VS, I&O, 24H, Fishbone


Vital Signs/I&O





Vital Signs








  Date Time  Temp Pulse Resp B/P (MAP) Pulse Ox O2 Delivery O2 Flow Rate FiO2


 


1/20/20 06:00 98.3 74 18 111/52 (71) 89 Room Air  


 


1/16/20 23:59        














I&O- Last 24 Hours up to 6 AM 


 


 1/20/20





 06:00


 


Intake Total 820 ml


 


Output Total 75 ml


 


Balance 745 ml











Laboratory Data


24H LABS


Laboratory Tests 2


1/20/20 05:38: 


Nucleated Red Blood Cells % (auto) 0.0, Anion Gap 7L, Glomerular Filtration Rate

45.1, Calcium Level 9.0


CBC/BMP


Laboratory Tests


1/20/20 05:38








Microbiology





Microbiology


1/12/20 Urine Culture - Final, Complete


          Proteus Mirabilis











MELVIN CERDA MD             Jan 20, 2020 09:39

## 2020-01-20 NOTE — HPEPDOC
Physiatrist Note


DATE OF ADMISSION: 1-20-20





DATE OF SERVICE: 1-20-20





TIME OF ADMISSION: Please refer to physician's admission order.





SOURCE OF ADMISSION INFORMATION: [facility] records and patient





CHIEF COMPLAINT: UTI with encephalopathy in setting of parkinsons





HISTORY OF PRESENT ILLNESS: 


84M pmh Parkinson Disease, HTN, AAA s/p grafting, CVA, CKD3, history of falls, 

gout, large hiatal hernia,  nephrolithiasis, diastolic CHF, CAD who presented to

[facility] ED on 1-11-20 with right flank pain and abdominal pain with visual 

hallucinations. CT abdomen showed, 3 mm calculus at the right ureterovesical 

junction causing moderate right hydroureteronephrosis.  There are three 

subcentimeter intrarenal calculi in the left.  In addition exophytic nodule of 

the upper pole of the left kidney measures 1.4 cm in maximum diameter and  

renal US showed mild-moderate right hydronephrosis. He was evaluated by urology 

who recommended IVF and repeat LABOY in a week with outpatient follow-up. He was 

treated for a proteus UTI thought to be the underlying cause of his 

encephalopathy, had impairments in mobility below his prior level of function 

and deemed medically appropriate for discharge to ARU on 1-20-20.








REVIEW OF SYSTEMS: The following is a completed review of systems and has been 

reviewed. Review of systems otherwise unremarkable.


PAIN: Patient self reports no pain


EYES: No recent vision changes


EARS, NOSE, & THROAT: No throat pain, or dysphagia, or rhinorrhea


CARDIOVASCULAR: Denies chest pain or palpitations


PULMONARY: Denies shortness of breath


GASTROINTESTINAL: Denies constipation/diarrhea.


GENITOURINARY: denies dysuria


MUSCULOSKELETAL: generalized weakness


NEUROLOGICAL:+parkinsons


HEMATOLOGICAL: denies easy bruising


SKIN: denies rash 


PSYCHIATRIC:+ confusion


All other review of systems found to be negative.





PAST MEDICAL HISTORY:


as per HPI





PAST SURGICAL HISTORY:


As per HPI, and appendectomy, right inguinal hernia repair, fem-pop bypass





ALLERGIES: Please see below.





MEDICATIONS: Please see below.





FAMILY HISTORY: HTN, CAD, lung cancer





SOCIAL HISTORY:no etoh, illicit drugs, or smoking





DIET: low salt, fluid restrict 1800cc





PHYSICAL EXAMINATION:


VITAL SIGNS: Please see below.


GENERAL: Pleasant and cooperative. No acute distress. 


HEENT: PERRL. Extraocular movements intact. Clear conjunctiva


CARDIOVASCULAR: Regular rate and rhythm. No murmurs, rubs, or gallops


LUNGS: Clear to auscultation bilaterally. No wheezes. No rhonchi


ABDOMEN: Soft, nontender, nondistended. Positive bowel sounds. Normal active 

bowel sounds


NEUROLOGICAL: Alert and oriented to self only, able to follow commands 

consistently, +dysarthria  Cranial nerves II through XII grossly intact. 

Sensation grossly intact


EXTREMITIES: 5\5 strength bilateral upper extremities. 5\5 strength right lower 

extremity. 5/5 strength in left lower extremity. 





SKIN: no rash





LABORATORY DATA: Please see below.





IMAGING:Imaging documentation personally reviewed by record





FUNCTIONAL STATUS: 


Premorbid: Independent with all activities of daily life as well as mobility


On Admission: 


Minimum assistance for dressing, bed chair and wheelchair transfers, toilet 

transfers, ambulation x 84 feet





GOALS: Mod-I for dressing, bed chair and wheelchair transfers, toilet transfers,

ambulation x community distances, dynamic balance training, fall recovery, 

medical optimization, assess for DMEs.





ASSESSMENT:84-year-old M with past medical history of Parkinsons who presents 

status post UTI and encephalopathy.





PLAN:


1. Rehab- PT/OT advance gait and ADLs, dynamic balance training, fall recovery, 

consider LSVT big program 


2. Neuro: hx of parkinsons with recurrent falls and worsening weakness in 

setting of UTI


-c/u Sinemet BID dosing, will consider adjusting while on ARU


-recent encephalopathy, monitor and avoid delirogenic meds


3. Cardiac: hx of diastolic CHF will fluid restrict, daily weights


-Htn c/u Amlodipine


-CAD c/u ASa, Plavix and Metoprolol, medicine consulted to assist in management


4. Resp: encourage incentive spirometry, Albuterol nebs prn


5. Renal: CKD 3, monitor for CALLUM


6. : s/p course of antibiotics for proteus UTI, patient with known kidney 

stone and hydronephrosis, will repeat renal US in one week and obtain inhouse 

urology consult if necessary, otherwise will need to f/u outpatient


-c/u flomax


7. GI ppx: protonix BID


-hx of large hiatal hernia 


8. Psych- insomnia/possible depression? c/u Remeron 


9. DVT ppx: heparin and TEDs


10. Dispo: TBD





POST ADMISSION PHYSICIAN EVALUATION: 


Medical and functional status: Description of medical status, medical assessme

nt: As above. Rehabilitation diagnosis and current and prior cold morbid medical

conditions as above. Risk of complications and plans to mitigate them as above. 

Description of functional status current status is as above. Prior status as 

above.


Status compared to preadmission: There are no clinically significant differences

between the patient's current status and the information described on the 

preadmission screening document.


Treatment plan anticipated: Treatment plan is as described above. Required 

disciplines including physical therapy, occupational therapy, others as noted 

above


Intensity of services: 3 hours a day, 6 days a week. 


Special considerations: There are no specific special or safety considerations 

that would likely preclude immediate implementation of an intensive devora

abilitation program or subsequently influence the plan of care.





ATTESTATION: Considering all the information above, it is my best judgment that 

this patient requires intensive rehabilitation therapy as described above and an

inpatient hospital environment due to the complexity of nursing, medical, and 

rehabilitation needs required by the patient. Furthermore, this patient can reas

onably be expected to participate in an benefit from an inpatient rehabilitation

stay with an interdisciplinary team approach to the delivery of rehabilitation 

care under the direction and supervision of rehabilitation physician





PROGNOSIS: Good 





ESTIMATED LENGTH OF STAY:18-21 days.





PROJECTED DISCHARGE DESTINATION: Home with family support and any durable 

medical equipment required to increase functional safety and mobility.





TIME SPENT COUNSELING AND COORDINATING INITIAL CARE: Greater than 70 minutes.


Vital Signs





Vital Sign - Last 24 Hours








 1/20/20





 11:25


 


Temp 98.2


 


Pulse 79


 


Resp 18


 


B/P (MAP) 133/70 (91)


 


Pulse Ox 96


 


O2 Delivery Room Air











Home Medications


Scheduled


Acetaminophen (Tylenol Extra Strength) 500 Mg Tablet, 1,000 MG PO BID, 

(Reported)


Amlodipine Besylate (Amlodipine Besylate) 10 Mg Tablet, 10 MG PO DAILY


Aspirin (Aspirin EC) 81 Mg Tab, 81 MG PO DAILY, (Reported)


Carbidopa/Levodopa (Sinemet  mg Tablet) 1 Each Tablet, 1 TAB PO BID, 

(Reported)


Clopidogrel Bisulfate (Clopidogrel) 75 Mg Tablet, 75 MG PO DAILY, (Reported)


Ferrous Sulfate (Ferrous Sulfate) 325 Mg Tablet, 325 MG PO BID, (Reported)


Magnesium Oxide (Magnesium) 400 Mg Cap, 400 MG PO DAILY, (Reported)


Metoprolol Tartrate (Metoprolol Tartrate) 25 Mg Tablet, 12.5 MG PO BID


Mirtazapine (Remeron) 15 Mg Tablet, 15 MG PO QHS, (Reported)


Pantoprazole Sodium (Pantoprazole Sodium) 40 Mg Tablet., 40 MG PO BID, 

(Reported)


Tamsulosin HCl (Flomax) 0.4 Mg Capsule, 0.4 MG PO QPM, (Reported)





Allergies


Coded Allergies:  


     No Known Drug Allergies (Verified  Allergy, Unknown, 9/22/19)





A-FIB/CHADSVASC


A-FIB History


Current/History of A-Fib/PAF?:  No











BROOKE CARTER MD         Jan 20, 2020 13:55

## 2020-01-21 VITALS — DIASTOLIC BLOOD PRESSURE: 79 MMHG | SYSTOLIC BLOOD PRESSURE: 124 MMHG

## 2020-01-21 VITALS — DIASTOLIC BLOOD PRESSURE: 74 MMHG | SYSTOLIC BLOOD PRESSURE: 138 MMHG

## 2020-01-21 VITALS — SYSTOLIC BLOOD PRESSURE: 122 MMHG | DIASTOLIC BLOOD PRESSURE: 59 MMHG

## 2020-01-21 LAB
ALBUMIN SERPL BCG-MCNC: 2.7 GM/DL (ref 3.2–5.2)
ALT SERPL W P-5'-P-CCNC: 17 U/L (ref 12–78)
BASOPHILS # BLD AUTO: 0.1 10^3/UL (ref 0–0.2)
BASOPHILS NFR BLD AUTO: 1.2 % (ref 0–1)
BILIRUB SERPL-MCNC: 0.9 MG/DL (ref 0.2–1)
BUN SERPL-MCNC: 24 MG/DL (ref 7–18)
CALCIUM SERPL-MCNC: 9 MG/DL (ref 8.8–10.2)
CHLORIDE SERPL-SCNC: 107 MEQ/L (ref 98–107)
CO2 SERPL-SCNC: 25 MEQ/L (ref 21–32)
CREAT SERPL-MCNC: 1.59 MG/DL (ref 0.7–1.3)
EOSINOPHIL # BLD AUTO: 0.1 10^3/UL (ref 0–0.5)
EOSINOPHIL NFR BLD AUTO: 1.7 % (ref 0–3)
GFR SERPL CREATININE-BSD FRML MDRD: 44.5 ML/MIN/{1.73_M2} (ref 35–?)
GLUCOSE SERPL-MCNC: 92 MG/DL (ref 70–100)
HCT VFR BLD AUTO: 29 % (ref 42–52)
HGB BLD-MCNC: 8.8 G/DL (ref 13.5–17.5)
LYMPHOCYTES # BLD AUTO: 0.7 10^3/UL (ref 1.5–5)
LYMPHOCYTES NFR BLD AUTO: 11.1 % (ref 24–44)
MCH RBC QN AUTO: 32.7 PG (ref 27–33)
MCHC RBC AUTO-ENTMCNC: 30.3 G/DL (ref 32–36.5)
MCV RBC AUTO: 107.8 FL (ref 80–96)
MONOCYTES # BLD AUTO: 0.8 10^3/UL (ref 0–0.8)
MONOCYTES NFR BLD AUTO: 11.5 % (ref 0–5)
NEUTROPHILS # BLD AUTO: 4.7 10^3/UL (ref 1.5–8.5)
NEUTROPHILS NFR BLD AUTO: 71.5 % (ref 36–66)
PLATELET # BLD AUTO: 236 10^3/UL (ref 150–450)
POTASSIUM SERPL-SCNC: 4.7 MEQ/L (ref 3.5–5.1)
PROT SERPL-MCNC: 7.1 GM/DL (ref 6.4–8.2)
RBC # BLD AUTO: 2.69 10^6/UL (ref 4.3–6.1)
SODIUM SERPL-SCNC: 139 MEQ/L (ref 136–145)
WBC # BLD AUTO: 6.6 10^3/UL (ref 4–10)

## 2020-01-21 RX ADMIN — CARBIDOPA AND LEVODOPA SCH TAB: 25; 100 TABLET ORAL at 16:57

## 2020-01-21 RX ADMIN — SODIUM CHLORIDE SCH UNITS: 4.5 INJECTION, SOLUTION INTRAVENOUS at 20:04

## 2020-01-21 RX ADMIN — METOPROLOL TARTRATE SCH MG: 25 TABLET, FILM COATED ORAL at 20:04

## 2020-01-21 RX ADMIN — TAMSULOSIN HYDROCHLORIDE SCH MG: 0.4 CAPSULE ORAL at 20:04

## 2020-01-21 RX ADMIN — IPRATROPIUM BROMIDE AND ALBUTEROL SULFATE SCH ML: .5; 3 SOLUTION RESPIRATORY (INHALATION) at 07:50

## 2020-01-21 RX ADMIN — WHITE PETROLATUM SCH DOSE: 57; 17 PASTE TOPICAL at 20:05

## 2020-01-21 RX ADMIN — WHITE PETROLATUM SCH DOSE: 57; 17 PASTE TOPICAL at 08:33

## 2020-01-21 RX ADMIN — PANTOPRAZOLE SODIUM SCH MG: 40 TABLET, DELAYED RELEASE ORAL at 08:32

## 2020-01-21 RX ADMIN — FERROUS SULFATE TAB 325 MG (65 MG ELEMENTAL FE) SCH MG: 325 (65 FE) TAB at 20:04

## 2020-01-21 RX ADMIN — DOCUSATE SODIUM SCH MG: 100 CAPSULE, LIQUID FILLED ORAL at 08:32

## 2020-01-21 RX ADMIN — METOPROLOL TARTRATE SCH MG: 25 TABLET, FILM COATED ORAL at 08:32

## 2020-01-21 RX ADMIN — PANTOPRAZOLE SODIUM SCH MG: 40 TABLET, DELAYED RELEASE ORAL at 20:04

## 2020-01-21 RX ADMIN — MIRTAZAPINE SCH MG: 15 TABLET, FILM COATED ORAL at 20:04

## 2020-01-21 RX ADMIN — WHITE PETROLATUM SCH DOSE: 57; 17 PASTE TOPICAL at 16:00

## 2020-01-21 RX ADMIN — ACETAMINOPHEN PRN MG: 325 TABLET ORAL at 20:04

## 2020-01-21 RX ADMIN — CARBIDOPA AND LEVODOPA SCH TAB: 25; 100 TABLET ORAL at 05:42

## 2020-01-21 RX ADMIN — NYSTATIN SCH DOSE: 100000 CREAM TOPICAL at 20:05

## 2020-01-21 RX ADMIN — IPRATROPIUM BROMIDE AND ALBUTEROL SULFATE SCH ML: .5; 3 SOLUTION RESPIRATORY (INHALATION) at 13:41

## 2020-01-21 RX ADMIN — FERROUS SULFATE TAB 325 MG (65 MG ELEMENTAL FE) SCH MG: 325 (65 FE) TAB at 08:32

## 2020-01-21 RX ADMIN — CLOPIDOGREL BISULFATE SCH MG: 75 TABLET ORAL at 08:32

## 2020-01-21 RX ADMIN — NYSTATIN SCH DOSE: 100000 CREAM TOPICAL at 08:33

## 2020-01-21 RX ADMIN — IPRATROPIUM BROMIDE AND ALBUTEROL SULFATE SCH ML: .5; 3 SOLUTION RESPIRATORY (INHALATION) at 20:00

## 2020-01-21 RX ADMIN — ASPIRIN SCH MG: 81 TABLET ORAL at 08:32

## 2020-01-21 RX ADMIN — SODIUM CHLORIDE SCH UNITS: 4.5 INJECTION, SOLUTION INTRAVENOUS at 08:33

## 2020-01-21 NOTE — IPNPDOC
PM&R Progress Note


DATE OF SERVICE:  Jan 21, 2020


Physiatrist Progress Note


Subjective:





Patient reporting he is feeling well and able to do stairs with ease. He denies 

having any pain. He does report loose stools. 





REVIEW OF SYSTEMS: The following is a completed review of systems and has been 

reviewed. Review of systems otherwise unremarkable.


PAIN: Patient self reports no pain


EYES: No recent vision changes


EARS, NOSE, & THROAT: No throat pain, or dysphagia, or rhinorrhea


CARDIOVASCULAR: Denies chest pain or palpitations


PULMONARY: Denies shortness of breath


GASTROINTESTINAL: +loose stools


GENITOURINARY: denies dysuria


MUSCULOSKELETAL: generalized weakness


NEUROLOGICAL:+parkinsons


HEMATOLOGICAL: denies easy bruising


SKIN: denies rash 


PSYCHIATRIC:+ confusion


All other review of systems found to be negative.











PHYSICAL EXAMINATION:


VITAL SIGNS: Please see below.


GENERAL: Pleasant and cooperative. No acute distress. 


HEENT: PERRL. Extraocular movements intact. Clear conjunctiva


CARDIOVASCULAR: Regular rate and rhythm. No murmurs, rubs, or gallops


LUNGS: Clear to auscultation bilaterally. No wheezes. No rhonchi


ABDOMEN: Soft, nontender, nondistended. Positive bowel sounds. Normal active 

bowel sounds


NEUROLOGICAL: Alert and oriented to self only, able to follow commands 

consistently, +dysarthria  Cranial nerves II through XII grossly intact. 

Sensation grossly intact


EXTREMITIES: 5\5 strength bilateral upper extremities. 5\5 strength right lower 

extremity. 5/5 strength in left lower extremity. 





SKIN: no rash











ASSESSMENT:84-year-old M with past medical history of Parkinsons who presents 

status post UTI and encephalopathy.





PLAN:


1. Rehab- PT/OT advance gait and ADLs, dynamic balance training, fall recovery, 

consider LSVT big program 


2. Neuro: hx of parkinsons with recurrent falls and worsening weakness in 

setting of UTI


-c/u Sinemet BID dosing, will consider adjusting while on ARU


-recent encephalopathy, monitor and avoid delirogenic meds


3. Cardiac: hx of diastolic CHF will fluid restrict, daily weights


-Htn c/u Amlodipine


-CAD c/u ASa, Plavix and Metoprolol, medicine consulted to assist in management


4. Resp: encourage incentive spirometry, Albuterol nebs prn


5. Renal: CKD 3, monitor for CALLUM


6. : s/p course of antibiotics for proteus UTI, patient with known kidney 

stone and hydronephrosis, will repeat renal US in one week and obtain inhouse 

urology consult if necessary, otherwise will need to f/u outpatient


-c/u flomax


7. GI ppx: protonix BID


-will d/c bowel meds and Mg-Ox for loose stools, patient has hx of C. diff, no 

concern for infection yet


-hx of large hiatal hernia 


8. Psych- insomnia/possible depression? c/u Remeron 


9. DVT ppx: heparin and TEDs


10. Dispo: TBD


Allergies


Coded Allergies:  


     No Known Drug Allergies (Verified  Allergy, Unknown, 9/22/19)





Vital Signs





Vital Signs








  Date Time  Temp Pulse Resp B/P (MAP) Pulse Ox O2 Delivery O2 Flow Rate FiO2


 


1/21/20 08:32  67  138/74    


 


1/21/20 06:00 97.9  17  97 Room Air  











Laboratory Data


CBC/BMP


Laboratory Tests


1/21/20 06:12








Labs 24H


Laboratory Tests 2


1/21/20 06:12: 


Immature Granulocyte % (Auto) 3.0, Neutrophils (%) (Auto) 71.5H, Lymphocytes (%)

(Auto) 11.1L, Monocytes (%) (Auto) 11.5H, Eosinophils (%) (Auto) 1.7, Basophils 

(%) (Auto) 1.2H, Neutrophils # (Auto) 4.7, Lymphocytes # (Auto) 0.7L, Monocytes 

# (Auto) 0.8, Eosinophils # (Auto) 0.1, Basophils # (Auto) 0.1, Nucleated Red 

Blood Cells % (auto) 0.0, Anion Gap 7L, Glomerular Filtration Rate 44.5, Calcium

Level 9.0, Total Bilirubin 0.9, Aspartate Amino Transf (AST/SGOT) 28, Alanine 

Aminotransferase (ALT/SGPT) 17, Alkaline Phosphatase 136H, Total Protein 7.1, 

Albumin 2.7L, Albumin/Globulin Ratio 0.61L





Current Medications


Current Medications





Current Medications








 Medications


  (Trade)  Dose


 Ordered  Sig/Karin


 Route


 PRN Reason  Start Time


 Stop Time Status Last Admin


Dose Admin


 


 Acetaminophen


  (Tylenol Tab)  650 mg  Q4HP  PRN


 PO


 fever/MILD PAIN (PS 1-4)  1/20/20 14:15


    1/20/20 20:56





 


 Albuterol Sulfate


  (Proventil Neb)  2.5 mg  Q6HP  PRN


 NEB


 SOB/WHEEZING  1/20/20 14:15


 1/20/20 16:05 DC  





 


 Albuterol/


 Ipratropium


  (Duoneb (Ipr


 0.5mg/Alb 2.5mg))  3 ml  RTID


 NEB


   1/20/20 14:00


     





 


 Amlodipine


 Besylate


  (Norvasc)  10 mg  DAILY


 PO


   1/21/20 09:00


    1/21/20 08:32





 


 Aspirin


  (Ecotrin)  81 mg  DAILY


 PO


   1/21/20 09:00


    1/21/20 08:32





 


 Bisacodyl


  (Dulcolax


 Suppository)  10 mg  DAILYPRN  PRN


 [place]


 CONSTIPATION  1/20/20 14:15


     





 


 Carbidopa/Levodopa


  (Sinemet 25/100)  1 tab  BID@0600,1800


 PO


   1/20/20 18:00


    1/21/20 05:42





 


 Clopidogrel


 Bisulfate


  (PLAVix)  75 mg  DAILY


 PO


   1/21/20 09:00


    1/21/20 08:32





 


 Docusate Sodium


  (Colace)  100 mg  BID


 PO


   1/20/20 21:00


    1/21/20 08:32





 


 Ferrous Sulfate


  (Ferrous Sulfate)  325 mg  BID


 PO


   1/20/20 21:00


    1/21/20 08:32





 


 Heparin Sodium


  (Porcine)


  (Heparin)  5,000 units  Q12H


 SC


   1/20/20 21:00


    1/21/20 08:33





 


 Magnesium


 Hydroxide


  (Milk Of


 Magnesia)  30 ml  DAILYPRN  PRN


 PO


 CONSTIPATION  1/20/20 14:15


     





 


 Magnesium Oxide


  (Mag-Ox)  400 mg  DAILY


 PO


   1/21/20 09:00


    1/21/20 08:32





 


 Metoprolol


 Tartrate


  (Lopressor)  12.5 mg  BID


 PO


   1/20/20 21:00


    1/21/20 08:32





 


 Mirtazapine


  (Remeron)  15 mg  QHS


 PO


   1/20/20 21:00


    1/20/20 20:56





 


 Nystatin


  (Mycostatin)  balantitis-


 apply to


 head...  BID


 TOP


   1/20/20 21:00


    1/21/20 08:33





 


 Pantoprazole


 Sodium


  (Protonix)  40 mg  BID


 PO


   1/20/20 21:00


    1/21/20 08:32





 


 Senna


  (Senokot)  1 tab  QHS


 PO


   1/20/20 21:00


    1/20/20 20:56





 


 Tamsulosin HCl


  (Flomax)  0.4 mg  QHS


 PO


   1/20/20 21:00


    1/20/20 20:56




















BROOKE CARTER MD         Jan 21, 2020 11:21

## 2020-01-22 VITALS — SYSTOLIC BLOOD PRESSURE: 128 MMHG | DIASTOLIC BLOOD PRESSURE: 67 MMHG

## 2020-01-22 VITALS — DIASTOLIC BLOOD PRESSURE: 72 MMHG | SYSTOLIC BLOOD PRESSURE: 137 MMHG

## 2020-01-22 VITALS — SYSTOLIC BLOOD PRESSURE: 130 MMHG | DIASTOLIC BLOOD PRESSURE: 70 MMHG

## 2020-01-22 LAB
ANISOCYTOSIS BLD QL SMEAR: (no result)
BASOPHILS NFR BLD MANUAL: 3 % (ref 0–1)
BUN SERPL-MCNC: 27 MG/DL (ref 7–18)
CALCIUM SERPL-MCNC: 9.1 MG/DL (ref 8.8–10.2)
CHLORIDE SERPL-SCNC: 108 MEQ/L (ref 98–107)
CO2 SERPL-SCNC: 28 MEQ/L (ref 21–32)
CREAT SERPL-MCNC: 1.63 MG/DL (ref 0.7–1.3)
EOSINOPHIL NFR BLD MANUAL: 4 % (ref 0–3)
GFR SERPL CREATININE-BSD FRML MDRD: 43.2 ML/MIN/{1.73_M2} (ref 35–?)
GLUCOSE SERPL-MCNC: 89 MG/DL (ref 70–100)
HCT VFR BLD AUTO: 27.6 % (ref 42–52)
HGB BLD-MCNC: 8.5 G/DL (ref 13.5–17.5)
LYMPHOCYTES NFR BLD MANUAL: 12 % (ref 16–44)
MACROCYTES BLD QL SMEAR: (no result)
MCH RBC QN AUTO: 33.2 PG (ref 27–33)
MCHC RBC AUTO-ENTMCNC: 30.8 G/DL (ref 32–36.5)
MCV RBC AUTO: 107.8 FL (ref 80–96)
MONOCYTES NFR BLD MANUAL: 8 % (ref 0–5)
MYELOBLASTS NFR BLD MANUAL: 2 % (ref 0–0)
NEUTROPHILS NFR BLD MANUAL: 71 % (ref 28–66)
PLATELET # BLD AUTO: 249 10^3/UL (ref 150–450)
PLATELET BLD QL SMEAR: NORMAL
POTASSIUM SERPL-SCNC: 4.6 MEQ/L (ref 3.5–5.1)
RBC # BLD AUTO: 2.56 10^6/UL (ref 4.3–6.1)
SODIUM SERPL-SCNC: 142 MEQ/L (ref 136–145)
WBC # BLD AUTO: 5.4 10^3/UL (ref 4–10)

## 2020-01-22 RX ADMIN — CARBIDOPA AND LEVODOPA SCH TAB: 25; 100 TABLET ORAL at 17:40

## 2020-01-22 RX ADMIN — SODIUM CHLORIDE SCH UNITS: 4.5 INJECTION, SOLUTION INTRAVENOUS at 20:38

## 2020-01-22 RX ADMIN — TAMSULOSIN HYDROCHLORIDE SCH MG: 0.4 CAPSULE ORAL at 20:38

## 2020-01-22 RX ADMIN — METOPROLOL TARTRATE SCH MG: 25 TABLET, FILM COATED ORAL at 09:07

## 2020-01-22 RX ADMIN — SODIUM CHLORIDE SCH UNITS: 4.5 INJECTION, SOLUTION INTRAVENOUS at 09:07

## 2020-01-22 RX ADMIN — FERROUS SULFATE TAB 325 MG (65 MG ELEMENTAL FE) SCH MG: 325 (65 FE) TAB at 20:38

## 2020-01-22 RX ADMIN — WHITE PETROLATUM SCH DOSE: 57; 17 PASTE TOPICAL at 20:39

## 2020-01-22 RX ADMIN — NYSTATIN SCH DOSE: 100000 CREAM TOPICAL at 09:08

## 2020-01-22 RX ADMIN — MIRTAZAPINE SCH MG: 15 TABLET, FILM COATED ORAL at 20:38

## 2020-01-22 RX ADMIN — FERROUS SULFATE TAB 325 MG (65 MG ELEMENTAL FE) SCH MG: 325 (65 FE) TAB at 09:06

## 2020-01-22 RX ADMIN — CARBIDOPA AND LEVODOPA SCH TAB: 25; 100 TABLET ORAL at 05:25

## 2020-01-22 RX ADMIN — WHITE PETROLATUM SCH DOSE: 57; 17 PASTE TOPICAL at 09:08

## 2020-01-22 RX ADMIN — WHITE PETROLATUM SCH DOSE: 57; 17 PASTE TOPICAL at 17:45

## 2020-01-22 RX ADMIN — CLOPIDOGREL BISULFATE SCH MG: 75 TABLET ORAL at 09:06

## 2020-01-22 RX ADMIN — ASPIRIN SCH MG: 81 TABLET ORAL at 09:06

## 2020-01-22 RX ADMIN — PANTOPRAZOLE SODIUM SCH MG: 40 TABLET, DELAYED RELEASE ORAL at 09:07

## 2020-01-22 RX ADMIN — IPRATROPIUM BROMIDE AND ALBUTEROL SULFATE SCH ML: .5; 3 SOLUTION RESPIRATORY (INHALATION) at 13:40

## 2020-01-22 RX ADMIN — NYSTATIN SCH DOSE: 100000 CREAM TOPICAL at 20:39

## 2020-01-22 RX ADMIN — IPRATROPIUM BROMIDE AND ALBUTEROL SULFATE SCH ML: .5; 3 SOLUTION RESPIRATORY (INHALATION) at 20:00

## 2020-01-22 RX ADMIN — METOPROLOL TARTRATE SCH MG: 25 TABLET, FILM COATED ORAL at 20:38

## 2020-01-22 RX ADMIN — IPRATROPIUM BROMIDE AND ALBUTEROL SULFATE SCH ML: .5; 3 SOLUTION RESPIRATORY (INHALATION) at 07:33

## 2020-01-22 NOTE — DSES
DATE OF ADMISSION:  01/11/2020

DATE OF DISCHARGE:  01/20/2020

 

DISCHARGE DIAGNOSES:

1.  Right ureterovesical junction (UVJ) hydroureteronephrosis secondary to a 3 mm

nephrolithiasis causing obstructive uropathy.

2.  Kidney injury on chronic kidney disease (CKD) stage III, which has resolved.

 

3.  Metabolic encephalopathy in the setting of proteus mirabilis urinary tract

infection (UTI).

4.  Nonruptured aortic abdominal aneurysm measuring 5 cm.

5.  Chronic diastolic congestive heart failure (compensated).

6.  Coronary artery disease (CAD).

 

CONSULTANTS ON THE CASE:

Dr. Cooley of urology.

 

PROCEDURES PERFORMING DURING HOSPITALIZATION:  None.

 

DISPOSITION:

The patient is discharged to acute rehabilitation unit (ARU) for ongoing rehab.

 

 

CONDITION ON DISCHARGE:  Stable and much improved from admission.

 

DISCHARGE INSTRUCTIONS:

Patient was instructed to followup with his primary care provider upon discharge

from the ARU.  He should have a repeat ultrasound approximately one week

following discharge from the hospital to reexamine for resolution of his right

hydroureteronephrosis.  The patient should followup with urology as an outpatient

one week up on discharge from the ARU also.  The patient completed his full

course of antibiotics during his hospital stay and does not warrant any at

discharge.

 

IMAGING STUDIES:

1.  CT of the abdomen and pelvis which showed that the patient had a 3 mm right

UVJ stone causing obstruction leading to right ureterohydronephrosis.  Please

reference full report for details. Repeat renal ultrasound showed unchanged mild

to moderate right ureterohydronephrosis.  Chest x-ray showed no significant

pulmonary congestion.  Microbiology grew Proteus Mirabilis.

 

LABS:

White count is 5.5, hemoglobin 8.5, hematocrit 27.9, platelet counts are 220.

Sodium 141, potassium 4.6, chloride 108, bicarb is 26, anion gap 7, BUN is 28,

creatinine is 1.5, which is significantly improved from creatinine of 2.5 when

the patient presented.  Calcium is 9.

 

HOSPITAL COURSE:

Mr. Lou is an 84-year-old gentleman who had presented to the hospital with

complaints of abdominal pain, altered mental status with increased confusion.

The patient was noted to have worsening renal function with creatinine above 2.5.

He underwent an unenhanced CT of his abdomen and pelvis which showed that he had

a right hydroureteronephrosis secondary to a 2 mm stone in the right UVJ.  He was

admitted to the hospital, gently hydrated.  He had an abnormal urinalysis and a

culture was sent.  Given his history of C. difficile in the past, we awaited

culture sensitivities for treating him.  The patient continued to have some

confusion with this.  Eventually his cultures came back growing Proteus

Mirabilis.  He was placed on oral antibiotics for five days based on

sensitivities and completed a course.  He had improvement in his medical status

as well as his kidney functions.  During this hospitalization he was attended to

by the urology service.  A repeat renal ultrasound showed mild to moderate right

hydroureteronephrosis.  I did discuss this with the on call urologist who

recommended just repeating this as an outpatient and it likely it is just slow to

resolve.

 

Due to his hospitalization and acute illness, the patient did develop

deconditioning and debility.  He was seen by physical therapy/occupational

therapy (PT/OT) who recommended for rehab.  The patient was accepted to inpatient

rehab and was discharged there in stable condition on January 20, 2020.

 

A total of 30 minutes was spent completing all discharge paperwork.

## 2020-01-22 NOTE — IPNPDOC
PM&R Progress Note


DATE OF SERVICE:  Jan 22, 2020


Physiatrist Progress Note


Subjective:


Patient reporting he is no longer having loose stools and that he wants to go 

home before next week.








REVIEW OF SYSTEMS: The following is a completed review of systems and has been 

reviewed. Review of systems otherwise unremarkable.


PAIN: Patient self reports no pain


EYES: No recent vision changes


EARS, NOSE, & THROAT: No throat pain, or dysphagia, or rhinorrhea


CARDIOVASCULAR: Denies chest pain or palpitations


PULMONARY: Denies shortness of breath


GASTROINTESTINAL: +loose stools


GENITOURINARY: denies dysuria


MUSCULOSKELETAL: generalized weakness


NEUROLOGICAL:+parkinsons


HEMATOLOGICAL: denies easy bruising


SKIN: denies rash 


PSYCHIATRIC:+ confusion


All other review of systems found to be negative.











PHYSICAL EXAMINATION:


VITAL SIGNS: Please see below.


GENERAL: Pleasant and cooperative. No acute distress. 


HEENT: PERRL. Extraocular movements intact. Clear conjunctiva


CARDIOVASCULAR: Regular rate and rhythm. No murmurs, rubs, or gallops


LUNGS: Clear to auscultation bilaterally. No wheezes. No rhonchi


ABDOMEN: Soft, nontender, nondistended. Positive bowel sounds. Normal active 

bowel sounds


NEUROLOGICAL: Alert and oriented to self only, able to follow commands 

consistently, +dysarthria  Cranial nerves II through XII grossly intact. 

Sensation grossly intact


EXTREMITIES: 5\5 strength bilateral upper extremities. 5\5 strength right lower 

extremity. 5/5 strength in left lower extremity. 





SKIN: no rash











ASSESSMENT:84-year-old M with past medical history of Parkinsons who presents 

status post UTI and encephalopathy.





PLAN:


1. Rehab- PT/OT advance gait and ADLs, dynamic balance training, fall recovery, 

consider LSVT big program 


-SLP ordered for cog eval


2. Neuro: hx of parkinsons with recurrent falls and worsening weakness in 

setting of UTI


-c/u Sinemet BID dosing, will consider adjusting while on ARU


-recent encephalopathy, monitor and avoid deliriogenic meds


-will order MRI to r/o new infarct, no focal deficits, but persistent confusion 

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


3. Cardiac: hx of diastolic CHF will fluid restrict, daily weights


-Htn c/u Amlodipine


-CAD c/u ASa, Plavix and Metoprolol, medicine consulted to assist in management


4. Resp: encourage incentive spirometry, Albuterol nebs prn


5. Renal: CKD 3, monitor for CALLUM


6. : s/p course of antibiotics for proteus UTI, patient with known kidney 

stone and hydronephrosis, will repeat renal US in one week and obtain inhouse 

urology consult if necessary, otherwise will need to f/u outpatient


-c/u flomax


7. GI ppx: d/c protonix in setting of C. diff hx


-will d/c bowel meds and Mg-Ox for loose stools, patient has hx of C. diff, no 

concern for infection yet


-hx of large hiatal hernia 


8. Psych- insomnia/possible depression c/u Remeron 


9. DVT ppx: heparin and TEDs


10. Dispo: TBD


Allergies


Coded Allergies:  


     No Known Drug Allergies (Verified  Allergy, Unknown, 9/22/19)





Vital Signs





Vital Signs








  Date Time  Temp Pulse Resp B/P (MAP) Pulse Ox O2 Delivery O2 Flow Rate FiO2


 


1/22/20 09:06  69  137/72    


 


1/22/20 07:33   16     


 


1/22/20 06:00 98.3    100 Room Air  











Laboratory Data


CBC/BMP


Laboratory Tests


1/22/20 06:57








Labs 24H


Laboratory Tests 2


1/22/20 06:57: 


Immature Granulocyte % (Auto) , Nucleated Red Blood Cells % (auto) 0.0, 

Neutrophils 71H, Lymphocytes (Manual) 12L, Monocytes (Manual) 8H, Eosinophils 

(Manual) 4H, Basophils (Manual) 3H, Myelocytes 2H, Anisocytosis 2+, Macrocytosis

2+, Platelet Estimate NORMAL, Anion Gap 6L, Glomerular Filtration Rate 43.2, 

Calcium Level 9.1





Current Medications


Current Medications





Current Medications








 Medications


  (Trade)  Dose


 Ordered  Sig/Karin


 Route


 PRN Reason  Start Time


 Stop Time Status Last Admin


Dose Admin


 


 Acetaminophen


  (Tylenol Tab)  650 mg  Q4HP  PRN


 PO


 fever/MILD PAIN (PS 1-4)  1/20/20 14:15


    1/21/20 20:04





 


 Albuterol Sulfate


  (Proventil Neb)  2.5 mg  Q6HP  PRN


 NEB


 SOB/WHEEZING  1/20/20 14:15


 1/20/20 16:05 DC  





 


 Albuterol/


 Ipratropium


  (Duoneb (Ipr


 0.5mg/Alb 2.5mg))  3 ml  RTID


 NEB


   1/20/20 14:00


    1/22/20 07:33





 


 Amlodipine


 Besylate


  (Norvasc)  10 mg  DAILY


 PO


   1/21/20 09:00


    1/22/20 09:06





 


 Aspirin


  (Ecotrin)  81 mg  DAILY


 PO


   1/21/20 09:00


    1/22/20 09:06





 


 Bisacodyl


  (Dulcolax


 Suppository)  10 mg  DAILYPRN  PRN


 [place]


 CONSTIPATION  1/20/20 14:15


     





 


 Carbidopa/Levodopa


  (Sinemet 25/100)  1 tab  BID@0600,1800


 PO


   1/20/20 18:00


    1/22/20 05:25





 


 Clopidogrel


 Bisulfate


  (PLAVix)  75 mg  DAILY


 PO


   1/21/20 09:00


    1/22/20 09:06





 


 Docusate Sodium


  (Colace)  100 mg  BID


 PO


   1/20/20 21:00


 1/21/20 16:22 DC 1/21/20 08:32





 


 Ferrous Sulfate


  (Ferrous Sulfate)  325 mg  BID


 PO


   1/20/20 21:00


    1/22/20 09:06





 


 Heparin Sodium


  (Porcine)


  (Heparin)  5,000 units  Q12H


 SC


   1/20/20 21:00


    1/22/20 09:07





 


 Magnesium


 Hydroxide


  (Milk Of


 Magnesia)  30 ml  DAILYPRN  PRN


 PO


 CONSTIPATION  1/20/20 14:15


     





 


 Magnesium Oxide


  (Mag-Ox)  400 mg  DAILY


 PO


   1/21/20 09:00


 1/21/20 16:22 DC 1/21/20 08:32





 


 Metoprolol


 Tartrate


  (Lopressor)  12.5 mg  BID


 PO


   1/20/20 21:00


    1/22/20 09:07





 


 Mirtazapine


  (Remeron)  15 mg  QHS


 PO


   1/20/20 21:00


    1/21/20 20:04





 


 Nystatin


  (Mycostatin)  balantitis-


 apply to


 head...  BID


 TOP


   1/20/20 21:00


    1/22/20 09:08





 


 Pantoprazole


 Sodium


  (Protonix)  40 mg  BID


 PO


   1/20/20 21:00


    1/22/20 09:07





 


 Senna


  (Senokot)  1 tab  QHS


 PO


   1/20/20 21:00


 1/21/20 16:22 DC 1/20/20 20:56





 


 Tamsulosin HCl


  (Flomax)  0.4 mg  QHS


 PO


   1/20/20 21:00


    1/21/20 20:04




















BROOKE CARTER MD         Jan 22, 2020 11:35

## 2020-01-23 VITALS — SYSTOLIC BLOOD PRESSURE: 137 MMHG | DIASTOLIC BLOOD PRESSURE: 69 MMHG

## 2020-01-23 VITALS — SYSTOLIC BLOOD PRESSURE: 116 MMHG | DIASTOLIC BLOOD PRESSURE: 74 MMHG

## 2020-01-23 RX ADMIN — CLOPIDOGREL BISULFATE SCH MG: 75 TABLET ORAL at 09:35

## 2020-01-23 RX ADMIN — CARBIDOPA AND LEVODOPA SCH TAB: 25; 100 TABLET ORAL at 06:24

## 2020-01-23 RX ADMIN — TAMSULOSIN HYDROCHLORIDE SCH MG: 0.4 CAPSULE ORAL at 20:39

## 2020-01-23 RX ADMIN — WHITE PETROLATUM SCH DOSE: 57; 17 PASTE TOPICAL at 17:09

## 2020-01-23 RX ADMIN — NYSTATIN SCH DOSE: 100000 CREAM TOPICAL at 20:40

## 2020-01-23 RX ADMIN — CYANOCOBALAMIN TAB 500 MCG SCH MCG: 500 TAB at 12:19

## 2020-01-23 RX ADMIN — FERROUS SULFATE TAB 325 MG (65 MG ELEMENTAL FE) SCH MG: 325 (65 FE) TAB at 20:39

## 2020-01-23 RX ADMIN — ASPIRIN SCH MG: 81 TABLET ORAL at 09:35

## 2020-01-23 RX ADMIN — SODIUM CHLORIDE SCH UNITS: 4.5 INJECTION, SOLUTION INTRAVENOUS at 20:39

## 2020-01-23 RX ADMIN — FOLIC ACID SCH MG: 1 TABLET ORAL at 12:18

## 2020-01-23 RX ADMIN — METOPROLOL TARTRATE SCH MG: 25 TABLET, FILM COATED ORAL at 09:35

## 2020-01-23 RX ADMIN — SODIUM CHLORIDE SCH UNITS: 4.5 INJECTION, SOLUTION INTRAVENOUS at 09:34

## 2020-01-23 RX ADMIN — MIRTAZAPINE SCH MG: 15 TABLET, FILM COATED ORAL at 20:39

## 2020-01-23 RX ADMIN — WHITE PETROLATUM SCH DOSE: 57; 17 PASTE TOPICAL at 20:40

## 2020-01-23 RX ADMIN — FERROUS SULFATE TAB 325 MG (65 MG ELEMENTAL FE) SCH MG: 325 (65 FE) TAB at 09:34

## 2020-01-23 RX ADMIN — IPRATROPIUM BROMIDE AND ALBUTEROL SULFATE SCH ML: .5; 3 SOLUTION RESPIRATORY (INHALATION) at 18:55

## 2020-01-23 RX ADMIN — METOPROLOL TARTRATE SCH MG: 25 TABLET, FILM COATED ORAL at 20:40

## 2020-01-23 RX ADMIN — WHITE PETROLATUM SCH DOSE: 57; 17 PASTE TOPICAL at 09:36

## 2020-01-23 RX ADMIN — CARBIDOPA AND LEVODOPA SCH TAB: 25; 100 TABLET ORAL at 17:08

## 2020-01-23 RX ADMIN — IPRATROPIUM BROMIDE AND ALBUTEROL SULFATE SCH ML: .5; 3 SOLUTION RESPIRATORY (INHALATION) at 07:20

## 2020-01-23 RX ADMIN — NYSTATIN SCH DOSE: 100000 CREAM TOPICAL at 09:37

## 2020-01-23 RX ADMIN — IPRATROPIUM BROMIDE AND ALBUTEROL SULFATE SCH ML: .5; 3 SOLUTION RESPIRATORY (INHALATION) at 14:00

## 2020-01-23 NOTE — REP
MRI brain without contrast:

 

History:  Rule out a recent infarct.  Ongoing confusion.

 

Comparison brain MRI study is from September 23, 2019.

 

Technique:  Axial and sagittal imaging planes are utilized for T1 and T2-weighted

scans.  Sequences include spin-echo, fast spin echo, FLAIR, and diffusion

weighted sequences.

 

MRI findings:  Bony calvarium is intact.  Craniocervical junction and upper

cervical cord are unremarkable.  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

the left cerebellar hemisphere unchanged.  There is moderate generalized atrophy.

There is no evidence of acute infarction on diffusion weighted scans.  There is

no evidence of intracranial hemorrhage.  There are small vessel atherosclerotic

changes on FLAIR images, mild in degree.  Study is otherwise unremarkable.  No

change from comparison study.

 

Impression:

 

Generalized atrophy and small vessel changes.  No change from comparison study

September 23, 2019.  No acute intracranial abnormality.

 

 

Electronically Signed by

Venu Gamez MD 01/23/2020 04:59 P

## 2020-01-23 NOTE — REP
Renal sonography:

 

History:  History of hydronephrosis.  Comparison CT study January 11, 2020.

Comparison sonography January 14, 2020.

 

Sonographic findings:  Scanning at the level urinary bladder raises a question of

a right-sided bladder diverticulum.  Renal cortical echogenicity is somewhat

increased consistent with chronic medical renal disease.  There is no evidence of

hydronephrosis on either side today.  Right kidney measures 11.5 x 5.2 x 5.5 cm.

Left renal dimensions are 10.7 x 4.4 x 4.1 cm.  No left renal cyst or mass is

appear observed by ultrasound.  On the right there is a cyst at mid pole level

measuring 2.9 cm and a cyst at the lower pole measures 2.0 cm in greatest

diameter.  Superior to the right kidney there is a 1.7 x 0.9 x 1.2 cm hypoechoic

area in the region of the right adrenal gland.  A small thickening of the medial

limb of the right ureter gland is seen on CT.  This may be a small adrenal

adenoma.

 

Impression:

 

No hydronephrosis seen today.  There are two right renal cysts.  Question small

right adrenal adenoma.  No mass or cyst is visible on the left by ultrasound.

 

 

Electronically Signed by

Venu Gamez MD 01/23/2020 05:58 P

## 2020-01-23 NOTE — IPNPDOC
PM&R Progress Note


DATE OF SERVICE:  Jan 23, 2020


Physiatrist Progress Note


Subjective:


Patient reporting he is no longer having loose stools and that he wants to go 

home before next week.








REVIEW OF SYSTEMS: The following is a completed review of systems and has been 

reviewed. Review of systems otherwise unremarkable.


PAIN: Patient self reports no pain


EYES: No recent vision changes


EARS, NOSE, & THROAT: No throat pain, or dysphagia, or rhinorrhea


CARDIOVASCULAR: Denies chest pain or palpitations


PULMONARY: Denies shortness of breath


GASTROINTESTINAL: +loose stools


GENITOURINARY: denies dysuria


MUSCULOSKELETAL: generalized weakness


NEUROLOGICAL:+parkinsons


HEMATOLOGICAL: denies easy bruising


SKIN: denies rash 


PSYCHIATRIC:+ confusion


All other review of systems found to be negative.











PHYSICAL EXAMINATION:


VITAL SIGNS: Please see below.


GENERAL: Pleasant and cooperative. No acute distress. 


HEENT: PERRL. Extraocular movements intact. Clear conjunctiva


CARDIOVASCULAR: Regular rate and rhythm. No murmurs, rubs, or gallops


LUNGS: Clear to auscultation bilaterally. No wheezes. No rhonchi


ABDOMEN: Soft, nontender, nondistended. Positive bowel sounds. Normal active 

bowel sounds


NEUROLOGICAL: Alert and oriented to self only, able to follow commands 

consistently, +dysarthria  Cranial nerves II through XII grossly intact. 

Sensation grossly intact


EXTREMITIES: 5\5 strength bilateral upper extremities. 5\5 strength right lower 

extremity. 5/5 strength in left lower extremity. 





SKIN: no rash











ASSESSMENT:84-year-old M with past medical history of Parkinsons who presents 

status post UTI and encephalopathy.





PLAN:


1. Rehab- PT/OT advance gait and ADLs, dynamic balance training, fall recovery, 

consider LSVT big program 


-SLP ordered for cog eval


2. Neuro: hx of parkinsons with recurrent falls and worsening weakness in s

etting of UTI


-c/u Sinemet BID dosing, will consider adjusting while on ARU


-recent encephalopathy, monitor and avoid deliriogenic meds


-will order MRI to r/o new infarct, no focal deficits, but persistent confusion 

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


-patient with macrocytic anemia and lower end of normal B12 levels, will start 

B12 and folic acid supplements and discuss with family and patient starting 

cholinergic for suspected dementia in setting of Parkinsons 


3. Cardiac: hx of diastolic CHF will fluid restrict, daily weights


-Htn c/u Amlodipine


-CAD c/u ASa, Plavix and Metoprolol, medicine consulted to assist in management


4. Resp: encourage incentive spirometry, Albuterol nebs prn


5. Renal: CKD 3, monitor for CALLUM


6. : s/p course of antibiotics for proteus UTI, patient with known kidney 

stone and hydronephrosis, will repeat renal US today to monitor and obtain 

inhouse urology consult if necessary, otherwise will need to f/u outpatient


-c/u flomax


7. GI ppx: d/c'd  protonix in setting of C. diff hx


- d/c'd bowel meds and Mg-Ox for loose stools, patient has hx of C. diff, 

patient with <3 loose stools per day, no concern for active infection yet


-hx of large hiatal hernia 


8. Psych- insomnia/possible depression c/u Remeron 


9. DVT ppx: heparin and TEDs


10. Dispo: TBD


Allergies


Coded Allergies:  


     No Known Drug Allergies (Verified  Allergy, Unknown, 9/22/19)





Vital Signs





Vital Signs








  Date Time  Temp Pulse Resp B/P (MAP) Pulse Ox O2 Delivery O2 Flow Rate FiO2


 


1/23/20 09:35  80  124/74    


 


1/23/20 06:00 98.1  18  92 Room Air  











Current Medications


Current Medications





Current Medications








 Medications


  (Trade)  Dose


 Ordered  Sig/Karin


 Route


 PRN Reason  Start Time


 Stop Time Status Last Admin


Dose Admin


 


 Acetaminophen


  (Tylenol Tab)  650 mg  Q4HP  PRN


 PO


 fever/MILD PAIN (PS 1-4)  1/20/20 14:15


    1/21/20 20:04





 


 Albuterol Sulfate


  (Proventil Neb)  2.5 mg  Q6HP  PRN


 NEB


 SOB/WHEEZING  1/20/20 14:15


 1/20/20 16:05 DC  





 


 Albuterol/


 Ipratropium


  (Duoneb (Ipr


 0.5mg/Alb 2.5mg))  3 ml  RTID


 NEB


   1/20/20 14:00


    1/23/20 07:20





 


 Amlodipine


 Besylate


  (Norvasc)  10 mg  DAILY


 PO


   1/21/20 09:00


    1/23/20 09:35





 


 Aspirin


  (Ecotrin)  81 mg  DAILY


 PO


   1/21/20 09:00


    1/23/20 09:35





 


 Bisacodyl


  (Dulcolax


 Suppository)  10 mg  DAILYPRN  PRN


 MA


 CONSTIPATION  1/20/20 14:15


     





 


 Carbidopa/Levodopa


  (Sinemet 25/100)  1 tab  BID@0600,1800


 PO


   1/20/20 18:00


    1/23/20 06:24





 


 Clopidogrel


 Bisulfate


  (PLAVix)  75 mg  DAILY


 PO


   1/21/20 09:00


    1/23/20 09:35





 


 Docusate Sodium


  (Colace)  100 mg  BID


 PO


   1/20/20 21:00


 1/21/20 16:22 DC 1/21/20 08:32





 


 Ferrous Sulfate


  (Ferrous Sulfate)  325 mg  BID


 PO


   1/20/20 21:00


    1/23/20 09:34





 


 Heparin Sodium


  (Porcine)


  (Heparin)  5,000 units  Q12H


 SC


   1/20/20 21:00


    1/23/20 09:34





 


 Magnesium


 Hydroxide


  (Milk Of


 Magnesia)  30 ml  DAILYPRN  PRN


 PO


 CONSTIPATION  1/20/20 14:15


     





 


 Magnesium Oxide


  (Mag-Ox)  400 mg  DAILY


 PO


   1/21/20 09:00


 1/21/20 16:22 DC 1/21/20 08:32





 


 Metoprolol


 Tartrate


  (Lopressor)  12.5 mg  BID


 PO


   1/20/20 21:00


    1/23/20 09:35





 


 Mirtazapine


  (Remeron)  15 mg  QHS


 PO


   1/20/20 21:00


    1/22/20 20:38





 


 Nystatin


  (Mycostatin)  balantitis-


 apply to


 head...  BID


 TOP


   1/20/20 21:00


    1/23/20 09:37





 


 Pantoprazole


 Sodium


  (Protonix)  40 mg  BID


 PO


   1/20/20 21:00


 1/22/20 11:35 DC 1/22/20 09:07





 


 Senna


  (Senokot)  1 tab  QHS


 PO


   1/20/20 21:00


 1/21/20 16:22 DC 1/20/20 20:56





 


 Tamsulosin HCl


  (Flomax)  0.4 mg  QHS


 PO


   1/20/20 21:00


    1/22/20 20:38




















BROOKE CARTER MD         Jan 23, 2020 11:55

## 2020-01-24 VITALS — DIASTOLIC BLOOD PRESSURE: 60 MMHG | SYSTOLIC BLOOD PRESSURE: 132 MMHG

## 2020-01-24 VITALS — DIASTOLIC BLOOD PRESSURE: 75 MMHG | SYSTOLIC BLOOD PRESSURE: 123 MMHG

## 2020-01-24 VITALS — SYSTOLIC BLOOD PRESSURE: 123 MMHG | DIASTOLIC BLOOD PRESSURE: 61 MMHG

## 2020-01-24 LAB
BASOPHILS # BLD AUTO: 0.1 10^3/UL (ref 0–0.2)
BASOPHILS NFR BLD AUTO: 0.9 % (ref 0–1)
BUN SERPL-MCNC: 25 MG/DL (ref 7–18)
CALCIUM SERPL-MCNC: 8.8 MG/DL (ref 8.8–10.2)
CHLORIDE SERPL-SCNC: 107 MEQ/L (ref 98–107)
CO2 SERPL-SCNC: 27 MEQ/L (ref 21–32)
CREAT SERPL-MCNC: 1.61 MG/DL (ref 0.7–1.3)
EOSINOPHIL # BLD AUTO: 0.2 10^3/UL (ref 0–0.5)
EOSINOPHIL NFR BLD AUTO: 3.3 % (ref 0–3)
GFR SERPL CREATININE-BSD FRML MDRD: 43.8 ML/MIN/{1.73_M2} (ref 35–?)
GLUCOSE SERPL-MCNC: 87 MG/DL (ref 70–100)
HCT VFR BLD AUTO: 27.9 % (ref 42–52)
HGB BLD-MCNC: 8.5 G/DL (ref 13.5–17.5)
LYMPHOCYTES # BLD AUTO: 0.7 10^3/UL (ref 1.5–5)
LYMPHOCYTES NFR BLD AUTO: 11.8 % (ref 24–44)
MCH RBC QN AUTO: 33.3 PG (ref 27–33)
MCHC RBC AUTO-ENTMCNC: 30.5 G/DL (ref 32–36.5)
MCV RBC AUTO: 109.4 FL (ref 80–96)
MONOCYTES # BLD AUTO: 0.7 10^3/UL (ref 0–0.8)
MONOCYTES NFR BLD AUTO: 11.9 % (ref 0–5)
NEUTROPHILS # BLD AUTO: 3.9 10^3/UL (ref 1.5–8.5)
NEUTROPHILS NFR BLD AUTO: 67.5 % (ref 36–66)
PLATELET # BLD AUTO: 237 10^3/UL (ref 150–450)
POTASSIUM SERPL-SCNC: 4.6 MEQ/L (ref 3.5–5.1)
RBC # BLD AUTO: 2.55 10^6/UL (ref 4.3–6.1)
SODIUM SERPL-SCNC: 139 MEQ/L (ref 136–145)
WBC # BLD AUTO: 5.7 10^3/UL (ref 4–10)

## 2020-01-24 RX ADMIN — CARBIDOPA AND LEVODOPA SCH TAB: 25; 100 TABLET ORAL at 17:21

## 2020-01-24 RX ADMIN — TAMSULOSIN HYDROCHLORIDE SCH MG: 0.4 CAPSULE ORAL at 21:42

## 2020-01-24 RX ADMIN — FERROUS SULFATE TAB 325 MG (65 MG ELEMENTAL FE) SCH MG: 325 (65 FE) TAB at 21:43

## 2020-01-24 RX ADMIN — CYANOCOBALAMIN TAB 500 MCG SCH MCG: 500 TAB at 09:09

## 2020-01-24 RX ADMIN — METOPROLOL TARTRATE SCH MG: 25 TABLET, FILM COATED ORAL at 21:43

## 2020-01-24 RX ADMIN — SODIUM CHLORIDE SCH UNITS: 4.5 INJECTION, SOLUTION INTRAVENOUS at 09:08

## 2020-01-24 RX ADMIN — METOPROLOL TARTRATE SCH MG: 25 TABLET, FILM COATED ORAL at 09:09

## 2020-01-24 RX ADMIN — ASPIRIN SCH MG: 81 TABLET ORAL at 09:09

## 2020-01-24 RX ADMIN — WHITE PETROLATUM SCH DOSE: 57; 17 PASTE TOPICAL at 09:10

## 2020-01-24 RX ADMIN — IPRATROPIUM BROMIDE AND ALBUTEROL SULFATE SCH ML: .5; 3 SOLUTION RESPIRATORY (INHALATION) at 19:39

## 2020-01-24 RX ADMIN — FOLIC ACID SCH MG: 1 TABLET ORAL at 09:09

## 2020-01-24 RX ADMIN — WHITE PETROLATUM SCH DOSE: 57; 17 PASTE TOPICAL at 17:22

## 2020-01-24 RX ADMIN — MIRTAZAPINE SCH MG: 15 TABLET, FILM COATED ORAL at 21:43

## 2020-01-24 RX ADMIN — WHITE PETROLATUM SCH DOSE: 57; 17 PASTE TOPICAL at 21:44

## 2020-01-24 RX ADMIN — FERROUS SULFATE TAB 325 MG (65 MG ELEMENTAL FE) SCH MG: 325 (65 FE) TAB at 09:08

## 2020-01-24 RX ADMIN — NYSTATIN SCH DOSE: 100000 CREAM TOPICAL at 09:10

## 2020-01-24 RX ADMIN — CARBIDOPA AND LEVODOPA SCH TAB: 25; 100 TABLET ORAL at 07:03

## 2020-01-24 RX ADMIN — CLOPIDOGREL BISULFATE SCH MG: 75 TABLET ORAL at 09:09

## 2020-01-24 RX ADMIN — IPRATROPIUM BROMIDE AND ALBUTEROL SULFATE SCH ML: .5; 3 SOLUTION RESPIRATORY (INHALATION) at 07:25

## 2020-01-24 RX ADMIN — IPRATROPIUM BROMIDE AND ALBUTEROL SULFATE SCH ML: .5; 3 SOLUTION RESPIRATORY (INHALATION) at 12:59

## 2020-01-24 RX ADMIN — NYSTATIN SCH DOSE: 100000 CREAM TOPICAL at 21:44

## 2020-01-24 RX ADMIN — SODIUM CHLORIDE SCH UNITS: 4.5 INJECTION, SOLUTION INTRAVENOUS at 21:42

## 2020-01-24 NOTE — IPNPDOC
PM&R Progress Note


DATE OF SERVICE:  Jan 24, 2020


Physiatrist Progress Note


Subjective:


Patient reporting he has a cough that doesn't seem to be getting better.








REVIEW OF SYSTEMS: The following is a completed review of systems and has been 

reviewed. Review of systems otherwise unremarkable.


PAIN: Patient self reports no pain


EYES: No recent vision changes


EARS, NOSE, & THROAT: No throat pain, or dysphagia, or rhinorrhea


CARDIOVASCULAR: Denies chest pain or palpitations


PULMONARY: Denies shortness of breath


GASTROINTESTINAL: +loose stools (improving)


GENITOURINARY: denies dysuria


MUSCULOSKELETAL: generalized weakness


NEUROLOGICAL:+parkinsons


HEMATOLOGICAL: denies easy bruising


SKIN: denies rash 


PSYCHIATRIC:+ confusion


All other review of systems found to be negative.











PHYSICAL EXAMINATION:


VITAL SIGNS: Please see below.


GENERAL: Pleasant and cooperative. No acute distress. 


HEENT: PERRL. Extraocular movements intact. Clear conjunctiva


CARDIOVASCULAR: Regular rate and rhythm. No murmurs, rubs, or gallops


LUNGS: Clear to auscultation bilaterally. No wheezes. No rhonchi


ABDOMEN: Soft, nontender, nondistended. Positive bowel sounds. Normal active 

bowel sounds


NEUROLOGICAL: Alert and oriented to self only, able to follow commands 

consistently, +dysarthria  Cranial nerves II through XII grossly intact. 

Sensation grossly intact


EXTREMITIES: 5\5 strength bilateral upper extremities. 5\5 strength right lower 

extremity. 5/5 strength in left lower extremity. 





SKIN: no rash











ASSESSMENT:84-year-old M with past medical history of Parkinsons who presents 

status post UTI and encephalopathy.





PLAN:


1. Rehab- PT/OT advance gait and ADLs, dynamic balance training, fall recovery, 

consider LSVT big program 


-SLP ordered for cog eval


2. Neuro: hx of parkinsons with recurrent falls and worsening weakness in 

setting of UTI


-c/u Sinemet BID dosing, will consider adjusting while on ARU


-recent encephalopathy, monitor and avoid deliriogenic meds


-will order MRI to r/o new infarct, no focal deficits, but persistent confusion 

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


-patient with macrocytic anemia and lower end of normal B12 levels, will start 

B12 and folic acid supplements and discuss with family and patient starting 

cholinergic for suspected dementia in setting of Parkinsons 


3. Cardiac: hx of diastolic CHF will fluid restrict, daily weights


-Htn c/u Amlodipine


-CAD c/u ASa, Plavix and Metoprolol, medicine consulted to assist in management


4. Resp: encourage incentive spirometry, Duonebs standing and guaifenesin


5. Renal: CKD 3, monitor for CALLUM


6. : s/p course of antibiotics for proteus UTI, patient with known kidney 

stone and hydronephrosis, will repeat renal US today to monitor and obtain inho

use urology consult if necessary, otherwise will need to f/u outpatient


-c/u flomax


7. GI ppx: d/c'd  protonix in setting of C. diff hx


- d/c'd bowel meds and Mg-Ox for loose stools, patient has hx of C. diff, 

patient with <3 loose stools per day, no concern for active infection yet


-hx of large hiatal hernia 


8. Psych- insomnia/possible depression c/u Remeron 


9. DVT ppx: heparin and TEDs


10. Dispo: TBD


Allergies


Coded Allergies:  


     No Known Drug Allergies (Verified  Allergy, Unknown, 9/22/19)





Vital Signs





Vital Signs








  Date Time  Temp Pulse Resp B/P (MAP) Pulse Ox O2 Delivery O2 Flow Rate FiO2


 


1/24/20 14:00 97.9 65 20 123/61 (81) 91 Room Air  











Laboratory Data


CBC/BMP


Laboratory Tests


1/24/20 06:18








Labs 24H


Laboratory Tests 2


1/24/20 06:18: 


Immature Granulocyte % (Auto) 4.6H, Neutrophils (%) (Auto) 67.5H, Lymphocytes 

(%) (Auto) 11.8L, Monocytes (%) (Auto) 11.9H, Eosinophils (%) (Auto) 3.3H, 

Basophils (%) (Auto) 0.9, Neutrophils # (Auto) 3.9, Lymphocytes # (Auto) 0.7L, 

Monocytes # (Auto) 0.7, Eosinophils # (Auto) 0.2, Basophils # (Auto) 0.1, 

Nucleated Red Blood Cells % (auto) 0.0, Anion Gap 5L, Glomerular Filtration Rate

43.8, Calcium Level 8.8





Current Medications


Current Medications





Current Medications








 Medications


  (Trade)  Dose


 Ordered  Sig/Karin


 Route


 PRN Reason  Start Time


 Stop Time Status Last Admin


Dose Admin


 


 Acetaminophen


  (Tylenol Tab)  650 mg  Q4HP  PRN


 PO


 fever/MILD PAIN (PS 1-4)  1/20/20 14:15


    1/21/20 20:04





 


 Albuterol Sulfate


  (Proventil Neb)  2.5 mg  Q6HP  PRN


 NEB


 SOB/WHEEZING  1/20/20 14:15


 1/20/20 16:05 DC  





 


 Albuterol/


 Ipratropium


  (Duoneb (Ipr


 0.5mg/Alb 2.5mg))  3 ml  RTID


 NEB


   1/20/20 14:00


    1/24/20 12:59





 


 Amlodipine


 Besylate


  (Norvasc)  10 mg  DAILY


 PO


   1/21/20 09:00


    1/24/20 09:09





 


 Aspirin


  (Ecotrin)  81 mg  DAILY


 PO


   1/21/20 09:00


    1/24/20 09:09





 


 Bisacodyl


  (Dulcolax


 Suppository)  10 mg  DAILYPRN  PRN


 DC


 CONSTIPATION  1/20/20 14:15


     





 


 Carbidopa/Levodopa


  (Sinemet 25/100)  1 tab  BID@0600,1800


 PO


   1/20/20 18:00


    1/24/20 17:21





 


 Clopidogrel


 Bisulfate


  (PLAVix)  75 mg  DAILY


 PO


   1/21/20 09:00


    1/24/20 09:09





 


 Cyanocobalamin


  (Vitamin B12)  1,000 mcg  DAILY


 PO


   1/23/20 09:00


    1/24/20 09:09





 


 Docusate Sodium


  (Colace)  100 mg  BID


 PO


   1/20/20 21:00


 1/21/20 16:22 DC 1/21/20 08:32





 


 Ferrous Sulfate


  (Ferrous Sulfate)  325 mg  BID


 PO


   1/20/20 21:00


    1/24/20 09:08





 


 Folic Acid


  (Folic Acid)  1 mg  DAILY


 PO


   1/23/20 09:00


    1/24/20 09:09





 


 Guaifenesin


  (Robitussin Tab)  400 mg  TID


 PO


   1/24/20 09:00


    1/24/20 17:21





 


 Heparin Sodium


  (Porcine)


  (Heparin)  5,000 units  Q12H


 SC


   1/20/20 21:00


    1/24/20 09:08





 


 Magnesium


 Hydroxide


  (Milk Of


 Magnesia)  30 ml  DAILYPRN  PRN


 PO


 CONSTIPATION  1/20/20 14:15


     





 


 Magnesium Oxide


  (Mag-Ox)  400 mg  DAILY


 PO


   1/21/20 09:00


 1/21/20 16:22 DC 1/21/20 08:32





 


 Metoprolol


 Tartrate


  (Lopressor)  12.5 mg  BID


 PO


   1/20/20 21:00


    1/24/20 09:09





 


 Mirtazapine


  (Remeron)  15 mg  QHS


 PO


   1/20/20 21:00


    1/23/20 20:39





 


 Nystatin


  (Mycostatin)  balantitis-


 apply to


 head...  BID


 TOP


   1/20/20 21:00


    1/24/20 09:10





 


 Pantoprazole


 Sodium


  (Protonix)  40 mg  BID


 PO


   1/20/20 21:00


 1/22/20 11:35 DC 1/22/20 09:07





 


 Senna


  (Senokot)  1 tab  QHS


 PO


   1/20/20 21:00


 1/21/20 16:22 DC 1/20/20 20:56





 


 Tamsulosin HCl


  (Flomax)  0.4 mg  QHS


 PO


   1/20/20 21:00


    1/23/20 20:39




















BROOKE CARTER MD         Jan 24, 2020 17:30

## 2020-01-25 VITALS — DIASTOLIC BLOOD PRESSURE: 62 MMHG | SYSTOLIC BLOOD PRESSURE: 126 MMHG

## 2020-01-25 VITALS — DIASTOLIC BLOOD PRESSURE: 62 MMHG | SYSTOLIC BLOOD PRESSURE: 120 MMHG

## 2020-01-25 VITALS — DIASTOLIC BLOOD PRESSURE: 66 MMHG | SYSTOLIC BLOOD PRESSURE: 135 MMHG

## 2020-01-25 RX ADMIN — FERROUS SULFATE TAB 325 MG (65 MG ELEMENTAL FE) SCH MG: 325 (65 FE) TAB at 08:44

## 2020-01-25 RX ADMIN — TAMSULOSIN HYDROCHLORIDE SCH MG: 0.4 CAPSULE ORAL at 20:28

## 2020-01-25 RX ADMIN — MIRTAZAPINE SCH MG: 15 TABLET, FILM COATED ORAL at 20:28

## 2020-01-25 RX ADMIN — IPRATROPIUM BROMIDE AND ALBUTEROL SULFATE SCH ML: .5; 3 SOLUTION RESPIRATORY (INHALATION) at 20:00

## 2020-01-25 RX ADMIN — WHITE PETROLATUM SCH DOSE: 57; 17 PASTE TOPICAL at 20:31

## 2020-01-25 RX ADMIN — IPRATROPIUM BROMIDE AND ALBUTEROL SULFATE SCH ML: .5; 3 SOLUTION RESPIRATORY (INHALATION) at 07:16

## 2020-01-25 RX ADMIN — IPRATROPIUM BROMIDE AND ALBUTEROL SULFATE SCH ML: .5; 3 SOLUTION RESPIRATORY (INHALATION) at 14:41

## 2020-01-25 RX ADMIN — SODIUM CHLORIDE SCH UNITS: 4.5 INJECTION, SOLUTION INTRAVENOUS at 08:44

## 2020-01-25 RX ADMIN — CLOPIDOGREL BISULFATE SCH MG: 75 TABLET ORAL at 08:44

## 2020-01-25 RX ADMIN — NYSTATIN SCH DOSE: 100000 CREAM TOPICAL at 08:48

## 2020-01-25 RX ADMIN — BENZONATATE SCH MG: 100 CAPSULE ORAL at 17:52

## 2020-01-25 RX ADMIN — WHITE PETROLATUM SCH DOSE: 57; 17 PASTE TOPICAL at 08:49

## 2020-01-25 RX ADMIN — BENZONATATE SCH MG: 100 CAPSULE ORAL at 20:28

## 2020-01-25 RX ADMIN — FERROUS SULFATE TAB 325 MG (65 MG ELEMENTAL FE) SCH MG: 325 (65 FE) TAB at 20:28

## 2020-01-25 RX ADMIN — CARBIDOPA AND LEVODOPA SCH TAB: 25; 100 TABLET ORAL at 17:52

## 2020-01-25 RX ADMIN — SODIUM CHLORIDE SCH UNITS: 4.5 INJECTION, SOLUTION INTRAVENOUS at 20:28

## 2020-01-25 RX ADMIN — CYANOCOBALAMIN TAB 500 MCG SCH MCG: 500 TAB at 08:44

## 2020-01-25 RX ADMIN — METOPROLOL TARTRATE SCH MG: 25 TABLET, FILM COATED ORAL at 20:29

## 2020-01-25 RX ADMIN — FOLIC ACID SCH MG: 1 TABLET ORAL at 08:45

## 2020-01-25 RX ADMIN — METOPROLOL TARTRATE SCH MG: 25 TABLET, FILM COATED ORAL at 08:45

## 2020-01-25 RX ADMIN — WHITE PETROLATUM SCH DOSE: 57; 17 PASTE TOPICAL at 17:53

## 2020-01-25 RX ADMIN — BENZONATATE SCH MG: 100 CAPSULE ORAL at 08:48

## 2020-01-25 RX ADMIN — BENZONATATE SCH MG: 100 CAPSULE ORAL at 02:55

## 2020-01-25 RX ADMIN — CARBIDOPA AND LEVODOPA SCH TAB: 25; 100 TABLET ORAL at 05:26

## 2020-01-25 RX ADMIN — NYSTATIN SCH DOSE: 100000 CREAM TOPICAL at 20:31

## 2020-01-25 RX ADMIN — ASPIRIN SCH MG: 81 TABLET ORAL at 08:45

## 2020-01-26 VITALS — SYSTOLIC BLOOD PRESSURE: 121 MMHG | DIASTOLIC BLOOD PRESSURE: 88 MMHG

## 2020-01-26 VITALS — DIASTOLIC BLOOD PRESSURE: 65 MMHG | SYSTOLIC BLOOD PRESSURE: 133 MMHG

## 2020-01-26 VITALS — SYSTOLIC BLOOD PRESSURE: 120 MMHG | DIASTOLIC BLOOD PRESSURE: 80 MMHG

## 2020-01-26 RX ADMIN — BENZONATATE SCH MG: 100 CAPSULE ORAL at 09:27

## 2020-01-26 RX ADMIN — WHITE PETROLATUM SCH DOSE: 57; 17 PASTE TOPICAL at 21:00

## 2020-01-26 RX ADMIN — IPRATROPIUM BROMIDE AND ALBUTEROL SULFATE SCH ML: .5; 3 SOLUTION RESPIRATORY (INHALATION) at 08:00

## 2020-01-26 RX ADMIN — WHITE PETROLATUM SCH DOSE: 57; 17 PASTE TOPICAL at 16:00

## 2020-01-26 RX ADMIN — NYSTATIN SCH DOSE: 100000 CREAM TOPICAL at 21:34

## 2020-01-26 RX ADMIN — FOLIC ACID SCH MG: 1 TABLET ORAL at 09:27

## 2020-01-26 RX ADMIN — IPRATROPIUM BROMIDE AND ALBUTEROL SULFATE SCH ML: .5; 3 SOLUTION RESPIRATORY (INHALATION) at 13:26

## 2020-01-26 RX ADMIN — BENZONATATE SCH MG: 100 CAPSULE ORAL at 21:33

## 2020-01-26 RX ADMIN — BENZONATATE SCH MG: 100 CAPSULE ORAL at 16:55

## 2020-01-26 RX ADMIN — SODIUM CHLORIDE SCH UNITS: 4.5 INJECTION, SOLUTION INTRAVENOUS at 21:33

## 2020-01-26 RX ADMIN — CARBIDOPA AND LEVODOPA SCH TAB: 25; 100 TABLET ORAL at 16:56

## 2020-01-26 RX ADMIN — NYSTATIN SCH DOSE: 100000 CREAM TOPICAL at 09:29

## 2020-01-26 RX ADMIN — FERROUS SULFATE TAB 325 MG (65 MG ELEMENTAL FE) SCH MG: 325 (65 FE) TAB at 09:27

## 2020-01-26 RX ADMIN — FERROUS SULFATE TAB 325 MG (65 MG ELEMENTAL FE) SCH MG: 325 (65 FE) TAB at 21:33

## 2020-01-26 RX ADMIN — CARBIDOPA AND LEVODOPA SCH TAB: 25; 100 TABLET ORAL at 05:35

## 2020-01-26 RX ADMIN — TAMSULOSIN HYDROCHLORIDE SCH MG: 0.4 CAPSULE ORAL at 21:33

## 2020-01-26 RX ADMIN — MIRTAZAPINE SCH MG: 15 TABLET, FILM COATED ORAL at 21:33

## 2020-01-26 RX ADMIN — CYANOCOBALAMIN TAB 500 MCG SCH MCG: 500 TAB at 09:27

## 2020-01-26 RX ADMIN — METOPROLOL TARTRATE SCH MG: 25 TABLET, FILM COATED ORAL at 21:34

## 2020-01-26 RX ADMIN — IPRATROPIUM BROMIDE AND ALBUTEROL SULFATE SCH ML: .5; 3 SOLUTION RESPIRATORY (INHALATION) at 20:00

## 2020-01-26 RX ADMIN — METOPROLOL TARTRATE SCH MG: 25 TABLET, FILM COATED ORAL at 09:28

## 2020-01-26 RX ADMIN — ASPIRIN SCH MG: 81 TABLET ORAL at 09:27

## 2020-01-26 RX ADMIN — ACETAMINOPHEN PRN MG: 325 TABLET ORAL at 21:34

## 2020-01-26 RX ADMIN — SODIUM CHLORIDE SCH UNITS: 4.5 INJECTION, SOLUTION INTRAVENOUS at 09:27

## 2020-01-26 RX ADMIN — WHITE PETROLATUM SCH DOSE: 57; 17 PASTE TOPICAL at 09:29

## 2020-01-26 RX ADMIN — CLOPIDOGREL BISULFATE SCH MG: 75 TABLET ORAL at 09:26

## 2020-01-27 VITALS — DIASTOLIC BLOOD PRESSURE: 67 MMHG | SYSTOLIC BLOOD PRESSURE: 133 MMHG

## 2020-01-27 VITALS — SYSTOLIC BLOOD PRESSURE: 126 MMHG | DIASTOLIC BLOOD PRESSURE: 60 MMHG

## 2020-01-27 RX ADMIN — BENZONATATE SCH MG: 100 CAPSULE ORAL at 20:59

## 2020-01-27 RX ADMIN — CYANOCOBALAMIN TAB 500 MCG SCH MCG: 500 TAB at 09:25

## 2020-01-27 RX ADMIN — FERROUS SULFATE TAB 325 MG (65 MG ELEMENTAL FE) SCH MG: 325 (65 FE) TAB at 09:25

## 2020-01-27 RX ADMIN — MIRTAZAPINE SCH MG: 15 TABLET, FILM COATED ORAL at 20:59

## 2020-01-27 RX ADMIN — CLOPIDOGREL BISULFATE SCH MG: 75 TABLET ORAL at 09:25

## 2020-01-27 RX ADMIN — METOPROLOL TARTRATE SCH MG: 25 TABLET, FILM COATED ORAL at 09:25

## 2020-01-27 RX ADMIN — CARBIDOPA AND LEVODOPA SCH TAB: 25; 100 TABLET ORAL at 17:36

## 2020-01-27 RX ADMIN — IPRATROPIUM BROMIDE AND ALBUTEROL SULFATE SCH ML: .5; 3 SOLUTION RESPIRATORY (INHALATION) at 13:53

## 2020-01-27 RX ADMIN — SODIUM CHLORIDE SCH UNITS: 4.5 INJECTION, SOLUTION INTRAVENOUS at 09:24

## 2020-01-27 RX ADMIN — NYSTATIN SCH DOSE: 100000 CREAM TOPICAL at 20:59

## 2020-01-27 RX ADMIN — IPRATROPIUM BROMIDE AND ALBUTEROL SULFATE SCH ML: .5; 3 SOLUTION RESPIRATORY (INHALATION) at 07:39

## 2020-01-27 RX ADMIN — IPRATROPIUM BROMIDE AND ALBUTEROL SULFATE SCH ML: .5; 3 SOLUTION RESPIRATORY (INHALATION) at 19:43

## 2020-01-27 RX ADMIN — BENZONATATE SCH MG: 100 CAPSULE ORAL at 09:24

## 2020-01-27 RX ADMIN — WHITE PETROLATUM SCH DOSE: 57; 17 PASTE TOPICAL at 09:26

## 2020-01-27 RX ADMIN — NYSTATIN SCH DOSE: 100000 CREAM TOPICAL at 09:26

## 2020-01-27 RX ADMIN — ASPIRIN SCH MG: 81 TABLET ORAL at 09:25

## 2020-01-27 RX ADMIN — WHITE PETROLATUM SCH DOSE: 57; 17 PASTE TOPICAL at 16:32

## 2020-01-27 RX ADMIN — METOPROLOL TARTRATE SCH MG: 25 TABLET, FILM COATED ORAL at 20:59

## 2020-01-27 RX ADMIN — FOLIC ACID SCH MG: 1 TABLET ORAL at 09:24

## 2020-01-27 RX ADMIN — FERROUS SULFATE TAB 325 MG (65 MG ELEMENTAL FE) SCH MG: 325 (65 FE) TAB at 20:59

## 2020-01-27 RX ADMIN — WHITE PETROLATUM SCH DOSE: 57; 17 PASTE TOPICAL at 20:59

## 2020-01-27 RX ADMIN — TAMSULOSIN HYDROCHLORIDE SCH MG: 0.4 CAPSULE ORAL at 20:59

## 2020-01-27 RX ADMIN — CARBIDOPA AND LEVODOPA SCH TAB: 25; 100 TABLET ORAL at 05:05

## 2020-01-27 RX ADMIN — BENZONATATE SCH MG: 100 CAPSULE ORAL at 16:32

## 2020-01-27 NOTE — IPNPDOC
PM&R Progress Note


DATE OF SERVICE:  Jan 27, 2020


Physiatrist Progress Note





Subjective:


Patient reporting he is feeling very well and eager to go home tomorrow.














REVIEW OF SYSTEMS: The following is a completed review of systems and has been 

reviewed. Review of systems otherwise unremarkable.


PAIN: Patient self reports no pain


EYES: No recent vision changes


EARS, NOSE, & THROAT: No throat pain, or dysphagia, or rhinorrhea


CARDIOVASCULAR: Denies chest pain or palpitations


PULMONARY: Denies shortness of breath


GASTROINTESTINAL: +loose stools (improving)


GENITOURINARY: denies dysuria


MUSCULOSKELETAL: generalized weakness


NEUROLOGICAL:+parkinsons


HEMATOLOGICAL: denies easy bruising


SKIN: denies rash 


PSYCHIATRIC:+ confusion


All other review of systems found to be negative.











PHYSICAL EXAMINATION:


VITAL SIGNS: Please see below.


GENERAL: Pleasant and cooperative. No acute distress. 


HEENT: PERRL. Extraocular movements intact. Clear conjunctiva


CARDIOVASCULAR: Regular rate and rhythm. No murmurs, rubs, or gallops


LUNGS: Clear to auscultation bilaterally. No wheezes. No rhonchi


ABDOMEN: Soft, nontender, nondistended. Positive bowel sounds. Normal active 

bowel sounds


NEUROLOGICAL: Alert and oriented to self only, able to follow commands 

consistently, +dysarthria  Cranial nerves II through XII grossly intact. Sen

sation grossly intact


EXTREMITIES: 5\5 strength bilateral upper extremities. 5\5 strength right lower 

extremity. 5/5 strength in left lower extremity. 





SKIN: no rash











ASSESSMENT:84-year-old M with past medical history of Parkinsons who presents 

status post UTI and encephalopathy.





PLAN:


1. Rehab- PT/OT advance gait and ADLs, dynamic balance training, fall recovery, 

consider LSVT big program 


-SLP ordered for cog eval


2. Neuro: hx of parkinsons with recurrent falls and worsening weakness in 

setting of UTI


-c/u Sinemet BID dosing, will consider adjusting while on ARU


-recent encephalopathy, monitor and avoid deliriogenic meds


-will order MRI to r/o new infarct, no focal deficits, but persistent confusion 

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


-patient with macrocytic anemia and lower end of normal B12 levels, will start 

B12 and folic acid supplements and discuss with family and patient starting 

cholinergic for suspected dementia in setting of Parkinsons 


3. Cardiac: hx of diastolic CHF will fluid restrict, daily weights


-Htn c/u Amlodipine


-CAD c/u ASa, Plavix and Metoprolol, medicine consulted to assist in management


4. Resp: encourage incentive spirometry, Duonebs standing and guaifenesin


5. Renal: CKD 3, monitor for CALLUM


6. : s/p course of antibiotics for proteus UTI, patient with known kidney 

stone and hydronephrosis, will repeat renal US today to monitor and obtain 

inhouse urology consult if necessary, otherwise will need to f/u outpatient


-c/u flomax


7. GI ppx: d/c'd  protonix in setting of C. diff hx


- d/c'd bowel meds and Mg-Ox for loose stools, patient has hx of C. diff, 

patient with <3 loose stools per day, no concern for active infection yet


-hx of large hiatal hernia 


8. Psych- insomnia/possible depression c/u Remeron 


9. DVT ppx: heparin and TEDs


10. Dispo: 1-28-20 to home


Allergies


Coded Allergies:  


     No Known Drug Allergies (Verified  Allergy, Unknown, 9/22/19)





Vital Signs





Vital Signs








  Date Time  Temp Pulse Resp B/P (MAP) Pulse Ox O2 Delivery O2 Flow Rate FiO2


 


1/26/20 21:34  86  133/65    


 


1/26/20 20:00 97.7  19  98 Room Air  











Current Medications


Current Medications





Current Medications








 Medications


  (Trade)  Dose


 Ordered  Sig/Karin


 Route


 PRN Reason  Start Time


 Stop Time Status Last Admin


Dose Admin


 


 Acetaminophen


  (Tylenol Tab)  650 mg  Q4HP  PRN


 PO


 fever/MILD PAIN (PS 1-4)  1/20/20 14:15


    1/26/20 21:34





 


 Albuterol Sulfate


  (Proventil Neb)  2.5 mg  Q6HP  PRN


 NEB


 SOB/WHEEZING  1/20/20 14:15


 1/20/20 16:05 DC  





 


 Albuterol/


 Ipratropium


  (Duoneb (Ipr


 0.5mg/Alb 2.5mg))  3 ml  RTID


 NEB


   1/20/20 14:00


    1/27/20 07:39





 


 Amlodipine


 Besylate


  (Norvasc)  10 mg  DAILY


 PO


   1/21/20 09:00


    1/27/20 09:25





 


 Aspirin


  (Ecotrin)  81 mg  DAILY


 PO


   1/21/20 09:00


    1/27/20 09:25





 


 Benzonatate


  (Tessalon Perles)  200 mg  TID


 PO


   1/25/20 02:15


    1/27/20 09:24





 


 Benzonatate


  (Tessalon Perles)  200 mg  TID


 PO


   1/25/20 09:00


 1/25/20 02:05 DC  





 


 Bisacodyl


  (Dulcolax


 Suppository)  10 mg  DAILYPRN  PRN


 NM


 CONSTIPATION  1/20/20 14:15


     





 


 Carbidopa/Levodopa


  (Sinemet 25/100)  1 tab  BID@0600,1800


 PO


   1/20/20 18:00


    1/27/20 05:05





 


 Clopidogrel


 Bisulfate


  (PLAVix)  75 mg  DAILY


 PO


   1/21/20 09:00


    1/27/20 09:25





 


 Cyanocobalamin


  (Vitamin B12)  1,000 mcg  DAILY


 PO


   1/23/20 09:00


    1/27/20 09:25





 


 Docusate Sodium


  (Colace)  100 mg  BID


 PO


   1/20/20 21:00


 1/21/20 16:22 DC 1/21/20 08:32





 


 Ferrous Sulfate


  (Ferrous Sulfate)  325 mg  BID


 PO


   1/20/20 21:00


    1/27/20 09:25





 


 Folic Acid


  (Folic Acid)  1 mg  DAILY


 PO


   1/23/20 09:00


    1/27/20 09:24





 


 Guaifenesin


  (Robitussin Tab)  400 mg  TID


 PO


   1/24/20 09:00


    1/27/20 09:24





 


 Heparin Sodium


  (Porcine)


  (Heparin)  5,000 units  Q12H


 SC


   1/20/20 21:00


    1/27/20 09:24





 


 Magnesium


 Hydroxide


  (Milk Of


 Magnesia)  30 ml  DAILYPRN  PRN


 PO


 CONSTIPATION  1/20/20 14:15


     





 


 Magnesium Oxide


  (Mag-Ox)  400 mg  DAILY


 PO


   1/21/20 09:00


 1/21/20 16:22 DC 1/21/20 08:32





 


 Metoprolol


 Tartrate


  (Lopressor)  12.5 mg  BID


 PO


   1/20/20 21:00


    1/27/20 09:25





 


 Mirtazapine


  (Remeron)  15 mg  QHS


 PO


   1/20/20 21:00


    1/26/20 21:33





 


 Miscellaneous


  (Unresolved


 Clarification


 Entry)  SEE LABEL


 COMMENTS  DAILY


 XX


   1/27/20 09:00


     





 


 Nystatin


  (Mycostatin)  balantitis-


 apply to


 head...  BID


 TOP


   1/20/20 21:00


    1/27/20 09:26





 


 Pantoprazole


 Sodium


  (Protonix)  40 mg  BID


 PO


   1/20/20 21:00


 1/22/20 11:35 DC 1/22/20 09:07





 


 Senna


  (Senokot)  1 tab  QHS


 PO


   1/20/20 21:00


 1/21/20 16:22 DC 1/20/20 20:56





 


 Tamsulosin HCl


  (Flomax)  0.4 mg  QHS


 PO


   1/20/20 21:00


    1/26/20 21:33




















BROOKE CARTER MD         Jan 27, 2020 11:48

## 2020-01-28 VITALS — DIASTOLIC BLOOD PRESSURE: 56 MMHG | SYSTOLIC BLOOD PRESSURE: 118 MMHG

## 2020-01-28 RX ADMIN — ASPIRIN SCH MG: 81 TABLET ORAL at 07:39

## 2020-01-28 RX ADMIN — CARBIDOPA AND LEVODOPA SCH TAB: 25; 100 TABLET ORAL at 05:58

## 2020-01-28 RX ADMIN — METOPROLOL TARTRATE SCH MG: 25 TABLET, FILM COATED ORAL at 07:39

## 2020-01-28 RX ADMIN — BENZONATATE SCH MG: 100 CAPSULE ORAL at 07:40

## 2020-01-28 RX ADMIN — WHITE PETROLATUM SCH DOSE: 57; 17 PASTE TOPICAL at 07:41

## 2020-01-28 RX ADMIN — NYSTATIN SCH DOSE: 100000 CREAM TOPICAL at 07:41

## 2020-01-28 RX ADMIN — FERROUS SULFATE TAB 325 MG (65 MG ELEMENTAL FE) SCH MG: 325 (65 FE) TAB at 07:38

## 2020-01-28 RX ADMIN — CYANOCOBALAMIN TAB 500 MCG SCH MCG: 500 TAB at 07:40

## 2020-01-28 RX ADMIN — FOLIC ACID SCH MG: 1 TABLET ORAL at 07:39

## 2020-01-28 RX ADMIN — CLOPIDOGREL BISULFATE SCH MG: 75 TABLET ORAL at 07:38

## 2020-01-28 RX ADMIN — IPRATROPIUM BROMIDE AND ALBUTEROL SULFATE SCH ML: .5; 3 SOLUTION RESPIRATORY (INHALATION) at 07:18

## 2020-01-28 NOTE — PMRDS
DATE OF ADMISSION:  01/20/2020

DATE OF DISCHARGE:  01/28/2020

 

CHIEF COMPLAINT/DISCHARGE DIAGNOSIS:

Urinary tract infection (UTI) with encephalopathy in the setting of Parkinson's.

 

HISTORY OF PRESENT ILLNESS:  An 84-year-old male with a past medical history of

Parkinson's disease, hypertension, abdominal aortic aneurysm (AAA) status post

grafting, cerebrovascular accident (CVA), chronic kidney disease (CKD) stage III,

history of falls, gout, large hiatal hernia, nephrolithiasis, diastolic

congestive heart failure (CHF), coronary artery disease (CAD) who presented to

[facility] ([facility] Emergency Department (ED) on 01/11/2020 with right

flank pain and abdominal pain with visual hallucinations.  CT abdomen showed "3

mm calculus at the right ureterovesical junction causing moderate right

hydroureteronephrosis.  There are three subcentimeter intrarenal calculi on the

left.  In addition, exophytic nodule of the upper pole left kidney measures 1.4

cm in maximum diameter."  Renal ultrasound shows mild to moderate right

hydronephrosis.  He was evaluated by urology who recommended IVF and repeat

ultrasound in a week with outpatient followup.  He was treated for a Proteus

urinary tract infection (UTI) thought to be the underlying cause of his

cephalopathy.  He had impairments in mobility below his prior level of function

and deemed medically appropriate for discharge to acute rehabilitation unit (ARU)

on 01/20/2020.

 

PAST MEDICAL HISTORY:  As per history of present illness (HPI).

 

HOSPITAL COURSE:  The patient was enrolled in a comprehensive physical therapy

(PT), occupational therapy (OT), speech and language pathology program.  He

received 24-hour nursing supervision and weekly team meetings were held to

discuss his progress.  The patient was continued on his home dose of Sinemet for

Parkinson's with no adjustments as required.  The patient's episodes of

encephalopathy and confusion gradually improved.  He received vitamin B12

supplements for a borderline low B12 level and folic acid.  He was also started

on donepezil per family's request for his cognitive impairments, likely due to

mild dementia.  Repeat ultrasound was ordered showing resolution of the

hydronephrosis and he was provided with an appointment for outpatient followup

with urology.  The patient arrived with loose stools with a known history of

Clostridium (C) difficile.  Bowel medications were discontinued and his loose

stools improved.  Overall, the patient did quite well in therapy, made

significant gains, and was deemed medically and functionally stable to return

home.

 

DISCHARGE MEDICATIONS:  As per instructions.

 

FUNCTIONAL HISTORY:  On discharge, the patient was standby assist to modified

independent for functional transfers, able to ambulate 50 feet times three at a

standby assist level, and in occupational therapy, he was modified independent

for functional transfers, upper and lower body dressing.  Thank you for this

referral.

## 2020-02-05 ENCOUNTER — HOSPITAL ENCOUNTER (OUTPATIENT)
Dept: HOSPITAL 53 - M SMT | Age: 85
End: 2020-02-05
Attending: NURSE PRACTITIONER
Payer: MEDICARE

## 2020-02-05 DIAGNOSIS — N39.0: Primary | ICD-10-CM

## 2020-02-05 DIAGNOSIS — N20.0: ICD-10-CM

## 2020-02-05 LAB
APPEARANCE UR: (no result)
BACTERIA UR QL AUTO: NEGATIVE
BILIRUB UR QL STRIP.AUTO: NEGATIVE
GLUCOSE UR QL STRIP.AUTO: NEGATIVE MG/DL
HGB UR QL STRIP.AUTO: NEGATIVE
KETONES UR QL STRIP.AUTO: (no result) MG/DL
LEUKOCYTE ESTERASE UR QL STRIP.AUTO: (no result)
MUCOUS THREADS URNS QL MICRO: (no result)
NITRITE UR QL STRIP.AUTO: NEGATIVE
PH UR STRIP.AUTO: 5 UNITS (ref 5–9)
PROT UR QL STRIP.AUTO: NEGATIVE MG/DL
RBC # UR AUTO: 1 /HPF (ref 0–3)
SP GR UR STRIP.AUTO: 1.02 (ref 1–1.03)
SQUAMOUS #/AREA URNS AUTO: 0 /HPF (ref 0–6)
UROBILINOGEN UR QL STRIP.AUTO: 2 MG/DL (ref 0–2)
WBC #/AREA URNS AUTO: 5 /HPF (ref 0–3)

## 2020-02-10 ENCOUNTER — HOSPITAL ENCOUNTER (OUTPATIENT)
Dept: HOSPITAL 53 - M SMT | Age: 85
End: 2020-02-10
Attending: NURSE PRACTITIONER
Payer: MEDICARE

## 2020-02-10 DIAGNOSIS — N39.0: Primary | ICD-10-CM

## 2020-02-10 LAB
APPEARANCE UR: CLEAR
BACTERIA UR QL AUTO: NEGATIVE
BILIRUB UR QL STRIP.AUTO: NEGATIVE
GLUCOSE UR QL STRIP.AUTO: NEGATIVE MG/DL
HGB UR QL STRIP.AUTO: NEGATIVE
KETONES UR QL STRIP.AUTO: NEGATIVE MG/DL
LEUKOCYTE ESTERASE UR QL STRIP.AUTO: (no result)
NITRITE UR QL STRIP.AUTO: NEGATIVE
PH UR STRIP.AUTO: 6 UNITS (ref 5–9)
PROT UR QL STRIP.AUTO: NEGATIVE MG/DL
RBC # UR AUTO: 1 /HPF (ref 0–3)
SP GR UR STRIP.AUTO: 1.01 (ref 1–1.03)
SQUAMOUS #/AREA URNS AUTO: 0 /HPF (ref 0–6)
UROBILINOGEN UR QL STRIP.AUTO: 2 MG/DL (ref 0–2)
WBC #/AREA URNS AUTO: 11 /HPF (ref 0–3)

## 2020-02-29 ENCOUNTER — HOSPITAL ENCOUNTER (INPATIENT)
Dept: HOSPITAL 53 - M ED | Age: 85
LOS: 3 days | Discharge: NURSING, REHAB OR LONG-TERM CARE | DRG: 193 | End: 2020-03-03
Attending: INTERNAL MEDICINE | Admitting: INTERNAL MEDICINE
Payer: MEDICARE

## 2020-02-29 VITALS — SYSTOLIC BLOOD PRESSURE: 144 MMHG | DIASTOLIC BLOOD PRESSURE: 88 MMHG

## 2020-02-29 VITALS — WEIGHT: 164.46 LBS | BODY MASS INDEX: 25.81 KG/M2 | HEIGHT: 67 IN

## 2020-02-29 VITALS — DIASTOLIC BLOOD PRESSURE: 91 MMHG | SYSTOLIC BLOOD PRESSURE: 143 MMHG

## 2020-02-29 DIAGNOSIS — N18.9: ICD-10-CM

## 2020-02-29 DIAGNOSIS — I12.9: ICD-10-CM

## 2020-02-29 DIAGNOSIS — Z98.62: ICD-10-CM

## 2020-02-29 DIAGNOSIS — E87.2: ICD-10-CM

## 2020-02-29 DIAGNOSIS — J15.9: Primary | ICD-10-CM

## 2020-02-29 DIAGNOSIS — Z87.891: ICD-10-CM

## 2020-02-29 DIAGNOSIS — I73.9: ICD-10-CM

## 2020-02-29 DIAGNOSIS — I48.20: ICD-10-CM

## 2020-02-29 DIAGNOSIS — Z66: ICD-10-CM

## 2020-02-29 DIAGNOSIS — Z95.1: ICD-10-CM

## 2020-02-29 DIAGNOSIS — I25.10: ICD-10-CM

## 2020-02-29 DIAGNOSIS — Z79.82: ICD-10-CM

## 2020-02-29 DIAGNOSIS — G20: ICD-10-CM

## 2020-02-29 DIAGNOSIS — J96.91: ICD-10-CM

## 2020-02-29 DIAGNOSIS — Z79.899: ICD-10-CM

## 2020-02-29 LAB
ALBUMIN SERPL BCG-MCNC: 3.2 GM/DL (ref 3.2–5.2)
ALT SERPL W P-5'-P-CCNC: 11 U/L (ref 12–78)
BASE EXCESS BLDV CALC-SCNC: -1.5 MMOL/L (ref -2–2)
BASOPHILS # BLD AUTO: 0 10^3/UL (ref 0–0.2)
BASOPHILS NFR BLD AUTO: 1 % (ref 0–1)
BILIRUB CONJ SERPL-MCNC: 0.4 MG/DL (ref 0–0.2)
BILIRUB SERPL-MCNC: 0.8 MG/DL (ref 0.2–1)
BUN SERPL-MCNC: 23 MG/DL (ref 7–18)
CALCIUM SERPL-MCNC: 8.9 MG/DL (ref 8.8–10.2)
CHLORIDE SERPL-SCNC: 107 MEQ/L (ref 98–107)
CK MB CFR.DF SERPL CALC: 1.72
CK MB SERPL-MCNC: < 1 NG/ML (ref ?–3.6)
CK SERPL-CCNC: 58 U/L (ref 39–308)
CO2 BLDV CALC-SCNC: 25.9 MEQ/L (ref 24–28)
CO2 SERPL-SCNC: 24 MEQ/L (ref 21–32)
CREAT SERPL-MCNC: 1.52 MG/DL (ref 0.7–1.3)
CRP SERPL-MCNC: 5.96 MG/DL (ref 0–0.3)
EOSINOPHIL # BLD AUTO: 0 10^3/UL (ref 0–0.5)
EOSINOPHIL NFR BLD AUTO: 0.2 % (ref 0–3)
GFR SERPL CREATININE-BSD FRML MDRD: 46.9 ML/MIN/{1.73_M2} (ref 35–?)
GLUCOSE SERPL-MCNC: 90 MG/DL (ref 70–100)
HCO3 BLDV-SCNC: 24.5 MEQ/L (ref 23–27)
HCO3 STD BLDV-SCNC: 22.4 MEQ/L
HCT VFR BLD AUTO: 32.7 % (ref 42–52)
HGB BLD-MCNC: 10.3 G/DL (ref 13.5–17.5)
INR PPP: 1.41
LYMPHOCYTES # BLD AUTO: 0.4 10^3/UL (ref 1.5–5)
LYMPHOCYTES NFR BLD AUTO: 9.6 % (ref 24–44)
MCH RBC QN AUTO: 33.4 PG (ref 27–33)
MCHC RBC AUTO-ENTMCNC: 31.5 G/DL (ref 32–36.5)
MCV RBC AUTO: 106.2 FL (ref 80–96)
MONOCYTES # BLD AUTO: 0.9 10^3/UL (ref 0–0.8)
MONOCYTES NFR BLD AUTO: 21.2 % (ref 0–5)
NEUTROPHILS # BLD AUTO: 2.7 10^3/UL (ref 1.5–8.5)
NEUTROPHILS NFR BLD AUTO: 66.8 % (ref 36–66)
PCO2 BLDV: 46.3 MMHG (ref 38–50)
PH BLDV: 7.34 UNITS (ref 7.33–7.43)
PLATELET # BLD AUTO: 178 10^3/UL (ref 150–450)
PO2 BLDV: 33.5 MMHG (ref 30–50)
POTASSIUM SERPL-SCNC: 4.6 MEQ/L (ref 3.5–5.1)
PROT SERPL-MCNC: 7.3 GM/DL (ref 6.4–8.2)
PROTHROMBIN TIME: 16.9 SECONDS (ref 11.8–14)
RBC # BLD AUTO: 3.08 10^6/UL (ref 4.3–6.1)
SAO2 % BLDV: 53.3 % (ref 60–80)
SODIUM SERPL-SCNC: 137 MEQ/L (ref 136–145)
TROPONIN I SERPL-MCNC: 0.05 NG/ML (ref ?–0.1)
WBC # BLD AUTO: 4.1 10^3/UL (ref 4–10)

## 2020-02-29 RX ADMIN — NYSTATIN SCH DOSE: 100000 POWDER TOPICAL at 20:04

## 2020-02-29 RX ADMIN — METOPROLOL TARTRATE SCH MG: 25 TABLET, FILM COATED ORAL at 20:03

## 2020-02-29 RX ADMIN — CLOPIDOGREL BISULFATE SCH MG: 75 TABLET ORAL at 16:21

## 2020-02-29 RX ADMIN — SODIUM CHLORIDE SCH UNITS: 4.5 INJECTION, SOLUTION INTRAVENOUS at 20:04

## 2020-02-29 RX ADMIN — TAMSULOSIN HYDROCHLORIDE SCH MG: 0.4 CAPSULE ORAL at 20:03

## 2020-02-29 RX ADMIN — FOLIC ACID SCH MG: 1 TABLET ORAL at 16:21

## 2020-02-29 RX ADMIN — SODIUM CHLORIDE SCH MLS/HR: 9 INJECTION, SOLUTION INTRAVENOUS at 12:05

## 2020-02-29 RX ADMIN — MIRTAZAPINE SCH MG: 15 TABLET, FILM COATED ORAL at 20:03

## 2020-02-29 RX ADMIN — CARBIDOPA AND LEVODOPA SCH TAB: 25; 100 TABLET ORAL at 20:03

## 2020-02-29 RX ADMIN — ASPIRIN SCH MG: 81 TABLET ORAL at 16:21

## 2020-02-29 RX ADMIN — SODIUM CHLORIDE SCH MLS/HR: 9 INJECTION, SOLUTION INTRAVENOUS at 19:49

## 2020-02-29 RX ADMIN — PANTOPRAZOLE SODIUM SCH MG: 40 TABLET, DELAYED RELEASE ORAL at 20:04

## 2020-02-29 RX ADMIN — FERROUS SULFATE TAB 325 MG (65 MG ELEMENTAL FE) SCH MG: 325 (65 FE) TAB at 20:04

## 2020-02-29 RX ADMIN — ACETAMINOPHEN PRN MG: 325 TABLET ORAL at 20:03

## 2020-02-29 NOTE — ECGEPIP
[facility] - ED

                                       

                                       Test Date:    2020

Pat Name:     FELISA SANCHEZ           Department:   

Patient ID:   [id number]                 Room:         -

Gender:       Male                     Technician:   zeenat

:          1935               Requested By: Shelley Aguilar 

Order Number: [id number]     Reading MD:   Shelley Aguilar

                                 Measurements

Intervals                              Axis          

Rate:         106                      P:            

CA:           0                        QRS:          61

QRSD:         93                       T:            5

QT:           336                                    

QTc:          447                                    

                           Interpretive Statements

ATRIAL FIBRILLATION WITH RAPID VENTRICULAR RESPONSE

INCOMPLETE RIGHT BUNDLE BRANCH BLOCK

ST DEVIATION AND MODERATE T-WAVE ABNORMALITY, CONSIDER ISCHEMIA

Electronically Signed on 2020 17:54:23 EST by Shelley Aguilar

## 2020-02-29 NOTE — REP
CT BRAIN WITHOUT IV CONTRAST:

 

CT brain performed without IV contrast. Coronal reconstruction images are

performed.

 

There is moderate atrophy. There is no midline shift or mass effect. There are

mild periventricular small vessel ischemic changes, which appear chronic in

nature. There is no extra-axial fluid collection. No acute intracranial

hemorrhage is seen. There are vascular calcifications in the carotid siphons.

There is mild fluid in the sphenoid sinus as well as maxillary sinuses,

suggesting mild sinusitis.

 

IMPRESSION:

 

Atrophy and chronic small vessel ischemic changes. No acute intracranial

hemorrhage. Vascular calcifications. Mild maxillary and sphenoid sinusitis.

 

 

Electronically Signed by

Samuel May MD 02/29/2020 06:43 P

## 2020-02-29 NOTE — REP
Left lower extremity Duplex Doppler venous ultrasound:

 

Real time compression and duplex Doppler interrogation of the left lower

extremity deep venous system is performed.  The left common femoral, superficial

femoral and popliteal veins are fully compressible with transducer pressure and

demonstrate normal spontaneous and phasic flow, without evidence of deep venous

thrombosis.

 

Impression:

 

No evidence of deep venous thrombosis of the left lower extremity femoral

popliteal venous system.

 

 

Electronically Signed by

Samuel May MD 02/29/2020 12:50 P

## 2020-02-29 NOTE — HPEPDOC
[facility] Medical History & Physical


Date of Admission


Feb 29, 2020


Date of Service:  Feb 29, 2020


Attending Physician:  GONDAL,KHUBAIB N. MD





History and Physical


CHIEF COMPLAINT: SOB, cough





HISTORY OF PRESENT ILLNESS: 84 y.o male w/ PMH of Parkinson's disease, CKD, CAD 

s/p CABG, PVD & HTN presents from home with SOB & cough. Patient's wife has been

diagnosed w/ viral URI and superimposed bacterial pneumonia and currently 

admitted at [facility]. He developed respiratory symptoms at the same time as his wife 

two weeks ago; his symptoms have been worsening over the past few days, with 

associated fatigue, generalized weakness, myalgia and arthralgia. In the ED, 

patient is found to have mild hypoxemia and infiltrates on chest x-ray. 

Respiratory viral panel was negative. Patient is otherwise comfortable, no 

additional complaints. He denies any chest pain, nausea, vomiting, abdominal 

pain or diarrhea.





10 point review of system is negative so for above





PAST MEDICAL HISTORY:


1. Parkinson's disease.


2. Chronic kidney disease.


3. Coronary artery disease.


4. Hypertension


5. Peripheral vascular disease





PAST SURGICAL HISTORY:


1. CABG.


2. Femoropopliteal bypass.





SOCIAL HISTORY:


Previous pipe smoker.


Denies alcohol use. 


denies drug use





FAMILY HISTORY:


Mother had MI





ALLERGIES: Please see below.





HOME MEDICATIONS: Please see below. 





PHYSICAL EXAMINATION:


VITAL SIGNS: Please see below.


GENERAL: No distress


HEENT: Normocephalic, atraumatic, moist mucous membranes


NECK: Supple


CARDIOVASCULAR EXAMINATION: S1, S2, no murmurs


RESPIRATORY EXAMINATION: Scattered rhonchi, distant, no wheezing


ABDOMINAL EXAMINATION: Soft, nontender, nondistended, positive bowel sounds


EXTREMITIES: Range of motion intact


SKIN: No rash


NEUROLOGICAL EXAMINATION: Alert and oriented 3, no focal deficits 


PSYCHIATRIC EXAMINATION: Calm and cooperative





LABORATORY DATA: See below.





IMAGING: Chest x-ray showing pulmonary infiltrates





MICROBIOLOGY: Please see below. 





ASSESSMENT: 84-year-old male with multiple medical morbidities, recent viral 

illness, presents with likely bacterial pneumonia.


.


PLAN:


1. Viral infection with superimposed bacterial pneumonia.


 Chest x-ray showing multifocal pneumonia, CT chest without contrast for better

evaluation, and respiratory viral panel negative, Levaquin, renally dosed, blood

cultures pending, supplemental oxygen as needed to maintain O2 sats between 88-

92%.





2. Coronary artery disease.


 History of CABG, continue aspirin, Plavix, beta blocker and statin.





3. Peripheral vascular disease.


 History of bilateral femoropopliteal bypass, continue aspirin, Plavix and 

statin





4. Parkinson's disease.


 Continue carbidopa, levodopa





5. Hypertension


 Continue Norvasc and metoprolol





DVT prophylaxis: Heparin subcutaneous 


GI prophylaxis: Protonix





Vital Signs





Vital Signs








  Date Time  Temp Pulse Resp B/P (MAP) Pulse Ox O2 Delivery O2 Flow Rate FiO2


 


2/29/20 13:40      Room Air  


 


2/29/20 13:30  106  129/93 (105) 94   


 


2/29/20 10:44 99.5  30     











Laboratory Data


Labs 24H


Laboratory Tests 2


2/29/20 11:39: 


Immature Granulocyte % (Auto) 1.2, Neutrophils (%) (Auto) 66.8H, Lymphocytes (%)

(Auto) 9.6L, Monocytes (%) (Auto) 21.2H, Eosinophils (%) (Auto) 0.2, Basophils (

%) (Auto) 1.0, Neutrophils # (Auto) 2.7, Lymphocytes # (Auto) 0.4L, Monocytes # 

(Auto) 0.9H, Eosinophils # (Auto) 0.0, Basophils # (Auto) 0.0, Nucleated Red 

Blood Cells % (auto) 0.0, Blood Gas Bicarbonate Standard 22.4, Venous Blood pH 

7.341, Venous Blood Partial Pressure CO2 46.3, Venous Blood Partial Pressure O2 

33.5, Venous Blood Total Carbon Dioxide 25.9, Venous Blood HCO3 24.5, Venous 

Blood Oxygen Saturation 53.3L, Venous Blood Base Excess -1.5, Lactic Acid Level 

3.2*H


2/29/20 12:29: 


Prothrombin Time 16.9H, Prothromb Time International Ratio 1.41


2/29/20 13:05: 


Anion Gap 6L, Glomerular Filtration Rate 46.9, Calcium Level 8.9, Total 

Bilirubin 0.8, Direct Bilirubin 0.4H, Aspartate Amino Transf (AST/SGOT) 14, 

Alanine Aminotransferase (ALT/SGPT) 11L, Alkaline Phosphatase 69, Total Creatine

Kinase 58, Creatine Kinase MB < 1.0, Creatine Kinase MB Relative Index 1.72, 

Troponin I 0.05, C-Reactive Protein, Quantitative 5.96H, Total Protein 7.3, A

lbumin 3.2, Albumin/Globulin Ratio 0.78L


CBC/BMP


Laboratory Tests


2/29/20 11:39








2/29/20 13:05








Microbiology





Microbiology


2/29/20 Respiratory Virus Panel (PCR) ([facility]) - Final, Complete


          


2/29/20 Blood Culture, Received


          Pending


2/29/20 Blood Culture, Received


          Pending





Home Medications


Scheduled


Amlodipine Besylate (Amlodipine Besylate) 10 Mg Tablet, 10 MG PO DAILY


Ampicillin Trihydrate (Ampicillin Trihydrate) 500 Mg Capsule, 500 MG PO QID


   PT HAS BEEN TAKING 2 OR 3 A DAY 


Aspirin (Aspir 81) 81 Mg Tablet.dr, 81 MG PO DAILY


Carbidopa/Levodopa (Carbidopa-Levodopa  Tab) 1 Each Tablet, 1 TAB PO BID


   0900, 2100 


Clopidogrel Bisulfate (Clopidogrel) 75 Mg Tablet, 75 MG PO DAILY


Cyanocobalamin (Vitamin B-12) (B-12) 1,000 Mcg Tablet, 1,000 MCG PO DAILY


Ferrous Sulfate (Ferrous Sulfate) 325 Mg Tablet, 325 MG PO BID


Folic Acid (Folic Acid) 1 Mg Tablet, 1 MG PO DAILY


Magnesium Oxide (Magnesium) 400 Mg Cap, 400 MG PO DAILY


Metoprolol Tartrate (Metoprolol Tartrate) 25 Mg Tablet, 12.5 MG PO BID


Mirtazapine (Remeron) 15 Mg Tablet, 15 MG PO QHS


Pantoprazole Sodium (Pantoprazole Sodium) 40 Mg Tablet.dr, 40 MG PO BID


Tamsulosin HCl (Flomax) 0.4 Mg Capsule, 0.4 MG PO QHS





Allergies


Coded Allergies:  


     No Known Drug Allergies (Verified  Allergy, Unknown, 9/22/19)





A-FIB/CHADSVASC


A-FIB History


Current/History of A-Fib/PAF?:  No











GONDAL,KHUBAIB N. MD           Feb 29, 2020 15:17

## 2020-02-29 NOTE — REP
CHEST, SINGLE VIEW:

 

Single view of the chest is performed and compared to several prior studies, most

recently, 01/15/2020.

 

There is infiltrate in the right upper lobe. I also suspect left lower lobe

infiltrate. Cardiac silhouette is again prominent. There are multiple sternal

wires and mediastinal clips present. Mediastinal silhouette appears unchanged.

 

 

Electronically Signed by

Samuel May MD 02/29/2020 06:46 P

## 2020-03-01 VITALS — SYSTOLIC BLOOD PRESSURE: 112 MMHG | DIASTOLIC BLOOD PRESSURE: 73 MMHG

## 2020-03-01 VITALS — SYSTOLIC BLOOD PRESSURE: 115 MMHG | DIASTOLIC BLOOD PRESSURE: 71 MMHG

## 2020-03-01 VITALS — DIASTOLIC BLOOD PRESSURE: 64 MMHG | SYSTOLIC BLOOD PRESSURE: 102 MMHG

## 2020-03-01 VITALS — SYSTOLIC BLOOD PRESSURE: 119 MMHG | DIASTOLIC BLOOD PRESSURE: 70 MMHG

## 2020-03-01 LAB
ALBUMIN SERPL BCG-MCNC: 2.5 GM/DL (ref 3.2–5.2)
ALT SERPL W P-5'-P-CCNC: 8 U/L (ref 12–78)
BILIRUB SERPL-MCNC: 0.6 MG/DL (ref 0.2–1)
BUN SERPL-MCNC: 22 MG/DL (ref 7–18)
CALCIUM SERPL-MCNC: 7.8 MG/DL (ref 8.8–10.2)
CHLORIDE SERPL-SCNC: 110 MEQ/L (ref 98–107)
CO2 SERPL-SCNC: 18 MEQ/L (ref 21–32)
CREAT SERPL-MCNC: 1.33 MG/DL (ref 0.7–1.3)
GFR SERPL CREATININE-BSD FRML MDRD: 54.7 ML/MIN/{1.73_M2} (ref 35–?)
GLUCOSE SERPL-MCNC: 74 MG/DL (ref 70–100)
HCT VFR BLD AUTO: 31.4 % (ref 42–52)
HGB BLD-MCNC: 9.5 G/DL (ref 13.5–17.5)
MAGNESIUM SERPL-MCNC: 2.1 MG/DL (ref 1.8–2.4)
MCH RBC QN AUTO: 33.5 PG (ref 27–33)
MCHC RBC AUTO-ENTMCNC: 30.3 G/DL (ref 32–36.5)
MCV RBC AUTO: 110.6 FL (ref 80–96)
PHOSPHATE SERPL-MCNC: 3.2 MG/DL (ref 2.5–4.9)
PLATELET # BLD AUTO: 132 10^3/UL (ref 150–450)
POTASSIUM SERPL-SCNC: 4.4 MEQ/L (ref 3.5–5.1)
PROT SERPL-MCNC: 7.2 GM/DL (ref 6.4–8.2)
RBC # BLD AUTO: 2.84 10^6/UL (ref 4.3–6.1)
SODIUM SERPL-SCNC: 136 MEQ/L (ref 136–145)
WBC # BLD AUTO: 3.2 10^3/UL (ref 4–10)

## 2020-03-01 RX ADMIN — IPRATROPIUM BROMIDE AND ALBUTEROL SULFATE PRN ML: .5; 3 SOLUTION RESPIRATORY (INHALATION) at 13:45

## 2020-03-01 RX ADMIN — METOPROLOL TARTRATE SCH MG: 25 TABLET, FILM COATED ORAL at 08:10

## 2020-03-01 RX ADMIN — SODIUM CHLORIDE SCH UNITS: 4.5 INJECTION, SOLUTION INTRAVENOUS at 20:55

## 2020-03-01 RX ADMIN — TAMSULOSIN HYDROCHLORIDE SCH MG: 0.4 CAPSULE ORAL at 20:55

## 2020-03-01 RX ADMIN — SODIUM CHLORIDE SCH MLS/HR: 9 INJECTION, SOLUTION INTRAVENOUS at 02:31

## 2020-03-01 RX ADMIN — ASPIRIN SCH MG: 81 TABLET ORAL at 08:10

## 2020-03-01 RX ADMIN — ACETAMINOPHEN PRN MG: 325 TABLET ORAL at 18:55

## 2020-03-01 RX ADMIN — METOPROLOL TARTRATE SCH MG: 25 TABLET, FILM COATED ORAL at 20:57

## 2020-03-01 RX ADMIN — MIRTAZAPINE SCH MG: 15 TABLET, FILM COATED ORAL at 20:55

## 2020-03-01 RX ADMIN — CARBIDOPA AND LEVODOPA SCH TAB: 25; 100 TABLET ORAL at 20:55

## 2020-03-01 RX ADMIN — FOLIC ACID SCH MG: 1 TABLET ORAL at 08:10

## 2020-03-01 RX ADMIN — PANTOPRAZOLE SODIUM SCH MG: 40 TABLET, DELAYED RELEASE ORAL at 08:10

## 2020-03-01 RX ADMIN — CLOPIDOGREL BISULFATE SCH MG: 75 TABLET ORAL at 08:10

## 2020-03-01 RX ADMIN — SODIUM CHLORIDE SCH UNITS: 4.5 INJECTION, SOLUTION INTRAVENOUS at 08:10

## 2020-03-01 RX ADMIN — NYSTATIN SCH DOSE: 100000 POWDER TOPICAL at 20:56

## 2020-03-01 RX ADMIN — IPRATROPIUM BROMIDE AND ALBUTEROL SULFATE PRN ML: .5; 3 SOLUTION RESPIRATORY (INHALATION) at 08:27

## 2020-03-01 RX ADMIN — PANTOPRAZOLE SODIUM SCH MG: 40 TABLET, DELAYED RELEASE ORAL at 20:55

## 2020-03-01 RX ADMIN — FERROUS SULFATE TAB 325 MG (65 MG ELEMENTAL FE) SCH MG: 325 (65 FE) TAB at 20:55

## 2020-03-01 RX ADMIN — FERROUS SULFATE TAB 325 MG (65 MG ELEMENTAL FE) SCH MG: 325 (65 FE) TAB at 08:10

## 2020-03-01 RX ADMIN — CARBIDOPA AND LEVODOPA SCH TAB: 25; 100 TABLET ORAL at 08:10

## 2020-03-01 RX ADMIN — NYSTATIN SCH DOSE: 100000 POWDER TOPICAL at 08:18

## 2020-03-01 RX ADMIN — IPRATROPIUM BROMIDE AND ALBUTEROL SULFATE PRN ML: .5; 3 SOLUTION RESPIRATORY (INHALATION) at 21:33

## 2020-03-01 NOTE — REP
CT CHEST WITHOUT CONTRAST:

 

CT chest performed without IV contrast. Sagittal and coronal reconstruction

images are performed.

 

There is moderate degree of patchy infiltrate in the right upper lobe. There is

adjacent mild fluid in the superior fissure. More inferiorly in the anterior

aspect of the right upper lobe, there is an irregular nodular opacity 9 mm in

diameter. This could represent a small focal infiltrate, but followup is

suggested to rule out neoplasm. Mild patchy densities in the posterior aspect of

the left upper lobe and in the inferior aspect of the left lower lobe may

represent areas of atelectasis or infiltrate. There is a small left pleural

effusion.

 

Multiple subcentimeter lymph nodes are seen throughout the mediastinum, likely

reactive. Atherosclerotic calcifications are noted of the thoracic aorta with

ectasia of the ascending thoracic aorta 4.4 cm in diameter. Heart is upper limits

of normal in size. There is no pericardial effusion. There is a large hiatal

hernia. In the visualized upper abdomen, note is made of gallstones in the

gallbladder. There are degenerative changes of the spine.

 

IMPRESSION:

 

Dense patchy infiltrate right upper lobe. Adjacent fluid in the major fissure,

superior aspect. 9 mm irregular nodular opacity more inferiorly in the anterior

aspect of the right upper lobe may represent small satellite infiltrate, but

followup CT is recommended to ensure resolution of the findings in the right

upper lobe. There are mild patchy areas of atelectasis or infiltrate in the left

upper and lower lobes with a small left effusion. Multiple subcentimeter lymph

nodes in the mediastinum are likely reactive.

 

 

Electronically Signed by

Samuel May MD 03/01/2020 05:52 P

## 2020-03-01 NOTE — ECGEPIP
[facility]

                                       

                                       Test Date:    2020

Pat Name:     FELISA SANCHEZ           Department:   

Patient ID:   [id number]                 Room:         [place] [id number]

Gender:       Male                     Technician:   PAVITHRA

:          1935               Requested By: KHUBAIB GONDAL N.

Order Number: [id number]     Reading MD:   Milton Huynh

                                 Measurements

Intervals                              Axis          

Rate:         101                      P:            5

GA:           296                      QRS:          78

QRSD:         101                      T:            25

QT:           361                                    

QTc:          470                                    

                           Interpretive Statements

ATRIAL FIBRILLATION WITH FREQUENT VENTRICULAR PREMATURE COMPLEXES

INCOMPLETE RIGHT BUNDLE BRANCH BLOCK

MODERATE ST DEPRESSION

SIMILAR TO 20

Electronically Signed on 3-1-2020 14:11:02 EST by Milton Huynh

## 2020-03-01 NOTE — IPNPDOC
Date Seen


The patient was seen on 3/1/20.





Progress Note


SUBJECTIVE: 84 y.o male w/ PMH of Parkinson's disease, CKD, CAD s/p CABG, PVD & 

HTN presents from home with SOB & cough. Patient's wife has been diagnosed w/ 

viral URI and superimposed bacterial pneumonia and currently admitted at [facility]. He

developed respiratory symptoms at the same time as his wife two weeks ago; his 

symptoms have been worsening over the past few days, with associated fatigue, g

eneralized weakness, myalgia and arthralgia. In the ED, patient is found to have

mild hypoxemia and infiltrates on chest x-ray. Respiratory viral panel was 

negative. Patient is otherwise comfortable, no additional complaints. He denies 

any chest pain, nausea, vomiting, abdominal pain or diarrhea.





3/1/20


Patient seen in the morning, comfortable in bed, continues to have mild dyspnea 

and dry cough, improved from yesterday, no other complaints.





10 point review of system is negative so for above





PHYSICAL EXAMINATION:


VITAL SIGNS: Please see below.


GENERAL: No distress


HEENT: Normocephalic, atraumatic, moist mucous membranes


NECK: Supple


CARDIOVASCULAR EXAMINATION: S1, S2, irregular, no murmurs


RESPIRATORY EXAMINATION: Scattered rhonchi, distant, no wheezing


ABDOMINAL EXAMINATION: Soft, nontender, nondistended, positive bowel sounds


EXTREMITIES: Range of motion intact


SKIN: No rash


NEUROLOGICAL EXAMINATION: Alert and oriented 3, no focal deficits 


PSYCHIATRIC EXAMINATION: Calm and cooperative





LABORATORY DATA: See below.





IMAGING: Chest x-ray showing pulmonary infiltrates





MICROBIOLOGY: Please see below. 





ASSESSMENT: 84-year-old male with multiple medical morbidities, recent viral 

illness, presents with likely bacterial pneumonia.





PLAN:


1. Viral infection with superimposed bacterial pneumonia.


 Recent viral infection, CT chest showing right upper lobe pneumonia with left 

lower lobe infiltrate/atelectasis, continue Levaquin, blood cultures pending, 

supplemental oxygen as needed to maintain O2 sats between 88-92%.





2. Coronary artery disease.


 History of CABG, continue aspirin, Plavix, statin and beta blocker.





3. Peripheral vascular disease.


 History of bilateral femoropopliteal bypass, continue aspirin, Plavix and 

statin





4. Parkinson's disease.


 Continue carbidopa, levodopa





5. Hypertension


 Continue Norvasc and metoprolol





DVT prophylaxis: Heparin subcutaneous 


GI prophylaxis: Protonix





VS, I&O, 24H, Fishbone


Vital Signs/I&O





Vital Signs








  Date Time  Temp Pulse Resp B/P (MAP) Pulse Ox O2 Delivery O2 Flow Rate FiO2


 


3/1/20 08:10  100  120/71    


 


3/1/20 08:08 99.7       


 


3/1/20 06:00   20  90 Nasal Cannula  


 


2/29/20 20:00       1.0 














I&O- Last 24 Hours up to 6 AM 


 


 3/1/20





 06:00


 


Intake Total 2724 ml


 


Balance 2724 ml











Laboratory Data


24H LABS


Laboratory Tests 2


2/29/20 11:39: 


Immature Granulocyte % (Auto) 1.2, Neutrophils (%) (Auto) 66.8H, Lymphocytes (%)

(Auto) 9.6L, Monocytes (%) (Auto) 21.2H, Eosinophils (%) (Auto) 0.2, Basophils 

(%) (Auto) 1.0, Neutrophils # (Auto) 2.7, Lymphocytes # (Auto) 0.4L, Monocytes #

(Auto) 0.9H, Eosinophils # (Auto) 0.0, Basophils # (Auto) 0.0, Nucleated Red 

Blood Cells % (auto) 0.0, Blood Gas Bicarbonate Standard 22.4, Venous Blood pH 

7.341, Venous Blood Partial Pressure CO2 46.3, Venous Blood Partial Pressure O2 

33.5, Venous Blood Total Carbon Dioxide 25.9, Venous Blood HCO3 24.5, Venous 

Blood Oxygen Saturation 53.3L, Venous Blood Base Excess -1.5, Lactic Acid Level 

3.2*H


2/29/20 12:29: 


Prothrombin Time 16.9H, Prothromb Time International Ratio 1.41


2/29/20 13:05: 


Anion Gap 6L, Glomerular Filtration Rate 46.9, Calcium Level 8.9, Total 

Bilirubin 0.8, Direct Bilirubin 0.4H, Aspartate Amino Transf (AST/SGOT) 14, 

Alanine Aminotransferase (ALT/SGPT) 11L, Alkaline Phosphatase 69, Total Creatine

Kinase 58, Creatine Kinase MB < 1.0, Creatine Kinase MB Relative Index 1.72, 

Troponin I 0.05, C-Reactive Protein, Quantitative 5.96H, Total Protein 7.3, 

Albumin 3.2, Albumin/Globulin Ratio 0.78L


2/29/20 16:32: Lactic Acid Followup at 4 Hours 1.7


3/1/20 06:09: 


Nucleated Red Blood Cells % (auto) 0.0, Anion Gap 8, Glomerular Filtration Rate 

54.7, Calcium Level 7.8L, Phosphorus Level 3.2, Magnesium Level 2.1, Total 

Bilirubin 0.6, Aspartate Amino Transf (AST/SGOT) 13, Alanine Aminotransferase 

(ALT/SGPT) 8L, Alkaline Phosphatase 63, Total Protein 7.2, Albumin 2.5#L, 

Albumin/Globulin Ratio 0.53L


CBC/BMP


Laboratory Tests


2/29/20 11:39








2/29/20 13:05








3/1/20 06:09








Microbiology





Microbiology


2/29/20 Respiratory Virus Panel (PCR) ([facility]) - Final, Complete


          


2/29/20 Blood Culture, Received


          Pending


2/29/20 Blood Culture, Received


          Pending











GONDAL,KHUBAIB N. MD            Mar 1, 2020 11:06

## 2020-03-02 VITALS — SYSTOLIC BLOOD PRESSURE: 102 MMHG | DIASTOLIC BLOOD PRESSURE: 84 MMHG

## 2020-03-02 VITALS — SYSTOLIC BLOOD PRESSURE: 102 MMHG | DIASTOLIC BLOOD PRESSURE: 65 MMHG

## 2020-03-02 VITALS — SYSTOLIC BLOOD PRESSURE: 105 MMHG | DIASTOLIC BLOOD PRESSURE: 64 MMHG

## 2020-03-02 LAB
BUN SERPL-MCNC: 35 MG/DL (ref 7–18)
CALCIUM SERPL-MCNC: 8.5 MG/DL (ref 8.8–10.2)
CHLORIDE SERPL-SCNC: 108 MEQ/L (ref 98–107)
CO2 SERPL-SCNC: 26 MEQ/L (ref 21–32)
CREAT SERPL-MCNC: 1.71 MG/DL (ref 0.7–1.3)
GFR SERPL CREATININE-BSD FRML MDRD: 40.9 ML/MIN/{1.73_M2} (ref 35–?)
GLUCOSE SERPL-MCNC: 102 MG/DL (ref 70–100)
HCT VFR BLD AUTO: 30.2 % (ref 42–52)
HGB BLD-MCNC: 9.3 G/DL (ref 13.5–17.5)
MCH RBC QN AUTO: 32.9 PG (ref 27–33)
MCHC RBC AUTO-ENTMCNC: 30.8 G/DL (ref 32–36.5)
MCV RBC AUTO: 106.7 FL (ref 80–96)
PLATELET # BLD AUTO: 144 10^3/UL (ref 150–450)
POTASSIUM SERPL-SCNC: 4.8 MEQ/L (ref 3.5–5.1)
RBC # BLD AUTO: 2.83 10^6/UL (ref 4.3–6.1)
SODIUM SERPL-SCNC: 138 MEQ/L (ref 136–145)
WBC # BLD AUTO: 2.8 10^3/UL (ref 4–10)

## 2020-03-02 RX ADMIN — PANTOPRAZOLE SODIUM SCH MG: 40 TABLET, DELAYED RELEASE ORAL at 09:51

## 2020-03-02 RX ADMIN — ACETAMINOPHEN PRN MG: 325 TABLET ORAL at 00:43

## 2020-03-02 RX ADMIN — IPRATROPIUM BROMIDE AND ALBUTEROL SULFATE PRN ML: .5; 3 SOLUTION RESPIRATORY (INHALATION) at 16:51

## 2020-03-02 RX ADMIN — CARBIDOPA AND LEVODOPA SCH TAB: 25; 100 TABLET ORAL at 09:51

## 2020-03-02 RX ADMIN — SODIUM CHLORIDE SCH UNITS: 4.5 INJECTION, SOLUTION INTRAVENOUS at 09:52

## 2020-03-02 RX ADMIN — IPRATROPIUM BROMIDE AND ALBUTEROL SULFATE PRN ML: .5; 3 SOLUTION RESPIRATORY (INHALATION) at 01:09

## 2020-03-02 RX ADMIN — CARBIDOPA AND LEVODOPA SCH TAB: 25; 100 TABLET ORAL at 20:09

## 2020-03-02 RX ADMIN — METOPROLOL TARTRATE SCH MG: 25 TABLET, FILM COATED ORAL at 20:10

## 2020-03-02 RX ADMIN — ATORVASTATIN CALCIUM SCH MG: 10 TABLET, FILM COATED ORAL at 09:51

## 2020-03-02 RX ADMIN — METOPROLOL TARTRATE SCH MG: 25 TABLET, FILM COATED ORAL at 09:47

## 2020-03-02 RX ADMIN — MIRTAZAPINE SCH MG: 15 TABLET, FILM COATED ORAL at 20:09

## 2020-03-02 RX ADMIN — ASPIRIN SCH MG: 81 TABLET ORAL at 09:47

## 2020-03-02 RX ADMIN — TAMSULOSIN HYDROCHLORIDE SCH MG: 0.4 CAPSULE ORAL at 20:08

## 2020-03-02 RX ADMIN — FERROUS SULFATE TAB 325 MG (65 MG ELEMENTAL FE) SCH MG: 325 (65 FE) TAB at 09:47

## 2020-03-02 RX ADMIN — ACETAMINOPHEN PRN MG: 325 TABLET ORAL at 20:08

## 2020-03-02 RX ADMIN — PANTOPRAZOLE SODIUM SCH MG: 40 TABLET, DELAYED RELEASE ORAL at 20:08

## 2020-03-02 RX ADMIN — NYSTATIN SCH DOSE: 100000 POWDER TOPICAL at 09:52

## 2020-03-02 RX ADMIN — IPRATROPIUM BROMIDE AND ALBUTEROL SULFATE PRN ML: .5; 3 SOLUTION RESPIRATORY (INHALATION) at 20:51

## 2020-03-02 RX ADMIN — FOLIC ACID SCH MG: 1 TABLET ORAL at 09:51

## 2020-03-02 RX ADMIN — SODIUM CHLORIDE SCH UNITS: 4.5 INJECTION, SOLUTION INTRAVENOUS at 20:09

## 2020-03-02 RX ADMIN — CLOPIDOGREL BISULFATE SCH MG: 75 TABLET ORAL at 09:51

## 2020-03-02 RX ADMIN — NYSTATIN SCH DOSE: 100000 POWDER TOPICAL at 20:09

## 2020-03-02 RX ADMIN — IPRATROPIUM BROMIDE AND ALBUTEROL SULFATE PRN ML: .5; 3 SOLUTION RESPIRATORY (INHALATION) at 10:48

## 2020-03-02 RX ADMIN — FERROUS SULFATE TAB 325 MG (65 MG ELEMENTAL FE) SCH MG: 325 (65 FE) TAB at 20:09

## 2020-03-02 NOTE — ECHO
DATE OF PROCEDURE:  02/20/2020

 

REFERRING PHYSICIAN:   Dr. Gondal

 

INDICATION:  Congestive heart failure.

 

HEIGHT:  170 cm.

WEIGHT:  75 kg.

 

DIMENSIONS:

IVS 1.5

LV 4.8

LVPW 1.5

LA 4.2

Aorta 4.8

RV 4.2

IVC:  2.4

Mitral E wave velocity 98

E prime septal 4.9

E prime lateral 9.0

 

FINDINGS:  The study is of fair technical quality.  The patient is in atrial

fibrillation with variable rate varying between 70 and 110 beats per minute

(BPM).

 

Normal LV size with moderate LVH and overall preserved LV systolic function.

Estimated LVEF around 50% to 55%.  Right ventricle appears normally contractile.

There is biatrial enlargement.  Aortic valve is sclerotic, but mobility of

leaflets is preserved.  There are degenerative abnormalities of mitral valve 
with

mitral annular calcifications, but mobility of leaflets is preserved.  No

visualized prolapse is seen.  Tricuspid valve appears normal.  Pulmonic valve 
was

not visualized.  No pericardial effusion is noted.  Inferior vena cava is

dilated, but it does collapse partially with inspiration indicative of elevated

central venous pressure (CVP).

 

Doppler interrogation reveals no aortic stenosis or insufficiency.  There is

approximately moderate mitral insufficiency with somewhat eccentric MR jet.

There is approximately moderate tricuspid insufficiency.  Calculated pulmonary

artery pressure is at a minimum high 50s, which would correspond to moderately

severe pulmonary hypertension.  Central venous pressure is elevated, as well.

 

Evaluation of diastolic function is inconclusive due to underlying atrial

fibrillation.

 

CONCLUSIONS:

 

1.  Study is of fair technical quality.  The patient is in atrial fibrillation

with somewhat tachycardic rate.

 

2.  Normal LV size with moderate LVH and low normal LV systolic function.

 

3.  Moderate mitral and tricuspid insufficiency.

 

4.  High central venous pressure and probably moderately severe pulmonary

hypertension.

 

5.  Biatrial enlargement.

 

COMMENT:  Subacute bacterial endocarditis (SBE) prophylaxis is not recommended.

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

diastolic congestive heart failure.

[facility]

## 2020-03-02 NOTE — IPNPDOC
Date Seen


The patient was seen on 3/2/20.





Progress Note


SUBJECTIVE: 84 y.o male w/ PMH of Parkinson's disease, CKD, CAD s/p CABG, PVD & 

HTN presents from home with SOB & cough. Patient's wife has been diagnosed w/ 

viral URI and superimposed bacterial pneumonia and currently admitted at [facility]. He

developed respiratory symptoms at the same time as his wife two weeks ago; his 

symptoms have been worsening over the past few days, with associated fatigue, g

eneralized weakness, myalgia and arthralgia. In the ED, patient is found to have

mild hypoxemia and infiltrates on chest x-ray. Respiratory viral panel was 

negative. Patient is otherwise comfortable, no additional complaints. He denies 

any chest pain, nausea, vomiting, abdominal pain or diarrhea.





3/1/20


Patient seen in the morning, comfortable in bed, continues to have mild dyspnea 

and dry cough, improved from yesterday, no other complaints.





3/2/20


Comfortable in bed, reports improvement in dyspnea, remains on supplemental 

oxygen, PT recommends rehab & patient lives alone but patient is adamant about 

returning home. 





10 point review of system is negative so for above





PHYSICAL EXAMINATION:


VITAL SIGNS: Please see below.


GENERAL: No distress


HEENT: Normocephalic, atraumatic, moist mucous membranes


NECK: Supple


CARDIOVASCULAR EXAMINATION: S1, S2, irregular, no murmurs


RESPIRATORY EXAMINATION: Scattered rhonchi, no wheezing


ABDOMINAL EXAMINATION: Soft, nontender, nondistended, positive bowel sounds


EXTREMITIES: Range of motion intact


SKIN: No rash


NEUROLOGICAL EXAMINATION: Alert and oriented 3, no focal deficits 


PSYCHIATRIC EXAMINATION: Calm and cooperative





LABORATORY DATA: See below.





MICROBIOLOGY: Please see below. 





ASSESSMENT: 84-year-old male with multiple medical morbidities, recent viral 

illness, presents with likely bacterial pneumonia.





PLAN:


1. Viral infection with superimposed bacterial pneumonia.


 Recent viral infection, CT chest showing right upper lobe pneumonia with left 

lower lobe infiltrate/atelectasis, continue Levaquin, blood cultures negative, 

supplemental oxygen as needed to maintain O2 sats between 88-92%.





2. Coronary artery disease.


 History of CABG, continue aspirin, Plavix, statin and beta blocker.





3. Peripheral vascular disease.


 History of bilateral femoropopliteal bypass, continue aspirin, Plavix and 

statin





4. Parkinson's disease.


 Continue carbidopa, levodopa





5. Hypertension


 Continue Norvasc and metoprolol





6. Lactic acidosis


- on presentation, resolved w/ fluids. 





7. A fib 


- paroxysmal, chronic, not on anticoagulation, has been evaluated by Cardiology 

previously. 





DVT prophylaxis: Heparin subcutaneous 


GI prophylaxis: Protonix





VS, I&O, 24H, Fishbone


Vital Signs/I&O





Vital Signs








  Date Time  Temp Pulse Resp B/P (MAP) Pulse Ox O2 Delivery O2 Flow Rate FiO2


 


3/2/20 14:00 98.2 52 20 102/65 (77) 92   


 


3/2/20 09:00       2.0 


 


3/2/20 06:00      Nasal Cannula  














I&O- Last 24 Hours up to 6 AM 


 


 3/2/20





 06:00


 


Intake Total 660 ml


 


Balance 660 ml











Laboratory Data


24H LABS


Laboratory Tests 2


3/2/20 05:33: 


Nucleated Red Blood Cells % (auto) 0.0, Anion Gap 4L, Glomerular Filtration Rate

40.9, Calcium Level 8.5L


CBC/BMP


Laboratory Tests


3/2/20 05:33








Microbiology





Microbiology


2/29/20 Respiratory Virus Panel (PCR) ([facility]) - Final, Complete


          


2/29/20 Blood Culture - Preliminary, Resulted


          No Growth after 48 hours. All Specime...


2/29/20 Blood Culture - Preliminary, Resulted


          No Growth after 48 hours. All Specime...











GONDAL,KHUBAIB N. MD            Mar 2, 2020 20:22

## 2020-03-03 ENCOUNTER — HOSPITAL ENCOUNTER (INPATIENT)
Dept: HOSPITAL 53 - M PM&R | Age: 85
LOS: 1 days | Discharge: ANOTHER HOSPITAL | DRG: 57 | End: 2020-03-04
Attending: PHYSICAL MEDICINE & REHABILITATION | Admitting: PHYSICAL MEDICINE & REHABILITATION
Payer: MEDICARE

## 2020-03-03 VITALS — DIASTOLIC BLOOD PRESSURE: 70 MMHG | SYSTOLIC BLOOD PRESSURE: 102 MMHG

## 2020-03-03 VITALS — DIASTOLIC BLOOD PRESSURE: 75 MMHG | SYSTOLIC BLOOD PRESSURE: 114 MMHG

## 2020-03-03 VITALS — SYSTOLIC BLOOD PRESSURE: 102 MMHG | DIASTOLIC BLOOD PRESSURE: 70 MMHG

## 2020-03-03 VITALS — DIASTOLIC BLOOD PRESSURE: 60 MMHG | SYSTOLIC BLOOD PRESSURE: 120 MMHG

## 2020-03-03 VITALS — SYSTOLIC BLOOD PRESSURE: 122 MMHG | DIASTOLIC BLOOD PRESSURE: 64 MMHG

## 2020-03-03 VITALS — SYSTOLIC BLOOD PRESSURE: 114 MMHG | DIASTOLIC BLOOD PRESSURE: 72 MMHG

## 2020-03-03 VITALS — BODY MASS INDEX: 25.81 KG/M2 | WEIGHT: 164.46 LBS | HEIGHT: 67 IN

## 2020-03-03 DIAGNOSIS — Z91.19: ICD-10-CM

## 2020-03-03 DIAGNOSIS — I10: ICD-10-CM

## 2020-03-03 DIAGNOSIS — Z95.1: ICD-10-CM

## 2020-03-03 DIAGNOSIS — Z98.62: ICD-10-CM

## 2020-03-03 DIAGNOSIS — I73.9: ICD-10-CM

## 2020-03-03 DIAGNOSIS — Z91.14: ICD-10-CM

## 2020-03-03 DIAGNOSIS — Z87.891: ICD-10-CM

## 2020-03-03 DIAGNOSIS — I25.10: ICD-10-CM

## 2020-03-03 DIAGNOSIS — I48.0: ICD-10-CM

## 2020-03-03 DIAGNOSIS — G20: Primary | ICD-10-CM

## 2020-03-03 DIAGNOSIS — Z53.29: ICD-10-CM

## 2020-03-03 DIAGNOSIS — N18.9: ICD-10-CM

## 2020-03-03 LAB
BUN SERPL-MCNC: 29 MG/DL (ref 7–18)
CALCIUM SERPL-MCNC: 8.7 MG/DL (ref 8.8–10.2)
CHLORIDE SERPL-SCNC: 106 MEQ/L (ref 98–107)
CO2 SERPL-SCNC: 23 MEQ/L (ref 21–32)
CREAT SERPL-MCNC: 1.54 MG/DL (ref 0.7–1.3)
GFR SERPL CREATININE-BSD FRML MDRD: 46.2 ML/MIN/{1.73_M2} (ref 35–?)
GLUCOSE SERPL-MCNC: 81 MG/DL (ref 70–100)
HCT VFR BLD AUTO: 31 % (ref 42–52)
HGB BLD-MCNC: 9.7 G/DL (ref 13.5–17.5)
MCH RBC QN AUTO: 33.1 PG (ref 27–33)
MCHC RBC AUTO-ENTMCNC: 31.3 G/DL (ref 32–36.5)
MCV RBC AUTO: 105.8 FL (ref 80–96)
PLATELET # BLD AUTO: 122 10^3/UL (ref 150–450)
POTASSIUM SERPL-SCNC: 4.7 MEQ/L (ref 3.5–5.1)
RBC # BLD AUTO: 2.93 10^6/UL (ref 4.3–6.1)
SODIUM SERPL-SCNC: 136 MEQ/L (ref 136–145)
WBC # BLD AUTO: 2.3 10^3/UL (ref 4–10)

## 2020-03-03 RX ADMIN — FERROUS SULFATE TAB 325 MG (65 MG ELEMENTAL FE) SCH MG: 325 (65 FE) TAB at 10:02

## 2020-03-03 RX ADMIN — METOPROLOL TARTRATE SCH MG: 25 TABLET, FILM COATED ORAL at 21:22

## 2020-03-03 RX ADMIN — FERROUS SULFATE TAB 325 MG (65 MG ELEMENTAL FE) SCH MG: 325 (65 FE) TAB at 21:21

## 2020-03-03 RX ADMIN — DOCUSATE SODIUM SCH MG: 100 CAPSULE, LIQUID FILLED ORAL at 21:21

## 2020-03-03 RX ADMIN — SENNOSIDES SCH TAB: 8.6 TABLET, FILM COATED ORAL at 22:47

## 2020-03-03 RX ADMIN — PANTOPRAZOLE SODIUM SCH MG: 40 TABLET, DELAYED RELEASE ORAL at 10:02

## 2020-03-03 RX ADMIN — METOPROLOL TARTRATE SCH MG: 25 TABLET, FILM COATED ORAL at 10:03

## 2020-03-03 RX ADMIN — IPRATROPIUM BROMIDE AND ALBUTEROL SULFATE SCH ML: .5; 3 SOLUTION RESPIRATORY (INHALATION) at 20:18

## 2020-03-03 RX ADMIN — SENNOSIDES SCH TAB: 8.6 TABLET, FILM COATED ORAL at 21:00

## 2020-03-03 RX ADMIN — NYSTATIN SCH DOSE: 100000 POWDER TOPICAL at 21:21

## 2020-03-03 RX ADMIN — CLOPIDOGREL BISULFATE SCH MG: 75 TABLET ORAL at 10:03

## 2020-03-03 RX ADMIN — CARBIDOPA AND LEVODOPA SCH TAB: 25; 100 TABLET ORAL at 10:02

## 2020-03-03 RX ADMIN — ASPIRIN SCH MG: 81 TABLET ORAL at 10:03

## 2020-03-03 RX ADMIN — CARBIDOPA AND LEVODOPA SCH TAB: 25; 100 TABLET ORAL at 21:22

## 2020-03-03 RX ADMIN — WHITE PETROLATUM SCH DOSE: 57; 17 PASTE TOPICAL at 21:00

## 2020-03-03 RX ADMIN — FOLIC ACID SCH MG: 1 TABLET ORAL at 10:01

## 2020-03-03 RX ADMIN — SODIUM CHLORIDE SCH UNITS: 4.5 INJECTION, SOLUTION INTRAVENOUS at 10:02

## 2020-03-03 RX ADMIN — ATORVASTATIN CALCIUM SCH MG: 10 TABLET, FILM COATED ORAL at 10:02

## 2020-03-03 RX ADMIN — SENNOSIDES SCH TAB: 8.6 TABLET, FILM COATED ORAL at 21:21

## 2020-03-03 RX ADMIN — NYSTATIN SCH DOSE: 100000 POWDER TOPICAL at 10:02

## 2020-03-03 RX ADMIN — SODIUM CHLORIDE SCH UNITS: 4.5 INJECTION, SOLUTION INTRAVENOUS at 21:29

## 2020-03-03 RX ADMIN — PANTOPRAZOLE SODIUM SCH MG: 40 TABLET, DELAYED RELEASE ORAL at 21:22

## 2020-03-03 RX ADMIN — IPRATROPIUM BROMIDE AND ALBUTEROL SULFATE PRN ML: .5; 3 SOLUTION RESPIRATORY (INHALATION) at 14:15

## 2020-03-03 NOTE — DS.PDOC
Discharge Summary


General


Date of Admission


Feb 29, 2020 at 14:30


Date of Discharge


03/03/20


Attending Physician:  GONDAL,KHUBAIB N. MD





Discharge Summary


PROCEDURES PERFORMED DURING STAY: None





ADMITTING DIAGNOSES: 


1. Pneumonia, hypoxemic respiratory failure





DISCHARGE DIAGNOSES:


1. Pneumonia, hypoxemic respiratory failure





COMPLICATIONS/CHIEF COMPLAINT: Cad/Hx Of Cabg/Hypertinesive Emergency/ 

Parkinsons.





HISTORY OF PRESENT ILLNESS: 84 y.o male w/ multiple medical comorbidities was 

admitted for hypoxemic respiratory failure secondary to Pneumonia. He has been 

treated w/ Levaquin & supplemental oxygen. He was evaluated by PT & rehab was 

recommended. Patient has had significant clinical improvement, remains on 

minimal supplemental oxygen and will be discharged to ARU. He is clinically and 

hemodynamically stable for discharge to ARU, will be discharged on Levaquin to 

complete his antibiotic course. 





HOSPITAL COURSE: As above





DISCHARGE MEDICATIONS: Please see below.


 


ALLERGIES: Please see below.





PHYSICAL EXAMINATION:


VITAL SIGNS: Please see below.


GENERAL: No distress


HEENT: Normocephalic, atraumatic, moist mucous membranes


NECK: Supple


CARDIOVASCULAR EXAMINATION: S1, S2, irregular, no murmurs


RESPIRATORY EXAMINATION: Scattered rhonchi, no wheezing


ABDOMINAL EXAMINATION: Soft, nontender, nondistended, positive bowel sounds


EXTREMITIES: Range of motion intact


SKIN: No rash


NEUROLOGICAL EXAMINATION: Alert and oriented 3, no focal deficits 


PSYCHIATRIC EXAMINATION: Calm and cooperative





LABORATORY DATA: Please see below.





IMAGING: CT showing multifocal pneumonia





PROGNOSIS: Guarded





ACTIVITY: As tolerated





DIET: Cardiac





DISCHARGE PLAN: f/u with PCP & Cardiologist after d/c fron ARU





DISPOSITION: 62 D/T Rehab Facility.





DISCHARGE INSTRUCTIONS:


1. As above.





DISCHARGE CONDITION: Stable





TIME SPENT ON DISCHARGE: Greater than 31 minutes.





Vital Signs/I&Os





Vital Signs








  Date Time  Temp Pulse Resp B/P (MAP) Pulse Ox O2 Delivery O2 Flow Rate FiO2


 


3/3/20 14:00 98.6 107 20 114/75 (88) 98   


 


3/3/20 10:10      Nasal Cannula 1.0 














I&O- Last 24 Hours up to 6 AM 


 


 3/3/20





 06:00


 


Intake Total 860 ml


 


Output Total 0 ml


 


Balance 860 ml











Laboratory Data


Labs 24H


Laboratory Tests 2


3/3/20 05:51: 


Nucleated Red Blood Cells % (auto) 0.0, Anion Gap 7L, Glomerular Filtration Rate

46.2, Calcium Level 8.7L


CBC/BMP


Laboratory Tests


3/3/20 05:51











Microbiology





Microbiology


2/29/20 Respiratory Virus Panel (PCR) ([facility]) - Final, Complete


          


2/29/20 Blood Culture - Preliminary, Resulted


          No Growth after 72 hours. All specime...


2/29/20 Blood Culture - Preliminary, Resulted


          No Growth after 72 hours. All specime...





Discharge Medications


Scheduled


Amlodipine Besylate (Amlodipine Besylate) 10 Mg Tablet, 10 MG PO DAILY, 

(Reported)


Aspirin (Aspir 81) 81 Mg Tablet.dr, 81 MG PO DAILY, (Reported)


Atorvastatin Calcium (Atorvastatin Calcium) 10 Mg Tablet, 20 MG PO DAILY


Carbidopa/Levodopa (Carbidopa-Levodopa  Tab) 1 Each Tablet, 1 TAB PO BID, 

(Reported)


   0900, 2100 


Clopidogrel Bisulfate (Clopidogrel) 75 Mg Tablet, 75 MG PO DAILY, (Reported)


Cyanocobalamin (Vitamin B-12) (B-12) 1,000 Mcg Tablet, 1,000 MCG PO DAILY, 

(Reported)


Ferrous Sulfate (Ferrous Sulfate) 325 Mg Tablet, 325 MG PO BID, (Reported)


Folic Acid (Folic Acid) 1 Mg Tablet, 1 MG PO DAILY, (Reported)


Levofloxacin (Levaquin) 750 Mg Tablet, 750 MG PO Q48H


Magnesium Oxide (Magnesium) 400 Mg Cap, 400 MG PO DAILY, (Reported)


Metoprolol Tartrate (Metoprolol Tartrate) 25 Mg Tablet, 12.5 MG PO BID, 

(Reported)


Mirtazapine (Remeron) 15 Mg Tablet, 15 MG PO QHS, (Reported)


Pantoprazole Sodium (Pantoprazole Sodium) 40 Mg Tablet.dr, 40 MG PO BID, 

(Reported)


Tamsulosin HCl (Flomax) 0.4 Mg Capsule, 0.4 MG PO QHS, (Reported)





Allergies


Coded Allergies:  


     No Known Drug Allergies (Verified  Allergy, Unknown, 9/22/19)











GONDAL,KHUBAIB N. MD            Mar 3, 2020 19:31

## 2020-03-04 ENCOUNTER — HOSPITAL ENCOUNTER (INPATIENT)
Dept: HOSPITAL 53 - M MS5PR | Age: 85
LOS: 6 days | Discharge: NURSING, REHAB OR LONG-TERM CARE | DRG: 947 | End: 2020-03-10
Attending: INTERNAL MEDICINE | Admitting: INTERNAL MEDICINE
Payer: MEDICARE

## 2020-03-04 VITALS — DIASTOLIC BLOOD PRESSURE: 73 MMHG | SYSTOLIC BLOOD PRESSURE: 119 MMHG

## 2020-03-04 VITALS — SYSTOLIC BLOOD PRESSURE: 119 MMHG | DIASTOLIC BLOOD PRESSURE: 73 MMHG

## 2020-03-04 VITALS — WEIGHT: 161.82 LBS | BODY MASS INDEX: 25.4 KG/M2 | HEIGHT: 67 IN

## 2020-03-04 VITALS — SYSTOLIC BLOOD PRESSURE: 111 MMHG | DIASTOLIC BLOOD PRESSURE: 68 MMHG

## 2020-03-04 DIAGNOSIS — Z75.1: ICD-10-CM

## 2020-03-04 DIAGNOSIS — G20: ICD-10-CM

## 2020-03-04 DIAGNOSIS — Z79.899: ICD-10-CM

## 2020-03-04 DIAGNOSIS — Z98.61: ICD-10-CM

## 2020-03-04 DIAGNOSIS — Z87.891: ICD-10-CM

## 2020-03-04 DIAGNOSIS — Z98.62: ICD-10-CM

## 2020-03-04 DIAGNOSIS — Z66: ICD-10-CM

## 2020-03-04 DIAGNOSIS — I25.10: ICD-10-CM

## 2020-03-04 DIAGNOSIS — R53.83: Primary | ICD-10-CM

## 2020-03-04 DIAGNOSIS — I12.9: ICD-10-CM

## 2020-03-04 DIAGNOSIS — J18.9: ICD-10-CM

## 2020-03-04 DIAGNOSIS — I73.9: ICD-10-CM

## 2020-03-04 DIAGNOSIS — Z79.82: ICD-10-CM

## 2020-03-04 DIAGNOSIS — N18.9: ICD-10-CM

## 2020-03-04 LAB
ALBUMIN SERPL BCG-MCNC: 2.7 GM/DL (ref 3.2–5.2)
ALT SERPL W P-5'-P-CCNC: 7 U/L (ref 12–78)
BASOPHILS NFR BLD MANUAL: 2 % (ref 0–1)
BILIRUB SERPL-MCNC: 0.4 MG/DL (ref 0.2–1)
BUN SERPL-MCNC: 28 MG/DL (ref 7–18)
CALCIUM SERPL-MCNC: 8.6 MG/DL (ref 8.8–10.2)
CHLORIDE SERPL-SCNC: 104 MEQ/L (ref 98–107)
CO2 SERPL-SCNC: 28 MEQ/L (ref 21–32)
CREAT SERPL-MCNC: 1.35 MG/DL (ref 0.7–1.3)
EOSINOPHIL NFR BLD MANUAL: 2 % (ref 0–3)
GFR SERPL CREATININE-BSD FRML MDRD: 53.7 ML/MIN/{1.73_M2} (ref 35–?)
GLUCOSE SERPL-MCNC: 89 MG/DL (ref 70–100)
HCT VFR BLD AUTO: 27.9 % (ref 42–52)
HGB BLD-MCNC: 8.7 G/DL (ref 13.5–17.5)
LYMPHOCYTES NFR BLD MANUAL: 26 % (ref 16–44)
MCH RBC QN AUTO: 33.2 PG (ref 27–33)
MCHC RBC AUTO-ENTMCNC: 31.2 G/DL (ref 32–36.5)
MCV RBC AUTO: 106.5 FL (ref 80–96)
MONOCYTES NFR BLD MANUAL: 9 % (ref 0–5)
NEUTROPHILS NFR BLD MANUAL: 60 % (ref 28–66)
PLATELET # BLD AUTO: 122 10^3/UL (ref 150–450)
PLATELET BLD QL SMEAR: NORMAL
POTASSIUM SERPL-SCNC: 4.4 MEQ/L (ref 3.5–5.1)
PROT SERPL-MCNC: 7.1 GM/DL (ref 6.4–8.2)
RBC # BLD AUTO: 2.62 10^6/UL (ref 4.3–6.1)
RBC MORPH BLD: NORMAL
SODIUM SERPL-SCNC: 138 MEQ/L (ref 136–145)
WBC # BLD AUTO: 2 10^3/UL (ref 4–10)

## 2020-03-04 RX ADMIN — CARBIDOPA AND LEVODOPA SCH TAB: 25; 100 TABLET ORAL at 08:59

## 2020-03-04 RX ADMIN — FERROUS SULFATE TAB 325 MG (65 MG ELEMENTAL FE) SCH MG: 325 (65 FE) TAB at 22:20

## 2020-03-04 RX ADMIN — NYSTATIN SCH DOSE: 100000 POWDER TOPICAL at 09:00

## 2020-03-04 RX ADMIN — TAMSULOSIN HYDROCHLORIDE SCH MG: 0.4 CAPSULE ORAL at 22:20

## 2020-03-04 RX ADMIN — METOPROLOL TARTRATE SCH MG: 25 TABLET, FILM COATED ORAL at 08:59

## 2020-03-04 RX ADMIN — SODIUM CHLORIDE SCH UNITS: 4.5 INJECTION, SOLUTION INTRAVENOUS at 08:58

## 2020-03-04 RX ADMIN — METOPROLOL TARTRATE SCH MG: 25 TABLET, FILM COATED ORAL at 22:20

## 2020-03-04 RX ADMIN — FERROUS SULFATE TAB 325 MG (65 MG ELEMENTAL FE) SCH MG: 325 (65 FE) TAB at 08:59

## 2020-03-04 RX ADMIN — PANTOPRAZOLE SODIUM SCH MG: 40 TABLET, DELAYED RELEASE ORAL at 08:59

## 2020-03-04 RX ADMIN — WHITE PETROLATUM SCH DOSE: 57; 17 PASTE TOPICAL at 09:00

## 2020-03-04 RX ADMIN — MIRTAZAPINE SCH MG: 15 TABLET, FILM COATED ORAL at 22:20

## 2020-03-04 RX ADMIN — IPRATROPIUM BROMIDE AND ALBUTEROL SULFATE SCH ML: .5; 3 SOLUTION RESPIRATORY (INHALATION) at 07:20

## 2020-03-04 RX ADMIN — CARBIDOPA AND LEVODOPA SCH TAB: 25; 100 TABLET ORAL at 22:20

## 2020-03-04 RX ADMIN — DOCUSATE SODIUM SCH MG: 100 CAPSULE, LIQUID FILLED ORAL at 08:59

## 2020-03-04 RX ADMIN — PANTOPRAZOLE SODIUM SCH MG: 40 TABLET, DELAYED RELEASE ORAL at 22:20

## 2020-03-04 NOTE — HPEPDOC
Physiatrist Note


DATE OF ADMISSION: 3-3-20





DATE OF SERVICE: 3-4-20





TIME OF ADMISSION: Please refer to physician's admission order.





SOURCE OF ADMISSION INFORMATION: [facility] record 





CHIEF COMPLAINT: Pneumonia in setting of Parkinson's





HISTORY OF PRESENT ILLNESS: 





84M pmh Parkinsons, CKD, CAD s/p CABG, PVD, HTN, paroxysmal Afib not on AC who 

developed shortness of breath at home with cough and presented to [facility] ED on 

2-29-20 where he was diagnosed with a viral URI with superimposed bacterial 

pneumonia, CXR showing, infiltrate in the right upper lobe. I also suspect left

lower lobe infiltrate. CT chest showed, Dense patchy infiltrate right upper 

lobe. Adjacent fluid in the major fissure, superior aspect. 9 mm irregular 

nodular opacity more inferiorly in the anterior aspect of the right upper lobe 

may represent small satellite infiltrate. Respiratory panel was negative and 

blood cultures negative. He was started on renally dosed Levaquin and worsening 

leukopenia with thrombocytopenia, requiring supplemental oxygen. He was 

evaluated by therapy and found to be well below his baseline for mobility and 

ADLs and deemed medically appropriate for discharge to ARU on 3-3-20. On initial

visit patient expressed clearly that he does not want to live and is not willing

to participate in therapy or take medications. His daughters bedside explained 

that he had been expressing similar views for a while and agreed for hospice 

consult. 








REVIEW OF SYSTEMS: The following is a completed review of systems and has been 

reviewed. Review of systems otherwise unremarkable.


difficult to obtain as patient refusing to give clear answers





PAST MEDICAL HISTORY:


as per hpi





PAST SURGICAL HISTORY:


CABG, Femoropopliteal bypass








ALLERGIES: Please see below.





MEDICATIONS: Please see below.





FAMILY HISTORY: Cardiac





SOCIAL HISTORY: former smoker, no ETOH or illicit drugs





DIET: low sodium





PHYSICAL EXAMINATION:


VITAL SIGNS: Please see below.


GENERAL:  No acute distress. Able to rouse


HEENT: PERRL. Extraocular movements intact. Clear conjunctiva


CARDIOVASCULAR: Irregular rate and rhythm. No murmurs, rubs, or gallops


LUNGS: scattered rhonchi


ABDOMEN: Soft, nontender, nondistended. Positive bowel sounds. Normal active 

bowel sounds


NEUROLOGICAL: Cranial nerves II through XII grossly intact. Sensation grossly 

intact


EXTREMITIES: 5\5 strength bilateral upper extremities. 5/5 strength right lower 

extremity. 5/5 strength in left lower extremity.








LABORATORY DATA: Please see below.





IMAGING:Imaging documentation personally reviewed by record





FUNCTIONAL STATUS: 


Premorbid:  Modified Independent with all activities of daily life as well as 

mobility


On Admission: Min assist 











ASSESSMENT:84-year-old M with past medical history of Parkinsons who presents 

status post pneumonia





PLAN:


Patient has been refusing medications and not agreeable to participate in 

therapy stating he wishes to be left alone to die. Case discussed with patient's

daughters to agree this is truly his wish and are amenable to transfer with 

hospice consult.


If patient's status changes in the near future and he is able to participate in 

3 hours of therapy with goal to go home and live, then he will be more than 

welcome back to ARU.








POST ADMISSION PHYSICIAN EVALUATION: 


Medical and functional status: Description of medical status, medical ass

essment: As above. Rehabilitation diagnosis and current and prior cold morbid 

medical conditions as above. Risk of complications and plans to mitigate them as

above. Description of functional status current status is as above. Prior status

as above.


Status compared to preadmission:Patient will be transferred back to acute care 

for home with hospice consult vs CMO











TIME SPENT COUNSELING AND COORDINATING INITIAL CARE: Greater than 70 minutes.


Vital Signs





Vital Sign - Last 24 Hours








 3/3/20 3/3/20 3/3/20 3/3/20





 15:55 20:00 21:22 22:00


 


Temp 98.0 97.4  


 


Pulse 99 93 93 


 


Resp 20 19  


 


B/P (MAP) 122/64 (83) 120/60 (80) 120/60 


 


Pulse Ox 92 96  


 


O2 Delivery Nasal Cannula Nasal Cannula  


 


O2 Flow Rate 1.0 1.0  1.0


 


    





 3/3/20 3/4/20 3/4/20 3/4/20





 22:52 01:10 02:28 05:55


 


Temp   97.7 97.0


 


Pulse   84 68


 


Resp 26   22


 


B/P (MAP)   111/68 (82) 119/73 (88)


 


Pulse Ox  79 97 97


 


O2 Delivery  Room Air Nasal Cannula Nasal Cannula


 


O2 Flow Rate   2.0 2.0


 


    





 3/4/20 3/4/20 3/4/20 3/4/20





 06:28 08:59 08:59 09:00


 


Pulse  68 68 


 


Resp 25   


 


B/P (MAP)  119/73 119/73 


 


O2 Flow Rate    1.0











Laboratory Data


CBC/BMP


Laboratory Tests


3/4/20 06:08








Labs 24H


Laboratory Tests 2


3/4/20 06:08: 


Neutrophils (%) (Auto) , Neutrophils # (Auto) , Nucleated Red Blood Cells % 

(auto) 0.0, Neutrophils 60, Band Neutrophils 1, Lymphocytes (Manual) 26, Monocyt

es (Manual) 9H, Eosinophils (Manual) 2, Basophils (Manual) 2H, Red Blood Cell 

Morphology NORMAL, Platelet Estimate NORMAL, Anion Gap 6L, Glomerular Filtration

Rate 53.7, Calcium Level 8.6L, Total Bilirubin 0.4, Aspartate Amino Transf 

(AST/SGOT) 17, Alanine Aminotransferase (ALT/SGPT) 7L, Alkaline Phosphatase 58, 

Total Protein 7.1, Albumin 2.7L, Albumin/Globulin Ratio 0.61L





Home Medications


Scheduled


Amlodipine Besylate (Amlodipine Besylate) 10 Mg Tablet, 10 MG PO DAILY, 

(Reported)


Aspirin (Aspir 81) 81 Mg Tablet.dr, 81 MG PO DAILY, (Reported)


Atorvastatin Calcium (Atorvastatin Calcium) 10 Mg Tablet, 20 MG PO DAILY


Carbidopa/Levodopa (Carbidopa-Levodopa  Tab) 1 Each Tablet, 1 TAB PO BID, 

(Reported)


   0900, 2100 


Clopidogrel Bisulfate (Clopidogrel) 75 Mg Tablet, 75 MG PO DAILY, (Reported)


Cyanocobalamin (Vitamin B-12) (B-12) 1,000 Mcg Tablet, 1,000 MCG PO DAILY, 

(Reported)


Ferrous Sulfate (Ferrous Sulfate) 325 Mg Tablet, 325 MG PO BID, (Reported)


Folic Acid (Folic Acid) 1 Mg Tablet, 1 MG PO DAILY, (Reported)


Levofloxacin (Levaquin) 750 Mg Tablet, 750 MG PO Q48H


Magnesium Oxide (Magnesium) 400 Mg Cap, 400 MG PO DAILY, (Reported)


Metoprolol Tartrate (Metoprolol Tartrate) 25 Mg Tablet, 12.5 MG PO BID, 

(Reported)


Mirtazapine (Remeron) 15 Mg Tablet, 15 MG PO QHS, (Reported)


Pantoprazole Sodium (Pantoprazole Sodium) 40 Mg Tablet.dr, 40 MG PO BID, 

(Reported)


Tamsulosin HCl (Flomax) 0.4 Mg Capsule, 0.4 MG PO QHS, (Reported)





Allergies


Coded Allergies:  


     No Known Drug Allergies (Verified  Allergy, Unknown, 9/22/19)





A-FIB/CHADSVASC


A-FIB History


Current/History of A-Fib/PAF?:  Yes


Current PO Anticoag Therapy:  No











BROOKE CARTER MD          Mar 4, 2020 11:28

## 2020-03-04 NOTE — HPEPDOC
General


Date of Admission


03/04/20


Date of Service:  Mar 4, 2020


Chief Complaint


The patient is a 84-year-old male admitted with a reason for visit of Pneumonia.


Source:  Patient, RN/MD, Old records


Exam Limitations:  Clinical conditions


Associated Symptoms:  Unobtainable





History of Present Illness


Mr. Webb is an 84-year-old male who is being readmitted from the ARU to the 

general floor, as he has requested hospice care.  Patient is seen on the ARU 

preparing for transfer. This examination is extremely limited due to significant

lethargy. He told me that he would like to continue his current medications.  He

would also like to be referred for hospice care. Per nursing he has refused all 

of his medications, he has not eaten and he has not had anything to drink. When 

asked, patient stated he is just not hungry. Patient denied any pain at this rj

e.





Per the medical record, patient was admitted February 29 with a diagnosis of a 

viral respiratory infection, superimposed bacterial pneumonia.  He was treated 

for the pneumonia with Levaquin (renally dosed), and supportive measures. 

Medications for his chronic illnesses were continued inpatient.  On March 3, he 

was clinically optimized, assessed for and found suitable for admission to the 

ARU.  Unfortunately, on his initial visit , the patient reported that he no 

longer wants to live,he refused medications, and refused to participate in any 

therapy. His daughters have reported that he has been expressing these views for

some time and have agreed to hospice.





Home Medications


Scheduled


Amlodipine Besylate (Amlodipine Besylate) 10 Mg Tablet, 10 MG PO DAILY, 

(Reported)


Aspirin (Aspir 81) 81 Mg Tablet.dr, 81 MG PO DAILY, (Reported)


Atorvastatin Calcium (Atorvastatin Calcium) 10 Mg Tablet, 20 MG PO DAILY


Carbidopa/Levodopa (Carbidopa-Levodopa  Tab) 1 Each Tablet, 1 TAB PO BID, 

(Reported)


   0900, 2100 


Clopidogrel Bisulfate (Clopidogrel) 75 Mg Tablet, 75 MG PO DAILY, (Reported)


Cyanocobalamin (Vitamin B-12) (B-12) 1,000 Mcg Tablet, 1,000 MCG PO DAILY, 

(Reported)


Ferrous Sulfate (Ferrous Sulfate) 325 Mg Tablet, 325 MG PO BID, (Reported)


Folic Acid (Folic Acid) 1 Mg Tablet, 1 MG PO DAILY, (Reported)


Levofloxacin (Levaquin) 750 Mg Tablet, 750 MG PO Q48H


Magnesium Oxide (Magnesium) 400 Mg Cap, 400 MG PO DAILY, (Reported)


Metoprolol Tartrate (Metoprolol Tartrate) 25 Mg Tablet, 12.5 MG PO BID, 

(Reported)


Mirtazapine (Remeron) 15 Mg Tablet, 15 MG PO QHS, (Reported)


Pantoprazole Sodium (Pantoprazole Sodium) 40 Mg Tablet.dr, 40 MG PO BID, 

(Reported)


Tamsulosin HCl (Flomax) 0.4 Mg Capsule, 0.4 MG PO QHS, (Reported)





Allergies


Coded Allergies:  


     No Known Drug Allergies (Verified  Allergy, Unknown, 9/22/19)





Past Medical History


Medical History


1. Parkinson's disease.


2. Chronic kidney disease.


3. Coronary artery disease.


4. Hypertension


5. Peripheral vascular disease


Surgical History


1. CABG.


2. Femoropopliteal bypass.





Family History


Significant Family History:  Heart disease (motherMI)





Social History


* Smoker:  former Smoker, pipe


Alcohol:  other (unobtainable)


Drugs:  other (unobtainable)


The past medical, surgical and social history is obtained from the medical 

records





A-FIB/CHADSVASC


A-FIB History


Current/History of A-Fib/PAF?:  No


Current PO Anticoag Therapy:  Yes





Review of Systems


Other systems


Please see HPI





Physical Examination


General Exam:  Positive: No Acute Distress, Other (frail, lethargic); 


   Negative: Alert


Eye Exam:  Positive: Conjunctiva & lids normal; 


   Negative: Sclera icteric


ENT Exam:  Positive: Atraumatic


Neck Exam:  Negative: thyromegaly


Chest Exam:  Positive: Rhonchi (diffuse b/l lungs )


Heart Exam:  Positive: Tachycardic, Murmurs (2to3/6 coarse murmur. S1S2 intact)


Abdomen Exam:  Positive: Normal bowel sounds, Soft, Tenderness (diffuse); 


   Negative: Hepatospenomegaly


Extremity Exam:  Negative: Clubbing, Cyanosis, Edema, Normal pulses (pulses 

barely palpable bilateral LEs), Tenderness, Swelling


Skin Exam:  Positive: Nl turgor and temperature


Neuro Exam:  Negative: Normal Gait, Normal Speech (slightly muffled)





Vital Signs


See below





 Assessment/Plan


Mr. Webb is an 84-year-old male who is being readmitted from the ARU to the 

general floor, as he has requested hospice care.  Patient is seen on the ARU 

preparing for transfer. This examination is extremely limited due to significant

lethargy. He told me that he would like to continue his current medications.  He

would also like to be referred for hospice care. Per nursing he has refused all 

of his medications, he has not eaten and he has not had anything to drink. When 

asked, patient stated he is just not hungry. Patient denied any pain at this 

time.


Per the medical record, patient has a past medical history which includes: 

Parkinson's disease, chronic kidney disease, coronary artery disease, 

hypertension, peripheral vascular disease, CABG.  


Per the medical record, patient was admitted February 29 with a diagnosis of a 

viral respiratory infection, superimposed bacterial pneumonia.  He was treated 

for the pneumonia with Levaquin (renally dosed), and supportive measures. M

edications for his chronic illnesses were continued inpatient.  On March 3, he 

was clinically optimized, assessed for and found suitable for admission to the 

ARU.  Unfortunately, on his initial visit , the patient reported that he no 

longer wants to live,he refused medications, and refused to participate in any 

therapy. His daughters have reported that he has been expressing these views for

some time and have agreed to hospice.





Plan: As patient has clearly stated that he would like to continue his medicati

ons we will do as he has asked; the plan below reflects this.  He has also 

requested a consultation for hospice, a referral has been made.





#1. Multifocal pneumonia, right upper lobe.


Patient will have completed a 5 day course of Levaquin today, discontinue.


Continue with supplemental oxygen





#2. Parkinson's disease.


Continue carbidopa and levodopa.





#3. Hypertension.


Continue amlodipine and metoprolol.





#4. Coronary artery disease, with history of CABG


Continue Plavix, aspirin, statin and beta blocker.








Attending addendum: I discussed the care and management of this patient with 

Kelsie Jerome. I agree with the plan as mentioned above.





Plan / VTE


VTE Prophylaxis Ordered?:  No





Plan


Anticipated Discharge:  Hospice


Advanced Directives:  Do Not Resuscitate (DNR), Do Not Intubate (DNI), Comfort C

are Measures











KELSIE JEROME PA-C          Mar 4, 2020 13:51


GONDAL,KHUBAIB N. MD            Mar 5, 2020 08:10

## 2020-03-05 VITALS — DIASTOLIC BLOOD PRESSURE: 61 MMHG | SYSTOLIC BLOOD PRESSURE: 101 MMHG

## 2020-03-05 VITALS — DIASTOLIC BLOOD PRESSURE: 70 MMHG | SYSTOLIC BLOOD PRESSURE: 122 MMHG

## 2020-03-05 RX ADMIN — CARBIDOPA AND LEVODOPA SCH TAB: 25; 100 TABLET ORAL at 21:09

## 2020-03-05 RX ADMIN — METOPROLOL TARTRATE SCH MG: 25 TABLET, FILM COATED ORAL at 22:10

## 2020-03-05 RX ADMIN — ASPIRIN SCH MG: 81 TABLET ORAL at 09:19

## 2020-03-05 RX ADMIN — CLOPIDOGREL BISULFATE SCH MG: 75 TABLET ORAL at 09:19

## 2020-03-05 RX ADMIN — ATORVASTATIN CALCIUM SCH MG: 10 TABLET, FILM COATED ORAL at 09:18

## 2020-03-05 RX ADMIN — FERROUS SULFATE TAB 325 MG (65 MG ELEMENTAL FE) SCH MG: 325 (65 FE) TAB at 21:10

## 2020-03-05 RX ADMIN — TAMSULOSIN HYDROCHLORIDE SCH MG: 0.4 CAPSULE ORAL at 21:09

## 2020-03-05 RX ADMIN — CARBIDOPA AND LEVODOPA SCH TAB: 25; 100 TABLET ORAL at 09:18

## 2020-03-05 RX ADMIN — PANTOPRAZOLE SODIUM SCH MG: 40 TABLET, DELAYED RELEASE ORAL at 21:09

## 2020-03-05 RX ADMIN — FOLIC ACID SCH MG: 1 TABLET ORAL at 09:18

## 2020-03-05 RX ADMIN — FERROUS SULFATE TAB 325 MG (65 MG ELEMENTAL FE) SCH MG: 325 (65 FE) TAB at 09:18

## 2020-03-05 RX ADMIN — PANTOPRAZOLE SODIUM SCH MG: 40 TABLET, DELAYED RELEASE ORAL at 09:18

## 2020-03-05 RX ADMIN — METOPROLOL TARTRATE SCH MG: 25 TABLET, FILM COATED ORAL at 09:19

## 2020-03-05 RX ADMIN — MIRTAZAPINE SCH MG: 15 TABLET, FILM COATED ORAL at 21:09

## 2020-03-05 RX ADMIN — SODIUM CHLORIDE SCH UNITS: 4.5 INJECTION, SOLUTION INTRAVENOUS at 21:10

## 2020-03-05 NOTE — IPNPDOC
Date Seen


The patient was seen on 3/5/20.





Progress Note


SUBJECTIVE: Patient was initially admitted from ARU for hospice eval but his 

daughter does not want the patient to be hospice and wishes complete medical 

treatment at this time. Patient is in agreement at this time, without any 

complaints at this time. He is comfortable, without any complaints and wishes to

go home. He is told that PT recommends rehab but he is adamantly refusing and 

wishes to go home anyway. His daughter reports he has some help during the day 

and he lives with his son. She understands that he may not be ideal to go home 

without support and may not be able to care for himself but she is in agreement 

with the patient and is okay with him going home. Patient denies any SOB, CP, 

N/V/D or abdominal pain.





10 point review of system is negative except for above 





OBJECTIVE


PHYSICAL EXAMINATION:


VITAL SIGNS: Please see below. 


GENERAL: no distress


HEENT: moist mucus membranes


CARDIOVASCULAR: S1, S2, no murmurs


RESPIRATORY: diminished in the bases


ABDOMINAL: soft, non-tender, non-distended, +BS


EXTREMITIES: ROM intact


NEUROLOGICAL: no focal deficits


PSYCHOLOGICAL: calm





LABORATORY DATA, IMAGING STUDIES, MICROBIOLOGY: Please see below.





ASSESSMENT AND PLAN: 84 y.o male initially admitted from ARU for hospice care 

now accepting medical treatment. 





PROBLEMS:


1. Physical deconditioning - PT recommending rehab, patient adamantly refusing, 

daughter in agreement with patient, PFS arranging ideal disposition. Patient 

comfortable and medically stable for discharge. 





2. Medical comorbidities - CAD/HTN/Parkinson's: continue home regimen.





VS, I&O, 24H, Fishbone


Vital Signs/I&O





Vital Signs








  Date Time  Temp Pulse Resp B/P (MAP) Pulse Ox O2 Delivery O2 Flow Rate FiO2


 


3/5/20 14:27 97.4 76 18 122/70 (87) 90 Room Air  














I&O- Last 24 Hours up to 6 AM 


 


 3/5/20





 06:00


 


Intake Total 260 ml


 


Output Total 200 ml


 


Balance 60 ml

















GONDAL,KHUBAIB N. MD            Mar 5, 2020 20:31

## 2020-03-06 VITALS — DIASTOLIC BLOOD PRESSURE: 61 MMHG | SYSTOLIC BLOOD PRESSURE: 108 MMHG

## 2020-03-06 VITALS — SYSTOLIC BLOOD PRESSURE: 128 MMHG | DIASTOLIC BLOOD PRESSURE: 76 MMHG

## 2020-03-06 VITALS — SYSTOLIC BLOOD PRESSURE: 128 MMHG | DIASTOLIC BLOOD PRESSURE: 75 MMHG

## 2020-03-06 LAB
BUN SERPL-MCNC: 24 MG/DL (ref 7–18)
CALCIUM SERPL-MCNC: 8.9 MG/DL (ref 8.8–10.2)
CHLORIDE SERPL-SCNC: 106 MEQ/L (ref 98–107)
CO2 SERPL-SCNC: 29 MEQ/L (ref 21–32)
CREAT SERPL-MCNC: 1.08 MG/DL (ref 0.7–1.3)
GFR SERPL CREATININE-BSD FRML MDRD: > 60 ML/MIN/{1.73_M2} (ref 35–?)
GLUCOSE SERPL-MCNC: 91 MG/DL (ref 70–100)
HCT VFR BLD AUTO: 31 % (ref 42–52)
HGB BLD-MCNC: 9.8 G/DL (ref 13.5–17.5)
MAGNESIUM SERPL-MCNC: 2.1 MG/DL (ref 1.8–2.4)
MCH RBC QN AUTO: 33 PG (ref 27–33)
MCHC RBC AUTO-ENTMCNC: 31.6 G/DL (ref 32–36.5)
MCV RBC AUTO: 104.4 FL (ref 80–96)
PLATELET # BLD AUTO: 152 10^3/UL (ref 150–450)
POTASSIUM SERPL-SCNC: 4.2 MEQ/L (ref 3.5–5.1)
RBC # BLD AUTO: 2.97 10^6/UL (ref 4.3–6.1)
SODIUM SERPL-SCNC: 139 MEQ/L (ref 136–145)
WBC # BLD AUTO: 2.7 10^3/UL (ref 4–10)

## 2020-03-06 RX ADMIN — ASPIRIN SCH MG: 81 TABLET ORAL at 09:03

## 2020-03-06 RX ADMIN — FOLIC ACID SCH MG: 1 TABLET ORAL at 09:03

## 2020-03-06 RX ADMIN — MIRTAZAPINE SCH MG: 15 TABLET, FILM COATED ORAL at 20:09

## 2020-03-06 RX ADMIN — SODIUM CHLORIDE SCH UNITS: 4.5 INJECTION, SOLUTION INTRAVENOUS at 09:05

## 2020-03-06 RX ADMIN — METOPROLOL TARTRATE SCH MG: 25 TABLET, FILM COATED ORAL at 20:09

## 2020-03-06 RX ADMIN — PANTOPRAZOLE SODIUM SCH MG: 40 TABLET, DELAYED RELEASE ORAL at 20:09

## 2020-03-06 RX ADMIN — SODIUM CHLORIDE SCH UNITS: 4.5 INJECTION, SOLUTION INTRAVENOUS at 20:09

## 2020-03-06 RX ADMIN — TAMSULOSIN HYDROCHLORIDE SCH MG: 0.4 CAPSULE ORAL at 20:09

## 2020-03-06 RX ADMIN — FERROUS SULFATE TAB 325 MG (65 MG ELEMENTAL FE) SCH MG: 325 (65 FE) TAB at 20:09

## 2020-03-06 RX ADMIN — CARBIDOPA AND LEVODOPA SCH TAB: 25; 100 TABLET ORAL at 20:09

## 2020-03-06 RX ADMIN — ATORVASTATIN CALCIUM SCH MG: 10 TABLET, FILM COATED ORAL at 09:04

## 2020-03-06 RX ADMIN — PANTOPRAZOLE SODIUM SCH MG: 40 TABLET, DELAYED RELEASE ORAL at 09:03

## 2020-03-06 RX ADMIN — CLOPIDOGREL BISULFATE SCH MG: 75 TABLET ORAL at 09:04

## 2020-03-06 RX ADMIN — CARBIDOPA AND LEVODOPA SCH TAB: 25; 100 TABLET ORAL at 09:04

## 2020-03-06 RX ADMIN — METOPROLOL TARTRATE SCH MG: 25 TABLET, FILM COATED ORAL at 09:05

## 2020-03-06 RX ADMIN — FERROUS SULFATE TAB 325 MG (65 MG ELEMENTAL FE) SCH MG: 325 (65 FE) TAB at 09:04

## 2020-03-06 NOTE — IPNPDOC
Date Seen


The patient was seen on 3/6/20.





Progress Note


SUBJECTIVE: Patient was initially admitted from ARU for hospice eval but his 

daughter does not want the patient to be hospice and wishes complete medical 

treatment at this time. Patient is in agreement at this time, without any 

complaints at this time. He is comfortable, without any complaints and wishes to

go home. He is told that PT recommends rehab but he is adamantly refusing and 

wishes to go home anyway. His daughter reports he has some help during the day 

and he lives with his son. She understands that he may not be ideal to go home 

without support and may not be able to care for himself but she is in agreement 

with the patient and is okay with him going home. Patient denies any SOB, CP, 

N/V/D or abdominal pain.





3/6/20


No acute events overnight, comfortable, no complaints at this time, agrees to 

MARILEE Hoffman making arrangements.





10 point review of system is negative except for above 





OBJECTIVE


PHYSICAL EXAMINATION:


VITAL SIGNS: Please see below. 


GENERAL: no distress


HEENT: moist mucus membranes


CARDIOVASCULAR: S1, S2, no murmurs


RESPIRATORY: diminished in the bases


ABDOMINAL: soft, non-tender, non-distended, +BS


EXTREMITIES: ROM intact


NEUROLOGICAL: no focal deficits


PSYCHOLOGICAL: calm





LABORATORY DATA, IMAGING STUDIES, MICROBIOLOGY: Please see below.





ASSESSMENT AND PLAN: 84 y.o male initially admitted from ARU for hospice care 

now accepting medical treatment. 





PROBLEMS:


1. Physical deconditioning - PT recommending rehab, patient adamantly refusing, 

daughter in agreement with patient, agrees with MARILEE Hoffman making 

arrangements, medically stable for d/c. 





2. Medical comorbidities - CAD/HTN/Parkinson's: continue home regimen.





VS, I&O, 24H, Fishbone


Vital Signs/I&O





Vital Signs








  Date Time  Temp Pulse Resp B/P (MAP) Pulse Ox O2 Delivery O2 Flow Rate FiO2


 


3/6/20 20:09  80  128/76    


 


3/6/20 14:11 97.3  18  93 Room Air  














I&O- Last 24 Hours up to 6 AM 


 


 3/6/20





 06:00


 


Intake Total 600 ml


 


Output Total 0 ml


 


Balance 600 ml











Laboratory Data


24H LABS


Laboratory Tests 2


3/6/20 06:26: 


Nucleated Red Blood Cells % (auto) 0.0, Anion Gap 4L, Glomerular Filtration Rate

> 60.0, Calcium Level 8.9, Magnesium Level 2.1


CBC/BMP


Laboratory Tests


3/6/20 06:26

















GONDAL,KHUBAIB N. MD            Mar 6, 2020 21:28

## 2020-03-07 VITALS — DIASTOLIC BLOOD PRESSURE: 55 MMHG | SYSTOLIC BLOOD PRESSURE: 98 MMHG

## 2020-03-07 VITALS — DIASTOLIC BLOOD PRESSURE: 57 MMHG | SYSTOLIC BLOOD PRESSURE: 104 MMHG

## 2020-03-07 VITALS — SYSTOLIC BLOOD PRESSURE: 130 MMHG | DIASTOLIC BLOOD PRESSURE: 78 MMHG

## 2020-03-07 LAB
BUN SERPL-MCNC: 24 MG/DL (ref 7–18)
CALCIUM SERPL-MCNC: 9.3 MG/DL (ref 8.8–10.2)
CHLORIDE SERPL-SCNC: 105 MEQ/L (ref 98–107)
CO2 SERPL-SCNC: 30 MEQ/L (ref 21–32)
CREAT SERPL-MCNC: 1.21 MG/DL (ref 0.7–1.3)
GFR SERPL CREATININE-BSD FRML MDRD: > 60 ML/MIN/{1.73_M2} (ref 35–?)
GLUCOSE SERPL-MCNC: 87 MG/DL (ref 70–100)
HCT VFR BLD AUTO: 32.3 % (ref 42–52)
HGB BLD-MCNC: 10.1 G/DL (ref 13.5–17.5)
MAGNESIUM SERPL-MCNC: 1.9 MG/DL (ref 1.8–2.4)
MCH RBC QN AUTO: 32.7 PG (ref 27–33)
MCHC RBC AUTO-ENTMCNC: 31.3 G/DL (ref 32–36.5)
MCV RBC AUTO: 104.5 FL (ref 80–96)
PLATELET # BLD AUTO: 201 10^3/UL (ref 150–450)
POTASSIUM SERPL-SCNC: 4.2 MEQ/L (ref 3.5–5.1)
RBC # BLD AUTO: 3.09 10^6/UL (ref 4.3–6.1)
SODIUM SERPL-SCNC: 139 MEQ/L (ref 136–145)
WBC # BLD AUTO: 3.7 10^3/UL (ref 4–10)

## 2020-03-07 RX ADMIN — FERROUS SULFATE TAB 325 MG (65 MG ELEMENTAL FE) SCH MG: 325 (65 FE) TAB at 20:13

## 2020-03-07 RX ADMIN — FERROUS SULFATE TAB 325 MG (65 MG ELEMENTAL FE) SCH MG: 325 (65 FE) TAB at 08:17

## 2020-03-07 RX ADMIN — PANTOPRAZOLE SODIUM SCH MG: 40 TABLET, DELAYED RELEASE ORAL at 20:13

## 2020-03-07 RX ADMIN — ASPIRIN SCH MG: 81 TABLET ORAL at 08:17

## 2020-03-07 RX ADMIN — SODIUM CHLORIDE SCH UNITS: 4.5 INJECTION, SOLUTION INTRAVENOUS at 20:16

## 2020-03-07 RX ADMIN — FERROUS SULFATE TAB 325 MG (65 MG ELEMENTAL FE) SCH MG: 325 (65 FE) TAB at 20:55

## 2020-03-07 RX ADMIN — TAMSULOSIN HYDROCHLORIDE SCH MG: 0.4 CAPSULE ORAL at 20:55

## 2020-03-07 RX ADMIN — PANTOPRAZOLE SODIUM SCH MG: 40 TABLET, DELAYED RELEASE ORAL at 08:17

## 2020-03-07 RX ADMIN — CLOPIDOGREL BISULFATE SCH MG: 75 TABLET ORAL at 08:17

## 2020-03-07 RX ADMIN — ATORVASTATIN CALCIUM SCH MG: 10 TABLET, FILM COATED ORAL at 08:17

## 2020-03-07 RX ADMIN — MIRTAZAPINE SCH MG: 15 TABLET, FILM COATED ORAL at 20:13

## 2020-03-07 RX ADMIN — CARBIDOPA AND LEVODOPA SCH TAB: 25; 100 TABLET ORAL at 20:13

## 2020-03-07 RX ADMIN — TAMSULOSIN HYDROCHLORIDE SCH MG: 0.4 CAPSULE ORAL at 20:13

## 2020-03-07 RX ADMIN — CARBIDOPA AND LEVODOPA SCH TAB: 25; 100 TABLET ORAL at 08:17

## 2020-03-07 RX ADMIN — PANTOPRAZOLE SODIUM SCH MG: 40 TABLET, DELAYED RELEASE ORAL at 20:55

## 2020-03-07 RX ADMIN — SODIUM CHLORIDE SCH UNITS: 4.5 INJECTION, SOLUTION INTRAVENOUS at 20:55

## 2020-03-07 RX ADMIN — METOPROLOL TARTRATE SCH MG: 25 TABLET, FILM COATED ORAL at 08:18

## 2020-03-07 RX ADMIN — FOLIC ACID SCH MG: 1 TABLET ORAL at 08:17

## 2020-03-07 RX ADMIN — SODIUM CHLORIDE SCH UNITS: 4.5 INJECTION, SOLUTION INTRAVENOUS at 08:17

## 2020-03-07 RX ADMIN — METOPROLOL TARTRATE SCH MG: 25 TABLET, FILM COATED ORAL at 20:16

## 2020-03-07 NOTE — IPNPDOC
Date Seen


The patient was seen on 3/7/20.





Progress Note


SUBJECTIVE: Patient was initially admitted from ARU for hospice eval but his 

daughter does not want the patient to be hospice and wishes complete medical 

treatment at this time. Patient is in agreement at this time, without any 

complaints at this time. He is comfortable, without any complaints and wishes to

go home. He is told that PT recommends rehab but he is adamantly refusing and 

wishes to go home anyway. His daughter reports he has some help during the day 

and he lives with his son. She understands that he may not be ideal to go home 

without support and may not be able to care for himself but she is in agreement 

with the patient and is okay with him going home. Patient denies any SOB, CP, 

N/V/D or abdominal pain.





3/6/20


No acute events overnight, comfortable, no complaints at this time, agrees to 

[facility], PFS making arrangements.





3/7/20


No acute events overnight, resting comfortably in chair, eating breakfast, 

having mild non-productive cough, no other complaints, awaiting discharge to 

[facility] (likely Monday). 





10 point review of system is negative except for above 





OBJECTIVE


PHYSICAL EXAMINATION:


VITAL SIGNS: Please see below. 


GENERAL: no distress


HEENT: moist mucus membranes


CARDIOVASCULAR: S1, S2, no murmurs


RESPIRATORY: diminished in the bases


ABDOMINAL: soft, non-tender, non-distended, +BS


EXTREMITIES: ROM intact


NEUROLOGICAL: no focal deficits


PSYCHOLOGICAL: calm





LABORATORY DATA, IMAGING STUDIES, MICROBIOLOGY: Please see below.





ASSESSMENT AND PLAN: 84 y.o male initially admitted from ARU for hospice care 

now accepting medical treatment. 





PROBLEMS:


1. Pneumonia - treatment started during previous hospitalization, continue 

Levaquin to complete 7 day course. 





2. Physical deconditioning - Agrees to [facility], awaiting placement, lik

ely Monday.  





3. Medical comorbidities - CAD/HTN/Parkinson's: continue home regimen.





VS, I&O, 24H, Fishbone


Vital Signs/I&O





Vital Signs








  Date Time  Temp Pulse Resp B/P (MAP) Pulse Ox O2 Delivery O2 Flow Rate FiO2


 


3/7/20 08:18  101  122/76    


 


3/7/20 06:00 97.2  20  91 Room Air  














I&O- Last 24 Hours up to 6 AM 


 


 3/7/20





 06:00


 


Intake Total 940 ml


 


Output Total 500 ml


 


Balance 440 ml











Laboratory Data


24H LABS


Laboratory Tests 2


3/7/20 06:28: 


Nucleated Red Blood Cells % (auto) 0.0, Anion Gap 4L, Glomerular Filtration Rate

> 60.0, Calcium Level 9.3, Magnesium Level 1.9


CBC/BMP


Laboratory Tests


3/7/20 06:28

















GONDAL,KHUBAIB N. MD            Mar 7, 2020 14:59

## 2020-03-08 VITALS — DIASTOLIC BLOOD PRESSURE: 74 MMHG | SYSTOLIC BLOOD PRESSURE: 114 MMHG

## 2020-03-08 VITALS — SYSTOLIC BLOOD PRESSURE: 125 MMHG | DIASTOLIC BLOOD PRESSURE: 81 MMHG

## 2020-03-08 VITALS — SYSTOLIC BLOOD PRESSURE: 112 MMHG | DIASTOLIC BLOOD PRESSURE: 76 MMHG

## 2020-03-08 RX ADMIN — ATORVASTATIN CALCIUM SCH MG: 10 TABLET, FILM COATED ORAL at 09:00

## 2020-03-08 RX ADMIN — PANTOPRAZOLE SODIUM SCH MG: 40 TABLET, DELAYED RELEASE ORAL at 09:00

## 2020-03-08 RX ADMIN — FERROUS SULFATE TAB 325 MG (65 MG ELEMENTAL FE) SCH MG: 325 (65 FE) TAB at 20:29

## 2020-03-08 RX ADMIN — SODIUM CHLORIDE SCH UNITS: 4.5 INJECTION, SOLUTION INTRAVENOUS at 20:29

## 2020-03-08 RX ADMIN — TAMSULOSIN HYDROCHLORIDE SCH MG: 0.4 CAPSULE ORAL at 20:28

## 2020-03-08 RX ADMIN — CARBIDOPA AND LEVODOPA SCH TAB: 25; 100 TABLET ORAL at 09:00

## 2020-03-08 RX ADMIN — MIRTAZAPINE SCH MG: 15 TABLET, FILM COATED ORAL at 20:28

## 2020-03-08 RX ADMIN — FOLIC ACID SCH MG: 1 TABLET ORAL at 09:00

## 2020-03-08 RX ADMIN — ASPIRIN SCH MG: 81 TABLET ORAL at 09:00

## 2020-03-08 RX ADMIN — ACETAMINOPHEN PRN MG: 325 TABLET ORAL at 17:40

## 2020-03-08 RX ADMIN — CLOPIDOGREL BISULFATE SCH MG: 75 TABLET ORAL at 09:00

## 2020-03-08 RX ADMIN — FERROUS SULFATE TAB 325 MG (65 MG ELEMENTAL FE) SCH MG: 325 (65 FE) TAB at 09:00

## 2020-03-08 RX ADMIN — SODIUM CHLORIDE SCH UNITS: 4.5 INJECTION, SOLUTION INTRAVENOUS at 08:59

## 2020-03-08 RX ADMIN — PANTOPRAZOLE SODIUM SCH MG: 40 TABLET, DELAYED RELEASE ORAL at 20:29

## 2020-03-08 RX ADMIN — METOPROLOL TARTRATE SCH MG: 25 TABLET, FILM COATED ORAL at 20:28

## 2020-03-08 RX ADMIN — CARBIDOPA AND LEVODOPA SCH TAB: 25; 100 TABLET ORAL at 20:28

## 2020-03-08 RX ADMIN — METOPROLOL TARTRATE SCH MG: 25 TABLET, FILM COATED ORAL at 09:01

## 2020-03-08 NOTE — IPNPDOC
Date Seen


The patient was seen on 3/8/20.





Progress Note


SUBJECTIVE: Patient was initially admitted from ARU for hospice eval but his 

daughter does not want the patient to be hospice and wishes complete medical 

treatment at this time. Patient is in agreement at this time, without any 

complaints at this time. He is comfortable, without any complaints and wishes to

go home. He is told that PT recommends rehab but he is adamantly refusing and 

wishes to go home anyway. His daughter reports he has some help during the day 

and he lives with his son. She understands that he may not be ideal to go home 

without support and may not be able to care for himself but she is in agreement 

with the patient and is okay with him going home. Patient denies any SOB, CP, 

N/V/D or abdominal pain.





3/6/20


No acute events overnight, comfortable, no complaints at this time, agrees to 

[facility], PFS making arrangements.





3/7/20


No acute events overnight, resting comfortably in chair, eating breakfast, 

having mild non-productive cough, no other complaints, awaiting discharge to 

[facility] (likely Monday). 





3/8/20


No acute events overnight, comfortable in bed, no complaints at this time, 

awaiting discharge [facility] tomorrow. 





10 point review of system is negative except for above 





OBJECTIVE


PHYSICAL EXAMINATION:


VITAL SIGNS: Please see below. 


GENERAL: no distress


HEENT: moist mucus membranes


CARDIOVASCULAR: S1, S2, no murmurs


RESPIRATORY: diminished in the bases


ABDOMINAL: soft, non-tender, non-distended, +BS


EXTREMITIES: ROM intact


NEUROLOGICAL: no focal deficits


PSYCHOLOGICAL: calm





LABORATORY DATA, IMAGING STUDIES, MICROBIOLOGY: Please see below.





ASSESSMENT AND PLAN: 84 y.o male initially admitted from ARU for hospice care 

now accepting medical treatment. 





PROBLEMS:


1. Pneumonia - treatment started during previous hospitalization, continue 

Levaquin to complete 7 day course. 





2. Physical deconditioning - Agrees to [facility], awaiting placement, 

likely Monday.  





3. Medical comorbidities - CAD/HTN/Parkinson's: continue home regimen.





VS, I&O, 24H, Fishbone


Vital Signs/I&O





Vital Signs








  Date Time  Temp Pulse Resp B/P (MAP) Pulse Ox O2 Delivery O2 Flow Rate FiO2


 


3/8/20 09:01  84  118/76    


 


3/8/20 06:00 97.5  20  93 Room Air  














I&O- Last 24 Hours up to 6 AM 


 


 3/8/20





 06:00


 


Intake Total 630 ml


 


Output Total 400 ml


 


Balance 230 ml

















GONDAL,KHUBAIB N. MD            Mar 8, 2020 18:49

## 2020-03-09 VITALS — DIASTOLIC BLOOD PRESSURE: 63 MMHG | SYSTOLIC BLOOD PRESSURE: 110 MMHG

## 2020-03-09 VITALS — DIASTOLIC BLOOD PRESSURE: 68 MMHG | SYSTOLIC BLOOD PRESSURE: 116 MMHG

## 2020-03-09 VITALS — DIASTOLIC BLOOD PRESSURE: 68 MMHG | SYSTOLIC BLOOD PRESSURE: 109 MMHG

## 2020-03-09 RX ADMIN — CARBIDOPA AND LEVODOPA SCH TAB: 25; 100 TABLET ORAL at 08:07

## 2020-03-09 RX ADMIN — TAMSULOSIN HYDROCHLORIDE SCH MG: 0.4 CAPSULE ORAL at 20:07

## 2020-03-09 RX ADMIN — FUROSEMIDE SCH MG: 40 TABLET ORAL at 08:08

## 2020-03-09 RX ADMIN — MIRTAZAPINE SCH MG: 15 TABLET, FILM COATED ORAL at 20:07

## 2020-03-09 RX ADMIN — FOLIC ACID SCH MG: 1 TABLET ORAL at 08:08

## 2020-03-09 RX ADMIN — CARBIDOPA AND LEVODOPA SCH TAB: 25; 100 TABLET ORAL at 20:07

## 2020-03-09 RX ADMIN — ASPIRIN SCH MG: 81 TABLET ORAL at 08:07

## 2020-03-09 RX ADMIN — ATORVASTATIN CALCIUM SCH MG: 10 TABLET, FILM COATED ORAL at 08:07

## 2020-03-09 RX ADMIN — SODIUM CHLORIDE SCH UNITS: 4.5 INJECTION, SOLUTION INTRAVENOUS at 08:14

## 2020-03-09 RX ADMIN — SODIUM CHLORIDE SCH UNITS: 4.5 INJECTION, SOLUTION INTRAVENOUS at 20:08

## 2020-03-09 RX ADMIN — METOPROLOL TARTRATE SCH MG: 25 TABLET, FILM COATED ORAL at 20:08

## 2020-03-09 RX ADMIN — PANTOPRAZOLE SODIUM SCH MG: 40 TABLET, DELAYED RELEASE ORAL at 08:07

## 2020-03-09 RX ADMIN — METOPROLOL TARTRATE SCH MG: 25 TABLET, FILM COATED ORAL at 08:07

## 2020-03-09 RX ADMIN — FERROUS SULFATE TAB 325 MG (65 MG ELEMENTAL FE) SCH MG: 325 (65 FE) TAB at 08:06

## 2020-03-09 RX ADMIN — PANTOPRAZOLE SODIUM SCH MG: 40 TABLET, DELAYED RELEASE ORAL at 20:08

## 2020-03-09 RX ADMIN — ACETAMINOPHEN PRN MG: 325 TABLET ORAL at 20:08

## 2020-03-09 RX ADMIN — FERROUS SULFATE TAB 325 MG (65 MG ELEMENTAL FE) SCH MG: 325 (65 FE) TAB at 20:08

## 2020-03-09 RX ADMIN — CLOPIDOGREL BISULFATE SCH MG: 75 TABLET ORAL at 08:07

## 2020-03-09 NOTE — IPNPDOC
Date Seen


The patient was seen on 3/9/20.





Progress Note


SUBJECTIVE: Patient was initially admitted from ARU for hospice eval but his 

daughter does not want the patient to be hospice and wishes complete medical 

treatment at this time. Patient is in agreement at this time, without any 

complaints at this time. He is comfortable, without any complaints and wishes to

go home. He is told that PT recommends rehab but he is adamantly refusing and 

wishes to go home anyway. His daughter reports he has some help during the day 

and he lives with his son. She understands that he may not be ideal to go home 

without support and may not be able to care for himself but she is in agreement 

with the patient and is okay with him going home. Patient denies any SOB, CP, 

N/V/D or abdominal pain.





3/6/20


No acute events overnight, comfortable, no complaints at this time, agrees to 

[facility], PFS making arrangements.





3/7/20


No acute events overnight, resting comfortably in chair, eating breakfast, 

having mild non-productive cough, no other complaints, awaiting discharge to 

[facility] (likely Monday). 





3/8/20


No acute events overnight, comfortable in bed, no complaints at this time, 

awaiting discharge [facility] tomorrow. 





3/9/20


Patient seen in the morning, comfortable in bed, without any complaints at this 

time, having difficulty with discharging to [facility],  

arranging alternatives.





10 point review of system is negative except for above 





OBJECTIVE


PHYSICAL EXAMINATION:


VITAL SIGNS: Please see below. 


GENERAL: no distress


HEENT: moist mucus membranes


CARDIOVASCULAR: S1, S2, no murmurs


RESPIRATORY: diminished in the bases


ABDOMINAL: soft, non-tender, non-distended, +BS


EXTREMITIES: ROM intact


NEUROLOGICAL: no focal deficits


PSYCHOLOGICAL: calm





LABORATORY DATA, IMAGING STUDIES, MICROBIOLOGY: Please see below.





ASSESSMENT AND PLAN: 84 y.o male initially admitted from ARU for hospice care 

now accepting medical treatment. 





PROBLEMS:


1. Pneumonia - continue Levaquin to complete 7 day course. 





2. Physical deconditioning -having difficulty with discharging to [facility],  arranging alternatives.





3. Medical comorbidities - CAD/HTN/Parkinson's: continue home regimen, started 

Lasix 40 mg by mouth daily.





VS, I&O, 24H, Fishbone


Vital Signs/I&O





Vital Signs








  Date Time  Temp Pulse Resp B/P (MAP) Pulse Ox O2 Delivery O2 Flow Rate FiO2


 


3/9/20 14:17 98.7 63 16 109/68 (82) 84 Room Air  














I&O- Last 24 Hours up to 6 AM 


 


 3/9/20





 05:59


 


Intake Total 320 ml


 


Output Total 300 ml


 


Balance 20 ml

















GONDAL,KHUBAIB N. MD            Mar 9, 2020 18:42

## 2020-03-10 VITALS — DIASTOLIC BLOOD PRESSURE: 63 MMHG | SYSTOLIC BLOOD PRESSURE: 102 MMHG

## 2020-03-10 VITALS — SYSTOLIC BLOOD PRESSURE: 102 MMHG | DIASTOLIC BLOOD PRESSURE: 63 MMHG

## 2020-03-10 VITALS — DIASTOLIC BLOOD PRESSURE: 59 MMHG | SYSTOLIC BLOOD PRESSURE: 102 MMHG

## 2020-03-10 RX ADMIN — FOLIC ACID SCH MG: 1 TABLET ORAL at 09:02

## 2020-03-10 RX ADMIN — PANTOPRAZOLE SODIUM SCH MG: 40 TABLET, DELAYED RELEASE ORAL at 09:03

## 2020-03-10 RX ADMIN — CLOPIDOGREL BISULFATE SCH MG: 75 TABLET ORAL at 09:03

## 2020-03-10 RX ADMIN — ASPIRIN SCH MG: 81 TABLET ORAL at 09:02

## 2020-03-10 RX ADMIN — SODIUM CHLORIDE SCH UNITS: 4.5 INJECTION, SOLUTION INTRAVENOUS at 09:02

## 2020-03-10 RX ADMIN — FERROUS SULFATE TAB 325 MG (65 MG ELEMENTAL FE) SCH MG: 325 (65 FE) TAB at 09:03

## 2020-03-10 RX ADMIN — ATORVASTATIN CALCIUM SCH MG: 10 TABLET, FILM COATED ORAL at 09:03

## 2020-03-10 RX ADMIN — CARBIDOPA AND LEVODOPA SCH TAB: 25; 100 TABLET ORAL at 09:03

## 2020-03-10 RX ADMIN — METOPROLOL TARTRATE SCH MG: 25 TABLET, FILM COATED ORAL at 09:02

## 2020-03-10 RX ADMIN — FUROSEMIDE SCH MG: 40 TABLET ORAL at 09:03

## 2020-03-12 NOTE — DS.PDOC
Discharge Summary


General


Date of Admission


Mar 6, 2020 at 16:43


Date of Discharge


03/10/20


Attending Physician:  GONDAL,KHUBAIB N. MD





Discharge Summary


PROCEDURES PERFORMED DURING STAY: None.





ADMITTING DIAGNOSES: 


1. Pneumonia, physical deconditioning.





DISCHARGE DIAGNOSES:


1. Pneumonia, physical deconditioning.





COMPLICATIONS/CHIEF COMPLAINT: Pneumonia.





HISTORY OF PRESENT ILLNESS: 84-year-old male with past medical history of 

coronary artery disease was admitted from acute rehabilitation unit initially 

for hospice care. Upon discussion with his daughter. She was not aware the 

patient was being sent over from acute rehabilitation unit for hospice care. She

does not wish for her father to be going for hospice care at this time, she is 

official healthcare proxy. Patient was restarted on all of his home meds, 

arrangements were made for patient to be discharged to [facility]. 

Patient is clinically at baseline, comfortable, tolerating diet, without any 

complaints, completed his antibiotic course, clinically and hemodynamically 

stable for discharge at this time.





HOSPITAL COURSE: As above. 





DISCHARGE MEDICATIONS: Please see below.


 


ALLERGIES: Please see below.





OBJECTIVE


PHYSICAL EXAMINATION:


VITAL SIGNS: Please see below. 


GENERAL: no distress


HEENT: moist mucus membranes


CARDIOVASCULAR: S1, S2, no murmurs


RESPIRATORY: diminished in the bases


ABDOMINAL: soft, non-tender, non-distended, +BS


EXTREMITIES: ROM intact


NEUROLOGICAL: no focal deficits


PSYCHOLOGICAL: calm





LABORATORY DATA: Please see below.





PROGNOSIS: Fair





ACTIVITY: As tolerated.





DIET: Cardiac





DISCHARGE PLAN: Follow with PCP in 1-2 weeks





DISPOSITION: [facility].





DISCHARGE INSTRUCTIONS:


1. As above.





DISCHARGE CONDITION: Stable.





TIME SPENT ON DISCHARGE: Greater than 27 minutes.





Vital Signs/I&Os





Vital Signs








  Date Time  Temp Pulse Resp B/P (MAP) Pulse Ox O2 Delivery O2 Flow Rate FiO2


 


3/10/20 09:03  78  102/63    


 


3/10/20 06:00 97.8  17  92 Room Air  











Discharge Medications


Scheduled


Amlodipine Besylate (Amlodipine Besylate) 10 Mg Tablet, 10 MG PO DAILY, 

(Reported)


Aspirin (Aspir 81) 81 Mg Tablet.dr, 81 MG PO DAILY, (Reported)


Atorvastatin Calcium (Atorvastatin Calcium) 10 Mg Tablet, 20 MG PO DAILY


Carbidopa/Levodopa (Carbidopa-Levodopa  Tab) 1 Each Tablet, 1 TAB PO BID, 

(Reported)


   0900, 2100 


Clopidogrel Bisulfate (Clopidogrel) 75 Mg Tablet, 75 MG PO DAILY, (Reported)


Cyanocobalamin (Vitamin B-12) (B-12) 1,000 Mcg Tablet, 1,000 MCG PO DAILY, 

(Reported)


Ferrous Sulfate (Ferrous Sulfate) 325 Mg Tablet, 325 MG PO BID, (Reported)


Folic Acid (Folic Acid) 1 Mg Tablet, 1 MG PO DAILY, (Reported)


Furosemide (Furosemide) 40 Mg Tablet, 40 MG PO DAILY


Magnesium Oxide (Magnesium) 400 Mg Cap, 400 MG PO DAILY, (Reported)


Metoprolol Tartrate (Metoprolol Tartrate) 25 Mg Tablet, 12.5 MG PO BID, 

(Reported)


Mirtazapine (Remeron) 15 Mg Tablet, 15 MG PO QHS, (Reported)


Pantoprazole Sodium (Pantoprazole Sodium) 40 Mg Tablet.dr, 40 MG PO BID, 

(Reported)


Tamsulosin HCl (Flomax) 0.4 Mg Capsule, 0.4 MG PO QHS, (Reported)





Allergies


Coded Allergies:  


     No Known Drug Allergies (Verified  Allergy, Unknown, 9/22/19)











GONDAL,KHUBAIB N. MD           Mar 12, 2020 23:21

## 2020-05-29 ENCOUNTER — HOSPITAL ENCOUNTER (OUTPATIENT)
Dept: HOSPITAL 53 - M SMT | Age: 85
End: 2020-05-29
Attending: NURSE PRACTITIONER
Payer: MEDICARE

## 2020-05-29 DIAGNOSIS — R32: Primary | ICD-10-CM

## 2020-05-29 LAB
APPEARANCE UR: CLEAR
BACTERIA UR QL AUTO: NEGATIVE
BILIRUB UR QL STRIP.AUTO: NEGATIVE
GLUCOSE UR QL STRIP.AUTO: NEGATIVE MG/DL
HGB UR QL STRIP.AUTO: NEGATIVE
KETONES UR QL STRIP.AUTO: NEGATIVE MG/DL
LEUKOCYTE ESTERASE UR QL STRIP.AUTO: NEGATIVE
MUCOUS THREADS URNS QL MICRO: (no result)
NITRITE UR QL STRIP.AUTO: NEGATIVE
PH UR STRIP.AUTO: 5 UNITS (ref 5–9)
PROT UR QL STRIP.AUTO: NEGATIVE MG/DL
RBC # UR AUTO: 0 /HPF (ref 0–3)
SP GR UR STRIP.AUTO: 1.01 (ref 1–1.03)
SQUAMOUS #/AREA URNS AUTO: 0 /HPF (ref 0–6)
UROBILINOGEN UR QL STRIP.AUTO: 0.2 MG/DL (ref 0–2)
WBC #/AREA URNS AUTO: 0 /HPF (ref 0–3)

## 2020-05-29 PROCEDURE — 51798 US URINE CAPACITY MEASURE: CPT

## 2020-05-29 PROCEDURE — 81001 URINALYSIS AUTO W/SCOPE: CPT

## 2020-05-29 PROCEDURE — 87086 URINE CULTURE/COLONY COUNT: CPT

## 2020-07-10 ENCOUNTER — HOSPITAL ENCOUNTER (OUTPATIENT)
Dept: HOSPITAL 53 - M LAB REF | Age: 85
End: 2020-07-10
Attending: INTERNAL MEDICINE
Payer: MEDICARE

## 2020-07-10 DIAGNOSIS — R13.10: Primary | ICD-10-CM

## 2020-07-10 LAB
FERRITIN SERPL-MCNC: 164 NG/ML (ref 26–388)
FOLATE SERPL-MCNC: > 24 NG/ML
IRON SATN MFR SERPL: 26.6 % (ref 19.7–50)
IRON SERPL-MCNC: 55 UG/DL (ref 65–175)
TIBC SERPL-MCNC: 207 UG/DL (ref 250–450)
VIT B12 SERPL-MCNC: 638 PG/ML

## 2020-11-09 ENCOUNTER — HOSPITAL ENCOUNTER (OUTPATIENT)
Dept: HOSPITAL 53 - M LAB REF | Age: 85
End: 2020-11-09
Attending: INTERNAL MEDICINE
Payer: MEDICARE

## 2020-11-09 DIAGNOSIS — D64.9: Primary | ICD-10-CM

## 2020-11-09 LAB
IRON SATN MFR SERPL: 32.1 % (ref 19.7–50)
IRON SERPL-MCNC: 69 UG/DL (ref 65–175)
TIBC SERPL-MCNC: 215 UG/DL (ref 250–450)

## 2020-12-15 ENCOUNTER — HOSPITAL ENCOUNTER (INPATIENT)
Dept: HOSPITAL 53 - M ED | Age: 85
LOS: 3 days | Discharge: HOME HEALTH | DRG: 605 | End: 2020-12-18
Attending: STUDENT IN AN ORGANIZED HEALTH CARE EDUCATION/TRAINING PROGRAM | Admitting: STUDENT IN AN ORGANIZED HEALTH CARE EDUCATION/TRAINING PROGRAM
Payer: MEDICARE

## 2020-12-15 VITALS — DIASTOLIC BLOOD PRESSURE: 72 MMHG | SYSTOLIC BLOOD PRESSURE: 129 MMHG

## 2020-12-15 VITALS — HEIGHT: 67 IN | BODY MASS INDEX: 26.68 KG/M2 | WEIGHT: 169.98 LBS

## 2020-12-15 VITALS — DIASTOLIC BLOOD PRESSURE: 69 MMHG | SYSTOLIC BLOOD PRESSURE: 125 MMHG

## 2020-12-15 DIAGNOSIS — G20: ICD-10-CM

## 2020-12-15 DIAGNOSIS — I12.9: ICD-10-CM

## 2020-12-15 DIAGNOSIS — I73.9: ICD-10-CM

## 2020-12-15 DIAGNOSIS — N17.9: ICD-10-CM

## 2020-12-15 DIAGNOSIS — N18.9: ICD-10-CM

## 2020-12-15 DIAGNOSIS — Z66: ICD-10-CM

## 2020-12-15 DIAGNOSIS — Z79.82: ICD-10-CM

## 2020-12-15 DIAGNOSIS — Z87.891: ICD-10-CM

## 2020-12-15 DIAGNOSIS — Z79.02: ICD-10-CM

## 2020-12-15 DIAGNOSIS — Z20.828: ICD-10-CM

## 2020-12-15 DIAGNOSIS — Z79.899: ICD-10-CM

## 2020-12-15 DIAGNOSIS — Z95.1: ICD-10-CM

## 2020-12-15 DIAGNOSIS — S30.0XXA: Primary | ICD-10-CM
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
## 2020-12-15 DIAGNOSIS — D53.9: ICD-10-CM

## 2020-12-15 DIAGNOSIS — Z98.62: ICD-10-CM
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
## 2020-12-15 DIAGNOSIS — I25.10: ICD-10-CM

## 2020-12-15 LAB
APTT BLD: 33.8 SECONDS (ref 24.2–38.5)
BUN SERPL-MCNC: 27 MG/DL (ref 7–18)
CALCIUM SERPL-MCNC: 8.9 MG/DL (ref 8.8–10.2)
CHLORIDE SERPL-SCNC: 109 MEQ/L (ref 98–107)
CO2 SERPL-SCNC: 25 MEQ/L (ref 21–32)
CREAT SERPL-MCNC: 2.11 MG/DL (ref 0.7–1.3)
GFR SERPL CREATININE-BSD FRML MDRD: 32 ML/MIN/{1.73_M2} (ref 35–?)
GLUCOSE SERPL-MCNC: 100 MG/DL (ref 70–100)
HCT VFR BLD AUTO: 27.1 % (ref 42–52)
HGB BLD-MCNC: 8.1 G/DL (ref 13.5–17.5)
INR PPP: 1.35
MCH RBC QN AUTO: 32.4 PG (ref 27–33)
MCHC RBC AUTO-ENTMCNC: 29.9 G/DL (ref 32–36.5)
MCV RBC AUTO: 108.4 FL (ref 80–96)
PLATELET # BLD AUTO: 136 10^3/UL (ref 150–450)
POTASSIUM SERPL-SCNC: 4.9 MEQ/L (ref 3.5–5.1)
PROTHROMBIN TIME: 17 SECONDS (ref 12.5–14.3)
RBC # BLD AUTO: 2.5 10^6/UL (ref 4.3–6.1)
RSV RNA NPH QL NAA+PROBE: NEGATIVE
SODIUM SERPL-SCNC: 139 MEQ/L (ref 136–145)
WBC # BLD AUTO: 8.4 10^3/UL (ref 4–10)

## 2020-12-15 RX ADMIN — ACETAMINOPHEN SCH MG: 500 TABLET ORAL at 21:07

## 2020-12-15 RX ADMIN — METOPROLOL TARTRATE SCH MG: 25 TABLET, FILM COATED ORAL at 21:08

## 2020-12-15 RX ADMIN — TAMSULOSIN HYDROCHLORIDE SCH MG: 0.4 CAPSULE ORAL at 21:07

## 2020-12-15 RX ADMIN — SODIUM CHLORIDE SCH MLS/HR: 9 INJECTION, SOLUTION INTRAVENOUS at 17:22

## 2020-12-15 RX ADMIN — MIRTAZAPINE SCH MG: 15 TABLET, FILM COATED ORAL at 21:07

## 2020-12-15 RX ADMIN — FERROUS SULFATE TAB 325 MG (65 MG ELEMENTAL FE) SCH MG: 325 (65 FE) TAB at 21:07

## 2020-12-15 RX ADMIN — CARBIDOPA AND LEVODOPA SCH TAB: 25; 100 TABLET ORAL at 17:22

## 2020-12-15 NOTE — HPEPDOC
General


Date of Admission


Dec 15, 2020 at 15:14


Date of Service:  Dec 15, 2020


Chief Complaint


The patient is a 85-year-old male admitted with a reason for visit of Adverse 

Effect Of Antiplatelet Agent/Traumatic Hem.


Source:  Patient, Family





History of Present Illness


Mr. Lou is an 85 year old male with Parkinson's disease, PVD, and CAD s/p 

CABG here for fall and hematoma. He initially fell on Sunday morning. Denies any

lightheadedness, dizziness, or syncope. He tripped over the rug and fell. Denies

LOC. He had someone help him back up and got into bed. The daughter did not know

he had a fell. He was doing okay until this morning (Tuesday morning). He had 

excruciating right hip pain and could not ambulate. He was taken to the ED. 

Imaging did not demonstrate fracture. On examination, he has bruising over right

gluteus nati and tender swelling. He had a hematoma. He has not been able to 

ambulate and ED called for admission.





Home Medications


Scheduled


Aspirin (Aspirin EC) 81 Mg Tablet.dr, 81 MG PO DAILY, (Reported)


Atorvastatin Calcium (Atorvastatin Calcium) 20 Mg Tablet, 20 MG PO DAILY, 

(Reported)


Carbidopa/Levodopa (Carbidopa-Levodopa  Tab) 1 Each Tablet, 1 TAB PO BID, 

(Reported)


Cholecalciferol (Vitamin D3) (Vitamin D3) 1,000 Unit Tablet, 2,000 UNITS PO 

DAILY, (Reported)


Clopidogrel Bisulfate (Clopidogrel) 75 Mg Tablet, 75 MG PO DAILY, (Reported)


Cyanocobalamin (Vitamin B-12) (B-12) 1,000 Mcg Tablet, 1,000 MCG PO DAILY, (Repo

rted)


Famotidine (Famotidine) 20 Mg Tablet, 20 MG PO DAILY, (Reported)


Ferrous Sulfate (Ferrous Sulfate) 325 Mg Tablet, 325 MG PO QHS, (Reported)


Folic Acid (Folic Acid) 1 Mg Tablet, 1 MG PO DAILY, (Reported)


Furosemide (Furosemide) 40 Mg Tablet, 20 MG PO QHS, (Reported)


Lactobacillus Combo No.10 (Probiotic) 1 Each Capsule, 1 CAP PO QHS, (Reported)


Magnesium Oxide (Magnesium Oxide) 400 Mg Tablet, 400 MG PO DAILY, (Reported)


Metoprolol Tartrate (Metoprolol Tartrate) 25 Mg Tablet, 12.5 MG PO BID, 

(Reported)


Mirtazapine (Remeron) 15 Mg Tablet, 15 MG PO QHS, (Reported)


Tamsulosin HCl (Flomax) 0.4 Mg Capsule, 0.4 MG PO QHS, (Reported)


Tolterodine Tartrate (Tolterodine Tartrate ER) 2 Mg Cap.er.24h, 2 MG PO DAILY, 

(Reported)





Scheduled PRN


Acetaminophen (Acetaminophen) 500 Mg Tablet, 1,000 MG PO Q6H PRN for PAIN, 

(Reported)





Miscellaneous Medications


[Med Rec Comment]  , (Reported)


   LIST OBTAINED FROM [facility] [phone] 





Allergies


Coded Allergies:  


     No Known Drug Allergies (Verified  Allergy, Unknown, 9/22/19)





Past Medical History


Medical History


1. Parkinson's disease.


2. Chronic kidney disease.


3. Coronary artery disease.


4. Hypertension


5. Peripheral vascular disease


Surgical History


1. CABG.


2. Femoropopliteal bypass.





Family History


Mother: MI





Social History


* Smoker:  former Smoker (pipe)


Alcohol:  Denies


Drugs:  denies





A-FIB/CHADSVASC


A-FIB History


Current/History of A-Fib/PAF?:  No





Review of Systems


Constitutional:  Denies: Chills, Fever


Eyes:  Denies: Vision change


ENT:  Denies: Sore Throat


Skin:  Denies: Rash


Pulmonary:  Denies: Dyspnea, Cough


Cardiovascular:  Denies: Chest Pain, Palpitations, Lt Headedness


Gastrointestinal:  Denies: Abdominal Pain, Diarrhea


Genitourinary:  Denies: Dysuria


Musculoskeletal:  Reports: Other Symptoms (Gluteal pain)


Neurological:  Denies: Numbness





Physical Examination


General Exam:  Positive: Cooperative, No Acute Distress


Eye Exam:  Positive: EOMI; 


   Negative: Sclera icteric


ENT Exam:  Positive: Tongue Midline


Neck Exam:  Positive: Supple


Chest Exam:  Positive: Clear to auscultation; 


   Negative: Rales, Rhonchi, Wheezing


Heart Exam:  Positive: Rate Normal, Regular Rhythm


Abdomen Exam:  Positive: Normal bowel sounds, Soft; 


   Negative: Tenderness


Extremity Exam:  Negative: Edema


Neuro Exam:  Positive: Normal Speech, Cranial Nerves 3-12 NL


Psych Exam:  Positive: Mental status NL, Mood NL





Vital Signs





Vital Signs








  Date Time  Temp Pulse Resp B/P (MAP) Pulse Ox O2 Delivery O2 Flow Rate FiO2


 


12/15/20 16:33 99.2 85 17 129/72 (91) 97 Room Air  











Laboratory Data


Labs 24H


Laboratory Tests 2


12/15/20 13:23: 


Nucleated Red Blood Cells % (auto) 0.0, Prothrombin Time 17.0H, Prothromb Time 

International Ratio 1.35, Activated Partial Thromboplast Time 33.8, Anion Gap 

5L, Glomerular Filtration Rate 32.0L, Calcium Level 8.9


12/15/20 14:52: 


Coronavirus (COVID-19)(PCR) NEGATIVE, Influenza Type A (RT-PCR) NEGATIVE, Inf

luenza Type B (RT-PCR) NEGATIVE, Respiratory Syncytial Virus (PCR) NEGATIVE


CBC/BMP


Laboratory Tests


12/15/20 13:23











 Assessment/Plan


Mr. Lou is an 85 year old male with Parkinson's disease, PVD, and CAD s/p 

CABG here for fall and hematoma. We will hold his DAPT for the time being and 

provide supportive care for the hematoma. His hemoglobin has decreased from 10 

in March to 8 today. Will continue to monitor H&H. Otherwise, patient will need 

physical therapy. Patient may need rehab pending on PT recommendations





Plan / VTE


VTE Prophylaxis Ordered?:  Yes





Plan


Plan


1. Right gluteal hematoma 2/2 mechanical fall


-Hold DAPT


-Supportive care and pain control


-PT for ambulation, may need rehab





2. CALLUM


-Hold diuretic


-Avoid nephrotoxic agents


-IVF





3. Parkinson's disease


-Continue carbidopa/levodopa


-PT for ambulation





4. CAD


-Hold DAPT due to hematoma


-Continue Lopressor and atorvastatin





5. Macrocytic anemia


-Hold DAPT and monitor H&H (may be related to hematoma)


-Continue Vit B12 and iron





6. DVT ppx


-No chemical ppx due to hematoma and anemia. SCD and TEDs











JOSIE ESTEBAN DO               Dec 15, 2020 17:38

## 2020-12-15 NOTE — REP
INDICATION:

trauma.



COMPARISON:

Comparison radiograph January 11, 2020..



TECHNIQUE:

Three views.



FINDINGS:

Bony pelvic ring is intact.  The patient is status post aorta bi-iliac stent graft

placement.  There are surgical clips in the inguinal soft tissues bilaterally.  There

is no evidence of pelvic or proximal femur fracture.  AP and frogleg views of the

right hip demonstrate osteoarthritic narrowing of the joint space and spur formation.

There is mild diffuse osteopenia.  There is some left hip osteoarthritis as well.



IMPRESSION:

No traumatic abnormality noted.  Bilateral hip joint osteoarthritis.  Status post

aorta bi-iliac stent graft placement.  No acute bony abnormality.





<Electronically signed by Jorje Gamez > 12/15/20 [id number]

## 2020-12-16 VITALS — DIASTOLIC BLOOD PRESSURE: 55 MMHG | SYSTOLIC BLOOD PRESSURE: 114 MMHG

## 2020-12-16 VITALS — SYSTOLIC BLOOD PRESSURE: 108 MMHG | DIASTOLIC BLOOD PRESSURE: 88 MMHG

## 2020-12-16 LAB
BUN SERPL-MCNC: 24 MG/DL (ref 7–18)
CALCIUM SERPL-MCNC: 8.5 MG/DL (ref 8.8–10.2)
CHLORIDE SERPL-SCNC: 112 MEQ/L (ref 98–107)
CO2 SERPL-SCNC: 25 MEQ/L (ref 21–32)
CREAT SERPL-MCNC: 2.02 MG/DL (ref 0.7–1.3)
FERRITIN SERPL-MCNC: 203 NG/ML (ref 26–388)
GFR SERPL CREATININE-BSD FRML MDRD: 33.7 ML/MIN/{1.73_M2} (ref 35–?)
GLUCOSE SERPL-MCNC: 86 MG/DL (ref 70–100)
HCT VFR BLD AUTO: 24.4 % (ref 42–52)
HCT VFR BLD AUTO: 25.1 % (ref 42–52)
HCT VFR BLD AUTO: 25.2 % (ref 42–52)
HGB BLD-MCNC: 7.4 G/DL (ref 13.5–17.5)
HGB BLD-MCNC: 7.4 G/DL (ref 13.5–17.5)
HGB BLD-MCNC: 7.6 G/DL (ref 13.5–17.5)
IRON SATN MFR SERPL: 11.1 % (ref 19.7–50)
IRON SERPL-MCNC: 15 UG/DL (ref 65–175)
LDH SERPL L TO P-CCNC: 138 U/L (ref 87–241)
MCH RBC QN AUTO: 32.5 PG (ref 27–33)
MCH RBC QN AUTO: 33.3 PG (ref 27–33)
MCH RBC QN AUTO: 33.5 PG (ref 27–33)
MCHC RBC AUTO-ENTMCNC: 29.5 G/DL (ref 32–36.5)
MCHC RBC AUTO-ENTMCNC: 30.2 G/DL (ref 32–36.5)
MCHC RBC AUTO-ENTMCNC: 30.3 G/DL (ref 32–36.5)
MCV RBC AUTO: 109.9 FL (ref 80–96)
MCV RBC AUTO: 110.1 FL (ref 80–96)
MCV RBC AUTO: 111 FL (ref 80–96)
PLATELET # BLD AUTO: 118 10^3/UL (ref 150–450)
PLATELET # BLD AUTO: 124 10^3/UL (ref 150–450)
PLATELET # BLD AUTO: 127 10^3/UL (ref 150–450)
POTASSIUM SERPL-SCNC: 4.8 MEQ/L (ref 3.5–5.1)
RBC # BLD AUTO: 2.22 10^6/UL (ref 4.3–6.1)
RBC # BLD AUTO: 2.27 10^6/UL (ref 4.3–6.1)
RBC # BLD AUTO: 2.28 10^6/UL (ref 4.3–6.1)
SODIUM SERPL-SCNC: 141 MEQ/L (ref 136–145)
TIBC SERPL-MCNC: 135 UG/DL (ref 250–450)
VIT B12 SERPL-MCNC: 507 PG/ML (ref 247–911)
WBC # BLD AUTO: 5.3 10^3/UL (ref 4–10)
WBC # BLD AUTO: 5.4 10^3/UL (ref 4–10)
WBC # BLD AUTO: 6.4 10^3/UL (ref 4–10)

## 2020-12-16 RX ADMIN — SODIUM CHLORIDE SCH MLS/HR: 9 INJECTION, SOLUTION INTRAVENOUS at 17:04

## 2020-12-16 RX ADMIN — METOPROLOL TARTRATE SCH MG: 25 TABLET, FILM COATED ORAL at 20:40

## 2020-12-16 RX ADMIN — ATORVASTATIN CALCIUM SCH MG: 20 TABLET, FILM COATED ORAL at 10:40

## 2020-12-16 RX ADMIN — PROBIOTIC PRODUCT - TAB SCH EA: TAB at 10:40

## 2020-12-16 RX ADMIN — ACETAMINOPHEN SCH MG: 500 TABLET ORAL at 10:42

## 2020-12-16 RX ADMIN — CYANOCOBALAMIN TAB 500 MCG SCH MCG: 500 TAB at 10:41

## 2020-12-16 RX ADMIN — METOPROLOL TARTRATE SCH MG: 25 TABLET, FILM COATED ORAL at 10:44

## 2020-12-16 RX ADMIN — MIRTAZAPINE SCH MG: 15 TABLET, FILM COATED ORAL at 20:40

## 2020-12-16 RX ADMIN — TOLTERODINE SCH MG: 2 CAPSULE, EXTENDED RELEASE ORAL at 10:41

## 2020-12-16 RX ADMIN — CARBIDOPA AND LEVODOPA SCH TAB: 25; 100 TABLET ORAL at 10:40

## 2020-12-16 RX ADMIN — ACETAMINOPHEN SCH MG: 500 TABLET ORAL at 17:05

## 2020-12-16 RX ADMIN — ACETAMINOPHEN SCH MG: 500 TABLET ORAL at 20:41

## 2020-12-16 RX ADMIN — Medication SCH UNITS: at 10:40

## 2020-12-16 RX ADMIN — FERROUS SULFATE TAB 325 MG (65 MG ELEMENTAL FE) SCH MG: 325 (65 FE) TAB at 20:40

## 2020-12-16 RX ADMIN — TAMSULOSIN HYDROCHLORIDE SCH MG: 0.4 CAPSULE ORAL at 20:40

## 2020-12-16 RX ADMIN — MAGNESIUM OXIDE SCH MG: 400 TABLET ORAL at 10:41

## 2020-12-16 RX ADMIN — SODIUM CHLORIDE SCH MLS/HR: 9 INJECTION, SOLUTION INTRAVENOUS at 05:30

## 2020-12-16 RX ADMIN — CARBIDOPA AND LEVODOPA SCH TAB: 25; 100 TABLET ORAL at 17:04

## 2020-12-16 RX ADMIN — FOLIC ACID SCH MG: 1 TABLET ORAL at 10:40

## 2020-12-16 RX ADMIN — FAMOTIDINE SCH MG: 20 TABLET, FILM COATED ORAL at 10:43

## 2020-12-16 NOTE — ECGEPIP
[facility] - ED

                                       

                                       Test Date:    2020-12-15

Pat Name:     FELISA SANCHEZ           Department:   

Patient ID:   [id number]                 Room:         [place] [address]

Gender:       Male                     Technician:   THAO

:          [id number]               Requested By: Toney DENNEY

Order Number: [id number]     Reading MD:   Toney De La Torre

                                 Measurements

Intervals                              Axis          

Rate:         81                       P:            

CT:           0                        QRS:          51

QRSD:         102                      T:            8

QT:           403                                    

QTc:          469                                    

                           Interpretive Statements

ATRIAL FIBRILLATION

INCOMPLETE RIGHT BUNDLE BRANCH BLOCK

NSTTW ABNORMALITY(S)

SIMILAR TO 3/1/20

Electronically Signed on 2020 7:45:09 EST by Toney De La Torre

## 2020-12-16 NOTE — IPNPDOC
Subjective


Date Seen


The patient was seen on 12/16/20.





Subjective


Chief Complaint/HPI


Mr. Lou is an 85 year old male with Parkinson's disease, PVD, and CAD s/p 

CABG here for fall and hematoma. This morning, he reports that his hip feels 

okay, but he has been afraid to move it. Otherwise, denies fever/chills, chest 

pain, dyspnea, or abdominal pain





Objective


Physical Examination


General Exam:  Positive: Cooperative, No Acute Distress


Eye Exam:  Positive: EOMI


ENT Exam:  Positive: Tongue Midline


Neck Exam:  Positive: Supple


Chest Exam:  Positive: Clear to auscultation


Heart Exam:  Positive: Rate Normal, Regular Rhythm


Abdomen Exam:  Positive: Normal bowel sounds, Soft


Extremity Exam:  Negative: Edema


Neuro Exam:  Positive: Normal Speech, Cranial Nerves 3-12 NL


Psych Exam:  Positive: Mental status NL, Mood NL





Assessment /Plan


Assessment


Mr. Lou is an 85 year old male with Parkinson's disease, PVD, and CAD s/p 

CABG here for fall and hematoma. We will hold his DAPT for the time being and 

provide supportive care for the hematoma. His hemoglobin has decreased from 10 

in March to 8 today. H&H has dropped down to 7.5 and has been consistently been 

at this level. Otherwise, patient will need physical therapy. Patient may need 

rehab pending on PT recommendations





Plan/VTE


VTE Prophylaxis Ordered?:  Yes





Plan


Plan


1. Right gluteal hematoma 2/2 mechanical fall


-Hold DAPT


-Supportive care and pain control


-PT for ambulation, may need rehab





2. CALLUM


-Hold diuretic


-Avoid nephrotoxic agents


-IVF





3. Parkinson's disease


-Continue carbidopa/levodopa


-PT for ambulation





4. CAD


-Hold DAPT due to hematoma


-Continue Lopressor and atorvastatin





5. Macrocytic anemia


-Hold DAPT and monitor H&H (may be related to hematoma)


-Continue Vit B12 and iron





6. DVT ppx


-No chemical ppx due to hematoma and anemia. SCD and TEDs





VS, I&O, 24H, Fishbone


Vital Signs/I&O





Vital Signs








  Date Time  Temp Pulse Resp B/P (MAP) Pulse Ox O2 Delivery O2 Flow Rate FiO2


 


12/16/20 20:40  58  114/55    


 


12/16/20 14:00 99.4  16  95 Room Air  














I&O- Last 24 Hours up to 6 AM 


 


 12/16/20





 06:00


 


Intake Total 1300 ml


 


Output Total 300 ml


 


Balance 1000 ml











Laboratory Data


24H LABS


Laboratory Tests 2


12/16/20 06:21: 


Nucleated Red Blood Cells % (auto) 0.0, Anion Gap 4L, Glomerular Filtration Rate

33.7L, Calcium Level 8.5L


12/16/20 12:14: 


Nucleated Red Blood Cells % (auto) 0.0, Reticulocyte # (auto) 40.2, Percent 

Reticulocyte Count 1.8H, Reticulocyte Hemoglobin Equivalent 32.7, Iron Level 

15L, Total Iron Binding Capacity 135L, Transferrin % Saturation 11.1L, Ferritin 

203, Lactate Dehydrogenase 138, Vitamin B12 Level 507


12/16/20 18:25: Nucleated Red Blood Cells % (auto) 0.0


CBC/BMP


Laboratory Tests


12/16/20 06:21








12/16/20 12:14








12/16/20 18:25

















JOSIE ESTEBAN DO               Dec 16, 2020 20:54

## 2020-12-17 VITALS — DIASTOLIC BLOOD PRESSURE: 55 MMHG | SYSTOLIC BLOOD PRESSURE: 107 MMHG

## 2020-12-17 VITALS — SYSTOLIC BLOOD PRESSURE: 112 MMHG | DIASTOLIC BLOOD PRESSURE: 66 MMHG

## 2020-12-17 VITALS — DIASTOLIC BLOOD PRESSURE: 84 MMHG | SYSTOLIC BLOOD PRESSURE: 156 MMHG

## 2020-12-17 VITALS — SYSTOLIC BLOOD PRESSURE: 127 MMHG | DIASTOLIC BLOOD PRESSURE: 71 MMHG

## 2020-12-17 VITALS — DIASTOLIC BLOOD PRESSURE: 74 MMHG | SYSTOLIC BLOOD PRESSURE: 143 MMHG

## 2020-12-17 VITALS — DIASTOLIC BLOOD PRESSURE: 62 MMHG | SYSTOLIC BLOOD PRESSURE: 125 MMHG

## 2020-12-17 VITALS — DIASTOLIC BLOOD PRESSURE: 78 MMHG | SYSTOLIC BLOOD PRESSURE: 138 MMHG

## 2020-12-17 VITALS — SYSTOLIC BLOOD PRESSURE: 132 MMHG | DIASTOLIC BLOOD PRESSURE: 78 MMHG

## 2020-12-17 VITALS — SYSTOLIC BLOOD PRESSURE: 128 MMHG | DIASTOLIC BLOOD PRESSURE: 73 MMHG

## 2020-12-17 VITALS — SYSTOLIC BLOOD PRESSURE: 128 MMHG | DIASTOLIC BLOOD PRESSURE: 64 MMHG

## 2020-12-17 LAB
BUN SERPL-MCNC: 27 MG/DL (ref 7–18)
CALCIUM SERPL-MCNC: 8.8 MG/DL (ref 8.8–10.2)
CHLORIDE SERPL-SCNC: 113 MEQ/L (ref 98–107)
CO2 SERPL-SCNC: 26 MEQ/L (ref 21–32)
CREAT SERPL-MCNC: 1.91 MG/DL (ref 0.7–1.3)
GFR SERPL CREATININE-BSD FRML MDRD: 35.9 ML/MIN/{1.73_M2} (ref 35–?)
GLUCOSE SERPL-MCNC: 87 MG/DL (ref 70–100)
HCT VFR BLD AUTO: 24.2 % (ref 42–52)
HCT VFR BLD AUTO: 24.3 % (ref 42–52)
HCT VFR BLD AUTO: 30.7 % (ref 42–52)
HGB BLD-MCNC: 7.2 G/DL (ref 13.5–17.5)
HGB BLD-MCNC: 7.6 G/DL (ref 13.5–17.5)
HGB BLD-MCNC: 9.3 G/DL (ref 13.5–17.5)
MCH RBC QN AUTO: 31.6 PG (ref 27–33)
MCH RBC QN AUTO: 32.9 PG (ref 27–33)
MCH RBC QN AUTO: 34.4 PG (ref 27–33)
MCHC RBC AUTO-ENTMCNC: 29.6 G/DL (ref 32–36.5)
MCHC RBC AUTO-ENTMCNC: 30.3 G/DL (ref 32–36.5)
MCHC RBC AUTO-ENTMCNC: 31.4 G/DL (ref 32–36.5)
MCV RBC AUTO: 104.4 FL (ref 80–96)
MCV RBC AUTO: 109.5 FL (ref 80–96)
MCV RBC AUTO: 111 FL (ref 80–96)
PLATELET # BLD AUTO: 117 10^3/UL (ref 150–450)
PLATELET # BLD AUTO: 119 10^3/UL (ref 150–450)
PLATELET # BLD AUTO: 139 10^3/UL (ref 150–450)
POTASSIUM SERPL-SCNC: 4.7 MEQ/L (ref 3.5–5.1)
RBC # BLD AUTO: 2.19 10^6/UL (ref 4.3–6.1)
RBC # BLD AUTO: 2.21 10^6/UL (ref 4.3–6.1)
RBC # BLD AUTO: 2.94 10^6/UL (ref 4.3–6.1)
SODIUM SERPL-SCNC: 140 MEQ/L (ref 136–145)
WBC # BLD AUTO: 5.4 10^3/UL (ref 4–10)
WBC # BLD AUTO: 5.7 10^3/UL (ref 4–10)
WBC # BLD AUTO: 6.7 10^3/UL (ref 4–10)

## 2020-12-17 PROCEDURE — 30233N1 TRANSFUSION OF NONAUTOLOGOUS RED BLOOD CELLS INTO PERIPHERAL VEIN, PERCUTANEOUS APPROACH: ICD-10-PCS | Performed by: STUDENT IN AN ORGANIZED HEALTH CARE EDUCATION/TRAINING PROGRAM

## 2020-12-17 RX ADMIN — FOLIC ACID SCH MG: 1 TABLET ORAL at 09:37

## 2020-12-17 RX ADMIN — TAMSULOSIN HYDROCHLORIDE SCH MG: 0.4 CAPSULE ORAL at 20:19

## 2020-12-17 RX ADMIN — FERROUS SULFATE TAB 325 MG (65 MG ELEMENTAL FE) SCH MG: 325 (65 FE) TAB at 20:19

## 2020-12-17 RX ADMIN — MAGNESIUM OXIDE SCH MG: 400 TABLET ORAL at 09:37

## 2020-12-17 RX ADMIN — ACETAMINOPHEN SCH MG: 500 TABLET ORAL at 18:00

## 2020-12-17 RX ADMIN — METOPROLOL TARTRATE SCH MG: 25 TABLET, FILM COATED ORAL at 20:10

## 2020-12-17 RX ADMIN — SODIUM CHLORIDE SCH MLS/HR: 9 INJECTION, SOLUTION INTRAVENOUS at 05:02

## 2020-12-17 RX ADMIN — ATORVASTATIN CALCIUM SCH MG: 20 TABLET, FILM COATED ORAL at 09:37

## 2020-12-17 RX ADMIN — METOPROLOL TARTRATE SCH MG: 25 TABLET, FILM COATED ORAL at 09:38

## 2020-12-17 RX ADMIN — TOLTERODINE SCH MG: 2 CAPSULE, EXTENDED RELEASE ORAL at 09:37

## 2020-12-17 RX ADMIN — Medication SCH UNITS: at 09:37

## 2020-12-17 RX ADMIN — ACETAMINOPHEN SCH MG: 500 TABLET ORAL at 09:00

## 2020-12-17 RX ADMIN — CARBIDOPA AND LEVODOPA SCH TAB: 25; 100 TABLET ORAL at 09:38

## 2020-12-17 RX ADMIN — MIRTAZAPINE SCH MG: 15 TABLET, FILM COATED ORAL at 20:19

## 2020-12-17 RX ADMIN — CARBIDOPA AND LEVODOPA SCH TAB: 25; 100 TABLET ORAL at 18:01

## 2020-12-17 RX ADMIN — PROBIOTIC PRODUCT - TAB SCH EA: TAB at 09:37

## 2020-12-17 RX ADMIN — ACETAMINOPHEN SCH MG: 500 TABLET ORAL at 20:19

## 2020-12-17 RX ADMIN — CYANOCOBALAMIN TAB 500 MCG SCH MCG: 500 TAB at 09:38

## 2020-12-17 NOTE — IPNPDOC
Subjective


Date Seen


The patient was seen on 12/17/20.





Subjective


Chief Complaint/HPI


Mr. Lou is an 85 year old male with Parkinson's disease, PVD, and CAD s/p 

CABG here for fall and hematoma. This morning, he denies any lightheadedness, 

dyspnea, or chest pain. His H&H continued to drop. Spoke with his daughter and 

HCP about blood transfusions. She has agreed to blood transfusion and patient 

will receive 2u of pRBC





Objective


Physical Examination


General Exam:  Positive: Cooperative, No Acute Distress


Eye Exam:  Positive: EOMI


ENT Exam:  Positive: Tongue Midline


Neck Exam:  Positive: Supple


Chest Exam:  Positive: Clear to auscultation


Heart Exam:  Positive: Rate Normal, Regular Rhythm


Abdomen Exam:  Positive: Normal bowel sounds, Soft


Extremity Exam:  Negative: Edema


Neuro Exam:  Positive: Normal Speech, Cranial Nerves 3-12 NL


Psych Exam:  Positive: Mental status NL, Mood NL





Assessment /Plan


Assessment


Mr. Lou is an 85 year old male with Parkinson's disease, PVD, and CAD s/p 

CABG here for fall and hematoma. We will hold his DAPT for the time being and 

provide supportive care for the hematoma. His hemoglobin continued to decline. 

Spoke with his HCP about blood transfusion and she was agreeable to transfusion.

Patient will be transfused with 2u pRBC. Possible discharge home tomorrow if H&H

remains stable.





Plan/VTE


VTE Prophylaxis Ordered?:  Yes





Plan


1. Right gluteal hematoma 2/2 mechanical fall


-Hold DAPT


-Supportive care and pain control


-PT for ambulation, may need rehab





2. CALLUM


-Hold diuretic


-Avoid nephrotoxic agents


-IVF





3. Parkinson's disease


-Continue carbidopa/levodopa


-PT for ambulation





4. CAD


-Hold DAPT due to hematoma


-Continue Lopressor and atorvastatin





5. Macrocytic anemia


-Hold DAPT


-Continue Vit B12 and iron


-Consented for blood, transfusing 2u of pRBC





6. DVT ppx


-No chemical ppx due to hematoma and anemia. SCD and TEDs





Disposition: If H&H remains stable tomorrow, possible discharge home with home 

services





VS, I&O, 24H, Fishbone


Vital Signs/I&O





Vital Signs








  Date Time  Temp Pulse Resp B/P (MAP) Pulse Ox O2 Delivery O2 Flow Rate FiO2


 


12/17/20 18:59 98.2 83 16 127/71 93 Room Air  














I&O- Last 24 Hours up to 6 AM 


 


 12/17/20





 06:00


 


Intake Total 1740 ml


 


Output Total 0 ml


 


Balance 1740 ml











Laboratory Data


24H LABS


Laboratory Tests 2


12/17/20 00:04: Nucleated Red Blood Cells % (auto) 0.0


12/17/20 06:28: 


Nucleated Red Blood Cells % (auto) 0.0, Anion Gap 1L, Glomerular Filtration Rate

35.9, Calcium Level 8.8


CBC/BMP


Laboratory Tests


12/17/20 00:04








12/17/20 06:28

















JOSIE ESTEBAN DO               Dec 17, 2020 20:17

## 2020-12-18 VITALS — SYSTOLIC BLOOD PRESSURE: 121 MMHG | DIASTOLIC BLOOD PRESSURE: 67 MMHG

## 2020-12-18 VITALS — SYSTOLIC BLOOD PRESSURE: 148 MMHG | DIASTOLIC BLOOD PRESSURE: 82 MMHG

## 2020-12-18 LAB
BUN SERPL-MCNC: 24 MG/DL (ref 7–18)
CALCIUM SERPL-MCNC: 9.1 MG/DL (ref 8.8–10.2)
CHLORIDE SERPL-SCNC: 111 MEQ/L (ref 98–107)
CO2 SERPL-SCNC: 25 MEQ/L (ref 21–32)
CREAT SERPL-MCNC: 1.73 MG/DL (ref 0.7–1.3)
GFR SERPL CREATININE-BSD FRML MDRD: 40.3 ML/MIN/{1.73_M2} (ref 35–?)
GLUCOSE SERPL-MCNC: 91 MG/DL (ref 70–100)
HCT VFR BLD AUTO: 29.7 % (ref 42–52)
HCT VFR BLD AUTO: 30.7 % (ref 42–52)
HGB BLD-MCNC: 9.1 G/DL (ref 13.5–17.5)
HGB BLD-MCNC: 9.4 G/DL (ref 13.5–17.5)
MCH RBC QN AUTO: 31.8 PG (ref 27–33)
MCH RBC QN AUTO: 31.9 PG (ref 27–33)
MCHC RBC AUTO-ENTMCNC: 30.6 G/DL (ref 32–36.5)
MCHC RBC AUTO-ENTMCNC: 30.6 G/DL (ref 32–36.5)
MCV RBC AUTO: 103.8 FL (ref 80–96)
MCV RBC AUTO: 104.1 FL (ref 80–96)
PLATELET # BLD AUTO: 134 10^3/UL (ref 150–450)
PLATELET # BLD AUTO: 141 10^3/UL (ref 150–450)
POTASSIUM SERPL-SCNC: 4.5 MEQ/L (ref 3.5–5.1)
RBC # BLD AUTO: 2.86 10^6/UL (ref 4.3–6.1)
RBC # BLD AUTO: 2.95 10^6/UL (ref 4.3–6.1)
SODIUM SERPL-SCNC: 141 MEQ/L (ref 136–145)
WBC # BLD AUTO: 5.1 10^3/UL (ref 4–10)
WBC # BLD AUTO: 5.7 10^3/UL (ref 4–10)

## 2020-12-18 RX ADMIN — CYANOCOBALAMIN TAB 500 MCG SCH MCG: 500 TAB at 09:53

## 2020-12-18 RX ADMIN — FOLIC ACID SCH MG: 1 TABLET ORAL at 09:51

## 2020-12-18 RX ADMIN — CARBIDOPA AND LEVODOPA SCH TAB: 25; 100 TABLET ORAL at 09:53

## 2020-12-18 RX ADMIN — MAGNESIUM OXIDE SCH MG: 400 TABLET ORAL at 09:52

## 2020-12-18 RX ADMIN — PROBIOTIC PRODUCT - TAB SCH EA: TAB at 09:53

## 2020-12-18 RX ADMIN — ACETAMINOPHEN SCH MG: 500 TABLET ORAL at 09:54

## 2020-12-18 RX ADMIN — METOPROLOL TARTRATE SCH MG: 25 TABLET, FILM COATED ORAL at 09:54

## 2020-12-18 RX ADMIN — FAMOTIDINE SCH MG: 20 TABLET, FILM COATED ORAL at 09:51

## 2020-12-18 RX ADMIN — Medication SCH UNITS: at 09:53

## 2020-12-18 RX ADMIN — TOLTERODINE SCH MG: 2 CAPSULE, EXTENDED RELEASE ORAL at 09:51

## 2020-12-18 RX ADMIN — ATORVASTATIN CALCIUM SCH MG: 20 TABLET, FILM COATED ORAL at 09:51

## 2020-12-18 NOTE — DS.PDOC
Discharge Summary


General


Date of Admission


Dec 15, 2020 at 15:14


Date of Discharge


Dec 18, 2020


Attending Physician:  JOSIE ESTEBAN DO





Discharge Summary


PROCEDURES PERFORMED DURING STAY: Blood transfusion on 12/17/2020.





ADMITTING DIAGNOSES: 


1. Right gluteal hematoma 2/2 mechanical fall


2. CALLUM


3. Parkinson's disease


4. CAD


5. Macrocytic anemia





DISCHARGE DIAGNOSES:


1. Right gluteal hematoma 2/2 mechanical fall


2. CALLUM


3. Parkinson's disease


4. CAD


5. Macrocytic anemia





COMPLICATIONS/CHIEF COMPLAINT: Adverse Effect Of Antiplatelet Agent/Traumatic 

Hem.





HISTORY OF PRESENT ILLNESS: Mr. Lou is an 85 year old male with Parkinson's 

disease, PVD, and CAD s/p CABG here for fall and hematoma. He initially fell on 

Sunday morning. Denies any lightheadedness, dizziness, or syncope. He tripped 

over the rug and fell. Denies LOC. He had someone help him back up and got into 

bed. The daughter did not know he had a fell. He was doing okay until this 

morning (Tuesday morning). He had excruciating right hip pain and could not 

ambulate. He was taken to the ED. Imaging did not demonstrate fracture. On 

examination, he has bruising over right gluteus nati and tender swelling. He 

had a hematoma. He has not been able to ambulate and ED called for admission.





HOSPITAL COURSE: The day afterwards, patient was feeling better. Physical 

therapy worked with the patient. Recommended home with home services. Patient's 

hemoglobin continued to drop while in patient despite holding DAPT therapy. 

Spoke with daughter about blood transfusion and she was agreeable. Patient was 

given 2u PRBC and responded appropriately. H&H remained stable. Suspect that the

initial H&H maybe hemoconcentrated since the H&H dropped after given fluids. 

Today, patient was feeling well and felt ready for home. He was subsequently 

discharged today. 





DISCHARGE MEDICATIONS: Please see below.


 


ALLERGIES: Please see below.





PHYSICAL EXAMINATION ON DISCHARGE:


VITAL SIGNS: Please see below.


GENERAL: Comfortable, no apparent distress


HEENT: EOMI, sclera


NECK: Supple


CARDIOVASCULAR EXAMINATION: Regular rate and rhythm


RESPIRATORY EXAMINATION: Lungs clear to auscultation bilaterally


ABDOMINAL EXAMINATION: Soft, non-tender, normal bowel sounds


EXTREMITIES: No pitting edema


SKIN: Right gluteal hematoma with tenderness present


NEUROLOGICAL EXAMINATION: CN 3-12 grossly intact


PSYCHIATRIC EXAMINATION: Normal mood and affect





LABORATORY DATA: Please see below.





IMAGING: 


Hip/pelvis XRay


No traumatic abnormality noted.  Bilateral hip joint osteoarthritis.  Status 

post


aorta bi-iliac stent graft placement.  No acute bony abnormality.





PROGNOSIS: Good





ACTIVITY: As tolerated.





DIET: 2gm sodium





DISCHARGE PLAN: Home with home services





DISPOSITION: 01 Home, Self-Care.





DISCHARGE INSTRUCTIONS:


1. Follow up with your PCP within 1 week





DISCHARGE CONDITION: Stable.





Total time spent on discharge planning, discharge summary, and medication 

reconciliation: 45 minutes.





Vital Signs/I&Os





Vital Signs








  Date Time  Temp Pulse Resp B/P (MAP) Pulse Ox O2 Delivery O2 Flow Rate FiO2


 


12/18/20 09:54  76  121/67    


 


12/18/20 06:00 98.1  18  95 Room Air  














I&O- Last 24 Hours up to 6 AM 


 


 12/18/20





 06:00


 


Intake Total 1530 ml


 


Output Total 457 ml


 


Balance 1073 ml











Laboratory Data


Labs 24H


Laboratory Tests 2


12/18/20 03:45: Nucleated Red Blood Cells % (auto) 0.0


12/18/20 05:44: 


Nucleated Red Blood Cells % (auto) 0.0, Anion Gap 5L, Glomerular Filtration Rate

40.3, Calcium Level 9.1


CBC/BMP


Laboratory Tests


12/18/20 03:45








12/18/20 05:44











Discharge Medications


Scheduled


Aspirin (Aspirin EC) 81 Mg Tablet.dr, 81 MG PO DAILY, (Reported)


Atorvastatin Calcium (Atorvastatin Calcium) 20 Mg Tablet, 20 MG PO DAILY, 

(Reported)


Carbidopa/Levodopa (Carbidopa-Levodopa  Tab) 1 Each Tablet, 1 TAB PO BID, 

(Reported)


Cholecalciferol (Vitamin D3) (Vitamin D3) 1,000 Unit Tablet, 2,000 UNITS PO 

DAILY, (Reported)


Clopidogrel Bisulfate (Clopidogrel) 75 Mg Tablet, 75 MG PO DAILY, (Reported)


Cyanocobalamin (Vitamin B-12) (B-12) 1,000 Mcg Tablet, 1,000 MCG PO DAILY, 

(Reported)


Famotidine (Famotidine) 20 Mg Tablet, 20 MG PO DAILY, (Reported)


Ferrous Sulfate (Ferrous Sulfate) 325 Mg Tablet, 325 MG PO QHS, (Reported)


Folic Acid (Folic Acid) 1 Mg Tablet, 1 MG PO DAILY, (Reported)


Furosemide (Furosemide) 40 Mg Tablet, 20 MG PO QHS, (Reported)


Lactobacillus Combo No.10 (Probiotic) 1 Each Capsule, 1 CAP PO QHS, (Reported)


Magnesium Oxide (Magnesium Oxide) 400 Mg Tablet, 400 MG PO DAILY, (Reported)


Metoprolol Tartrate (Metoprolol Tartrate) 25 Mg Tablet, 12.5 MG PO BID, 

(Reported)


Mirtazapine (Remeron) 15 Mg Tablet, 15 MG PO QHS, (Reported)


Tamsulosin HCl (Flomax) 0.4 Mg Capsule, 0.4 MG PO QHS, (Reported)


Tolterodine Tartrate (Tolterodine Tartrate ER) 2 Mg Cap.er.24h, 2 MG PO DAILY, 

(Reported)





Scheduled PRN


Acetaminophen (Acetaminophen) 500 Mg Tablet, 1,000 MG PO Q6H PRN for PAIN, 

(Reported)





Miscellaneous Medications


[Med Rec Comment]  , (Reported)


   LIST OBTAINED FROM [facility] [phone] 





Allergies


Coded Allergies:  


     No Known Drug Allergies (Verified  Allergy, Unknown, 9/22/19)











JOSIE ESTEBAN DO               Dec 18, 2020 23:40

## 2021-01-18 ENCOUNTER — HOSPITAL ENCOUNTER (OUTPATIENT)
Dept: HOSPITAL 53 - M RAD | Age: 86
End: 2021-01-18
Attending: INTERNAL MEDICINE
Payer: MEDICARE

## 2021-01-18 DIAGNOSIS — K22.4: ICD-10-CM

## 2021-01-18 DIAGNOSIS — R13.10: Primary | ICD-10-CM

## 2021-01-18 DIAGNOSIS — R09.01: ICD-10-CM

## 2021-01-18 NOTE — REP
INDICATION:

DYSPHAGIA.



COMPARISON:



None



TECHNIQUE:

This procedure was performed by Jacquelyn Barber [facility], under the direct

supervision of Dr. May.  Images were reviewed with Dr. May prior to dictation.



Liquid barium was given in the erect position in order to perform a single contrast

esophagram examination.





FINDINGS:

A single view PA chest x-ray is submitted as a  film.  The superior mediastinal

structures are midline.  The heart size is within normal limits.  The lungs are clear.



The oral and pharyngeal stages of deglutition demonstrated aspiration of barium at

least twice without a cough response.  Esophageal transport is prompt and efficient

and there is no evidence of esophagitis, stricture, or mucosal ring.  However tertiary

contractions were visualized during the exam.  Evaluation of gastroesophageal reflux

and hiatal hernia was not obtained due to aspiration.





IMPRESSION:

1.  Limited evaluation due to aspiration.

2.  Patient aspirated barium at least twice during the exam with no cough response.

3.  Tertiary contractions were visualized.



0.1 minutes of fluoroscopy time was utilized for this procedure. Some fluoroscopic

images are performed with last image hold technology.  These images require no

additional radiation.



<Electronically signed by Jacquelyn Barber > 01/18/21 1715

<Electronically signed by Samuel May > 01/18/21 1806

## 2021-02-26 ENCOUNTER — HOSPITAL ENCOUNTER (OUTPATIENT)
Dept: HOSPITAL 53 - M ST | Age: 86
End: 2021-02-26
Attending: INTERNAL MEDICINE
Payer: MEDICARE

## 2021-02-26 DIAGNOSIS — R13.12: Primary | ICD-10-CM

## 2021-02-26 NOTE — REP
INDICATION:

OROPHAYNGEAL DYSPHIA WITH ASPIRTION.



COMPARISON:

None.



TECHNIQUE:

The procedure was performed under the direct supervision of Dr. Gamez.



The procedure was performed with Hetal Espitia from speech pathology present.



5 cc aliquots of nectar, pudding, thin, mixed fruit, soft and solid consistency barium

was administered.  With thin consistency barium there is laryngeal penetration.



A detailed report of this examination will be provided by speech pathology.



2.2 minutes of fluoro time was utilized for this procedure.



FINDINGS:

None



IMPRESSION:

With thin consistency barium there is laryngeal penetration.



<Electronically signed by Balwinder Magaña > 02/26/21 1604

<Electronically signed by Jorje Gamez > 02/26/21 [id number]

## 2021-03-10 ENCOUNTER — HOSPITAL ENCOUNTER (INPATIENT)
Dept: HOSPITAL 53 - M ED | Age: 86
LOS: 9 days | Discharge: NURSING, REHAB OR LONG-TERM CARE | DRG: 480 | End: 2021-03-19
Attending: INTERNAL MEDICINE | Admitting: FAMILY MEDICINE
Payer: MEDICARE

## 2021-03-10 VITALS — HEIGHT: 66 IN | WEIGHT: 157.32 LBS | BODY MASS INDEX: 25.28 KG/M2

## 2021-03-10 DIAGNOSIS — Z79.82: ICD-10-CM
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
## 2021-03-10 DIAGNOSIS — I50.32: ICD-10-CM

## 2021-03-10 DIAGNOSIS — E87.0: ICD-10-CM

## 2021-03-10 DIAGNOSIS — Z20.822: ICD-10-CM

## 2021-03-10 DIAGNOSIS — Z66: ICD-10-CM

## 2021-03-10 DIAGNOSIS — I25.10: ICD-10-CM

## 2021-03-10 DIAGNOSIS — N40.0: ICD-10-CM
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
## 2021-03-10 DIAGNOSIS — Z79.899: ICD-10-CM

## 2021-03-10 DIAGNOSIS — Z87.891: ICD-10-CM

## 2021-03-10 DIAGNOSIS — I27.20: ICD-10-CM

## 2021-03-10 DIAGNOSIS — S72.142A: Primary | ICD-10-CM

## 2021-03-10 DIAGNOSIS — I13.0: ICD-10-CM
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
## 2021-03-10 DIAGNOSIS — E46: ICD-10-CM

## 2021-03-10 DIAGNOSIS — F05: ICD-10-CM

## 2021-03-10 DIAGNOSIS — Z95.1: ICD-10-CM

## 2021-03-10 DIAGNOSIS — J69.0: ICD-10-CM

## 2021-03-10 DIAGNOSIS — D62: ICD-10-CM

## 2021-03-10 DIAGNOSIS — G20: ICD-10-CM

## 2021-03-10 DIAGNOSIS — R13.10: ICD-10-CM

## 2021-03-10 DIAGNOSIS — S72.012A: ICD-10-CM

## 2021-03-10 DIAGNOSIS — G31.84: ICD-10-CM

## 2021-03-10 DIAGNOSIS — D63.1: ICD-10-CM

## 2021-03-10 DIAGNOSIS — R91.1: ICD-10-CM

## 2021-03-10 DIAGNOSIS — Z79.02: ICD-10-CM

## 2021-03-10 DIAGNOSIS — N18.9: ICD-10-CM

## 2021-03-10 DIAGNOSIS — Z98.62: ICD-10-CM

## 2021-03-10 DIAGNOSIS — I73.9: ICD-10-CM

## 2021-03-10 DIAGNOSIS — I48.91: ICD-10-CM
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
## 2021-03-10 LAB
ALBUMIN SERPL BCG-MCNC: 3.3 GM/DL (ref 3.2–5.2)
ALT SERPL W P-5'-P-CCNC: < 6 U/L (ref 12–78)
APTT BLD: 33.8 SECONDS (ref 24.2–38.5)
BASOPHILS # BLD AUTO: 0 10^3/UL (ref 0–0.2)
BASOPHILS NFR BLD AUTO: 0.3 % (ref 0–1)
BILIRUB CONJ SERPL-MCNC: 0.3 MG/DL (ref 0–0.2)
BILIRUB SERPL-MCNC: 0.6 MG/DL (ref 0.2–1)
BUN SERPL-MCNC: 29 MG/DL (ref 7–18)
CALCIUM SERPL-MCNC: 8.8 MG/DL (ref 8.8–10.2)
CHLORIDE SERPL-SCNC: 105 MEQ/L (ref 98–107)
CK MB CFR.DF SERPL CALC: 1.75
CK MB SERPL-MCNC: < 1 NG/ML (ref ?–3.6)
CK SERPL-CCNC: 57 U/L (ref 39–308)
CO2 SERPL-SCNC: 25 MEQ/L (ref 21–32)
CREAT SERPL-MCNC: 1.78 MG/DL (ref 0.7–1.3)
EOSINOPHIL # BLD AUTO: 0 10^3/UL (ref 0–0.5)
EOSINOPHIL NFR BLD AUTO: 0.3 % (ref 0–3)
GFR SERPL CREATININE-BSD FRML MDRD: 39 ML/MIN/{1.73_M2} (ref 35–?)
GLUCOSE SERPL-MCNC: 115 MG/DL (ref 70–100)
HCT VFR BLD AUTO: 28.6 % (ref 42–52)
HGB BLD-MCNC: 8.9 G/DL (ref 13.5–17.5)
INR PPP: 1.29
LYMPHOCYTES # BLD AUTO: 0.9 10^3/UL (ref 1.5–5)
LYMPHOCYTES NFR BLD AUTO: 8.1 % (ref 24–44)
MCH RBC QN AUTO: 33.8 PG (ref 27–33)
MCHC RBC AUTO-ENTMCNC: 31.1 G/DL (ref 32–36.5)
MCV RBC AUTO: 108.7 FL (ref 80–96)
MONOCYTES # BLD AUTO: 0.8 10^3/UL (ref 0–0.8)
MONOCYTES NFR BLD AUTO: 7.7 % (ref 2–8)
NEUTROPHILS # BLD AUTO: 8.8 10^3/UL (ref 1.5–8.5)
NEUTROPHILS NFR BLD AUTO: 82.8 % (ref 36–66)
PLATELET # BLD AUTO: 136 10^3/UL (ref 150–450)
POTASSIUM SERPL-SCNC: 4.6 MEQ/L (ref 3.5–5.1)
PROT SERPL-MCNC: 7.4 GM/DL (ref 6.4–8.2)
PROTHROMBIN TIME: 16.4 SECONDS (ref 12.5–14.3)
RBC # BLD AUTO: 2.63 10^6/UL (ref 4.3–6.1)
RSV RNA NPH QL NAA+PROBE: NEGATIVE
SODIUM SERPL-SCNC: 137 MEQ/L (ref 136–145)
T4 FREE SERPL-MCNC: 1.24 NG/DL (ref 0.76–1.46)
TROPONIN I SERPL-MCNC: 0.03 NG/ML (ref ?–0.1)
TSH SERPL DL<=0.005 MIU/L-ACNC: 2.05 UIU/ML (ref 0.36–3.74)
WBC # BLD AUTO: 10.6 10^3/UL (ref 4–10)

## 2021-03-10 NOTE — REPVR
PROCEDURE INFORMATION:

 

Exam: XR Left Hip 

Exam date and time: 3/10/21  (9:23pm) 

Age: 85 years old 

Clinical indication: Trauma 



TECHNIQUE: 

Imaging protocol: XR Left hip. 

Views:  2 or 3 views hip with pelvis when performed



COMPARISON: 

CT ABDOMEN PELVIS of 1/11/20 



FINDINGS: 

Acute impacted fracture at the base of the left femoral head (impacted 

subcapital neck fracture).

Additional oblique fracture in the left intertrochanteric region (wide lucent 

fracture line).

No hip dislocation.

Advanced degenerative changes at each hip joint.

Vascular-type surgical clips project over each groin region.

An aorto bi-iliac stent is visualized in the lower abdomen and upper pelvis.

Residual bowel contrast opacifies numerous sigmoid diverticuli.



IMPRESSION: 

Acute impacted fracture at the base of the left femoral head (impacted 

subcapital neck fracture).

Additional oblique fracture in the left intertrochanteric region.

No hip dislocation.



See additional comments above.



Electronically signed by: Linnea Bashir On 03/10/2021  22:13:09 PM
PROCEDURE INFORMATION: 



Exam: CT Chest without Contrast; Diagnostic 

Exam date and time:  3/10/21  (10:40pm)  

Age: 85 years old 

Clinical indication:  Blunt trauma.  Fall.   



TECHNIQUE: 

Imaging protocol: Diagnostic computed tomography of the chest without contrast. 

3D rendering (Not supervised by radiologist): MIP and/or 3D reconstructed 

images were created by the technologist. 

Radiation optimization: All CT scans at this facility use at least one of these 

dose optimization techniques: automated exposure control; mA and/or kV 

adjustment per patient size (includes targeted exams where dose is matched to 

clinical indication); or iterative reconstruction. 



COMPARISON: 

CT CHEST of 2/29/20 



FINDINGS: 

Hazy and patchy left lung opacities, predominantly in the mid and lower left 

lung zones.  Minimal streaky changes at the right lung base.

No pneumothorax.

Cardiomegaly. No pericardial effusion.  Coronary artery calcifications.  

    S/P sternotomy and CABG surgery.

Atherosclerotic aortic calcifications.

Aortic stent (partially imaged at and below the renal hilar level).  

No acute fracture. 



Partially contracted gallbladder, containing a few calcified stones. 

Bilateral renal atrophy.  Posterolateral right renal cyst (2.4 cm size) 

(kidneys are partially imaged).  Small left upper renal pole cyst (7 mm size).

    Non-obstructing calcifications in each kidney.

Small right adrenal nodule (9 mm size) (most likely benign).

Advanced osteoarthritic changes at the right shoulder joint.  Milder changes at 

the left shoulder joint.

Multilevel degenerative thoracic spine changes.

Chronic-appearing compression deformity of approx. L2 vertebral body.



IMPRESSION: 

Probable infectious left lung pneumonia (predominantly mid and lower left lung 

zones.  Minimal streaky posterior RLL parenchymal changes.

No pleural effusions.



No acute traumatic pathology.  No acute fracture.

No pleural effusions.  No pneumothorax.

Calcified gallstones.



Electronically signed by: Linnea Bashir On 03/10/2021  23:14:34 PM
PROCEDURE INFORMATION: 



Exam: XR Chest 

Exam date and time:  3/10/21  (9:25pm) 

Age: 85 years old 

Clinical indication:  Trauma 



TECHNIQUE: 

Imaging protocol:  Portable CXR

Views:  1 view



COMPARISON: 

CT CHEST of 2/29/20 



FINDINGS: 

Film technique is not optimal for full evaluation of the right lung (the region 

of the right lung and the ALVARO area are overpenetrated).

The patient is slightly rotated to an WEEKS position.



Stable cardiomegaly.

Hazy and patchy opacities in the left mid and lower lung zones.

Probable small left pleural effusion.

S/P sternotomy and CABG surgery.

Multiple surgical clips project over the left lung base region.

Cannot confidently evaluate the right lung.

Degenerative changes at the right shoulder joint.



IMPRESSION: 

Cannot confidently evaluate the right lung nor the AVLARO area due to film 

technique.  See comments above.

Stable cardiomegaly.  S/P sternotomy and CABG surgery.

Suspect infectious pneumonia involving the mid and lower left lung zones.  

Probable small left pleural effusion.

Due to film technique, cannot exclude a pneumothorax.



Electronically signed by: Linnea Bashir On 03/10/2021  22:22:15 PM
PROCEDURE INFORMATION: 



Exam: XR Left Femur 

Exam date and time:  3/10/21  (9:24pm)

Age: 85 years old 

Clinical indication:  Trauma 



TECHNIQUE: 

Imaging protocol:  XR Left femur 

Views:  2 views 



COMPARISON: 

No relevant prior studies available 



FINDINGS: 

Acute impacted fracture at the base of the left femoral head.

Additional oblique fracture at the left intertrochanteric region.

No dislocation at the left hip.

Degenerative changes at the left hip.

Numerous sigmoid diverticuli (opacified with residual bowel contrast).



IMPRESSION: 

Acute impacted fracture at the base of the left femoral head (subcapital neck 

fracture).

Additional oblique fracture at the left intertrochanteric region.

No dislocation at the left hip.



Electronically signed by: Linnea Bashir On 03/10/2021  22:32:29 PM
PROCEDURE INFORMATION: 

Exam: CT Cervical Spine Without Contrast 

Exam date and time: 3/10/2021 9:29 PM 

Age: 85 years old 

Clinical indication: Injury or trauma; Fall; Blunt trauma 



TECHNIQUE: 

Imaging protocol: Computed tomography images of the cervical spine without 

contrast. 

Radiation optimization: All CT scans at this facility use at least one of these 

dose optimization techniques: automated exposure control; mA and/or kV 

adjustment per patient size (includes targeted exams where dose is matched to 

clinical indication); or iterative reconstruction. 



COMPARISON: 

08/07/2017 cervical spine CT. 



FINDINGS: 

Bones/joints: No acute fracture. Normal alignment. 

Discs/Spinal canal/Neural foramina: Advanced discogenic degenerative changes. 

Advanced facet DJD with multilevel neural foraminal stenoses. No spinal 

stenosis. 



Lungs: Lung apices are normal. 

Soft tissues: Unremarkable. 

Other findings: 2.0 x 1.3 cm oval circumscribed solid mass in the right parotid 

gland is not significantly changed. 



IMPRESSION: 

1. No acute fracture. Normal alignment. 

2. Advanced degenerative spondylosis. 



Electronically signed by: Jaspreet Miller On 03/10/2021  22:03:10 PM
PROCEDURE INFORMATION: 

Exam: CT Head Without Contrast 

Exam date and time: 3/10/2021 9:29 PM 

Age: 85 years old 

Clinical indication: Injury or trauma; Fall; Blunt trauma (contusions or 

hematomas) 



TECHNIQUE: 

Imaging protocol: Computed tomography of the head without contrast. 

Radiation optimization: All CT scans at this facility use at least one of these 

dose optimization techniques: automated exposure control; mA and/or kV 

adjustment per patient size (includes targeted exams where dose is matched to 

clinical indication); or iterative reconstruction. 



COMPARISON: 

CT Head without contrast 2/29/2020 11:07 AM 



FINDINGS: 

Brain: There is mild cerebral volume loss. Changes of chronic white matter 

microvascular disease are present. No signs of a recent infarction or 

hemorrhage. 

Cerebral ventricles: There is ex vacuo ventricular enlargement without evidence 

of obstructive hydrocephalus. 

Bones/joints: Unremarkable. No acute fracture. 

Paranasal sinuses: Visualized sinuses are unremarkable. No fluid levels. 

Mastoid air cells: Visualized mastoid air cells are well aerated. 

Soft tissues: Unremarkable. 



IMPRESSION: 

Mild cerebral volume loss and chronic white matter changes. No acute 

intracranial abnormality. 



Electronically signed by: Jaspreet Miller On 03/10/2021  21:48:45 PM
coffee

## 2021-03-11 VITALS — DIASTOLIC BLOOD PRESSURE: 58 MMHG | SYSTOLIC BLOOD PRESSURE: 101 MMHG

## 2021-03-11 VITALS — SYSTOLIC BLOOD PRESSURE: 108 MMHG | DIASTOLIC BLOOD PRESSURE: 58 MMHG

## 2021-03-11 VITALS — SYSTOLIC BLOOD PRESSURE: 134 MMHG | DIASTOLIC BLOOD PRESSURE: 83 MMHG

## 2021-03-11 RX ADMIN — CARBIDOPA AND LEVODOPA SCH TAB: 25; 100 TABLET ORAL at 08:20

## 2021-03-11 RX ADMIN — FERROUS SULFATE TAB 325 MG (65 MG ELEMENTAL FE) SCH MG: 325 (65 FE) TAB at 09:00

## 2021-03-11 RX ADMIN — DEXTROSE MONOHYDRATE SCH MLS/HR: 5 INJECTION INTRAVENOUS at 04:26

## 2021-03-11 RX ADMIN — PANTOPRAZOLE SODIUM SCH MG: 40 TABLET, DELAYED RELEASE ORAL at 09:00

## 2021-03-11 RX ADMIN — FERROUS SULFATE TAB 325 MG (65 MG ELEMENTAL FE) SCH MG: 325 (65 FE) TAB at 21:59

## 2021-03-11 RX ADMIN — TOLTERODINE SCH MG: 2 CAPSULE, EXTENDED RELEASE ORAL at 08:20

## 2021-03-11 RX ADMIN — MORPHINE SULFATE PRN MG: 2 INJECTION, SOLUTION INTRAMUSCULAR; INTRAVENOUS at 03:29

## 2021-03-11 RX ADMIN — MIRTAZAPINE SCH MG: 15 TABLET, FILM COATED ORAL at 21:59

## 2021-03-11 RX ADMIN — TAMSULOSIN HYDROCHLORIDE SCH MG: 0.4 CAPSULE ORAL at 08:20

## 2021-03-11 RX ADMIN — MORPHINE SULFATE PRN MG: 2 INJECTION, SOLUTION INTRAMUSCULAR; INTRAVENOUS at 08:29

## 2021-03-11 RX ADMIN — CEFTRIAXONE SCH MLS/HR: 1 INJECTION, POWDER, FOR SOLUTION INTRAMUSCULAR; INTRAVENOUS at 06:03

## 2021-03-11 RX ADMIN — DEXTROSE MONOHYDRATE SCH MLS/HR: 5 INJECTION INTRAVENOUS at 14:42

## 2021-03-11 RX ADMIN — MORPHINE SULFATE PRN MG: 2 INJECTION, SOLUTION INTRAMUSCULAR; INTRAVENOUS at 14:42

## 2021-03-11 RX ADMIN — CARBIDOPA AND LEVODOPA SCH TAB: 25; 100 TABLET ORAL at 21:59

## 2021-03-11 RX ADMIN — MAGNESIUM OXIDE SCH MG: 400 TABLET ORAL at 09:00

## 2021-03-11 RX ADMIN — ATORVASTATIN CALCIUM SCH MG: 20 TABLET, FILM COATED ORAL at 09:00

## 2021-03-11 RX ADMIN — PROBIOTIC PRODUCT - TAB SCH EA: TAB at 21:59

## 2021-03-11 RX ADMIN — METOPROLOL TARTRATE SCH MG: 25 TABLET, FILM COATED ORAL at 22:01

## 2021-03-11 RX ADMIN — FOLIC ACID SCH MG: 1 TABLET ORAL at 09:00

## 2021-03-11 RX ADMIN — PANTOPRAZOLE SODIUM SCH MG: 40 TABLET, DELAYED RELEASE ORAL at 21:59

## 2021-03-11 RX ADMIN — METOPROLOL TARTRATE SCH MG: 25 TABLET, FILM COATED ORAL at 08:21

## 2021-03-11 NOTE — IPNPDOC
Text Note


Date of Service


The patient was seen on 3/11/21.





NOTE


Patient high cardiac risk for the proposed procedure. He is medically optimized 

for the procedure. Will hold ASA, Plavix and lasix for anticipated surgery.





VS,Fishbone, I+O


VS, Fishbone, I+O


Laboratory Tests


3/10/21 21:03








3/10/21 21:47











Vital Signs








  Date Time  Temp Pulse Resp B/P (MAP) Pulse Ox O2 Delivery O2 Flow Rate FiO2


 


3/11/21 08:40   17   Room Air  


 


3/11/21 08:21  80  128/84    


 


3/11/21 06:00 98.7    96  2.0 














I&O- Last 24 Hours up to 6 AM 


 


 3/11/21





 06:00


 


Intake Total 170 ml


 


Output Total 0 ml


 


Balance 170 ml

















FLORES MCNULTY MD                   Mar 11, 2021 12:42

## 2021-03-11 NOTE — IPNPDOC
Subjective


Date Seen


The patient was seen on 3/11/21.





Subjective


Chief Complaint/HPI


No complaints this am. no pain if he does not move. Patietn refusing surgery.





Objective


Physical Examination


General Exam:  Positive: Alert, Cooperative, No Acute Distress


Eye Exam:  Positive: PERRLA, Conjunctiva & lids normal, EOMI; 


   Negative: Sclera icteric


ENT Exam:  Positive: Atraumatic, Mucous membr. moist/pink, Pharynx Normal


Neck Exam:  Positive: Supple; 


   Negative: JVD, thyromegaly


Chest Exam:  Positive: Clear to auscultation, Normal air movement


Heart Exam:  Positive: Rate Normal, Irregular Rhythm, Normal S1, Normal S2, 

Murmurs (systolic murmur); 


   Negative: Rubs


Abdomen Exam:  Positive: Normal bowel sounds, Soft; 


   Negative: Tenderness, Hepatospenomegaly


Extremity Exam:  Negative: Clubbing, Cyanosis, Edema





Assessment /Plan


Assessment


85 year old male with PMHx including Parkinson's disease, mild cognitive 

impairment, CKD, atrial fib, PVD, CAD/ CABG, Diastolic congestive heart failure,

severe pulmonary hypertension, HTN, Dysphagia, PAD, Abdominal aortic aneurysm 

repair, Protein calorie malnutrition with albumin 2.5, Chronic anemia, Moderate 

mitral insufficiency, Right upper lobe 9 mm pulmonary nodule presented to the ED

after a mechanical fall at home found to have L hip fracture and pneumonia by CT

chest. 





Medical clearance


Patient is high risk for a cardiac event in the perioperative period with the 

h/o  CKD, CHF, Severe pulmonary hypertension,  h/o CAD with EKG showing 

Anterior/lateral ST depressions-more notable than tracing done 12-15-20 , EKG 

also shows Atrial fibrillation Incomplete Right bundle branch block 


Though he denies any chest pain at rest or exertion. He mobility is limited to 

walking within the house.


will hold diuretic. ASA and plavix. 


will continue metoprolol.


Patient medically optimized for the proposed procedure. 





Left hip fracture secondary to mechanical fall


would benefit from surical intervension


see ortho consult


Patietn refusing. 


On going discussion with Patient , HCPs and surgeon. 





Pneumonia


Found on chest CT, patient is asymptomatic


patient reports that he has trouble swallowing and sometimes chokes on food so 

may have silent aspirations and this aspiration pneumonia 


On Ceftriaxone and doxycycline


sputum culture ordered. blood cultures x2 pending





Parkinson's disease


continue home meds





Atrial Fibrillation, rate controlled


not currently on anticoagulation, can be addressed after decision about possible

surgery


metoprolol





CKD with chronic anemia


Cr appears at baseline. H/H appears at baseline


continue iron supplement





CAD and PVD/ Aortic aneurysm repair  with graft


hold aspirin and plavix for possible surgery. 





HTN


metoprolol





Diastolic CHF with Valvular heart disease ( moderate mitral regurgitation) and 

severe pulmonary hypertension


Patient is euvolemic at present


will hold Laisx in anticipation of surgery.





Plan/VTE


VTE Prophylaxis Ordered?:  Yes





VS, I&O, 24H, Fishbone


Vital Signs/I&O





Vital Signs








  Date Time  Temp Pulse Resp B/P (MAP) Pulse Ox O2 Delivery O2 Flow Rate FiO2


 


3/11/21 14:52   16   Room Air  


 


3/11/21 13:41 98.0 91  101/58 (72) 95  2.0 














I&O- Last 24 Hours up to 6 AM 


 


 3/11/21





 07:00


 


Intake Total 170 ml


 


Output Total 0 ml


 


Balance 170 ml











Laboratory Data


24H LABS


Laboratory Tests 2


3/10/21 21:03: 


Anion Gap 7L, Glomerular Filtration Rate 39.0, Calcium Level 8.8, Total 

Bilirubin 0.6, Direct Bilirubin 0.3H, Aspartate Amino Transf (AST/SGOT) 4L, 

Alanine Aminotransferase (ALT/SGPT) < 6L, Alkaline Phosphatase 85, Total 

Creatine Kinase 57, Creatine Kinase MB < 1.0, Creatine Kinase MB Relative Index 

1.75, Troponin I 0.03, Total Protein 7.4, Albumin 3.3, Albumin/Globulin Ratio 

0.8, Thyroid Stimulating Hormone (TSH) 2.050, Free Thyroxine 1.24


3/10/21 21:47: 


Immature Granulocyte % (Auto) 0.8, Neutrophils (%) (Auto) 82.8H, Lymphocytes (%)

(Auto) 8.1L, Monocytes (%) (Auto) 7.7, Eosinophils (%) (Auto) 0.3, Basophils (%)

(Auto) 0.3, Neutrophils # (Auto) 8.8H, Lymphocytes # (Auto) 0.9L, Monocytes # 

(Auto) 0.8, Eosinophils # (Auto) 0.0, Basophils # (Auto) 0.0, Nucleated Red 

Blood Cells % (auto) 0.0, Prothrombin Time 16.4H, Prothromb Time International 

Ratio 1.29, Activated Partial Thromboplast Time 33.8, Coronavirus (COVID-

19)(PCR) NEGATIVE, Influenza Type A (RT-PCR) NEGATIVE, Influenza Type B (RT-PCR)

NEGATIVE, Respiratory Syncytial Virus (PCR) NEGATIVE


3/11/21 01:26: Procalcitonin <0.05


CBC/BMP


Laboratory Tests


3/10/21 21:03








3/10/21 21:47








Microbiology





Microbiology


3/11/21 Blood Culture, Received


          Pending


3/10/21 Blood Culture, Received


          Pending











FLORES MCNULTY MD                   Mar 11, 2021 17:38

## 2021-03-11 NOTE — ECGEPIP
[facility] - ED

                                       

                                       Test Date:    2021-03-10

Pat Name:     FELISA SANCHEZ           Department:   

Patient ID:   [id number]                 Room:         -

Gender:       Male                     Technician:   

:          [id number]               Requested By: KELVIN DESIR

Order Number: [id number]     Reading MD:   Kenan Diehl

                                 Measurements

Intervals                              Axis          

Rate:         70                       P:            

PA:                                    QRS:          94

QRSD:         98                       T:            50

QT:           412                                    

QTc:          444                                    

                           Interpretive Statements

Atrial fibrillation

Incomplete Right bundle branch block

Rightward axis

Anterior/lateral ST depressions- consider ischemia- more notable than tracing

d

done 12-15-20

Electronically Signed on 3- 3:38:48 EST by Kenan Diehl

## 2021-03-11 NOTE — HPEPDOC
[facility] Medical History & Physical


Date of Admission


Mar 10, 2021


Date of Service:  Mar 10, 2021


Attending Physician:  SEPIDEH BAKER MD





History and Physical


CHIEF COMPLAINT: fall





HISTORY OF PRESENT ILLNESS: 85 year old male with PMHx listed below presented to

the ED after a fall at home. Patient states he was in his bathroom earlier today

and fell down to the ground. He states he did not slip or lose his balance. He 

states he did not feel dizzy or lightheaded before the fall. He states he did 

not lose consciousness. Patient states his son lives at home with him and was 

able to call EMS and get him to the hospital. Patient states he did hit his head

and multiple areas of his body. Patient states currently he has an aching pain 

in his left hip which is improved from the sharp pain he had after the fall. He 

denies any additional pain currently. 


In the ER, workup revealed and L hip fracture as well as a left lung PNA. 

Patient denies any fevers, chills, cough, sputum production, shortness of 

breath.


Patient was able to describe the event today but is a poor historian, so much of

the medical history is obtained from chart review.





PAST MEDICAL HISTORY:


1. Parkinson's disease.


2. Chronic kidney disease.


3. Coronary artery disease.


4. Hypertension


5. Peripheral vascular disease


6. Atrial Fibrillation





PAST SURGICAL HISTORY:


1. CABG.


2. Femoropopliteal bypass.





SOCIAL HISTORY:


Former pipe smoker for about 55 years. Denies alcohol use. Denies history of IV 

or illicit substance use.


Lives at home with his son who helps manage his medical care





FAMILY HISTORY:


Noncontributory





ALLERGIES: Please see below.





REVIEW OF SYSTEMS:


CONSTITUTIONAL: Denies fevers, chills, night sweats, fatigue, unexpected change 

in weight.


HEENT: Denies change in vision, change in hearing.


CARDIOVASCULAR: Denies chest pain, palpitations, shortness of breath, 

lightheadedness.


RESPIRATORY: Denies dyspnea, cough, wheezing.


GASTROINTESTINAL: Denies nausea, vomiting, abdominal pain, diarrhea, 

constipation, blood in stool.


GENITOURINARY: Denies dysuria, urinary frequency, urinary urgency.


SKIN: Denies rash, lesions.


MUSCULOSKELETAL: Positive per HPI.


NEUROLOGICAL: Denies headache, dizziness, weakness.


PSYCHIATRIC: Denies change in mood.





HOME MEDICATIONS: Please see below. 





PHYSICAL EXAMINATION:


VITAL SIGNS: See below


GENERAL: Alert, comfortable, in no acute distress


HEENT: Normocephalic, atraumatic, PERRLA, moist mucous membranes


NECK: Supple, trachea midline, no JVD


CARDIOVASCULAR: Irregularly irregular rhythm with normal rate, normal S1 and S2.


RESPIRATORY: Clear to auscultation bilaterally with equal air entry bilaterally.

No wheezing, rhonchi, or rales.


ABDOMEN: Soft, nontender, nondistended, bowel sounds present.


EXTREMITIES: Trace to 1+edema in bilateral LE up to the lower calf. 


NEUROLOGIC: Alert and oriented x3 to person, place and time. No focal deficits 

appreciated


PSYCHIATRIC: Mood and affect appropriate





LABORATORY DATA: See below.





IMAGING: 


- Hip/pelvis XR


   Acute impacted fracture at the base of the left femoral head (impacted 

subcapital neck fracture).


   Additional oblique fracture in the left intertrochanteric region.


   No hip dislocation.


- Femur XR


   Acute impacted fracture at the base of the left femoral head (subcapital neck

fracture).


   Additional oblique fracture at the left intertrochanteric region.


   No dislocation at the left hip.


- CXR


   Cannot confidently evaluate the right lung nor the ALVARO area due to film 

technique.  See comments above.


   Stable cardiomegaly.  S/P sternotomy and CABG surgery.


   Suspect infectious pneumonia involving the mid and lower left lung zones.  


   Probable small left pleural effusion.


   Due to film technique, cannot exclude a pneumothorax.


- CT C spine


   1. No acute fracture. Normal alignment. 


   2. Advanced degenerative spondylosis. 


- Head CT


   Mild cerebral volume loss and chronic white matter changes. No acute 

intracranial abnormality. 


- Chest CT


   Probable infectious left lung pneumonia (predominantly mid and lower left 

lung zones)


   Minimal streaky posterior RLL parenchymal changes. No pleural effusions.


   No acute traumatic pathology.  No acute fracture.


   No pleural effusions.  No pneumothorax. Calcified gallstones.





MICROBIOLOGY: Please see below. 





ASSESSMENT: 85 year old male with PMHx including Parkinson's disease, CKD, 

atrial fib, PVD, CAD, HTN, presented to the ED after a mechanical fall at home 

found to have L hip fracture and community acquired pneumonia admitted for 

evaluation by orthopedic surgery and further treatment





PLAN:


1. Left hip fracture secondary to mechanical fall


- Orthopedic surgery consulted, will evaluate patient in the AM


- NPO in case of surgical intervention


- Pain management with IV morphine


- PFS consult for rehab vs home care





2. Community acquired pneumonia


- Found on chest CT, patient is asymptomatic


- Will check procalcitonin 


- s/p IV vancomycin and zosyn in the ED


- continue IV antibiotics with ceftriaxone and doxycycline


- sputum culture ordered. blood cultures x2 pending





3. Parkinson's disease


- continue home meds





4. Atrial Fibrillation, rate controlled


- not currently on anticoagulation, can be addressed after decision about 

possible surgery


- continue home meds for rate control





5. CKD with chronic anemia


- Cr appears at baseline. H/H appears at baseline


- continue iron supplement





6. CAD and PVD


- hold aspirin and plavix for possible surgery. 





7. HTN


- continue home meds





DVT prophylaxis: teds and scds pending possible surgery





Disposition: admitted inpatient to med/surg expect greater than two midnights 

stay





Vital Signs





Vital Signs








  Date Time  Temp Pulse Resp B/P (MAP) Pulse Ox O2 Delivery O2 Flow Rate FiO2


 


3/10/21 23:18   16     


 


3/10/21 21:10 97.9 80  135/84 (101) 94 Room Air  











Laboratory Data


Labs 24H


Laboratory Tests 2


3/10/21 21:03: 


Anion Gap 7L, Glomerular Filtration Rate 39.0, Calcium Level 8.8, Total 

Bilirubin 0.6, Direct Bilirubin 0.3H, Aspartate Amino Transf (AST/SGOT) 4L, 

Alanine Aminotransferase (ALT/SGPT) < 6L, Alkaline Phosphatase 85, Total 

Creatine Kinase 57, Creatine Kinase MB < 1.0, Creatine Kinase MB Relative Index 

1.75, Troponin I 0.03, Total Protein 7.4, Albumin 3.3, Albumin/Globulin Ratio 

0.8, Thyroid Stimulating Hormone (TSH) 2.050, Free Thyroxine 1.24


3/10/21 21:47: 


Immature Granulocyte % (Auto) 0.8, Neutrophils (%) (Auto) 82.8H, Lymphocytes (%)

(Auto) 8.1L, Monocytes (%) (Auto) 7.7, Eosinophils (%) (Auto) 0.3, Basophils (%)

(Auto) 0.3, Neutrophils # (Auto) 8.8H, Lymphocytes # (Auto) 0.9L, Monocytes # 

(Auto) 0.8, Eosinophils # (Auto) 0.0, Basophils # (Auto) 0.0, Nucleated Red Bl

ood Cells % (auto) 0.0, Prothrombin Time 16.4H, Prothromb Time International 

Ratio 1.29, Activated Partial Thromboplast Time 33.8, Coronavirus (COVID-

19)(PCR) NEGATIVE, Influenza Type A (RT-PCR) NEGATIVE, Influenza Type B (RT-PCR)

NEGATIVE, Respiratory Syncytial Virus (PCR) NEGATIVE


CBC/BMP


Laboratory Tests


3/10/21 21:03








3/10/21 21:47











Home Medications


Scheduled


Aspirin (Aspirin EC) 81 Mg Tablet.dr, 81 MG PO DAILY


Atorvastatin Calcium (Atorvastatin Calcium) 20 Mg Tablet, 20 MG PO DAILY


Carbidopa/Levodopa (Carbidopa-Levodopa  Tab) 1 Each Tablet, 1 TAB PO BID


Cholecalciferol (Vitamin D3) (Vitamin D3) 1,000 Unit Tablet, 2,000 UNITS PO 

DAILY


Clopidogrel Bisulfate (Clopidogrel) 75 Mg Tablet, 75 MG PO DAILY


Cyanocobalamin (Vitamin B-12) (B-12) 1,000 Mcg Tablet, 1,000 MCG PO DAILY


Famotidine (Famotidine) 20 Mg Tablet, 20 MG PO DAILY


Ferrous Sulfate (Ferrous Sulfate) 325 Mg Tablet, 325 MG PO BID


Folic Acid (Folic Acid) 1 Mg Tablet, 1 MG PO DAILY


Furosemide (Furosemide) 40 Mg Tablet, 20 MG PO DAILY


L.acidoph/L.bulg/B.bif/S.therm (Bacid Caplet) 1 Each Tablet, 1 TAB PO QHS


Magnesium Oxide (Magnesium Oxide) 400 Mg Tablet, 400 MG PO DAILY


Metoprolol Tartrate (Metoprolol Tartrate) 25 Mg Tablet, 12.5 MG PO BID


Mirtazapine (Remeron) 15 Mg Tablet, 15 MG PO QHS


Pantoprazole Sodium (Pantoprazole Sodium) 40 Mg Tablet.dr, 40 MG PO BID


Tamsulosin HCl (Flomax) 0.4 Mg Capsule, 0.4 MG PO DAILY


Tolterodine Tartrate (Tolterodine Tartrate ER) 2 Mg Cap.er.24h, 2 MG PO DAILY





Scheduled PRN


Acetaminophen (Acetaminophen) 500 Mg Tablet, 1,000 MG PO Q6H PRN for PAIN





Miscellaneous Medications


[Med Rec Comment]


   MED REC COMPLETED VIA MED LIST AND EXTERNAL MED HISTORY 





Allergies


Coded Allergies:  


     No Known Drug Allergies (Verified  Allergy, Unknown, 9/22/19)





GME ATTESTATION


GME ATTESTATION


My faculty preceptor for this patient encounter was physically present during 

the encounter and was fully available. All aspects of the patient interview, 

examination, medical decision making process, and medical care plan development 

were reviewed and approved by the faculty preceptor. The faculty preceptor is 

aware and concurs with the plan as stated in the body of this note and will 

attest to such by his/her cosignature.





ATTENDING NOTE


Patient seen 03/10/2021.I, VICTORINO Baker, have independently examined this patient 

and performed my own physical exam, as well as reviewed the documentation and 

edited where necessary. I have discussed in detail with the resident / student 

the findings and plan of treatment as documented by the resident / student and 

edited their note. I agree with their findings and treatment plan and have 

edited their documentation. I will continue to follow the patient during this 

hospital stay.











JAYME BREAUX D.O.        Mar 11, 2021 00:07


SEPIDEH BAKER MD              Mar 11, 2021 20:39

## 2021-03-11 NOTE — CR.PDOC
General


Date of Consultation:  Mar 10, 2021


Referring Provider:  SEPIDEH BAKER MD


Attending Physician:  FLORES MCNULTY MD





Consultation


REASON FOR CONSULTATION/CHIEF COMPLAINT: Left hip fracture, intertrochanteric in

nature with possible subcapital impaction component. 





HISTORY OF PRESENT ILLNESS: The patient is an 85-year-old gentleman who lives at

home with his son. Reportedly, the patient turned suddenly with his walker and 

felt to the ground while entering the bathroom. He states that he did not slip 

or loses balance. He did not feel any dizziness or lightheadedness. He was 

brought to the ED by the EMS. He did hit his head and also multiple areas of his

body during the fall. He did have a sharp pain in his left hip after the fall, 

which has slightly improved. He denies any other significant pain. In the ED, he

was noted to have the left hip fracture as well as a left lung pneumonia. This a

ppears to be an asymptomatic based on the report by the hospitalist. Majority of

the history and physical and consultation is gleaned from the patient's medical 

record 





ALLERGIES: Please see below.





HOME MEDICATIONS: Please see below.





PAST MEDICAL HISTORY:


Parkinson's disease; Chronic kidney disease; coronary artery disease; 

hypertension; peripheral vascular disease; atrial fibrillation








PAST SURGICAL HISTORY: 


1. Coronary artery bypass graft


2. Femoropopliteal bypass





FAMILY HISTORY:


Noncontributory





SOCIAL HISTORY:


Marital status and/or living arrangements: The patient lives at home with his 

son who helps to manage his medical care.


Tobacco use: Pipe smoker for 55 years but quit


ETOH: Denies


Illicit drug use: Denies





REVIEW OF SYSTEMS:


Negative except for those mentioned in the HPI





PHYSICAL EXAMINATION:





The patient appears drowsy.  Their pain appears to be well controlled


Constitutional: The patient appears their stated age. They comfortable, but 

drowsy, in no acute distress.


Psychiatric: Appropriate affect with good eye contact. Aside from the drowsiness


Ambulation: Nonambulatory secondary hip fracture


Head, ears, eyes, nose, and throat: Head is normocephalic, atraumatic. Eyes: 

Pupils equal. Sclerae anicteric. 


Mouth: Not assessed due to covert masking


Neck: Supple with full range of motion. 


Skin: Clean, dry, and intact with no abrasions or ulcerations.


Lungs: Breathing is unlabored with symmetric chest expansion on inspiration. 


Cardiovascular: Palpable posterior tibialis pulses, bilaterally.


Neurologic: The patient is able to move his toes and flex and extend his ankle. 

Sensation is intact to light touch in the L4-S1 dermatome distribution, 

bilaterally








Musculoskeletal: 





The left lower extremity is held in a shortened and external rotation position, 

consistent with the left hip fracture





X-ray: X-ray imaging was independently reviewed by myself today. The x-ray 

imaging demonstrates a clear lucency delineating an intertrochanteric fracture. 

There is reportedly an impaction fracture in the subcapital region. However, 

this is not as obvious.





LABORATORY DATA: Please see below.





ASSESSMENT/PLAN: 


Patient demonstrates a left hip intertrochanteric fracture with possible s

ubcapital component in an impacted manner. I had a thorough discussion with the 

patient with regards to the risks and benefits of the procedure. The patient was

made aware that the hip fracture alone puts him at increased risk of mortality 

and morbidity. Over the next year. Surgical intervention will help with mobility

and decrease the risks associated with being bedridden secondary to fracture, w

hich include but are not limited to blood clots, pneumonia, etc. the risks of 

the procedure were described to the patient which include infection, blood loss,

DVT or pulmonary emboli, damage to local soft tissue structures, periprosthetic 

fracture, and need for revision surgery. The anesthetic risks include but are 

not limited to, heart attack, stroke and death. During this discussion, the 

patient voiced that he would like to avoid surgery if possible. I did explain 

that that would significantly increase his risk of mortality and morbidity, as 

described above. He stated that he was still like to avoid surgical intervention

if possible.  While the patient did appear to be drowsy. He did not appear to be

confused. His health proxy designees include his daughters. I contacted his 

daughter by phone and the daughter who is most in charge of his health, who is 

currently in [place] contacted the vera. I spoke with her and she stated that 

she would like to speak with her father by telephone and that arrangements would

be made by the charge nurse for his other daughter to visit him in the hospital 

to clarify his wishes. At this point. I am awaiting a decision from the patient 

with regards to how he would like to proceed. Of note, the hospitalist 

evaluation has demonstrated that the patient is at high cardiac risk associated 

with this surgery. I spoke with his daughter Jose Penny [phone]





Vital Signs/I&O





Vital Signs








  Date Time  Temp Pulse Resp B/P (MAP) Pulse Ox O2 Delivery O2 Flow Rate FiO2


 


3/11/21 08:40   17   Room Air  


 


3/11/21 08:21  80  128/84    


 


3/11/21 06:00 98.7    96  2.0 














I&O- Last 24 Hours up to 6 AM 


 


 3/11/21





 06:00


 


Intake Total 170 ml


 


Output Total 0 ml


 


Balance 170 ml











Laboratory Data


Labs 24H


Laboratory Tests 2


3/10/21 21:03: 


Anion Gap 7L, Glomerular Filtration Rate 39.0, Calcium Level 8.8, Total 

Bilirubin 0.6, Direct Bilirubin 0.3H, Aspartate Amino Transf (AST/SGOT) 4L, 

Alanine Aminotransferase (ALT/SGPT) < 6L, Alkaline Phosphatase 85, Total 

Creatine Kinase 57, Creatine Kinase MB < 1.0, Creatine Kinase MB Relative Index 

1.75, Troponin I 0.03, Total Protein 7.4, Albumin 3.3, Albumin/Globulin Ratio 

0.8, Thyroid Stimulating Hormone (TSH) 2.050, Free Thyroxine 1.24


3/10/21 21:47: 


Immature Granulocyte % (Auto) 0.8, Neutrophils (%) (Auto) 82.8H, Lymphocytes (%)

(Auto) 8.1L, Monocytes (%) (Auto) 7.7, Eosinophils (%) (Auto) 0.3, Basophils (%)

(Auto) 0.3, Neutrophils # (Auto) 8.8H, Lymphocytes # (Auto) 0.9L, Monocytes # 

(Auto) 0.8, Eosinophils # (Auto) 0.0, Basophils # (Auto) 0.0, Nucleated Red 

Blood Cells % (auto) 0.0, Prothrombin Time 16.4H, Prothromb Time International 

Ratio 1.29, Activated Partial Thromboplast Time 33.8, Coronavirus (COVID-

19)(PCR) NEGATIVE, Influenza Type A (RT-PCR) NEGATIVE, Influenza Type B (RT-PCR)

NEGATIVE, Respiratory Syncytial Virus (PCR) NEGATIVE


3/11/21 01:26: Procalcitonin <0.05


CBC/BMP


Laboratory Tests


3/10/21 21:03








3/10/21 21:47








Microbiology





Microbiology


3/11/21 Blood Culture, Received


          Pending


3/10/21 Blood Culture, Received


          Pending





Allergies


Coded Allergies:  


     No Known Drug Allergies (Verified  Allergy, Unknown, 9/22/19)





Home Medications


Scheduled


Aspirin (Aspirin EC) 81 Mg Tablet.dr, 81 MG PO DAILY, (Reported)


Atorvastatin Calcium (Atorvastatin Calcium) 20 Mg Tablet, 20 MG PO DAILY, 

(Reported)


Carbidopa/Levodopa (Carbidopa-Levodopa  Tab) 1 Each Tablet, 1 TAB PO BID, 

(Reported)


Cholecalciferol (Vitamin D3) (Vitamin D3) 1,000 Unit Tablet, 2,000 UNITS PO 

DAILY, (Reported)


Clopidogrel Bisulfate (Clopidogrel) 75 Mg Tablet, 75 MG PO DAILY, (Reported)


Cyanocobalamin (Vitamin B-12) (B-12) 1,000 Mcg Tablet, 1,000 MCG PO DAILY, 

(Reported)


Famotidine (Famotidine) 20 Mg Tablet, 20 MG PO DAILY, (Reported)


Ferrous Sulfate (Ferrous Sulfate) 325 Mg Tablet, 325 MG PO BID, (Reported)


Folic Acid (Folic Acid) 1 Mg Tablet, 1 MG PO DAILY, (Reported)


Furosemide (Furosemide) 40 Mg Tablet, 20 MG PO DAILY, (Reported)


L.acidoph/L.bulg/B.bif/S.therm (Bacid Caplet) 1 Each Tablet, 1 TAB PO QHS, 

(Reported)


Magnesium Oxide (Magnesium Oxide) 400 Mg Tablet, 400 MG PO DAILY, (Reported)


Metoprolol Tartrate (Metoprolol Tartrate) 25 Mg Tablet, 12.5 MG PO BID, 

(Reported)


Mirtazapine (Remeron) 15 Mg Tablet, 15 MG PO QHS, (Reported)


Pantoprazole Sodium (Pantoprazole Sodium) 40 Mg Tablet.dr, 40 MG PO BID, 

(Reported)


Tamsulosin HCl (Flomax) 0.4 Mg Capsule, 0.4 MG PO DAILY, (Reported)


Tolterodine Tartrate (Tolterodine Tartrate ER) 2 Mg Cap.er.24h, 2 MG PO DAILY, 

(Reported)





Scheduled PRN


Acetaminophen (Acetaminophen) 500 Mg Tablet, 1,000 MG PO Q6H PRN for PAIN, 

(Reported)





Miscellaneous Medications


[Med Rec Comment]  , (Reported)


   MED REC COMPLETED VIA MED LIST AND EXTERNAL MED HISTORY 











MICHAELA PAK MD              Mar 11, 2021 13:03

## 2021-03-12 VITALS — DIASTOLIC BLOOD PRESSURE: 70 MMHG | SYSTOLIC BLOOD PRESSURE: 129 MMHG

## 2021-03-12 VITALS — DIASTOLIC BLOOD PRESSURE: 90 MMHG | SYSTOLIC BLOOD PRESSURE: 136 MMHG

## 2021-03-12 VITALS — SYSTOLIC BLOOD PRESSURE: 112 MMHG | DIASTOLIC BLOOD PRESSURE: 52 MMHG

## 2021-03-12 VITALS — SYSTOLIC BLOOD PRESSURE: 107 MMHG | DIASTOLIC BLOOD PRESSURE: 89 MMHG

## 2021-03-12 VITALS — DIASTOLIC BLOOD PRESSURE: 51 MMHG | SYSTOLIC BLOOD PRESSURE: 106 MMHG

## 2021-03-12 PROCEDURE — 0QS704Z REPOSITION LEFT UPPER FEMUR WITH INTERNAL FIXATION DEVICE, OPEN APPROACH: ICD-10-PCS

## 2021-03-12 RX ADMIN — SODIUM CHLORIDE, POTASSIUM CHLORIDE, SODIUM LACTATE AND CALCIUM CHLORIDE SCH MLS/HR: 600; 310; 30; 20 INJECTION, SOLUTION INTRAVENOUS at 16:40

## 2021-03-12 RX ADMIN — CARBIDOPA AND LEVODOPA SCH TAB: 25; 100 TABLET ORAL at 21:00

## 2021-03-12 RX ADMIN — PANTOPRAZOLE SODIUM SCH MG: 40 TABLET, DELAYED RELEASE ORAL at 21:00

## 2021-03-12 RX ADMIN — PANTOPRAZOLE SODIUM SCH MG: 40 TABLET, DELAYED RELEASE ORAL at 09:29

## 2021-03-12 RX ADMIN — TOLTERODINE SCH MG: 2 CAPSULE, EXTENDED RELEASE ORAL at 09:29

## 2021-03-12 RX ADMIN — MIRTAZAPINE SCH MG: 15 TABLET, FILM COATED ORAL at 21:00

## 2021-03-12 RX ADMIN — DEXTROSE MONOHYDRATE SCH MLS/HR: 5 INJECTION INTRAVENOUS at 22:44

## 2021-03-12 RX ADMIN — TAMSULOSIN HYDROCHLORIDE SCH MG: 0.4 CAPSULE ORAL at 09:29

## 2021-03-12 RX ADMIN — MAGNESIUM OXIDE SCH MG: 400 TABLET ORAL at 09:28

## 2021-03-12 RX ADMIN — CARBIDOPA AND LEVODOPA SCH TAB: 25; 100 TABLET ORAL at 09:28

## 2021-03-12 RX ADMIN — CEFAZOLIN SODIUM SCH MLS/HR: 2 SOLUTION INTRAVENOUS at 20:24

## 2021-03-12 RX ADMIN — PROBIOTIC PRODUCT - TAB SCH EA: TAB at 21:00

## 2021-03-12 RX ADMIN — METOPROLOL TARTRATE SCH MG: 25 TABLET, FILM COATED ORAL at 09:29

## 2021-03-12 RX ADMIN — FOLIC ACID SCH MG: 1 TABLET ORAL at 09:28

## 2021-03-12 RX ADMIN — FERROUS SULFATE TAB 325 MG (65 MG ELEMENTAL FE) SCH MG: 325 (65 FE) TAB at 21:00

## 2021-03-12 RX ADMIN — ATORVASTATIN CALCIUM SCH MG: 20 TABLET, FILM COATED ORAL at 09:29

## 2021-03-12 RX ADMIN — FERROUS SULFATE TAB 325 MG (65 MG ELEMENTAL FE) SCH MG: 325 (65 FE) TAB at 09:28

## 2021-03-12 RX ADMIN — DEXTROSE MONOHYDRATE SCH MLS/HR: 5 INJECTION INTRAVENOUS at 02:10

## 2021-03-12 RX ADMIN — FAMOTIDINE SCH MG: 20 TABLET, FILM COATED ORAL at 09:28

## 2021-03-12 RX ADMIN — CEFTRIAXONE SCH MLS/HR: 1 INJECTION, POWDER, FOR SOLUTION INTRAMUSCULAR; INTRAVENOUS at 05:44

## 2021-03-12 RX ADMIN — METOPROLOL TARTRATE SCH MG: 25 TABLET, FILM COATED ORAL at 21:00

## 2021-03-12 NOTE — ROOPDOC
[facility] Report Of Operation


Report of Operation


DATE OF PROCEDURE: 3/12/21





PREPROCEDURE DIAGNOSES: Left Hip Fracture.





POSTPROCEDURE DIAGNOSES: Left Hip Fracture.





PROCEDURE: Left hip ORIF Short Gamma Nail. 





SURGEON: Kenan Pak MD





ASSISTANT: Hetal Dukes CST





ANESTHESIA: General.





ESTIMATED BLOOD LOSS: Approximately , 275 mL. 





COMPLICATIONS: no known complications. 





REMARKS: Components: [place]: Short Gamma Nail 11x150; CM screw 105 mm; shaft 

screw 42 mm





PROCEDURE NOTE: The patient was taken to the OR for ORIF Left Hip fracture.  

High risk associated with high cardiac risk and known pneumonia





DESCRIPTION OF PROCEDURE: The patient was seen in the Preop area and the Left 

hip was signed.  The patient resigned the informed consent for the procedure.  

He was taken to the OR and after a time out, a general anesthetic was carried 

out.  The patient was transferred to the fracture table and the left leg was 

placed in the traction boot.  The right leg was placed in the well leg vuong 

and padded, and secured appropriately.  A padded central post was used to allow 

traction through the pelvis, in the normal fashion.  The appropriate padding and

securing of the patient was carried out.





A surgical time out was completed.





Fluoroscopy was used to reduce the fracture.  The patella was pointed skyward 

with internal rotation.  Appropriate traction and adduction of the hip was 

carried out.  Once the fracture was reduced, the limb was prepped with 

chlorhexidine.  A shower curtain drape was applied.





Fluoro was used to identify the GT of the Left hip and an incision was carried 

through the skin and subcutaneous tissue down through the fascia.  The GT tip 

was identified and the awl was advanced at the tip junction of the ant 1/3rd and

post 2/3rds.  Position was checked on ap and lateral fluoro views.  When the awl

was central to the canal, the guidewire was advanced through the length of the 

femur.  This position was confirmed on ap and lateral fluoro views.





The soft tissue protector was placed over the wire followed by the opening 

reamer, which was advanced to the LT region.





The short nail implant was fixed to the  and the drill guides were 

tested for alignment with the nail.  The nail was then advanced over the 

guidewire and advanced under fluoro visualization.  Once the nail was at the 

correct level, the CM screw guide was placed through the radiolucent targeting 

arm.  The skin and fascia were incised inline with the guide.  The femoral 

guidewire was removed.  A guidewire was advanced under fluoro under AP and 

lateral views until a centre centre position was obtained in the femoral head.  

This was reamed and measured to 105 mm.  The screw was advanced under fluoro and

compressed.





The targeting arm was then used to locate the distal screw hole.  The skin and 

fascia were incised to allow the guide to rest on the bone.  The drill was 

passed through the guide and femur and a 42 mm length was measured.  A 42.5 mm 

screw was advanced.





The locking screw device was affixed and the lockiing screw was placed and 

secured and loosened 1/4 turn.  The targeting arm was removed and the wounds 

were irrigated with NSS.





30 ml of 0.5% Marcaine was instilled into the wound edges for local anesthetic. 

The wounds were closed with 1 vicryl and 2 vicryl and 3.0 monocryl in a layered 

manner with irrigation.  Telfa and tegaderm dressing was applied.





The pt tolerated the procedure well with no known complications.  He was 

transferred to recovery after reversal of the anesthetic.





The patient will be toe touch wb with a walker.  He can resume his home blood 

thinners when appropriate as deemed by the hospitalist service POD1.  He will 

follow up for wound assessment in clincic with Dr. Pak in 2 weeks.  He will 

likely require rehab post op.











KNEAN PAK MD              Mar 12, 2021 19:42

## 2021-03-12 NOTE — IPNPDOC
Text Note


Date of Service


The patient was seen on 3/11/21.





NOTE


An approximately 2:30 PM on March 11. I had a discussion with the patient and 

one of his daughters, Opal, who was admitted to the hospital to have a 

discussion about surgery with the patient. He reportedly had also had a 

discussion with his daughter Toshia, by telephone to discuss surgery earlier. We

did discuss the patient's left hip fracture and the risks of morbidity and 

mortality. The patient was aware that he is high risk for surgery but he is also

at a high risk for morbidity and mortality. If he does not have surgery. The 

risks of surgery included but were not limited to, heart attack, stroke and 

death associated with the anesthetic and the surgery. The risks of the procedure

itself included infection, blood loss, DVT/PE, periprosthetic fracture, damage t

o local soft tissue structures, need for revision surgery. The risks included 

but were not limited to these. The patient hasn't an increased risk associated 

with bleeding. As a result of his blood thinners. The patient is also been noted

to be a high cardiac risk. Both the patient and his family are aware of this. 

The patient consented for the left hip open reduction internal fixation and 

consented for blood transfusion at that time. The surgery will be planned for 

Friday afternoon.





VS,Fishbone, I+O


VS, Fishbone, I+O





Vital Signs








  Date Time  Temp Pulse Resp B/P (MAP) Pulse Ox O2 Delivery O2 Flow Rate FiO2


 


3/12/21 09:29  70  129/70    


 


3/12/21 08:30       2.0 


 


3/12/21 06:00 97.5  20  100 Nasal Cannula  














I&O- Last 24 Hours up to 6 AM 


 


 3/12/21





 05:59


 


Intake Total 100 ml


 


Output Total 650 ml


 


Balance -550 ml

















MICHAELA PAK MD              Mar 12, 2021 12:59

## 2021-03-12 NOTE — IPNPDOC
Subjective


Date Seen


The patient was seen on 3/12/21.





Subjective


Chief Complaint/HPI


Patient does not offer any complaits this morning. No chest pain or sob. Going 

to OR this morning.





Objective


Physical Examination


General Exam:  Positive: Alert, Cooperative, No Acute Distress


Eye Exam:  Positive: PERRLA, Conjunctiva & lids normal, EOMI; 


   Negative: Sclera icteric


ENT Exam:  Positive: Atraumatic, Mucous membr. moist/pink, Pharynx Normal


Neck Exam:  Positive: Supple; 


   Negative: JVD, thyromegaly


Chest Exam:  Positive: Clear to auscultation, Normal air movement


Heart Exam:  Positive: Rate Normal, Irregular Rhythm, Normal S1, Normal S2, 

Murmurs (systolic murmur); 


   Negative: Rubs


Abdomen Exam:  Positive: Normal bowel sounds, Soft; 


   Negative: Tenderness, Hepatospenomegaly


Extremity Exam:  Negative: Clubbing, Cyanosis, Edema





Assessment /Plan


Assessment


85 year old male with PMHx including Parkinson's disease, mild cognitive 

impairment, CKD, atrial fib, PVD, CAD/ CABG, Diastolic congestive heart failure,

severe pulmonary hypertension, HTN, Dysphagia, PAD, Abdominal aortic aneurysm 

repair, Protein calorie malnutrition with albumin 2.5, Chronic anemia, Moderate 

mitral insufficiency, Right upper lobe 9 mm pulmonary nodule presented to the ED

after a mechanical fall at home found to have L hip fracture and pneumonia by CT

chest. 





Left hip fracture secondary to mechanical fall


Going to OR today





Pneumonia


Found on chest CT, patient is asymptomatic


patient reports that he has trouble swallowing and sometimes chokes on food so 

may have silent aspirations and this aspiration pneumonia 


On Ceftriaxone and doxycycline


Blood cultures negative till date





Parkinson's disease


continue home meds





Atrial Fibrillation, rate controlled


not currently on anticoagulation


metoprolol





CKD with chronic anemia


Cr appears at baseline. H/H appears at baseline


continue iron supplement





CAD and PVD/ Aortic aneurysm repair  with graft


hold aspirin and plavix 





HTN


metoprolol





Diastolic CHF with Valvular heart disease ( moderate mitral regurgitation) and 

severe pulmonary hypertension


Patient is euvolemic at present


will hold Lasix





Plan/VTE


VTE Prophylaxis Ordered?:  Yes





VS, I&O, 24H, Fishbone


Vital Signs/I&O





Vital Signs








  Date Time  Temp Pulse Resp B/P (MAP) Pulse Ox O2 Delivery O2 Flow Rate FiO2


 


3/12/21 06:00 97.5 70 20 129/70 (89) 100 Nasal Cannula 2.0 














I&O- Last 24 Hours up to 6 AM 


 


 3/12/21





 06:00


 


Intake Total 100 ml


 


Output Total 650 ml


 


Balance -550 ml











Laboratory Data


Microbiology





Microbiology


3/11/21 Blood Culture - Preliminary, Resulted


          No growth after 24 hours . All specim...


3/10/21 Blood Culture - Preliminary, Resulted


          No growth after 24 hours . All specim...











FLORES MCNULTY MD                   Mar 12, 2021 09:23

## 2021-03-12 NOTE — REP
INDICATION:

LEFT HIP GAMMA NAIL.



COMPARISON:

Comparison left hip radiographs are from March 10, 2021..



TECHNIQUE:

Six views.  116.5 seconds of fluoroscopy time is reported.



FINDINGS:

A sequence of 6 last image hold fluoroscopically obtained spot radiographs of the left

hip document orthopedic open reduction internal fixation of intertrochanteric fracture.



IMPRESSION:

Procedural imaging.





<Electronically signed by Jorje Gamez > 03/12/21 1600

## 2021-03-13 VITALS — DIASTOLIC BLOOD PRESSURE: 59 MMHG | SYSTOLIC BLOOD PRESSURE: 114 MMHG

## 2021-03-13 VITALS — DIASTOLIC BLOOD PRESSURE: 62 MMHG | SYSTOLIC BLOOD PRESSURE: 100 MMHG

## 2021-03-13 VITALS — SYSTOLIC BLOOD PRESSURE: 112 MMHG | DIASTOLIC BLOOD PRESSURE: 59 MMHG

## 2021-03-13 VITALS — SYSTOLIC BLOOD PRESSURE: 114 MMHG | DIASTOLIC BLOOD PRESSURE: 56 MMHG

## 2021-03-13 VITALS — SYSTOLIC BLOOD PRESSURE: 118 MMHG | DIASTOLIC BLOOD PRESSURE: 59 MMHG

## 2021-03-13 VITALS — SYSTOLIC BLOOD PRESSURE: 109 MMHG | DIASTOLIC BLOOD PRESSURE: 59 MMHG

## 2021-03-13 VITALS — DIASTOLIC BLOOD PRESSURE: 59 MMHG | SYSTOLIC BLOOD PRESSURE: 111 MMHG

## 2021-03-13 VITALS — SYSTOLIC BLOOD PRESSURE: 100 MMHG | DIASTOLIC BLOOD PRESSURE: 62 MMHG

## 2021-03-13 VITALS — DIASTOLIC BLOOD PRESSURE: 59 MMHG | SYSTOLIC BLOOD PRESSURE: 110 MMHG

## 2021-03-13 VITALS — DIASTOLIC BLOOD PRESSURE: 56 MMHG | SYSTOLIC BLOOD PRESSURE: 117 MMHG

## 2021-03-13 VITALS — DIASTOLIC BLOOD PRESSURE: 58 MMHG | SYSTOLIC BLOOD PRESSURE: 108 MMHG

## 2021-03-13 VITALS — SYSTOLIC BLOOD PRESSURE: 115 MMHG | DIASTOLIC BLOOD PRESSURE: 57 MMHG

## 2021-03-13 VITALS — SYSTOLIC BLOOD PRESSURE: 108 MMHG | DIASTOLIC BLOOD PRESSURE: 58 MMHG

## 2021-03-13 VITALS — DIASTOLIC BLOOD PRESSURE: 59 MMHG | SYSTOLIC BLOOD PRESSURE: 112 MMHG

## 2021-03-13 VITALS — SYSTOLIC BLOOD PRESSURE: 108 MMHG | DIASTOLIC BLOOD PRESSURE: 61 MMHG

## 2021-03-13 LAB
BASOPHILS # BLD AUTO: 0 10^3/UL (ref 0–0.2)
BASOPHILS NFR BLD AUTO: 0.2 % (ref 0–1)
BUN SERPL-MCNC: 40 MG/DL (ref 7–18)
CALCIUM SERPL-MCNC: 8.3 MG/DL (ref 8.8–10.2)
CHLORIDE SERPL-SCNC: 106 MEQ/L (ref 98–107)
CO2 SERPL-SCNC: 26 MEQ/L (ref 21–32)
CREAT SERPL-MCNC: 2.06 MG/DL (ref 0.7–1.3)
EOSINOPHIL # BLD AUTO: 0 10^3/UL (ref 0–0.5)
EOSINOPHIL NFR BLD AUTO: 0 % (ref 0–3)
GFR SERPL CREATININE-BSD FRML MDRD: 32.9 ML/MIN/{1.73_M2} (ref 35–?)
GLUCOSE SERPL-MCNC: 133 MG/DL (ref 70–100)
HCT VFR BLD AUTO: 17.1 % (ref 42–52)
HCT VFR BLD AUTO: 20.8 % (ref 42–52)
HGB BLD-MCNC: 5.3 G/DL (ref 13.5–17.5)
HGB BLD-MCNC: 6.6 G/DL (ref 13.5–17.5)
LYMPHOCYTES # BLD AUTO: 0.7 10^3/UL (ref 1.5–5)
LYMPHOCYTES NFR BLD AUTO: 4.6 % (ref 24–44)
MCH RBC QN AUTO: 33.2 PG (ref 27–33)
MCH RBC QN AUTO: 33.3 PG (ref 27–33)
MCHC RBC AUTO-ENTMCNC: 31 G/DL (ref 32–36.5)
MCHC RBC AUTO-ENTMCNC: 31.7 G/DL (ref 32–36.5)
MCV RBC AUTO: 104.5 FL (ref 80–96)
MCV RBC AUTO: 107.5 FL (ref 80–96)
MONOCYTES # BLD AUTO: 1.8 10^3/UL (ref 0–0.8)
MONOCYTES NFR BLD AUTO: 11.9 % (ref 2–8)
NEUTROPHILS # BLD AUTO: 12.1 10^3/UL (ref 1.5–8.5)
NEUTROPHILS NFR BLD AUTO: 81.9 % (ref 36–66)
PLATELET # BLD AUTO: 131 10^3/UL (ref 150–450)
PLATELET # BLD AUTO: 152 10^3/UL (ref 150–450)
POTASSIUM SERPL-SCNC: 5.1 MEQ/L (ref 3.5–5.1)
RBC # BLD AUTO: 1.59 10^6/UL (ref 4.3–6.1)
RBC # BLD AUTO: 1.99 10^6/UL (ref 4.3–6.1)
SODIUM SERPL-SCNC: 138 MEQ/L (ref 136–145)
WBC # BLD AUTO: 12.9 10^3/UL (ref 4–10)
WBC # BLD AUTO: 14.8 10^3/UL (ref 4–10)

## 2021-03-13 PROCEDURE — 30233N1 TRANSFUSION OF NONAUTOLOGOUS RED BLOOD CELLS INTO PERIPHERAL VEIN, PERCUTANEOUS APPROACH: ICD-10-PCS | Performed by: INTERNAL MEDICINE

## 2021-03-13 RX ADMIN — CARBIDOPA AND LEVODOPA SCH TAB: 25; 100 TABLET ORAL at 20:07

## 2021-03-13 RX ADMIN — PANTOPRAZOLE SODIUM SCH MG: 40 TABLET, DELAYED RELEASE ORAL at 09:00

## 2021-03-13 RX ADMIN — FERROUS SULFATE TAB 325 MG (65 MG ELEMENTAL FE) SCH MG: 325 (65 FE) TAB at 09:00

## 2021-03-13 RX ADMIN — DEXTROSE MONOHYDRATE SCH MLS/HR: 5 INJECTION INTRAVENOUS at 10:05

## 2021-03-13 RX ADMIN — CARBIDOPA AND LEVODOPA SCH TAB: 25; 100 TABLET ORAL at 09:00

## 2021-03-13 RX ADMIN — PROBIOTIC PRODUCT - TAB SCH EA: TAB at 20:14

## 2021-03-13 RX ADMIN — MAGNESIUM OXIDE SCH MG: 400 TABLET ORAL at 09:00

## 2021-03-13 RX ADMIN — MIRTAZAPINE SCH MG: 15 TABLET, FILM COATED ORAL at 20:08

## 2021-03-13 RX ADMIN — SODIUM CHLORIDE, POTASSIUM CHLORIDE, SODIUM LACTATE AND CALCIUM CHLORIDE SCH MLS/HR: 600; 310; 30; 20 INJECTION, SOLUTION INTRAVENOUS at 03:36

## 2021-03-13 RX ADMIN — ACETAMINOPHEN PRN MG: 325 TABLET ORAL at 16:10

## 2021-03-13 RX ADMIN — ATORVASTATIN CALCIUM SCH MG: 20 TABLET, FILM COATED ORAL at 09:00

## 2021-03-13 RX ADMIN — TAMSULOSIN HYDROCHLORIDE SCH MG: 0.4 CAPSULE ORAL at 09:00

## 2021-03-13 RX ADMIN — PROBIOTIC PRODUCT - TAB SCH EA: TAB at 20:09

## 2021-03-13 RX ADMIN — CEFAZOLIN SODIUM SCH MLS/HR: 2 SOLUTION INTRAVENOUS at 03:39

## 2021-03-13 RX ADMIN — FERROUS SULFATE TAB 325 MG (65 MG ELEMENTAL FE) SCH MG: 325 (65 FE) TAB at 20:07

## 2021-03-13 RX ADMIN — FOLIC ACID SCH MG: 1 TABLET ORAL at 09:00

## 2021-03-13 RX ADMIN — CEFTRIAXONE SCH MLS/HR: 1 INJECTION, POWDER, FOR SOLUTION INTRAMUSCULAR; INTRAVENOUS at 06:01

## 2021-03-13 RX ADMIN — PANTOPRAZOLE SODIUM SCH MG: 40 TABLET, DELAYED RELEASE ORAL at 20:08

## 2021-03-13 RX ADMIN — QUETIAPINE FUMARATE SCH MG: 25 TABLET ORAL at 09:00

## 2021-03-13 RX ADMIN — QUETIAPINE FUMARATE SCH MG: 25 TABLET ORAL at 20:08

## 2021-03-13 RX ADMIN — TOLTERODINE SCH MG: 2 CAPSULE, EXTENDED RELEASE ORAL at 09:00

## 2021-03-13 RX ADMIN — DEXTROSE MONOHYDRATE SCH MLS/HR: 5 INJECTION INTRAVENOUS at 20:07

## 2021-03-13 RX ADMIN — METOPROLOL TARTRATE SCH MG: 25 TABLET, FILM COATED ORAL at 09:00

## 2021-03-13 RX ADMIN — METOPROLOL TARTRATE SCH MG: 25 TABLET, FILM COATED ORAL at 20:09

## 2021-03-13 NOTE — IPNPDOC
Subjective


Date Seen


The patient was seen on 3/13/21.





Subjective


Chief Complaint/HPI


Complains in severe pain in the left leg and resists any movement . Does not 

want to get out of bed. Had episodes of confusion, agitation and aggression 

overnight. More calm and cooperative this morning.





Objective


Physical Examination


General Exam:  Positive: Alert, Cooperative, No Acute Distress


Eye Exam:  Positive: PERRLA, Conjunctiva & lids normal, EOMI; 


   Negative: Sclera icteric


ENT Exam:  Positive: Atraumatic, Mucous membr. moist/pink, Pharynx Normal


Neck Exam:  Positive: Supple; 


   Negative: JVD, thyromegaly


Chest Exam:  Positive: Clear to auscultation, Normal air movement


Heart Exam:  Positive: Rate Normal, Irregular Rhythm, Normal S1, Normal S2, 

Murmurs (systolic murmur); 


   Negative: Rubs


Abdomen Exam:  Positive: Normal bowel sounds, Soft; 


   Negative: Tenderness, Hepatospenomegaly


Extremity Exam:  Positive: Tenderness (left leg); 


   Negative: Clubbing, Cyanosis, Edema





Assessment /Plan


Assessment


85 year old male with PMHx including Parkinson's disease, mild cognitive 

impairment, CKD, atrial fib, PVD, CAD/ CABG, Diastolic congestive heart failure,

severe pulmonary hypertension, HTN, Dysphagia, PAD, Abdominal aortic aneurysm 

repair, Protein calorie malnutrition with albumin 2.5, Chronic anemia, Moderate 

mitral insufficiency, Right upper lobe 9 mm pulmonary nodule presented to the ED

after a mechanical fall at home found to have L hip fracture and pneumonia by CT

chest. 





Left hip fracture secondary to mechanical fall


s/p Left hip ORIF Short Gamma Nail


pain control with oxycodone. 


PT/OT


DVT prophylaxis as per orthopedics. 





Acute Blood loss anemia


due to fracture hematoma, operative blood loss and dilutional.


PRBC transfusion x 2 





Delirium on the background of mild cognitive impairment


postoperative delirium


cont mirtazepine. 


seroquel 





Pneumonia


Found on chest CT, patient is asymptomatic


patient reports that he has trouble swallowing and sometimes chokes on food so 

may have silent aspirations and this aspiration pneumonia 


On Ceftriaxone and doxycycline


Blood cultures negative till date





Parkinson's disease


continue home meds





Atrial Fibrillation, rate controlled


not currently on anticoagulation


metoprolol





CKD with chronic anemia


Cr appears at baseline.


continue iron supplement





CAD and PVD/ Aortic aneurysm repair  with graft


hold aspirin and plavix 





HTN


metoprolol





Diastolic CHF with Valvular heart disease ( moderate mitral regurgitation) and 

severe pulmonary hypertension


Patient is euvolemic at present





BPH


flomax and tolterodine





Plan/VTE


VTE Prophylaxis Ordered?:  Yes





VS, I&O, 24H, Fishbone


Vital Signs/I&O





Vital Signs








  Date Time  Temp Pulse Resp B/P (MAP) Pulse Ox O2 Delivery O2 Flow Rate FiO2


 


3/13/21 10:00 97.5 107 19 115/57 (76) 99 Nasal Cannula 2.0 














I&O- Last 24 Hours up to 6 AM 


 


 3/13/21





 06:00


 


Intake Total 1170 ml


 


Output Total 1025 ml


 


Balance 145 ml











Laboratory Data


24H LABS


Laboratory Tests 2


3/13/21 06:13: Nucleated Red Blood Cells % (auto) 0.0


CBC/BMP


Laboratory Tests


3/13/21 06:13








Microbiology





Microbiology


3/11/21 Blood Culture - Preliminary, Resulted


          No Growth after 48 hours. All Specime...


3/10/21 Blood Culture - Preliminary, Resulted


          No Growth after 48 hours. All Specime...











FLORES MCNULTY MD                   Mar 13, 2021 10:47

## 2021-03-13 NOTE — IPNPDOC
Text Note


Date of Service


The patient was seen on 3/13/21.





NOTE


POD 1 from L hip ORIF, short gamma nail.





Pt feels unwell and tired today.  Not complaining of significant pain.





Dressing intact to Left hip with minimal staining proximal dressing.  Grossly 

NVI to left lower extremity.





Post op xray imaging shows IMN device in satisfactory position.





Pt had low Hb this am and 2 units PRBC ordered by Hospitalist service.  Likely 

responsible for pt feeling unwell and sluggish.





Pt is toe touch wb to L leg.  Rehab most likely. Follow up with Dr. Pak 2 

weeks post op for wound check.  Change dressing (telfa and tegaderm) prn for 

soiling and will plan on Post op day 3 dressing change





Kenan Pak MD .





VS,Stef, I+O


VS, Stef, I+O


Laboratory Tests


3/13/21 06:13











Vital Signs








  Date Time  Temp Pulse Resp B/P (MAP) Pulse Ox O2 Delivery O2 Flow Rate FiO2


 


3/13/21 10:00 97.5 107 19 115/57 (76) 99 Nasal Cannula 2.0 














I&O- Last 24 Hours up to 6 AM 


 


 3/13/21





 06:00


 


Intake Total 1170 ml


 


Output Total 1025 ml


 


Balance 145 ml

















KENAN PAK MD              Mar 13, 2021 11:18

## 2021-03-14 VITALS — DIASTOLIC BLOOD PRESSURE: 52 MMHG | SYSTOLIC BLOOD PRESSURE: 102 MMHG

## 2021-03-14 VITALS — SYSTOLIC BLOOD PRESSURE: 102 MMHG | DIASTOLIC BLOOD PRESSURE: 56 MMHG

## 2021-03-14 VITALS — SYSTOLIC BLOOD PRESSURE: 105 MMHG | DIASTOLIC BLOOD PRESSURE: 57 MMHG

## 2021-03-14 VITALS — SYSTOLIC BLOOD PRESSURE: 100 MMHG | DIASTOLIC BLOOD PRESSURE: 59 MMHG

## 2021-03-14 VITALS — DIASTOLIC BLOOD PRESSURE: 72 MMHG | SYSTOLIC BLOOD PRESSURE: 132 MMHG

## 2021-03-14 VITALS — SYSTOLIC BLOOD PRESSURE: 100 MMHG | DIASTOLIC BLOOD PRESSURE: 56 MMHG

## 2021-03-14 VITALS — SYSTOLIC BLOOD PRESSURE: 102 MMHG | DIASTOLIC BLOOD PRESSURE: 58 MMHG

## 2021-03-14 VITALS — DIASTOLIC BLOOD PRESSURE: 56 MMHG | SYSTOLIC BLOOD PRESSURE: 102 MMHG

## 2021-03-14 VITALS — DIASTOLIC BLOOD PRESSURE: 70 MMHG | SYSTOLIC BLOOD PRESSURE: 119 MMHG

## 2021-03-14 VITALS — SYSTOLIC BLOOD PRESSURE: 101 MMHG | DIASTOLIC BLOOD PRESSURE: 58 MMHG

## 2021-03-14 VITALS — DIASTOLIC BLOOD PRESSURE: 66 MMHG | SYSTOLIC BLOOD PRESSURE: 114 MMHG

## 2021-03-14 VITALS — DIASTOLIC BLOOD PRESSURE: 66 MMHG | SYSTOLIC BLOOD PRESSURE: 113 MMHG

## 2021-03-14 VITALS — SYSTOLIC BLOOD PRESSURE: 101 MMHG | DIASTOLIC BLOOD PRESSURE: 52 MMHG

## 2021-03-14 LAB
BASOPHILS # BLD AUTO: 0 10^3/UL (ref 0–0.2)
BASOPHILS # BLD AUTO: 0.1 10^3/UL (ref 0–0.2)
BASOPHILS NFR BLD AUTO: 0.3 % (ref 0–1)
BASOPHILS NFR BLD AUTO: 0.5 % (ref 0–1)
BUN SERPL-MCNC: 45 MG/DL (ref 7–18)
CALCIUM SERPL-MCNC: 8.3 MG/DL (ref 8.8–10.2)
CHLORIDE SERPL-SCNC: 108 MEQ/L (ref 98–107)
CO2 SERPL-SCNC: 27 MEQ/L (ref 21–32)
CREAT SERPL-MCNC: 1.88 MG/DL (ref 0.7–1.3)
EOSINOPHIL # BLD AUTO: 0 10^3/UL (ref 0–0.5)
EOSINOPHIL # BLD AUTO: 0.1 10^3/UL (ref 0–0.5)
EOSINOPHIL NFR BLD AUTO: 0.1 % (ref 0–3)
EOSINOPHIL NFR BLD AUTO: 0.6 % (ref 0–3)
FERRITIN SERPL-MCNC: 300 NG/ML (ref 26–388)
GFR SERPL CREATININE-BSD FRML MDRD: 36.6 ML/MIN/{1.73_M2} (ref 35–?)
GLUCOSE SERPL-MCNC: 109 MG/DL (ref 70–100)
HCT VFR BLD AUTO: 21.2 % (ref 42–52)
HCT VFR BLD AUTO: 27 % (ref 42–52)
HGB BLD-MCNC: 6.8 G/DL (ref 13.5–17.5)
HGB BLD-MCNC: 8.8 G/DL (ref 13.5–17.5)
IRON SATN MFR SERPL: 53.9 % (ref 19.7–50)
IRON SERPL-MCNC: 82 UG/DL (ref 65–175)
LYMPHOCYTES # BLD AUTO: 0.6 10^3/UL (ref 1.5–5)
LYMPHOCYTES # BLD AUTO: 0.6 10^3/UL (ref 1.5–5)
LYMPHOCYTES NFR BLD AUTO: 5.2 % (ref 24–44)
LYMPHOCYTES NFR BLD AUTO: 6.8 % (ref 24–44)
MCH RBC QN AUTO: 32.6 PG (ref 27–33)
MCH RBC QN AUTO: 32.7 PG (ref 27–33)
MCHC RBC AUTO-ENTMCNC: 32.1 G/DL (ref 32–36.5)
MCHC RBC AUTO-ENTMCNC: 32.6 G/DL (ref 32–36.5)
MCV RBC AUTO: 100 FL (ref 80–96)
MCV RBC AUTO: 101.9 FL (ref 80–96)
MONOCYTES # BLD AUTO: 1.2 10^3/UL (ref 0–0.8)
MONOCYTES # BLD AUTO: 1.2 10^3/UL (ref 0–0.8)
MONOCYTES NFR BLD AUTO: 11 % (ref 2–8)
MONOCYTES NFR BLD AUTO: 12.9 % (ref 2–8)
NEUTROPHILS # BLD AUTO: 7.3 10^3/UL (ref 1.5–8.5)
NEUTROPHILS # BLD AUTO: 9.2 10^3/UL (ref 1.5–8.5)
NEUTROPHILS NFR BLD AUTO: 77.7 % (ref 36–66)
NEUTROPHILS NFR BLD AUTO: 81.8 % (ref 36–66)
PLATELET # BLD AUTO: 128 10^3/UL (ref 150–450)
PLATELET # BLD AUTO: 129 10^3/UL (ref 150–450)
POTASSIUM SERPL-SCNC: 4.4 MEQ/L (ref 3.5–5.1)
RBC # BLD AUTO: 2.08 10^6/UL (ref 4.3–6.1)
RBC # BLD AUTO: 2.7 10^6/UL (ref 4.3–6.1)
SODIUM SERPL-SCNC: 141 MEQ/L (ref 136–145)
TIBC SERPL-MCNC: 152 UG/DL (ref 250–450)
WBC # BLD AUTO: 11.3 10^3/UL (ref 4–10)
WBC # BLD AUTO: 9.4 10^3/UL (ref 4–10)

## 2021-03-14 RX ADMIN — ACETAMINOPHEN PRN MG: 325 TABLET ORAL at 05:38

## 2021-03-14 RX ADMIN — PANTOPRAZOLE SODIUM SCH MG: 40 TABLET, DELAYED RELEASE ORAL at 11:18

## 2021-03-14 RX ADMIN — PANTOPRAZOLE SODIUM SCH MG: 40 TABLET, DELAYED RELEASE ORAL at 20:34

## 2021-03-14 RX ADMIN — DOCUSATE SODIUM,SENNOSIDES SCH TAB: 50; 8.6 TABLET, FILM COATED ORAL at 20:35

## 2021-03-14 RX ADMIN — FOLIC ACID SCH MG: 1 TABLET ORAL at 11:17

## 2021-03-14 RX ADMIN — CEFTRIAXONE SCH MLS/HR: 1 INJECTION, POWDER, FOR SOLUTION INTRAMUSCULAR; INTRAVENOUS at 05:24

## 2021-03-14 RX ADMIN — CARBIDOPA AND LEVODOPA SCH TAB: 25; 100 TABLET ORAL at 20:34

## 2021-03-14 RX ADMIN — TAMSULOSIN HYDROCHLORIDE SCH MG: 0.4 CAPSULE ORAL at 11:17

## 2021-03-14 RX ADMIN — SODIUM CHLORIDE SCH UNITS: 4.5 INJECTION, SOLUTION INTRAVENOUS at 09:00

## 2021-03-14 RX ADMIN — DEXTROSE MONOHYDRATE SCH MLS/HR: 5 INJECTION INTRAVENOUS at 11:18

## 2021-03-14 RX ADMIN — PROBIOTIC PRODUCT - TAB SCH EA: TAB at 20:23

## 2021-03-14 RX ADMIN — DOCUSATE SODIUM,SENNOSIDES SCH TAB: 50; 8.6 TABLET, FILM COATED ORAL at 11:18

## 2021-03-14 RX ADMIN — FERROUS SULFATE TAB 325 MG (65 MG ELEMENTAL FE) SCH MG: 325 (65 FE) TAB at 20:35

## 2021-03-14 RX ADMIN — METOPROLOL TARTRATE SCH MG: 25 TABLET, FILM COATED ORAL at 09:00

## 2021-03-14 RX ADMIN — ACETAMINOPHEN PRN MG: 325 TABLET ORAL at 20:35

## 2021-03-14 RX ADMIN — MAGNESIUM OXIDE SCH MG: 400 TABLET ORAL at 11:17

## 2021-03-14 RX ADMIN — METOPROLOL TARTRATE SCH MG: 25 TABLET, FILM COATED ORAL at 20:47

## 2021-03-14 RX ADMIN — QUETIAPINE FUMARATE SCH MG: 25 TABLET ORAL at 20:35

## 2021-03-14 RX ADMIN — TOLTERODINE SCH MG: 2 CAPSULE, EXTENDED RELEASE ORAL at 11:17

## 2021-03-14 RX ADMIN — FERROUS SULFATE TAB 325 MG (65 MG ELEMENTAL FE) SCH MG: 325 (65 FE) TAB at 11:17

## 2021-03-14 RX ADMIN — ATORVASTATIN CALCIUM SCH MG: 20 TABLET, FILM COATED ORAL at 11:18

## 2021-03-14 RX ADMIN — QUETIAPINE FUMARATE SCH MG: 25 TABLET ORAL at 11:18

## 2021-03-14 RX ADMIN — MIRTAZAPINE SCH MG: 15 TABLET, FILM COATED ORAL at 20:34

## 2021-03-14 RX ADMIN — FAMOTIDINE SCH MG: 20 TABLET, FILM COATED ORAL at 11:17

## 2021-03-14 RX ADMIN — DEXTROSE MONOHYDRATE SCH MLS/HR: 5 INJECTION INTRAVENOUS at 20:36

## 2021-03-14 RX ADMIN — SODIUM CHLORIDE SCH UNITS: 4.5 INJECTION, SOLUTION INTRAVENOUS at 20:35

## 2021-03-14 RX ADMIN — MAGNESIUM HYDROXIDE SCH ML: 400 SUSPENSION ORAL at 09:00

## 2021-03-14 RX ADMIN — CARBIDOPA AND LEVODOPA SCH TAB: 25; 100 TABLET ORAL at 11:18

## 2021-03-14 NOTE — IPNPDOC
Subjective


Date Seen


The patient was seen on 3/14/21.





Subjective


Chief Complaint/HPI


Continues to feel  tired. Does not want to move much. cooperative.





Objective


Physical Examination


General Exam:  Positive: Alert, Cooperative, No Acute Distress


Eye Exam:  Positive: PERRLA, Conjunctiva & lids normal, EOMI; 


   Negative: Sclera icteric


ENT Exam:  Positive: Atraumatic, Mucous membr. moist/pink, Pharynx Normal


Neck Exam:  Positive: Supple; 


   Negative: JVD, thyromegaly


Chest Exam:  Positive: Clear to auscultation, Normal air movement


Heart Exam:  Positive: Rate Normal, Irregular Rhythm, Normal S1, Normal S2, 

Murmurs (systolic murmur); 


   Negative: Rubs


Abdomen Exam:  Positive: Normal bowel sounds, Soft; 


   Negative: Tenderness, Hepatospenomegaly


Extremity Exam:  Positive: Tenderness (left leg); 


   Negative: Clubbing, Cyanosis, Edema





Assessment /Plan


Assessment


85 year old male with PMHx including Parkinson's disease, mild cognitive 

impairment, CKD, atrial fib, PVD, CAD/ CABG, Diastolic congestive heart failure,

severe pulmonary hypertension, HTN, Dysphagia, PAD, Abdominal aortic aneurysm 

repair, Protein calorie malnutrition with albumin 2.5, Chronic anemia, Moderate 

mitral insufficiency, Right upper lobe 9 mm pulmonary nodule presented to the ED

after a mechanical fall at home found to have L hip fracture and pneumonia by CT

chest. 





Left hip fracture secondary to mechanical fall


s/p Left hip ORIF Short Gamma Nail


pain control with oxycodone. 


PT/OT


DVT prophylaxis as per orthopedics. 


bowel meds. 





Acute Blood loss anemia on Chronic anemia


due to fracture hematoma, operative blood loss and dilutional.


CHronic anemia likely due to CKD


will check iron panel , folate and vit b12. 


PRBC transfusion x 4 





Delirium on the background of mild cognitive impairment


postoperative delirium


cont mirtazepine. 


seroquel 





Pneumonia


Found on chest CT, patient is asymptomatic


patient reports that he has trouble swallowing and sometimes chokes on food so 

may have silent aspirations and this aspiration pneumonia 


will get swallow eval on monday. 


On pureed diet. 


On Ceftriaxone and doxycycline


Blood cultures negative till date





Parkinson's disease


continue home meds





Atrial Fibrillation, rate controlled


not currently on anticoagulation


metoprolol





CKD with chronic anemia


Cr appears at baseline about 1.6 to 1.8


continue iron supplement





CAD and PVD/ Aortic aneurysm repair  with graft


hold aspirin and plavix 





HTN


metoprolol





Diastolic CHF with Valvular heart disease ( moderate mitral regurgitation) and 

severe pulmonary hypertension


Patient is euvolemic at present





BPH


flomax and tolterodine





Plan/VTE


VTE Prophylaxis Ordered?:  Yes





VS, I&O, 24H, Fishbone


Vital Signs/I&O





Vital Signs








  Date Time  Temp Pulse Resp B/P (MAP) Pulse Ox O2 Delivery O2 Flow Rate FiO2


 


3/14/21 06:00 98.4 111 20 132/72 (92) 98 Nasal Cannula 1.0 














I&O- Last 24 Hours up to 6 AM 


 


 3/14/21





 06:00


 


Intake Total 2860 ml


 


Output Total 825 ml


 


Balance 2035 ml











Laboratory Data


24H LABS


Laboratory Tests 2


3/13/21 15:43: 


Immature Granulocyte % (Auto) 1.4, Neutrophils (%) (Auto) 81.9H, Lymphocytes (%)

(Auto) 4.6L, Monocytes (%) (Auto) 11.9H, Eosinophils (%) (Auto) 0.0, Basophils 

(%) (Auto) 0.2, Neutrophils # (Auto) 12.1H, Lymphocytes # (Auto) 0.7L, Monocytes

# (Auto) 1.8H, Eosinophils # (Auto) 0.0, Basophils # (Auto) 0.0, Nucleated Red 

Blood Cells % (auto) 0.0, Anion Gap 6L, Glomerular Filtration Rate 32.9L, 

Calcium Level 8.3L


CBC/BMP


Laboratory Tests


3/13/21 15:43








Microbiology





Microbiology


3/11/21 Blood Culture - Preliminary, Resulted


          No Growth after 72 hours. All specime...


3/10/21 Blood Culture - Preliminary, Resulted


          No Growth after 72 hours. All specime...











FLORES MCNULTY MD                   Mar 14, 2021 07:57

## 2021-03-15 VITALS — SYSTOLIC BLOOD PRESSURE: 140 MMHG | DIASTOLIC BLOOD PRESSURE: 79 MMHG

## 2021-03-15 VITALS — DIASTOLIC BLOOD PRESSURE: 56 MMHG | SYSTOLIC BLOOD PRESSURE: 107 MMHG

## 2021-03-15 LAB
BASOPHILS # BLD AUTO: 0 10^3/UL (ref 0–0.2)
BASOPHILS NFR BLD AUTO: 0.4 % (ref 0–1)
BUN SERPL-MCNC: 48 MG/DL (ref 7–18)
CALCIUM SERPL-MCNC: 8.7 MG/DL (ref 8.8–10.2)
CHLORIDE SERPL-SCNC: 107 MEQ/L (ref 98–107)
CO2 SERPL-SCNC: 27 MEQ/L (ref 21–32)
CREAT SERPL-MCNC: 1.66 MG/DL (ref 0.7–1.3)
EOSINOPHIL # BLD AUTO: 0 10^3/UL (ref 0–0.5)
EOSINOPHIL NFR BLD AUTO: 0.4 % (ref 0–3)
FOLATE SERPL-MCNC: > 24 NG/ML (ref 5.4–?)
GFR SERPL CREATININE-BSD FRML MDRD: 42.2 ML/MIN/{1.73_M2} (ref 35–?)
GLUCOSE SERPL-MCNC: 100 MG/DL (ref 70–100)
HCT VFR BLD AUTO: 27 % (ref 42–52)
HGB BLD-MCNC: 8.9 G/DL (ref 13.5–17.5)
LYMPHOCYTES # BLD AUTO: 0.7 10^3/UL (ref 1.5–5)
LYMPHOCYTES NFR BLD AUTO: 6.9 % (ref 24–44)
MCH RBC QN AUTO: 32.7 PG (ref 27–33)
MCHC RBC AUTO-ENTMCNC: 33 G/DL (ref 32–36.5)
MCV RBC AUTO: 99.3 FL (ref 80–96)
MONOCYTES # BLD AUTO: 1 10^3/UL (ref 0–0.8)
MONOCYTES NFR BLD AUTO: 10.3 % (ref 2–8)
NEUTROPHILS # BLD AUTO: 7.8 10^3/UL (ref 1.5–8.5)
NEUTROPHILS NFR BLD AUTO: 80.3 % (ref 36–66)
PLATELET # BLD AUTO: 130 10^3/UL (ref 150–450)
POTASSIUM SERPL-SCNC: 4.5 MEQ/L (ref 3.5–5.1)
RBC # BLD AUTO: 2.72 10^6/UL (ref 4.3–6.1)
SODIUM SERPL-SCNC: 139 MEQ/L (ref 136–145)
VIT B12 SERPL-MCNC: 522 PG/ML (ref 247–911)
WBC # BLD AUTO: 9.7 10^3/UL (ref 4–10)

## 2021-03-15 RX ADMIN — MAGNESIUM HYDROXIDE SCH ML: 400 SUSPENSION ORAL at 08:12

## 2021-03-15 RX ADMIN — DOCUSATE SODIUM,SENNOSIDES SCH TAB: 50; 8.6 TABLET, FILM COATED ORAL at 08:17

## 2021-03-15 RX ADMIN — MAGNESIUM OXIDE SCH MG: 400 TABLET ORAL at 08:17

## 2021-03-15 RX ADMIN — NYSTATIN SCH DOSE: 100000 POWDER TOPICAL at 21:00

## 2021-03-15 RX ADMIN — ATORVASTATIN CALCIUM SCH MG: 20 TABLET, FILM COATED ORAL at 08:28

## 2021-03-15 RX ADMIN — METOPROLOL TARTRATE SCH MG: 25 TABLET, FILM COATED ORAL at 09:00

## 2021-03-15 RX ADMIN — PANTOPRAZOLE SODIUM SCH MG: 40 TABLET, DELAYED RELEASE ORAL at 08:17

## 2021-03-15 RX ADMIN — CEFTRIAXONE SCH MLS/HR: 1 INJECTION, POWDER, FOR SOLUTION INTRAMUSCULAR; INTRAVENOUS at 07:15

## 2021-03-15 RX ADMIN — METOPROLOL TARTRATE SCH MG: 25 TABLET, FILM COATED ORAL at 21:00

## 2021-03-15 RX ADMIN — FOLIC ACID SCH MG: 1 TABLET ORAL at 08:17

## 2021-03-15 RX ADMIN — CARBIDOPA AND LEVODOPA SCH TAB: 25; 100 TABLET ORAL at 08:13

## 2021-03-15 RX ADMIN — PROBIOTIC PRODUCT - TAB SCH EA: TAB at 21:00

## 2021-03-15 RX ADMIN — QUETIAPINE FUMARATE SCH MG: 25 TABLET ORAL at 08:16

## 2021-03-15 RX ADMIN — MIRTAZAPINE SCH MG: 15 TABLET, FILM COATED ORAL at 21:00

## 2021-03-15 RX ADMIN — TOLTERODINE SCH MG: 2 CAPSULE, EXTENDED RELEASE ORAL at 08:16

## 2021-03-15 RX ADMIN — SODIUM CHLORIDE SCH UNITS: 4.5 INJECTION, SOLUTION INTRAVENOUS at 21:00

## 2021-03-15 RX ADMIN — CARBIDOPA AND LEVODOPA SCH TAB: 25; 100 TABLET ORAL at 21:00

## 2021-03-15 RX ADMIN — FERROUS SULFATE TAB 325 MG (65 MG ELEMENTAL FE) SCH MG: 325 (65 FE) TAB at 08:17

## 2021-03-15 RX ADMIN — ACETAMINOPHEN PRN MG: 325 TABLET ORAL at 16:04

## 2021-03-15 RX ADMIN — SODIUM CHLORIDE SCH UNITS: 4.5 INJECTION, SOLUTION INTRAVENOUS at 08:13

## 2021-03-15 RX ADMIN — PANTOPRAZOLE SODIUM SCH MG: 40 TABLET, DELAYED RELEASE ORAL at 21:00

## 2021-03-15 RX ADMIN — DOCUSATE SODIUM,SENNOSIDES SCH TAB: 50; 8.6 TABLET, FILM COATED ORAL at 21:00

## 2021-03-15 RX ADMIN — TAMSULOSIN HYDROCHLORIDE SCH MG: 0.4 CAPSULE ORAL at 08:18

## 2021-03-15 RX ADMIN — DEXTROSE MONOHYDRATE SCH MLS/HR: 5 INJECTION INTRAVENOUS at 08:11

## 2021-03-15 RX ADMIN — FERROUS SULFATE TAB 325 MG (65 MG ELEMENTAL FE) SCH MG: 325 (65 FE) TAB at 21:00

## 2021-03-15 NOTE — IPNPDOC
Text Note


Date of Service


The patient was seen on 3/15/21.





NOTE


POD3 for Left hip ORIF with IMN for fracture.





Pt alert.  Pain with movement of left leg and palpation of left hip wounds.  

Possibly some guarding.





Proximal dressing intact, but peeling off.  2 distal dressings have been removed

with wounds exposed.





Dressing Change:





Remaining telfa and tegaderm dressing removed.  Steri strips in place.  All 3 

wounds cleansed with NSS.  Painted with Betadine over incision line.  Telfa 

island dressings applied with tegaderm to reinforce proximal dressing.





Patient tolerated dressing change with no known complications.





Discussed Anticoagulation with Dr. Santoro this morning.  Patient has received 4 

units of PRBC post op.  Currently off home cardioprotective ASA and Plavix.  The

patient is at high risk for bleeding, so he cannot be on combined antiplatelet 

and anticoagulant therapy, such as Xarelto.  The patient was at a high cardiac r

isk pre op.  The Antiplatelet action of ASA and Plavix should also act as a 

'blood thinner'.  I have discussed with Dr. Santoro and his home ASA and Plavix will

be restarted in the morning.  SANTO stockings and compression devices should 

continue to be used and calf pump exercises should be encouraged.





Kenan Pak MD





VS,Stef, I+O


VS, Stef I+O


Laboratory Tests


3/14/21 21:38








3/15/21 06:25











Vital Signs








  Date Time  Temp Pulse Resp B/P (MAP) Pulse Ox O2 Delivery O2 Flow Rate FiO2


 


3/15/21 09:00       1.0 


 


3/15/21 09:00  82  108/57    


 


3/15/21 06:00 97.6  17  92 Room Air  














I&O- Last 24 Hours up to 6 AM 


 


 3/15/21





 06:00


 


Intake Total 2070 ml


 


Output Total 725 ml


 


Balance 1345 ml

















KENAN PAK MD              Mar 15, 2021 16:17

## 2021-03-15 NOTE — IPNPDOC
Subjective


Date Seen


The patient was seen on 3/15/21.





Subjective


Chief Complaint/HPI


Does not offer any complaints this morning. Says no appetite. No fever or 

chills, did work a little with PT however still not wanting ot move much. 

Updated daughter Solange on the patient. Solange does not want him to go to ARU. 

will ask  to look for STR beds.





Objective


Physical Examination


General Exam:  Positive: Alert, Cooperative, No Acute Distress


Eye Exam:  Positive: PERRLA, Conjunctiva & lids normal, EOMI; 


   Negative: Sclera icteric


ENT Exam:  Positive: Atraumatic, Mucous membr. moist/pink, Pharynx Normal


Neck Exam:  Positive: Supple; 


   Negative: JVD, thyromegaly


Chest Exam:  Positive: Clear to auscultation, Normal air movement


Heart Exam:  Positive: Rate Normal, Irregular Rhythm, Normal S1, Normal S2, 

Murmurs (systolic murmur); 


   Negative: Rubs


Abdomen Exam:  Positive: Normal bowel sounds, Soft; 


   Negative: Tenderness, Hepatospenomegaly


Extremity Exam:  Negative: Clubbing, Cyanosis, Edema





Assessment /Plan


Assessment


85 year old male with PMHx including Parkinson's disease, mild cognitive 

impairment, CKD, atrial fib, PVD, CAD/ CABG, Diastolic congestive heart failure,

severe pulmonary hypertension, HTN, Dysphagia, PAD, Abdominal aortic aneurysm 

repair, Protein calorie malnutrition with albumin 2.5, Chronic anemia, Moderate 

mitral insufficiency, Right upper lobe 9 mm pulmonary nodule presented to the ED

after a mechanical fall at home found to have L hip fracture and pneumonia by CT

chest. 





Left hip fracture secondary to mechanical fall


s/p Left hip ORIF Short Gamma Nail


pain control with oxycodone. 


PT/OT


bowel meds. 


heparin for DVT prophylaxis. 





Acute Blood loss anemia on Chronic anemia


due to fracture hematoma, operative blood loss and dilutional.


Chronic anemia likely due to CKD


iron panel , folate and vit b12 no gross deficiencies. 


Received PRBC transfusion x 4 





Delirium on the background of mild cognitive impairment


postoperative delirium


cont mirtazepine. 


seroquel 





Pneumonia


CAP vs aspiration. 


Found on chest CT, patient is asymptomatic


patient reports that he has trouble swallowing and sometimes chokes on food so 

may have silent aspirations and this aspiration pneumonia 


will get swallow eval 


On pureed diet. 


Finished 5 days of antibiotics. 


Blood cultures negative till date





Parkinson's disease


continue home meds





Atrial Fibrillation, rate controlled


not on anticoagulation at home


metoprolol





CKD with chronic anemia


Cr appears at baseline about 1.6 to 1.8


continue iron supplement





CAD and PAD/ Aortic aneurysm repair with graft/ CABG


on  aspirin and plavix at home.


will resume when OK with surgeon. 





HTN


metoprolol





Diastolic CHF with Valvular heart disease ( moderate mitral regurgitation) and 

severe pulmonary hypertension


Patient is euvolemic at present





BPH


flomax and tolterodine





Plan/VTE


VTE Prophylaxis Ordered?:  Yes





VS, I&O, 24H, Fishbone


Vital Signs/I&O





Vital Signs








  Date Time  Temp Pulse Resp B/P (MAP) Pulse Ox O2 Delivery O2 Flow Rate FiO2


 


3/15/21 06:00 97.6 84 17 107/56 (73) 92 Room Air  


 


3/14/21 20:33       1.0 














I&O- Last 24 Hours up to 6 AM 


 


 3/15/21





 06:00


 


Intake Total 2070 ml


 


Output Total 725 ml


 


Balance 1345 ml











Laboratory Data


24H LABS


Laboratory Tests 2


3/14/21 21:38: 


Immature Granulocyte % (Auto) 1.5, Neutrophils (%) (Auto) 77.7H, Lymphocytes (%)

(Auto) 6.8L, Monocytes (%) (Auto) 12.9H, Eosinophils (%) (Auto) 0.6, Basophils 

(%) (Auto) 0.5, Neutrophils # (Auto) 7.3, Lymphocytes # (Auto) 0.6L, Monocytes #

(Auto) 1.2H, Eosinophils # (Auto) 0.1, Basophils # (Auto) 0.1, Nucleated Red 

Blood Cells % (auto) 0.0, Iron Level 82, Total Iron Binding Capacity 152L, 

Transferrin % Saturation 53.9H, Ferritin 300, Vitamin B12 Level 522, Folate > 

24.0


3/15/21 06:25: 


Immature Granulocyte % (Auto) 1.7, Neutrophils (%) (Auto) 80.3H, Lymphocytes (%)

(Auto) 6.9L, Monocytes (%) (Auto) 10.3H, Eosinophils (%) (Auto) 0.4, Basophils 

(%) (Auto) 0.4, Neutrophils # (Auto) 7.8, Lymphocytes # (Auto) 0.7L, Monocytes #

(Auto) 1.0H, Eosinophils # (Auto) 0.0, Basophils # (Auto) 0.0, Nucleated Red 

Blood Cells % (auto) 0.0, Anion Gap 5L, Glomerular Filtration Rate 42.2, Calcium

Level 8.7L


CBC/BMP


Laboratory Tests


3/14/21 21:38








3/15/21 06:25








Microbiology





Microbiology


3/11/21 Blood Culture - Preliminary, Resulted


          No Growth after 72 hours. All specime...


3/10/21 Blood Culture - Preliminary, Resulted


          No Growth after 72 hours. All specime...











FLORES MCNULTY MD                   Mar 15, 2021 11:44

## 2021-03-15 NOTE — REP
INDICATION:

POST OP IN PACU/ LOW SET AP PELVIS



COMPARISON:

03/10/2021



TECHNIQUE:

Single AP view of the pelvis.



FINDINGS:

Patient is status post satisfactory fixation for left intertrochanteric fracture.

Postoperative changes in the overlying soft tissues noted.



IMPRESSION:

Status post left intertrochanteric fracture fixation.





<Electronically signed by Manan Chun > 03/12/21 [id number]

## 2021-03-16 VITALS — DIASTOLIC BLOOD PRESSURE: 63 MMHG | SYSTOLIC BLOOD PRESSURE: 118 MMHG

## 2021-03-16 LAB
BASOPHILS # BLD AUTO: 0 10^3/UL (ref 0–0.2)
BASOPHILS NFR BLD AUTO: 0.4 % (ref 0–1)
BUN SERPL-MCNC: 39 MG/DL (ref 7–18)
CALCIUM SERPL-MCNC: 9.1 MG/DL (ref 8.8–10.2)
CHLORIDE SERPL-SCNC: 110 MEQ/L (ref 98–107)
CO2 SERPL-SCNC: 26 MEQ/L (ref 21–32)
CREAT SERPL-MCNC: 1.52 MG/DL (ref 0.7–1.3)
EOSINOPHIL # BLD AUTO: 0.1 10^3/UL (ref 0–0.5)
EOSINOPHIL NFR BLD AUTO: 0.9 % (ref 0–3)
GFR SERPL CREATININE-BSD FRML MDRD: 46.7 ML/MIN/{1.73_M2} (ref 35–?)
GLUCOSE SERPL-MCNC: 93 MG/DL (ref 70–100)
HCT VFR BLD AUTO: 28.3 % (ref 42–52)
HGB BLD-MCNC: 9 G/DL (ref 13.5–17.5)
LYMPHOCYTES # BLD AUTO: 0.6 10^3/UL (ref 1.5–5)
LYMPHOCYTES NFR BLD AUTO: 8.4 % (ref 24–44)
MCH RBC QN AUTO: 32.1 PG (ref 27–33)
MCHC RBC AUTO-ENTMCNC: 31.8 G/DL (ref 32–36.5)
MCV RBC AUTO: 101.1 FL (ref 80–96)
MONOCYTES # BLD AUTO: 0.7 10^3/UL (ref 0–0.8)
MONOCYTES NFR BLD AUTO: 10.5 % (ref 2–8)
NEUTROPHILS # BLD AUTO: 5.2 10^3/UL (ref 1.5–8.5)
NEUTROPHILS NFR BLD AUTO: 77.9 % (ref 36–66)
PLATELET # BLD AUTO: 148 10^3/UL (ref 150–450)
POTASSIUM SERPL-SCNC: 4.1 MEQ/L (ref 3.5–5.1)
RBC # BLD AUTO: 2.8 10^6/UL (ref 4.3–6.1)
SODIUM SERPL-SCNC: 141 MEQ/L (ref 136–145)
WBC # BLD AUTO: 6.7 10^3/UL (ref 4–10)

## 2021-03-16 RX ADMIN — METOPROLOL TARTRATE SCH MG: 25 TABLET, FILM COATED ORAL at 08:14

## 2021-03-16 RX ADMIN — MAGNESIUM HYDROXIDE SCH ML: 400 SUSPENSION ORAL at 08:33

## 2021-03-16 RX ADMIN — FAMOTIDINE SCH MG: 20 TABLET, FILM COATED ORAL at 08:15

## 2021-03-16 RX ADMIN — FERROUS SULFATE TAB 325 MG (65 MG ELEMENTAL FE) SCH MG: 325 (65 FE) TAB at 19:56

## 2021-03-16 RX ADMIN — NYSTATIN SCH DOSE: 100000 POWDER TOPICAL at 19:57

## 2021-03-16 RX ADMIN — MIRTAZAPINE SCH MG: 15 TABLET, FILM COATED ORAL at 19:56

## 2021-03-16 RX ADMIN — ATORVASTATIN CALCIUM SCH MG: 20 TABLET, FILM COATED ORAL at 08:11

## 2021-03-16 RX ADMIN — METOPROLOL TARTRATE SCH MG: 25 TABLET, FILM COATED ORAL at 19:56

## 2021-03-16 RX ADMIN — ACETAMINOPHEN PRN MG: 325 TABLET ORAL at 18:01

## 2021-03-16 RX ADMIN — POLYETHYLENE GLYCOL 3350 SCH PKT: 17 POWDER, FOR SOLUTION ORAL at 20:01

## 2021-03-16 RX ADMIN — PROBIOTIC PRODUCT - TAB SCH EA: TAB at 19:53

## 2021-03-16 RX ADMIN — CARBIDOPA AND LEVODOPA SCH TAB: 25; 100 TABLET ORAL at 08:11

## 2021-03-16 RX ADMIN — QUETIAPINE FUMARATE SCH MG: 25 TABLET ORAL at 08:14

## 2021-03-16 RX ADMIN — CLOPIDOGREL BISULFATE SCH MG: 75 TABLET ORAL at 08:11

## 2021-03-16 RX ADMIN — CARBIDOPA AND LEVODOPA SCH TAB: 25; 100 TABLET ORAL at 19:56

## 2021-03-16 RX ADMIN — NYSTATIN SCH DOSE: 100000 POWDER TOPICAL at 08:15

## 2021-03-16 RX ADMIN — PANTOPRAZOLE SODIUM SCH MG: 40 TABLET, DELAYED RELEASE ORAL at 19:56

## 2021-03-16 RX ADMIN — MAGNESIUM OXIDE SCH MG: 400 TABLET ORAL at 08:11

## 2021-03-16 RX ADMIN — PANTOPRAZOLE SODIUM SCH MG: 40 TABLET, DELAYED RELEASE ORAL at 08:10

## 2021-03-16 RX ADMIN — DOCUSATE SODIUM,SENNOSIDES SCH TAB: 50; 8.6 TABLET, FILM COATED ORAL at 08:11

## 2021-03-16 RX ADMIN — TAMSULOSIN HYDROCHLORIDE SCH MG: 0.4 CAPSULE ORAL at 08:11

## 2021-03-16 RX ADMIN — DOCUSATE SODIUM,SENNOSIDES SCH TAB: 50; 8.6 TABLET, FILM COATED ORAL at 19:53

## 2021-03-16 RX ADMIN — ASPIRIN SCH MG: 81 TABLET ORAL at 08:14

## 2021-03-16 RX ADMIN — MAGNESIUM HYDROXIDE SCH ML: 400 SUSPENSION ORAL at 08:15

## 2021-03-16 RX ADMIN — FOLIC ACID SCH MG: 1 TABLET ORAL at 08:14

## 2021-03-16 RX ADMIN — TOLTERODINE SCH MG: 2 CAPSULE, EXTENDED RELEASE ORAL at 08:15

## 2021-03-16 RX ADMIN — FERROUS SULFATE TAB 325 MG (65 MG ELEMENTAL FE) SCH MG: 325 (65 FE) TAB at 08:11

## 2021-03-17 RX ADMIN — FOLIC ACID SCH MG: 1 TABLET ORAL at 08:38

## 2021-03-17 RX ADMIN — METOPROLOL TARTRATE SCH MG: 25 TABLET, FILM COATED ORAL at 20:50

## 2021-03-17 RX ADMIN — ASPIRIN SCH MG: 81 TABLET ORAL at 08:38

## 2021-03-17 RX ADMIN — MIRTAZAPINE SCH MG: 15 TABLET, FILM COATED ORAL at 20:59

## 2021-03-17 RX ADMIN — NYSTATIN SCH DOSE: 100000 POWDER TOPICAL at 08:45

## 2021-03-17 RX ADMIN — DOCUSATE SODIUM,SENNOSIDES SCH TAB: 50; 8.6 TABLET, FILM COATED ORAL at 20:59

## 2021-03-17 RX ADMIN — MAGNESIUM HYDROXIDE SCH ML: 400 SUSPENSION ORAL at 08:37

## 2021-03-17 RX ADMIN — POLYETHYLENE GLYCOL 3350 SCH PKT: 17 POWDER, FOR SOLUTION ORAL at 08:37

## 2021-03-17 RX ADMIN — MAGNESIUM OXIDE SCH MG: 400 TABLET ORAL at 08:39

## 2021-03-17 RX ADMIN — POLYETHYLENE GLYCOL 3350 SCH PKT: 17 POWDER, FOR SOLUTION ORAL at 21:00

## 2021-03-17 RX ADMIN — PANTOPRAZOLE SODIUM SCH MG: 40 TABLET, DELAYED RELEASE ORAL at 08:39

## 2021-03-17 RX ADMIN — TOLTERODINE SCH MG: 2 CAPSULE, EXTENDED RELEASE ORAL at 08:38

## 2021-03-17 RX ADMIN — FERROUS SULFATE TAB 325 MG (65 MG ELEMENTAL FE) SCH MG: 325 (65 FE) TAB at 08:38

## 2021-03-17 RX ADMIN — PROBIOTIC PRODUCT - TAB SCH EA: TAB at 20:59

## 2021-03-17 RX ADMIN — QUETIAPINE FUMARATE SCH MG: 25 TABLET ORAL at 08:38

## 2021-03-17 RX ADMIN — DOCUSATE SODIUM,SENNOSIDES SCH TAB: 50; 8.6 TABLET, FILM COATED ORAL at 08:38

## 2021-03-17 RX ADMIN — PANTOPRAZOLE SODIUM SCH MG: 40 TABLET, DELAYED RELEASE ORAL at 21:00

## 2021-03-17 RX ADMIN — ATORVASTATIN CALCIUM SCH MG: 20 TABLET, FILM COATED ORAL at 08:39

## 2021-03-17 RX ADMIN — FERROUS SULFATE TAB 325 MG (65 MG ELEMENTAL FE) SCH MG: 325 (65 FE) TAB at 21:00

## 2021-03-17 RX ADMIN — METOPROLOL TARTRATE SCH MG: 25 TABLET, FILM COATED ORAL at 08:38

## 2021-03-17 RX ADMIN — CARBIDOPA AND LEVODOPA SCH TAB: 25; 100 TABLET ORAL at 21:00

## 2021-03-17 RX ADMIN — TAMSULOSIN HYDROCHLORIDE SCH MG: 0.4 CAPSULE ORAL at 08:39

## 2021-03-17 RX ADMIN — CARBIDOPA AND LEVODOPA SCH TAB: 25; 100 TABLET ORAL at 08:38

## 2021-03-17 RX ADMIN — ACETAMINOPHEN PRN MG: 325 TABLET ORAL at 08:42

## 2021-03-17 RX ADMIN — CLOPIDOGREL BISULFATE SCH MG: 75 TABLET ORAL at 08:39

## 2021-03-17 RX ADMIN — NYSTATIN SCH DOSE: 100000 POWDER TOPICAL at 21:01

## 2021-03-17 RX ADMIN — ACETAMINOPHEN PRN MG: 325 TABLET ORAL at 21:01

## 2021-03-17 NOTE — DS.PDOC
Discharge Summary


General


Date of Admission


Mar 10, 2021 at 23:57


Date of Discharge


3/17/21





Discharge Summary


PROCEDURES PERFORMED DURING STAY:


Left hip ORIF Short Gamma Nail on 03/12/21





DISCHARGE DIAGNOSES:


Left Hip fracture s/p mechanical fall


Acute blood loss anemia on chronic anemia


Pneumonia CAP Vs aspiration


Delirium on the back ground of dementia. 





SECONDARY DIAGNOSIS:


Parkinson's disease, Cognitive impairment with sundowning, CKD stge 3, atrial 

fib, PVD, CAD/ CABG, Diastolic congestive heart failure, Severe pulmonary 

hypertension, HTN, Dysphagia, PAD, Abdominal aortic aneurysm repair, Protein 

calorie malnutrition, Chronic anemia, Moderate mitral insufficiency, BPH, Right 

upper lobe 9 mm pulmonary nodule





COMPLICATIONS/CHIEF COMPLAINT: Fracture Of L Hip.





HOSPITAL COURSE: 85 year old male with PMHx including Parkinson's disease, mild 

cognitive impairment, CKD, atrial fib, PVD, CAD/ CABG, Diastolic congestive 

heart failure, severe pulmonary hypertension, HTN, Dysphagia, PAD, Abdominal a

ortic aneurysm repair, Protein calorie malnutrition,  Chronic anemia, Moderate 

mitral insufficiency, Right upper lobe 9 mm pulmonary nodule presented to the ED

after a mechanical fall at home found to have L hip fracture and pneumonia by CT

chest. Hospital course was complicated by more than expected bleeding during 

surgery and the post op period felt to be due to being on ASA and Plavix at 

home. Large fracture hematoma.  He required 4 units of PRBC transfusion. He also

had some delirium in the postoperative period which has now resolved.





Left hip fracture secondary to mechanical fall


s/p Left hip ORIF Short Gamma Nail


pain control with oxycodone. 


PT/OT


continue bowel meds. 


DVT prophylaxis with ASA and Plavix. Ortho surgeon did not want any AC. 





Acute Blood loss anemia on Chronic anemia


due to fracture hematoma, operative blood loss and dilutional.


Chronic anemia likely due to CKD


iron panel , folate and vit b12 no gross deficiencies. 


Received PRBC transfusion x 4 





Delirium on the background of mild cognitive impairment with postopertive 

delirium


resolved. Needed seroquel in hospital. 


cont mirtazepine. 





Pneumonia


CAP vs aspiration. 


Found on chest CT, patient is asymptomatic


patient reports that he has trouble swallowing and sometimes chokes on food so 

may have silent aspirations and this aspiration pneumonia 


On pureed diet with thin liquids. 


Will follow reccomendations from [facility] swallow evaluation. 


Finished 5 days of antibiotics. 


Blood cultures negative till date





Dysphagia


recommendations from ST. 


Level 1/pureed solids


Thin liquids


NO STRAWS


staff assist as needed to maximize pt's success in being able to retrieve solids

and liquids


ST services 3-5x wk to determine most appropriate diet for pt based on needs





Parkinson's disease


continue home meds





Atrial Fibrillation, rate controlled


not on anticoagulation at home


metoprolol





CKD with chronic anemia


Cr appears at baseline about 1.6 to 1.8


continue iron supplement





CAD and PAD/ Aortic aneurysm repair with graft/ CABG


will restart ASA and Plavix. 





HTN


metoprolol





Diastolic CHF with Valvular heart disease ( moderate mitral regurgitation) and 

severe pulmonary hypertension


Patient is euvolemic at present


will resume lasix. 





BPH


flomax and tolterodine





DISCHARGE MEDICATIONS: Please see below.


 


ALLERGIES: Please see below.





PHYSICAL EXAMINATION ON DISCHARGE:


VITAL SIGNS: Please see below.


General Exam:  Positive: Alert, Cooperative, No Acute Distress


Eye Exam:  Positive: PERRLA, Conjunctiva & lids normal, EOMI; 


   Negative: Sclera icteric


ENT Exam:  Positive: Atraumatic, Mucous membr. moist/pink, Pharynx Normal


Neck Exam:  Positive: Supple; 


   Negative: JVD, thyromegaly


Chest Exam:  Positive: Clear to auscultation, Normal air movement


Heart Exam:  Positive: Rate Normal, Irregular Rhythm, Normal S1, Normal S2, 

Murmurs (systolic murmur); 


   Negative: Rubs


Abdomen Exam:  Positive: Normal bowel sounds, Soft; 


   Negative: Tenderness, Hepatosplenomegaly


Extremity Exam:  Negative: Clubbing, Cyanosis, Edema





LABORATORY DATA: Please see below.





ACTIVITY: [As tolerated].





DIET: Pureed with thin liquids





DISCHARGE PLAN: [facility]





DISPOSITION: .





DISCHARGE INSTRUCTIONS:


Follow up with Ortho in 2 weeks





DISCHARGE CONDITION: [Stable].





TIME SPENT ON DISCHARGE: 35 minutes.





Vital Signs/I&Os





Vital Signs








  Date Time  Temp Pulse Resp B/P (MAP) Pulse Ox O2 Delivery O2 Flow Rate FiO2


 


3/16/21 22:00 97.4 66 17  96 Room Air  


 


3/16/21 19:56    104/73    


 


3/15/21 09:00       1.0 














I&O- Last 24 Hours up to 6 AM 


 


 3/17/21





 06:00


 


Intake Total 480 ml


 


Balance 480 ml











Laboratory Data


Labs 24H


Laboratory Tests 2


3/16/21 09:48: Coronavirus (COVID-19)(PCR) NEGATIVE





Microbiology





Microbiology


3/11/21 Blood Culture - Final, Complete


          NO GROWTH AFTER 5 DAYS


3/10/21 Blood Culture - Final, Complete


          NO GROWTH AFTER 5 DAYS





Discharge Medications


Scheduled


Aspirin (Aspirin EC) 81 Mg Tablet.dr, 81 MG PO DAILY, (Reported)


Atorvastatin Calcium (Atorvastatin Calcium) 20 Mg Tablet, 20 MG PO DAILY, 

(Reported)


Carbidopa/Levodopa (Carbidopa-Levodopa  Tab) 1 Each Tablet, 1 TAB PO BID, 

(Reported)


Cholecalciferol (Vitamin D3) (Vitamin D3) 1,000 Unit Tablet, 2,000 UNITS PO 

DAILY, (Reported)


Clopidogrel Bisulfate (Clopidogrel) 75 Mg Tablet, 75 MG PO DAILY, (Reported)


Cyanocobalamin (Vitamin B-12) (B-12) 1,000 Mcg Tablet, 1,000 MCG PO DAILY, 

(Reported)


Famotidine (Famotidine) 20 Mg Tablet, 20 MG PO DAILY, (Reported)


Ferrous Sulfate (Ferrous Sulfate) 325 Mg Tablet, 325 MG PO BID, (Reported)


Folic Acid (Folic Acid) 1 Mg Tablet, 1 MG PO DAILY, (Reported)


Furosemide (Furosemide) 40 Mg Tablet, 20 MG PO DAILY, (Reported)


L.acidoph/L.bulg/B.bif/S.therm (Bacid Caplet) 1 Each Tablet, 1 TAB PO QHS, 

(Reported)


Magnesium Oxide (Magnesium Oxide) 400 Mg Tablet, 400 MG PO DAILY, (Reported)


Metoprolol Tartrate (Metoprolol Tartrate) 25 Mg Tablet, 12.5 MG PO BID, 

(Reported)


Mirtazapine (Remeron) 15 Mg Tablet, 15 MG PO QHS, (Reported)


Nystatin (Nystop) 60 Gm Powder, 0 DOSE TOP BID


Pantoprazole Sodium (Pantoprazole Sodium) 40 Mg Tablet.dr, 40 MG PO BID, (Re

ported)


Tamsulosin HCl (Flomax) 0.4 Mg Capsule, 0.4 MG PO DAILY, (Reported)


Tolterodine Tartrate (Tolterodine Tartrate ER) 2 Mg Cap.er.24h, 2 MG PO DAILY, 

(Reported)





Scheduled PRN


Acetaminophen (Acetaminophen) 500 Mg Tablet, 1,000 MG PO Q6H PRN for PAIN, 

(Reported)


Oxycodone HCl (Oxycodone HCl) 5 Mg Tablet, 1-2 MG PO Q8HP PRN for MODERATE PAIN 

(PS 5-7)





Miscellaneous Medications


[Med Rec Comment]  , (Reported)


   MED REC COMPLETED VIA MED LIST AND EXTERNAL MED HISTORY 





Allergies


Coded Allergies:  


     No Known Drug Allergies (Verified  Allergy, Unknown, 9/22/19)











FLORES MCNULTY MD                   Mar 17, 2021 08:42

## 2021-03-17 NOTE — IPNPDOC
Subjective


Date Seen


The patient was seen on 3/16/21.





Subjective


Chief Complaint/HPI


No issues overnight. Patient has a bed in [facility] however he needs to have a bowel 

movement till he can go there.





Objective


Physical Examination


General Exam:  Positive: Alert, Cooperative, No Acute Distress


Eye Exam:  Positive: PERRLA, Conjunctiva & lids normal, EOMI; 


   Negative: Sclera icteric


ENT Exam:  Positive: Atraumatic, Mucous membr. moist/pink, Pharynx Normal


Neck Exam:  Positive: Supple; 


   Negative: JVD, thyromegaly


Chest Exam:  Positive: Clear to auscultation, Normal air movement


Heart Exam:  Positive: Rate Normal, Irregular Rhythm, Normal S1, Normal S2, 

Murmurs (systolic murmur); 


   Negative: Rubs


Abdomen Exam:  Positive: Normal bowel sounds, Soft; 


   Negative: Tenderness, Hepatospenomegaly


Extremity Exam:  Negative: Clubbing, Cyanosis, Edema





Assessment /Plan


Assessment


85 year old male with PMHx including Parkinson's disease, mild cognitive 

impairment, CKD, atrial fib, PVD, CAD/ CABG, Diastolic congestive heart failure,

severe pulmonary hypertension, HTN, Dysphagia, PAD, Abdominal aortic aneurysm 

repair, Protein calorie malnutrition with albumin 2.5, Chronic anemia, Moderate 

mitral insufficiency, Right upper lobe 9 mm pulmonary nodule presented to the ED

after a mechanical fall at home found to have L hip fracture and pneumonia by CT

chest. 





Left hip fracture secondary to mechanical fall


s/p Left hip ORIF Short Gamma Nail


pain control with oxycodone. 


PT/OT


bowel meds. 


ASA and Plavix started. Orhto surgeon did not want any AC. 





Acute Blood loss anemia on Chronic anemia


due to fracture hematoma, operative blood loss and dilutional.


Chronic anemia likely due to CKD


iron panel , folate and vit b12 no gross deficiencies. 


Received PRBC transfusion x 4 





Delirium on the background of mild cognitive impairment


postoperative delirium


cont mirtazepine. 


seroquel 





Pneumonia


CAP vs aspiration. 


Found on chest CT, patient is asymptomatic


patient reports that he has trouble swallowing and sometimes chokes on food so 

may have silent aspirations and this aspiration pneumonia 


Had swallow eval done will follow recommendations.


On pureed diet. 


Finished 5 days of antibiotics. 


Blood cultures negative till date





Parkinson's disease


continue home meds





Atrial Fibrillation, rate controlled


not on anticoagulation at home


metoprolol





CKD with chronic anemia


Cr appears at baseline about 1.6 to 1.8


continue iron supplement





CAD and PAD/ Aortic aneurysm repair with graft/ CABG


on  aspirin and plavix at home.


will resume  





HTN


metoprolol





Diastolic CHF with Valvular heart disease ( moderate mitral regurgitation) and 

severe pulmonary hypertension


Patient is euvolemic at present


will restart Lasix





BPH


flomax and tolterodine





Plan/VTE


VTE Prophylaxis Ordered?:  Yes





VS, I&O, 24H, Fishbone


Vital Signs/I&O





Vital Signs








  Date Time  Temp Pulse Resp B/P (MAP) Pulse Ox O2 Delivery O2 Flow Rate FiO2


 


3/16/21 22:00 97.4 66 17  96 Room Air  


 


3/16/21 19:56    104/73    


 


3/15/21 09:00       1.0 














I&O- Last 24 Hours up to 6 AM 


 


 3/17/21





 06:00


 


Intake Total 480 ml


 


Balance 480 ml











Laboratory Data


24H LABS


Laboratory Tests 2


3/16/21 09:48: Coronavirus (COVID-19)(PCR) NEGATIVE


Microbiology





Microbiology


3/11/21 Blood Culture - Final, Complete


          NO GROWTH AFTER 5 DAYS


3/10/21 Blood Culture - Final, Complete


          NO GROWTH AFTER 5 DAYS











FLORES MCNULTY MD                   Mar 17, 2021 08:39

## 2021-03-18 VITALS — DIASTOLIC BLOOD PRESSURE: 62 MMHG | SYSTOLIC BLOOD PRESSURE: 111 MMHG

## 2021-03-18 LAB
BASOPHILS # BLD AUTO: 0 10^3/UL (ref 0–0.2)
BASOPHILS NFR BLD AUTO: 0.5 % (ref 0–1)
BUN SERPL-MCNC: 37 MG/DL (ref 7–18)
CALCIUM SERPL-MCNC: 9 MG/DL (ref 8.8–10.2)
CHLORIDE SERPL-SCNC: 113 MEQ/L (ref 98–107)
CO2 SERPL-SCNC: 29 MEQ/L (ref 21–32)
CREAT SERPL-MCNC: 1.37 MG/DL (ref 0.7–1.3)
EOSINOPHIL # BLD AUTO: 0.1 10^3/UL (ref 0–0.5)
EOSINOPHIL NFR BLD AUTO: 1.9 % (ref 0–3)
GFR SERPL CREATININE-BSD FRML MDRD: 52.7 ML/MIN/{1.73_M2} (ref 35–?)
GLUCOSE SERPL-MCNC: 115 MG/DL (ref 70–100)
HCT VFR BLD AUTO: 29.1 % (ref 42–52)
HGB BLD-MCNC: 9.2 G/DL (ref 13.5–17.5)
LYMPHOCYTES # BLD AUTO: 0.6 10^3/UL (ref 1.5–5)
LYMPHOCYTES NFR BLD AUTO: 8.8 % (ref 24–44)
MCH RBC QN AUTO: 32.4 PG (ref 27–33)
MCHC RBC AUTO-ENTMCNC: 31.6 G/DL (ref 32–36.5)
MCV RBC AUTO: 102.5 FL (ref 80–96)
MONOCYTES # BLD AUTO: 0.8 10^3/UL (ref 0–0.8)
MONOCYTES NFR BLD AUTO: 11.4 % (ref 2–8)
NEUTROPHILS # BLD AUTO: 5.4 10^3/UL (ref 1.5–8.5)
NEUTROPHILS NFR BLD AUTO: 74.1 % (ref 36–66)
PLATELET # BLD AUTO: 186 10^3/UL (ref 150–450)
POTASSIUM SERPL-SCNC: 4.3 MEQ/L (ref 3.5–5.1)
RBC # BLD AUTO: 2.84 10^6/UL (ref 4.3–6.1)
SODIUM SERPL-SCNC: 146 MEQ/L (ref 136–145)
WBC # BLD AUTO: 7.3 10^3/UL (ref 4–10)

## 2021-03-18 RX ADMIN — METOPROLOL TARTRATE SCH MG: 25 TABLET, FILM COATED ORAL at 09:12

## 2021-03-18 RX ADMIN — POLYETHYLENE GLYCOL 3350 SCH PKT: 17 POWDER, FOR SOLUTION ORAL at 23:04

## 2021-03-18 RX ADMIN — MAGNESIUM HYDROXIDE SCH ML: 400 SUSPENSION ORAL at 09:00

## 2021-03-18 RX ADMIN — MAGNESIUM OXIDE SCH MG: 400 TABLET ORAL at 09:15

## 2021-03-18 RX ADMIN — FERROUS SULFATE TAB 325 MG (65 MG ELEMENTAL FE) SCH MG: 325 (65 FE) TAB at 09:14

## 2021-03-18 RX ADMIN — DOCUSATE SODIUM,SENNOSIDES SCH TAB: 50; 8.6 TABLET, FILM COATED ORAL at 09:13

## 2021-03-18 RX ADMIN — ACETAMINOPHEN PRN MG: 325 TABLET ORAL at 05:54

## 2021-03-18 RX ADMIN — QUETIAPINE FUMARATE SCH MG: 25 TABLET ORAL at 09:14

## 2021-03-18 RX ADMIN — ASPIRIN SCH MG: 81 TABLET ORAL at 09:13

## 2021-03-18 RX ADMIN — DOCUSATE SODIUM,SENNOSIDES SCH TAB: 50; 8.6 TABLET, FILM COATED ORAL at 23:02

## 2021-03-18 RX ADMIN — CARBIDOPA AND LEVODOPA SCH TAB: 25; 100 TABLET ORAL at 23:04

## 2021-03-18 RX ADMIN — MIRTAZAPINE SCH MG: 15 TABLET, FILM COATED ORAL at 23:04

## 2021-03-18 RX ADMIN — NYSTATIN SCH DOSE: 100000 POWDER TOPICAL at 23:04

## 2021-03-18 RX ADMIN — FERROUS SULFATE TAB 325 MG (65 MG ELEMENTAL FE) SCH MG: 325 (65 FE) TAB at 23:04

## 2021-03-18 RX ADMIN — FOLIC ACID SCH MG: 1 TABLET ORAL at 09:15

## 2021-03-18 RX ADMIN — PROBIOTIC PRODUCT - TAB SCH EA: TAB at 23:04

## 2021-03-18 RX ADMIN — ATORVASTATIN CALCIUM SCH MG: 20 TABLET, FILM COATED ORAL at 09:14

## 2021-03-18 RX ADMIN — TOLTERODINE SCH MG: 2 CAPSULE, EXTENDED RELEASE ORAL at 09:14

## 2021-03-18 RX ADMIN — PANTOPRAZOLE SODIUM SCH MG: 40 TABLET, DELAYED RELEASE ORAL at 23:02

## 2021-03-18 RX ADMIN — ACETAMINOPHEN PRN MG: 325 TABLET ORAL at 23:05

## 2021-03-18 RX ADMIN — CARBIDOPA AND LEVODOPA SCH TAB: 25; 100 TABLET ORAL at 09:15

## 2021-03-18 RX ADMIN — METOPROLOL TARTRATE SCH MG: 25 TABLET, FILM COATED ORAL at 21:00

## 2021-03-18 RX ADMIN — POLYETHYLENE GLYCOL 3350 SCH PKT: 17 POWDER, FOR SOLUTION ORAL at 09:00

## 2021-03-18 RX ADMIN — POLYETHYLENE GLYCOL 3350 SCH PKT: 17 POWDER, FOR SOLUTION ORAL at 09:12

## 2021-03-18 RX ADMIN — TAMSULOSIN HYDROCHLORIDE SCH MG: 0.4 CAPSULE ORAL at 09:14

## 2021-03-18 RX ADMIN — PANTOPRAZOLE SODIUM SCH MG: 40 TABLET, DELAYED RELEASE ORAL at 09:15

## 2021-03-18 RX ADMIN — FAMOTIDINE SCH MG: 20 TABLET, FILM COATED ORAL at 09:13

## 2021-03-18 RX ADMIN — NYSTATIN SCH DOSE: 100000 POWDER TOPICAL at 09:15

## 2021-03-18 RX ADMIN — CLOPIDOGREL BISULFATE SCH MG: 75 TABLET ORAL at 09:14

## 2021-03-19 VITALS — SYSTOLIC BLOOD PRESSURE: 135 MMHG | DIASTOLIC BLOOD PRESSURE: 74 MMHG

## 2021-03-19 LAB
BASOPHILS # BLD AUTO: 0.1 10^3/UL (ref 0–0.2)
BASOPHILS NFR BLD AUTO: 0.5 % (ref 0–1)
BUN SERPL-MCNC: 37 MG/DL (ref 7–18)
CALCIUM SERPL-MCNC: 9.3 MG/DL (ref 8.8–10.2)
CHLORIDE SERPL-SCNC: 114 MEQ/L (ref 98–107)
CO2 SERPL-SCNC: 29 MEQ/L (ref 21–32)
CREAT SERPL-MCNC: 1.36 MG/DL (ref 0.7–1.3)
EOSINOPHIL # BLD AUTO: 0.2 10^3/UL (ref 0–0.5)
EOSINOPHIL NFR BLD AUTO: 2.1 % (ref 0–3)
GFR SERPL CREATININE-BSD FRML MDRD: 53.1 ML/MIN/{1.73_M2} (ref 35–?)
GLUCOSE SERPL-MCNC: 95 MG/DL (ref 70–100)
HCT VFR BLD AUTO: 31 % (ref 42–52)
HGB BLD-MCNC: 9.7 G/DL (ref 13.5–17.5)
LYMPHOCYTES # BLD AUTO: 0.9 10^3/UL (ref 1.5–5)
LYMPHOCYTES NFR BLD AUTO: 9.7 % (ref 24–44)
MCH RBC QN AUTO: 32.3 PG (ref 27–33)
MCHC RBC AUTO-ENTMCNC: 31.3 G/DL (ref 32–36.5)
MCV RBC AUTO: 103.3 FL (ref 80–96)
MONOCYTES # BLD AUTO: 1.1 10^3/UL (ref 0–0.8)
MONOCYTES NFR BLD AUTO: 11.1 % (ref 2–8)
NEUTROPHILS # BLD AUTO: 7.1 10^3/UL (ref 1.5–8.5)
NEUTROPHILS NFR BLD AUTO: 73.2 % (ref 36–66)
PLATELET # BLD AUTO: 211 10^3/UL (ref 150–450)
POTASSIUM SERPL-SCNC: 4.6 MEQ/L (ref 3.5–5.1)
RBC # BLD AUTO: 3 10^6/UL (ref 4.3–6.1)
SODIUM SERPL-SCNC: 147 MEQ/L (ref 136–145)
WBC # BLD AUTO: 9.7 10^3/UL (ref 4–10)

## 2021-03-19 RX ADMIN — METOPROLOL TARTRATE SCH MG: 25 TABLET, FILM COATED ORAL at 07:45

## 2021-03-19 RX ADMIN — CLOPIDOGREL BISULFATE SCH MG: 75 TABLET ORAL at 07:45

## 2021-03-19 RX ADMIN — MAGNESIUM OXIDE SCH MG: 400 TABLET ORAL at 07:46

## 2021-03-19 RX ADMIN — CARBIDOPA AND LEVODOPA SCH TAB: 25; 100 TABLET ORAL at 07:45

## 2021-03-19 RX ADMIN — ATORVASTATIN CALCIUM SCH MG: 20 TABLET, FILM COATED ORAL at 07:45

## 2021-03-19 RX ADMIN — TAMSULOSIN HYDROCHLORIDE SCH MG: 0.4 CAPSULE ORAL at 07:46

## 2021-03-19 RX ADMIN — ASPIRIN SCH MG: 81 TABLET ORAL at 07:45

## 2021-03-19 RX ADMIN — QUETIAPINE FUMARATE SCH MG: 25 TABLET ORAL at 07:45

## 2021-03-19 RX ADMIN — NYSTATIN SCH DOSE: 100000 POWDER TOPICAL at 07:48

## 2021-03-19 RX ADMIN — DOCUSATE SODIUM,SENNOSIDES SCH TAB: 50; 8.6 TABLET, FILM COATED ORAL at 07:44

## 2021-03-19 RX ADMIN — FERROUS SULFATE TAB 325 MG (65 MG ELEMENTAL FE) SCH MG: 325 (65 FE) TAB at 07:46

## 2021-03-19 RX ADMIN — TOLTERODINE SCH MG: 2 CAPSULE, EXTENDED RELEASE ORAL at 07:46

## 2021-03-19 RX ADMIN — PANTOPRAZOLE SODIUM SCH MG: 40 TABLET, DELAYED RELEASE ORAL at 07:44

## 2021-03-19 RX ADMIN — ACETAMINOPHEN PRN MG: 325 TABLET ORAL at 06:49

## 2021-03-19 RX ADMIN — FOLIC ACID SCH MG: 1 TABLET ORAL at 07:46

## 2021-03-19 RX ADMIN — MAGNESIUM HYDROXIDE SCH ML: 400 SUSPENSION ORAL at 07:46

## 2021-03-19 RX ADMIN — POLYETHYLENE GLYCOL 3350 SCH PKT: 17 POWDER, FOR SOLUTION ORAL at 07:47

## 2021-03-19 NOTE — IPNPDOC
Detail Level: Generalized Subjective


Date Seen


The patient was seen on 3/19/21.





Subjective


Chief Complaint/HPI


Patient stayed extra days due to paperwork that needed to be filled out for NH. 

No new issues. However he is not working much with PT. For details of discharge 

summary and hospital course please refer to the discharge summary on 3/17/21





Objective


Physical Examination


General Exam:  Positive: Alert, Cooperative, No Acute Distress


Eye Exam:  Positive: PERRLA, Conjunctiva & lids normal, EOMI; 


   Negative: Sclera icteric


ENT Exam:  Positive: Atraumatic, Mucous membr. moist/pink, Pharynx Normal


Neck Exam:  Positive: Supple; 


   Negative: JVD, thyromegaly


Chest Exam:  Positive: Clear to auscultation, Normal air movement


Heart Exam:  Positive: Rate Normal, Irregular Rhythm, Normal S1, Normal S2, 

Murmurs (systolic murmur); 


   Negative: Rubs


Abdomen Exam:  Positive: Normal bowel sounds, Soft; 


   Negative: Tenderness, Hepatospenomegaly


Extremity Exam:  Negative: Clubbing, Cyanosis, Edema





Assessment /Plan


Assessment


85 year old male with PMHx including Parkinson's disease, mild cognitive 

impairment, CKD, atrial fib, PVD, CAD/ CABG, Diastolic congestive heart failure,

severe pulmonary hypertension, HTN, Dysphagia, PAD, Abdominal aortic aneurysm 

repair, Protein calorie malnutrition with albumin 2.5, Chronic anemia, Moderate 

mitral insufficiency, Right upper lobe 9 mm pulmonary nodule presented to the ED

after a mechanical fall at home found to have L hip fracture and pneumonia by CT

chest. 





Left hip fracture secondary to mechanical fall


s/p Left hip ORIF Short Gamma Nail


pain control with oxycodone. 


PT/OT


bowel meds. 


ASA and Plavix started. Ortho surgeon did not want any AC. 





Acute Blood loss anemia on Chronic anemia


due to fracture hematoma, operative blood loss and dilutional.


Chronic anemia likely due to CKD


iron panel , folate and vit b12 no gross deficiencies. 


Received PRBC transfusion x 4 





Delirium on the background of mild cognitive impairment


postoperative delirium


cont mirtazepine. 





Pneumonia


CAP vs aspiration. 


Found on chest CT, patient is asymptomatic


patient reports that he has trouble swallowing and sometimes chokes on food so 

may have silent aspirations and this aspiration pneumonia 


Had swallow eval done will follow recommendations.


On pureed diet. 


Finished 5 days of antibiotics. 





Parkinson's disease


continue home meds





Atrial Fibrillation, rate controlled


not on anticoagulation at home


metoprolol





CKD with chronic anemia


Cr appears at baseline about 1.6 to 1.8


continue iron supplement





CAD and PAD/ Aortic aneurysm repair with graft/ CABG


on  aspirin and plavix at home.


will resume  





HTN


metoprolol





Diastolic CHF with Valvular heart disease ( moderate mitral regurgitation) and 

severe pulmonary hypertension


Patient is euvolemic at present


will restart Lasix every other day. 





BPH


flomax and tolterodine





Hypernatremia


likely due to poor oral intake


will reduce dose of lasix from daily to every other day.





Plan/VTE


VTE Prophylaxis Ordered?:  Yes





VS, I&O, 24H, Fishbone


Vital Signs/I&O





Vital Signs








  Date Time  Temp Pulse Resp B/P (MAP) Pulse Ox O2 Delivery O2 Flow Rate FiO2


 


3/19/21 08:31   18   Room Air  


 


3/19/21 07:45  78  135/74    


 


3/19/21 06:00 97.5    92   


 


3/15/21 09:00       1.0 














I&O- Last 24 Hours up to 6 AM 


 


 3/19/21





 06:00


 


Intake Total 840 ml


 


Output Total 0 ml


 


Balance 840 ml











Laboratory Data


24H LABS


Laboratory Tests 2


3/19/21 07:59: 


Immature Granulocyte % (Auto) 3.4H, Neutrophils (%) (Auto) 73.2H, Lymphocytes 

(%) (Auto) 9.7L, Monocytes (%) (Auto) 11.1H, Eosinophils (%) (Auto) 2.1, 

Basophils (%) (Auto) 0.5, Neutrophils # (Auto) 7.1, Lymphocytes # (Auto) 0.9L, 

Monocytes # (Auto) 1.1H, Eosinophils # (Auto) 0.2, Basophils # (Auto) 0.1, 

Nucleated Red Blood Cells % (auto) 0.0, Anion Gap 4L, Glomerular Filtration Rate

53.1, Calcium Level 9.3


CBC/BMP


Laboratory Tests


3/19/21 07:59








Microbiology





Microbiology


3/11/21 Blood Culture - Final, Complete


          NO GROWTH AFTER 5 DAYS


3/10/21 Blood Culture - Final, Complete


          NO GROWTH AFTER 5 DAYS











FLORES MCNULTY MD                   Mar 19, 2021 12:27 Detail Level: Zone Detail Level: Simple

## 2021-03-24 ENCOUNTER — HOSPITAL ENCOUNTER (OUTPATIENT)
Dept: HOSPITAL 53 - SKLAB2 | Age: 86
End: 2021-03-24
Attending: INTERNAL MEDICINE

## 2021-03-24 DIAGNOSIS — D64.9: Primary | ICD-10-CM

## 2021-03-24 LAB
BUN SERPL-MCNC: 55 MG/DL (ref 7–18)
CALCIUM SERPL-MCNC: 8.6 MG/DL (ref 8.8–10.2)
CHLORIDE SERPL-SCNC: 109 MEQ/L (ref 98–107)
CO2 SERPL-SCNC: 28 MEQ/L (ref 21–32)
CREAT SERPL-MCNC: 1.61 MG/DL (ref 0.7–1.3)
GFR SERPL CREATININE-BSD FRML MDRD: 43.7 ML/MIN/{1.73_M2} (ref 35–?)
GLUCOSE SERPL-MCNC: 119 MG/DL (ref 70–100)
HCT VFR BLD AUTO: 31.4 % (ref 42–52)
HGB BLD-MCNC: 9.8 G/DL (ref 13.5–17.5)
MCH RBC QN AUTO: 32.5 PG (ref 27–33)
MCHC RBC AUTO-ENTMCNC: 31.2 G/DL (ref 32–36.5)
MCV RBC AUTO: 104 FL (ref 80–96)
PLATELET # BLD AUTO: 226 10^3/UL (ref 150–450)
POTASSIUM SERPL-SCNC: 4.1 MEQ/L (ref 3.5–5.1)
RBC # BLD AUTO: 3.02 10^6/UL (ref 4.3–6.1)
SODIUM SERPL-SCNC: 141 MEQ/L (ref 136–145)
WBC # BLD AUTO: 8.7 10^3/UL (ref 4–10)

## 2021-03-25 ENCOUNTER — HOSPITAL ENCOUNTER (OUTPATIENT)
Dept: HOSPITAL 53 - SKLAB2 | Age: 86
End: 2021-03-25
Attending: INTERNAL MEDICINE

## 2021-03-25 DIAGNOSIS — E86.0: Primary | ICD-10-CM

## 2021-03-25 LAB
BUN SERPL-MCNC: 44 MG/DL (ref 7–18)
CALCIUM SERPL-MCNC: 8.6 MG/DL (ref 8.8–10.2)
CHLORIDE SERPL-SCNC: 104 MEQ/L (ref 98–107)
CO2 SERPL-SCNC: 27 MEQ/L (ref 21–32)
CREAT SERPL-MCNC: 1.57 MG/DL (ref 0.7–1.3)
GFR SERPL CREATININE-BSD FRML MDRD: 45 ML/MIN/{1.73_M2} (ref 35–?)
GLUCOSE SERPL-MCNC: 98 MG/DL (ref 70–100)
POTASSIUM SERPL-SCNC: 4.2 MEQ/L (ref 3.5–5.1)
SODIUM SERPL-SCNC: 135 MEQ/L (ref 136–145)

## 2021-03-26 ENCOUNTER — HOSPITAL ENCOUNTER (OUTPATIENT)
Dept: HOSPITAL 53 - SKLAB2 | Age: 86
End: 2021-03-26
Attending: INTERNAL MEDICINE

## 2021-03-26 DIAGNOSIS — E86.0: Primary | ICD-10-CM

## 2021-03-26 LAB
BUN SERPL-MCNC: 39 MG/DL (ref 7–18)
CALCIUM SERPL-MCNC: 9.1 MG/DL (ref 8.8–10.2)
CHLORIDE SERPL-SCNC: 104 MEQ/L (ref 98–107)
CO2 SERPL-SCNC: 28 MEQ/L (ref 21–32)
CREAT SERPL-MCNC: 1.24 MG/DL (ref 0.7–1.3)
GFR SERPL CREATININE-BSD FRML MDRD: 59.1 ML/MIN/{1.73_M2} (ref 35–?)
GLUCOSE SERPL-MCNC: 89 MG/DL (ref 70–100)
POTASSIUM SERPL-SCNC: 5.1 MEQ/L (ref 3.5–5.1)
SODIUM SERPL-SCNC: 136 MEQ/L (ref 136–145)

## 2021-03-30 ENCOUNTER — HOSPITAL ENCOUNTER (OUTPATIENT)
Dept: HOSPITAL 53 - SKLAB2 | Age: 86
End: 2021-03-30
Attending: INTERNAL MEDICINE

## 2021-03-30 DIAGNOSIS — D64.9: Primary | ICD-10-CM

## 2021-03-30 LAB
BUN SERPL-MCNC: 33 MG/DL (ref 7–18)
CALCIUM SERPL-MCNC: 9.4 MG/DL (ref 8.8–10.2)
CHLORIDE SERPL-SCNC: 107 MEQ/L (ref 98–107)
CO2 SERPL-SCNC: 27 MEQ/L (ref 21–32)
CREAT SERPL-MCNC: 1.34 MG/DL (ref 0.7–1.3)
GFR SERPL CREATININE-BSD FRML MDRD: 54.1 ML/MIN/{1.73_M2} (ref 35–?)
GLUCOSE SERPL-MCNC: 126 MG/DL (ref 70–100)
POTASSIUM SERPL-SCNC: 4.3 MEQ/L (ref 3.5–5.1)
SODIUM SERPL-SCNC: 138 MEQ/L (ref 136–145)

## 2021-03-31 ENCOUNTER — HOSPITAL ENCOUNTER (OUTPATIENT)
Dept: HOSPITAL 53 - SKLAB2 | Age: 86
End: 2021-03-31
Attending: INTERNAL MEDICINE

## 2021-03-31 DIAGNOSIS — I10: Primary | ICD-10-CM

## 2021-03-31 LAB
HCT VFR BLD AUTO: 32.1 % (ref 42–52)
HGB BLD-MCNC: 10 G/DL (ref 13.5–17.5)
MCH RBC QN AUTO: 32.6 PG (ref 27–33)
MCHC RBC AUTO-ENTMCNC: 31.2 G/DL (ref 32–36.5)
MCV RBC AUTO: 104.6 FL (ref 80–96)
PLATELET # BLD AUTO: 266 10^3/UL (ref 150–450)
RBC # BLD AUTO: 3.07 10^6/UL (ref 4.3–6.1)
WBC # BLD AUTO: 4.4 10^3/UL (ref 4–10)

## 2021-04-02 ENCOUNTER — HOSPITAL ENCOUNTER (OUTPATIENT)
Dept: HOSPITAL 53 - SKLAB2 | Age: 86
End: 2021-04-02
Attending: INTERNAL MEDICINE

## 2021-04-02 DIAGNOSIS — Z20.822: Primary | ICD-10-CM

## 2021-04-05 ENCOUNTER — HOSPITAL ENCOUNTER (OUTPATIENT)
Dept: HOSPITAL 53 - M SOG | Age: 86
End: 2021-04-05
Payer: MEDICARE
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
## 2021-04-05 DIAGNOSIS — S72.002D: Primary | ICD-10-CM

## 2021-04-05 DIAGNOSIS — Z96.7: ICD-10-CM

## 2021-04-05 NOTE — REP
INDICATION:

F/U FX.



COMPARISON:

03/10/2021.



TECHNIQUE:

There are two views.



FINDINGS:

There is gamma nail internal fixation of an intertrochanteric fracture with the

fracture and hardware in satisfactory positions and alignment.

There are surgical staples in the inguinal area.

There is osteoarthritis of the left hip.



IMPRESSION:

There is gamma nail internal fixation of an intertrochanteric fracture with the

fracture and hardware in satisfactory positions and alignment.

There are surgical staples in the inguinal area.

There is osteoarthritis of the left hip.





<Electronically signed by Samuel Diaz > 04/05/21 [id number]

## 2021-04-07 ENCOUNTER — HOSPITAL ENCOUNTER (OUTPATIENT)
Dept: HOSPITAL 53 - SKLAB2 | Age: 86
End: 2021-04-07
Attending: INTERNAL MEDICINE

## 2021-04-07 DIAGNOSIS — D64.9: Primary | ICD-10-CM

## 2021-04-07 LAB
BUN SERPL-MCNC: 31 MG/DL (ref 7–18)
CALCIUM SERPL-MCNC: 9.9 MG/DL (ref 8.8–10.2)
CHLORIDE SERPL-SCNC: 104 MEQ/L (ref 98–107)
CO2 SERPL-SCNC: 25 MEQ/L (ref 21–32)
CREAT SERPL-MCNC: 1.57 MG/DL (ref 0.7–1.3)
GFR SERPL CREATININE-BSD FRML MDRD: 45 ML/MIN/{1.73_M2} (ref 35–?)
GLUCOSE SERPL-MCNC: 80 MG/DL (ref 70–100)
HCT VFR BLD AUTO: 36 % (ref 42–52)
HGB BLD-MCNC: 11.2 G/DL (ref 13.5–17.5)
MCH RBC QN AUTO: 32.5 PG (ref 27–33)
MCHC RBC AUTO-ENTMCNC: 31.1 G/DL (ref 32–36.5)
MCV RBC AUTO: 104.3 FL (ref 80–96)
PLATELET # BLD AUTO: 242 10^3/UL (ref 150–450)
POTASSIUM SERPL-SCNC: 4.5 MEQ/L (ref 3.5–5.1)
RBC # BLD AUTO: 3.45 10^6/UL (ref 4.3–6.1)
SODIUM SERPL-SCNC: 138 MEQ/L (ref 136–145)
WBC # BLD AUTO: 4.4 10^3/UL (ref 4–10)

## 2021-04-13 ENCOUNTER — HOSPITAL ENCOUNTER (OUTPATIENT)
Dept: HOSPITAL 53 - SKLAB2 | Age: 86
End: 2021-04-13
Attending: INTERNAL MEDICINE

## 2021-04-13 DIAGNOSIS — E83.42: ICD-10-CM

## 2021-04-13 DIAGNOSIS — R60.9: ICD-10-CM

## 2021-04-13 DIAGNOSIS — D64.9: Primary | ICD-10-CM

## 2021-04-13 DIAGNOSIS — I10: ICD-10-CM

## 2021-04-13 LAB
BUN SERPL-MCNC: 30 MG/DL (ref 7–18)
CALCIUM SERPL-MCNC: 10 MG/DL (ref 8.8–10.2)
CHLORIDE SERPL-SCNC: 105 MEQ/L (ref 98–107)
CO2 SERPL-SCNC: 28 MEQ/L (ref 21–32)
CREAT SERPL-MCNC: 1.39 MG/DL (ref 0.7–1.3)
GFR SERPL CREATININE-BSD FRML MDRD: 51.8 ML/MIN/{1.73_M2} (ref 35–?)
GLUCOSE SERPL-MCNC: 132 MG/DL (ref 70–100)
HCT VFR BLD AUTO: 37.4 % (ref 42–52)
HGB BLD-MCNC: 11.7 G/DL (ref 13.5–17.5)
MCH RBC QN AUTO: 32.5 PG (ref 27–33)
MCHC RBC AUTO-ENTMCNC: 31.3 G/DL (ref 32–36.5)
MCV RBC AUTO: 103.9 FL (ref 80–96)
PLATELET # BLD AUTO: 159 10^3/UL (ref 150–450)
POTASSIUM SERPL-SCNC: 5.2 MEQ/L (ref 3.5–5.1)
RBC # BLD AUTO: 3.6 10^6/UL (ref 4.3–6.1)
SODIUM SERPL-SCNC: 138 MEQ/L (ref 136–145)
WBC # BLD AUTO: 7.3 10^3/UL (ref 4–10)

## 2021-04-22 ENCOUNTER — HOSPITAL ENCOUNTER (OUTPATIENT)
Dept: HOSPITAL 53 - SKLAB2 | Age: 86
End: 2021-04-22
Attending: INTERNAL MEDICINE
Payer: MEDICARE

## 2021-04-22 DIAGNOSIS — R63.4: Primary | ICD-10-CM

## 2021-04-22 LAB
ALBUMIN SERPL BCG-MCNC: 3 GM/DL (ref 3.2–5.2)
ALT SERPL W P-5'-P-CCNC: 11 U/L (ref 12–78)
BILIRUB SERPL-MCNC: 0.5 MG/DL (ref 0.2–1)
BUN SERPL-MCNC: 36 MG/DL (ref 7–18)
CALCIUM SERPL-MCNC: 10.7 MG/DL (ref 8.8–10.2)
CHLORIDE SERPL-SCNC: 105 MEQ/L (ref 98–107)
CO2 SERPL-SCNC: 28 MEQ/L (ref 21–32)
CREAT SERPL-MCNC: 1.32 MG/DL (ref 0.7–1.3)
GFR SERPL CREATININE-BSD FRML MDRD: 55 ML/MIN/{1.73_M2} (ref 35–?)
GLUCOSE SERPL-MCNC: 78 MG/DL (ref 70–100)
HCT VFR BLD AUTO: 36.2 % (ref 42–52)
HGB BLD-MCNC: 11.3 G/DL (ref 13.5–17.5)
MCH RBC QN AUTO: 32.6 PG (ref 27–33)
MCHC RBC AUTO-ENTMCNC: 31.2 G/DL (ref 32–36.5)
MCV RBC AUTO: 104.3 FL (ref 80–96)
PLATELET # BLD AUTO: 178 10^3/UL (ref 150–450)
POTASSIUM SERPL-SCNC: 4.4 MEQ/L (ref 3.5–5.1)
PROT SERPL-MCNC: 7.5 GM/DL (ref 6.4–8.2)
RBC # BLD AUTO: 3.47 10^6/UL (ref 4.3–6.1)
SODIUM SERPL-SCNC: 138 MEQ/L (ref 136–145)
WBC # BLD AUTO: 5.8 10^3/UL (ref 4–10)

## 2021-04-27 ENCOUNTER — HOSPITAL ENCOUNTER (OUTPATIENT)
Dept: HOSPITAL 53 - SKLAB2 | Age: 86
End: 2021-04-27
Attending: INTERNAL MEDICINE
Payer: MEDICARE

## 2021-04-27 DIAGNOSIS — E83.52: Primary | ICD-10-CM

## 2021-04-27 LAB
BUN SERPL-MCNC: 31 MG/DL (ref 7–18)
CALCIUM SERPL-MCNC: 9.8 MG/DL (ref 8.8–10.2)
CHLORIDE SERPL-SCNC: 107 MEQ/L (ref 98–107)
CO2 SERPL-SCNC: 27 MEQ/L (ref 21–32)
CREAT SERPL-MCNC: 1.41 MG/DL (ref 0.7–1.3)
GFR SERPL CREATININE-BSD FRML MDRD: 51 ML/MIN/{1.73_M2} (ref 35–?)
GLUCOSE SERPL-MCNC: 133 MG/DL (ref 70–100)
MAGNESIUM SERPL-MCNC: 2.4 MG/DL (ref 1.8–2.4)
POTASSIUM SERPL-SCNC: 4.6 MEQ/L (ref 3.5–5.1)
SODIUM SERPL-SCNC: 139 MEQ/L (ref 136–145)

## 2021-05-10 ENCOUNTER — HOSPITAL ENCOUNTER (OUTPATIENT)
Dept: HOSPITAL 53 - M SOG | Age: 86
End: 2021-05-10
Attending: ORTHOPAEDIC SURGERY
Payer: MEDICARE

## 2021-05-10 DIAGNOSIS — M25.552: Primary | ICD-10-CM

## 2021-05-10 NOTE — REP
INDICATION:

PAIN.



COMPARISON:

04/05/2021.



TECHNIQUE:

Two views left hip.



FINDINGS:

Metallic internal fixation in the proximal left femur is unchanged.  The osseous

structures are well-aligned, unchanged.  Metallic clips overlie the medial aspect of

the femoral head.  There is a left iliac stent.  There are diffuse vascular

calcifications.  There are moderate degenerative changes at the left hip joint.



IMPRESSION:

Healing fracture proximal left femur with no change in position or alignment.





<Electronically signed by Samuel May > 05/10/21 [id number]

## 2021-05-18 ENCOUNTER — HOSPITAL ENCOUNTER (OUTPATIENT)
Dept: HOSPITAL 53 - SKLAB2 | Age: 86
End: 2021-05-18
Attending: INTERNAL MEDICINE

## 2021-05-18 DIAGNOSIS — R63.4: Primary | ICD-10-CM

## 2021-05-18 LAB
BUN SERPL-MCNC: 34 MG/DL (ref 7–18)
CALCIUM SERPL-MCNC: 9.9 MG/DL (ref 8.8–10.2)
CHLORIDE SERPL-SCNC: 106 MEQ/L (ref 98–107)
CO2 SERPL-SCNC: 30 MEQ/L (ref 21–32)
CREAT SERPL-MCNC: 1.23 MG/DL (ref 0.7–1.3)
GFR SERPL CREATININE-BSD FRML MDRD: 59.7 ML/MIN/{1.73_M2} (ref 35–?)
GLUCOSE SERPL-MCNC: 97 MG/DL (ref 70–100)
POTASSIUM SERPL-SCNC: 4.3 MEQ/L (ref 3.5–5.1)
SODIUM SERPL-SCNC: 139 MEQ/L (ref 136–145)
TSH SERPL DL<=0.005 MIU/L-ACNC: 1.1 UIU/ML (ref 0.36–3.74)

## 2021-05-20 ENCOUNTER — HOSPITAL ENCOUNTER (OUTPATIENT)
Dept: HOSPITAL 53 - SKLAB2 | Age: 86
End: 2021-05-20
Attending: INTERNAL MEDICINE

## 2021-05-20 ENCOUNTER — HOSPITAL ENCOUNTER (INPATIENT)
Dept: HOSPITAL 53 - M ED | Age: 86
LOS: 1 days | Discharge: NURSING, REHAB OR LONG-TERM CARE | DRG: 951 | End: 2021-05-21
Attending: INTERNAL MEDICINE | Admitting: INTERNAL MEDICINE
Payer: MEDICARE

## 2021-05-20 VITALS — BODY MASS INDEX: 16.59 KG/M2 | HEIGHT: 74 IN | WEIGHT: 129.28 LBS

## 2021-05-20 DIAGNOSIS — Z95.1: ICD-10-CM

## 2021-05-20 DIAGNOSIS — R13.10: ICD-10-CM

## 2021-05-20 DIAGNOSIS — J12.89: ICD-10-CM

## 2021-05-20 DIAGNOSIS — I13.0: ICD-10-CM

## 2021-05-20 DIAGNOSIS — R50.9: Primary | ICD-10-CM

## 2021-05-20 DIAGNOSIS — I73.9: ICD-10-CM

## 2021-05-20 DIAGNOSIS — Z87.891: ICD-10-CM

## 2021-05-20 DIAGNOSIS — I50.32: ICD-10-CM

## 2021-05-20 DIAGNOSIS — M62.50: ICD-10-CM

## 2021-05-20 DIAGNOSIS — Z79.02: ICD-10-CM

## 2021-05-20 DIAGNOSIS — Z79.82: ICD-10-CM

## 2021-05-20 DIAGNOSIS — G20: ICD-10-CM

## 2021-05-20 DIAGNOSIS — Z98.62: ICD-10-CM

## 2021-05-20 DIAGNOSIS — Z79.899: ICD-10-CM

## 2021-05-20 DIAGNOSIS — F03.90: ICD-10-CM

## 2021-05-20 DIAGNOSIS — Z51.5: Primary | ICD-10-CM

## 2021-05-20 DIAGNOSIS — I25.10: ICD-10-CM

## 2021-05-20 DIAGNOSIS — I48.91: ICD-10-CM

## 2021-05-20 DIAGNOSIS — E43: ICD-10-CM

## 2021-05-20 DIAGNOSIS — A41.9: ICD-10-CM

## 2021-05-20 DIAGNOSIS — Z66: ICD-10-CM

## 2021-05-20 DIAGNOSIS — I27.20: ICD-10-CM

## 2021-05-20 DIAGNOSIS — N18.30: ICD-10-CM

## 2021-05-20 LAB
ALBUMIN SERPL BCG-MCNC: 2.7 GM/DL (ref 3.2–5.2)
ALT SERPL W P-5'-P-CCNC: 13 U/L (ref 12–78)
AMYLASE SERPL-CCNC: 48 U/L (ref 25–115)
ANISOCYTOSIS BLD QL SMEAR: (no result)
APPEARANCE UR: CLEAR
APTT BLD: 34.3 SECONDS (ref 24.2–38.5)
BACTERIA UR QL AUTO: NEGATIVE
BILIRUB CONJ SERPL-MCNC: 0.3 MG/DL (ref 0–0.2)
BILIRUB SERPL-MCNC: 0.7 MG/DL (ref 0.2–1)
BILIRUB UR QL STRIP.AUTO: NEGATIVE
BUN SERPL-MCNC: 33 MG/DL (ref 7–18)
BUN SERPL-MCNC: 37 MG/DL (ref 7–18)
CALCIUM SERPL-MCNC: 8.9 MG/DL (ref 8.8–10.2)
CALCIUM SERPL-MCNC: 9.5 MG/DL (ref 8.8–10.2)
CHLORIDE SERPL-SCNC: 102 MEQ/L (ref 98–107)
CHLORIDE SERPL-SCNC: 102 MEQ/L (ref 98–107)
CK MB CFR.DF SERPL CALC: 7.69
CK MB SERPL-MCNC: < 1 NG/ML (ref ?–3.6)
CK SERPL-CCNC: 13 U/L (ref 39–308)
CO2 SERPL-SCNC: 25 MEQ/L (ref 21–32)
CO2 SERPL-SCNC: 26 MEQ/L (ref 21–32)
CREAT SERPL-MCNC: 1.38 MG/DL (ref 0.7–1.3)
CREAT SERPL-MCNC: 1.4 MG/DL (ref 0.7–1.3)
CRP SERPL-MCNC: 15.1 MG/DL (ref 0–0.3)
GFR SERPL CREATININE-BSD FRML MDRD: 51.4 ML/MIN/{1.73_M2} (ref 35–?)
GFR SERPL CREATININE-BSD FRML MDRD: 52.3 ML/MIN/{1.73_M2} (ref 35–?)
GLUCOSE SERPL-MCNC: 101 MG/DL (ref 70–100)
GLUCOSE SERPL-MCNC: 105 MG/DL (ref 70–100)
GLUCOSE UR QL STRIP.AUTO: NEGATIVE MG/DL
GRAN CASTS URNS QL MICRO: 1 /LPF
HCT VFR BLD AUTO: 32 % (ref 42–52)
HCT VFR BLD AUTO: 33.4 % (ref 42–52)
HGB BLD-MCNC: 10.4 G/DL (ref 13.5–17.5)
HGB BLD-MCNC: 10.8 G/DL (ref 13.5–17.5)
HGB UR QL STRIP.AUTO: NEGATIVE
INR PPP: 1.45
KETONES UR QL STRIP.AUTO: NEGATIVE MG/DL
LEUKOCYTE ESTERASE UR QL STRIP.AUTO: NEGATIVE
LYMPHOCYTES NFR BLD MANUAL: 2 % (ref 16–44)
MACROCYTES BLD QL SMEAR: (no result)
MCH RBC QN AUTO: 33.2 PG (ref 27–33)
MCH RBC QN AUTO: 33.6 PG (ref 27–33)
MCHC RBC AUTO-ENTMCNC: 32.3 G/DL (ref 32–36.5)
MCHC RBC AUTO-ENTMCNC: 32.5 G/DL (ref 32–36.5)
MCV RBC AUTO: 102.2 FL (ref 80–96)
MCV RBC AUTO: 104 FL (ref 80–96)
MONOCYTES NFR BLD MANUAL: 10 % (ref 0–5)
MUCOUS THREADS URNS QL MICRO: (no result)
NEUTROPHILS NFR BLD MANUAL: 82 % (ref 28–66)
NITRITE UR QL STRIP.AUTO: NEGATIVE
PH UR STRIP.AUTO: 5 UNITS (ref 5–9)
PLATELET # BLD AUTO: 181 10^3/UL (ref 150–450)
PLATELET # BLD AUTO: 186 10^3/UL (ref 150–450)
PLATELET BLD QL SMEAR: NORMAL
POTASSIUM SERPL-SCNC: 4.4 MEQ/L (ref 3.5–5.1)
POTASSIUM SERPL-SCNC: 4.7 MEQ/L (ref 3.5–5.1)
PROT SERPL-MCNC: 7 GM/DL (ref 6.4–8.2)
PROT UR QL STRIP.AUTO: (no result) MG/DL
PROTHROMBIN TIME: 18 SECONDS (ref 12.5–14.3)
RBC # BLD AUTO: 3.13 10^6/UL (ref 4.3–6.1)
RBC # BLD AUTO: 3.21 10^6/UL (ref 4.3–6.1)
RBC # UR AUTO: 1 /HPF (ref 0–3)
SODIUM SERPL-SCNC: 134 MEQ/L (ref 136–145)
SODIUM SERPL-SCNC: 135 MEQ/L (ref 136–145)
SP GR UR STRIP.AUTO: 1.02 (ref 1–1.03)
SQUAMOUS #/AREA URNS AUTO: 0 /HPF (ref 0–6)
TROPONIN I SERPL-MCNC: 0.09 NG/ML (ref ?–0.1)
UROBILINOGEN UR QL STRIP.AUTO: 2 MG/DL (ref 0–2)
WBC # BLD AUTO: 23.8 10^3/UL (ref 4–10)
WBC # BLD AUTO: 28.1 10^3/UL (ref 4–10)
WBC #/AREA URNS AUTO: 0 /HPF (ref 0–3)

## 2021-05-20 NOTE — HPEPDOC
[facility] Medical History & Physical


Date of Admission


May 20, 2021


Date of Service:  May 20, 2021


Primary Care Physician:  Dolores Yap DO


Attending Physician:  CHRISTOPHER ROGERS MD





History and Physical


TIME OF SERVICE: 1157 pm


   


CHIEF COMPLAINT: fever





HISTORY OF PRESENT ILLNESS: The hx was obtained from the patients daughter the 

pt was to lethargic and confused. This 85 yr old M has declined after his wife 

 and he was hospitalized to manage a hematoma 2/2 mechanical fall  & PNA 

last year; he has been more confused and bed bound.  [facility] notice he had a fever

and elevated WBC# and sent him to the ER for evaluation.





REVIEW OF SYSTEMS: unable to assess bc pt is confused





PAST MEDICAL/ SURGICAL HISTORY: Parkinson's disease w dementia & dysphagia, CKD,

atrial fib, PVD, CAD, HTN, CABG, Femoropopliteal bypass.





SOCIAL HISTORY: Former pipe smoker for about 55 years. Lives at [facility]





FAMILY HISTORY: Mother: [place]


   


ALLERGIES: Please see below.





HOME MEDICATIONS: Please see below. 





PHYSICAL EXAMINATION:


Vital Signs








  Date Time  Temp Pulse Resp B/P (MAP) Pulse Ox O2 Delivery O2 Flow Rate FiO2


 


21 20:51  109 22 97/51 (66) 90 Room Air  


 


21 21:11 101.6       


 


21 23:15       2.0 





GENERAL APPEARANCE: cachectic / lethargic


HEENT:temporal wasting / mucus membranes dry


CARDIOVASCULAR: tachycardic /NMRG


LUNGS: CTAB 


ABDOMEN: scaphoid 


MUSCULOSKELETAL: muscle wasting


INTEGUMENT: pale / not flushed or diaphoretic


NEUROLOGICAL: unable to assess bc of confusion





LABORATORY DATA:


Neutrophils (%) (Auto) , Nucleated Red Blood Cells % (auto) 0.0, Neutrophils 8

2H, Band Neutrophils 6, Lymphocytes (Manual) 2L, Monocytes (Manual) 10H, 

Anisocytosis 1+, Macrocytosis 1+, Platelet Estimate NORMAL, Prothrombin Time 

18.0H, Prothromb Time International Ratio 1.45, Activated Partial Thromboplast 

Time 34.3, Anion Gap 7L, Glomerular Filtration Rate 51.4, Lactic Acid Level 2.0,

Calcium Level 8.9, Total Bilirubin 0.7, Direct Bilirubin 0.3H, Aspartate Amino 

Transf (AST/SGOT) 9, Alanine Aminotransferase (ALT/SGPT) 13, Alkaline 

Phosphatase 96, Total Creatine Kinase 13L, Creatine Kinase MB < 1.0, Creatine 

Kinase MB Relative Index 7.69H, Troponin I 0.09, C-Reactive Protein, 

Quantitative 15.10H, Total Protein 7.0, Albumin 2.7L, Albumin/Globulin Ratio 

0.6, Amylase Level 48


IMAGING:  XRC IMPRESSION: Right basilar pneumonia. 





MICROBIOLOGY: respiratory panel May 20 + rhinovirus





ASSESSMENT: Mr Lou is an 85 yr old w Parkinsons disease, CKD, atrial fib, 

PVD, CAD, HTN, sacral wounds & sarcopenia, who was sent from his NH for 

evaluation of AMS and fever and was diagnosed with sepsis 2/2 PNA / rhinovirus; 

his daughter elected to transition to CMO.





PLAN:


1 Sepsis 2/2 rhinovirus PNA - CMO order set / c/w abx for now 


2 Sacral wounds - frequent repositioning if he will tolerate it


Dispo: possibly back to NH w Hospice will ask day time team to place consult





Home Medications


Scheduled


Arginine/Glutamine/Calcium Bmb (Jose De Jesus Packet) 1 Each Powd.pack, 1 DOSE PO BID


Aspirin (Aspirin EC) 81 Mg Tablet.dr, 81 MG PO DAILY


Atorvastatin Calcium (Atorvastatin Calcium) 20 Mg Tablet, 20 MG PO DAILY


Carbidopa/Levodopa (Carbidopa-Levo ER  Tab) 1 Each Tablet.er, 1 TAB PO BID


Clopidogrel Bisulfate (Clopidogrel) 75 Mg Tablet, 75 MG PO DAILY


Cyanocobalamin (Vitamin B-12) (B-12) 1,000 Mcg Tablet, 1,000 MCG PO DAILY


Lactose-Reduced Food (Ensure Enlive) 237 Ml Liquid, 180 ML PO DAILY


Mirtazapine (Remeron) 15 Mg Tablet, 15 MG PO QHS


Multivitamins (Thera M Plus Tablet) 1 Each Tablet, 1 TAB PO DAILY


Pantoprazole Sodium (Pantoprazole Sodium) 40 Mg Tablet.dr, 40 MG PO DAILY


Polyethylene Glycol 3350 (Miralax) 17 Gm Powd.pack, 17 GM PO DAILY


Sennosides/Docusate Sodium (Senna-S Tablet) 1 Each Tablet, 2 TAB PO BID


Tamsulosin HCl (Flomax) 0.4 Mg Capsule, 0.4 MG PO DAILY





Scheduled PRN


Acetaminophen (Acetaminophen) 500 Mg Tablet, 1,000 MG PO Q6H PRN for PAIN


Acetaminophen (Tylenol) 325 Mg Tablet, 650 MG PO Q4H PRN for PAIN / FEVER


Bisacodyl (Bisacodyl) 10 Mg Supp.rect, 10 MG AR DAILY PRN for CONSTIPATION


Milk Of Magnesia (Milk of Magnesia) 2,400 Mg/10 Ml Oral.susp, 10 ML PO DAILY PRN

for CONSTIPATION


Sodium Phosphate,Mono-Dibasic (Fleet Enema) 133 Ml Enema, 1 CHER AR DAILY PRN for

CONSTIPATION





Allergies


Coded Allergies:  


     No Known Drug Allergies (Verified  Allergy, Unknown, 19)





A-FIB/CHADSVASC


A-FIB History


Current/History of A-Fib/PAF?:  No


Current PO Anticoag Therapy:  No











CHRISTOPHER ROGERS MD                May 20, 2021 23:48

## 2021-05-20 NOTE — REPVR
PROCEDURE INFORMATION: 

Exam: XR Chest 

Exam date and time: 5/20/2021 9:40 PM 

Age: 85 years old 

Clinical indication: Other: Sepsis/shock 



TECHNIQUE: 

Imaging protocol: XR of the chest. 

Views: 1 view. 



COMPARISON: 

CT Chest without contrast 3/10/2021 10:40 PM 



FINDINGS: 

Lungs: Right basilar airspace infiltrate. Lungs are otherwise clear. 

Pleural spaces: Unremarkable. No pleural effusion. No pneumothorax. 

Heart/Mediastinum: Normal heart size. There is a small hiatal hernia. 

Bones/joints: Skeletal degenerative changes are noted. Prior sternotomy and 

CABG. 



IMPRESSION: 

Right basilar pneumonia. 



Electronically signed by: Jaspreet Miller On 05/20/2021  22:39:48 PM

## 2021-05-21 VITALS — SYSTOLIC BLOOD PRESSURE: 86 MMHG | DIASTOLIC BLOOD PRESSURE: 56 MMHG

## 2021-05-21 NOTE — DS.PDOC
Discharge Summary


General


Date of Admission


May 20, 2021 at 23:49


Date of Discharge


5/21/21





Discharge Summary


PROCEDURES PERFORMED DURING STAY: [None].





DISCHARGE DIAGNOSES:


Pneumonia


Sepsis


Rhinovirus infection





SECONDARY DIAGNOSIS:


Left Hip fracture s/p mechanical fall in March 2021


Aspiration pneumonia March 2021. 


Chronic anemia


Dementia. 


Parkinson's disease,


CKD stage 3, 


atrial fib, 


PVD, CAD/ CABG, 


Diastolic congestive heart failure, 


Severe pulmonary hypertension, 


HTN, 


Dysphagia/ Aspiration


PAD, 


Abdominal aortic aneurysm repair, 


Severe Protein calorie malnutrition with BMI of 16.6


Moderate mitral insufficiency,


BPH, 


Right upper lobe 9 mm pulmonary nodule





COMPLICATIONS/CHIEF COMPLAINT: Sepsis.





HOSPITAL COURSE: 85-year-old male with history of dementia, severe protein 

calorie malnutrition, dysphagia, Parkinson's disease CTD stage III, CAD, 

peripheral vascular disease, severe pulmonary hypertension, diastolic CHF, who 

lost his wife about a year ago and since then has been deteriorating rapidly was

 recently admitted in the hospital in March 2021 after a mechanical fall at home

 and he fractured his left hip. He had surgical fixation done and was discharged

 to [facility] for rehabilitation. The patient has been more or less bedbound, has not 

been progressing with physical therapy has been refusing to eat food. Has been 

refusing his medications. On 5/20/21 nurses noted the patient to be febrile and 

confused and sent the patient to the ED. In the hospital, patient was found to 

have rhinovirus infection and left lower lobe pneumonia. Patient's blood 

pressure was in 80s, which did not improve with them fluid bolus. Patient was 

diagnosed with pneumonia and sepsis and admitted to the hospital. After further 

discussion with options of care and about central line vasopressors HCP wanted 

him to be kept comfortable and did not want to escalate any care. Patient was 

transitioned into CMO measure. Patient was discharged back to the nursing home 

in CMO status.





DISCHARGE MEDICATIONS: Please see below.


 


ALLERGIES: Please see below.





PHYSICAL EXAMINATION ON DISCHARGE:


VITAL SIGNS: Please see below.


GENERAL: awake and alert


HEENT: NC and Atraumatic, bitemporal wasting


CARDIOVASCULAR EXAMINATION: S1, S2 regular


RESPIRATORY EXAMINATION: left basal crackles


ABDOMINAL EXAMINATION: soft nontender. 


EXTREMITIES: No edema, wasting of smallmuscles of hands and feet.





LABORATORY DATA: Please see below.





ACTIVITY: [As tolerated].





DIET: As tolerated





DISPOSITION: [facility] in CMO status. 





DISCHARGE CONDITION: [Stable].





TIME SPENT ON DISCHARGE: 35 minutes.





Vital Signs/I&Os





Vital Signs








  Date Time  Temp Pulse Resp B/P (MAP) Pulse Ox O2 Delivery O2 Flow Rate FiO2


 


5/21/21 02:51   18     


 


5/21/21 02:00  79  86/56 (66) 94 Nasal Cannula 2.0 


 


5/20/21 21:11 101.6       














I&O- Last 24 Hours up to 6 AM 


 


 5/21/21





 06:00


 


Intake Total 1810 ml


 


Output Total 0 ml


 


Balance 1810 ml











Laboratory Data


Labs 24H


Laboratory Tests 2


5/20/21 20:56: 


Neutrophils (%) (Auto) , Nucleated Red Blood Cells % (auto) 0.0, Neutrophils 

82H, Band Neutrophils 6, Lymphocytes (Manual) 2L, Monocytes (Manual) 10H, 

Anisocytosis 1+, Macrocytosis 1+, Platelet Estimate NORMAL, Prothrombin Time 

18.0H, Prothromb Time International Ratio 1.45, Activated Partial Thromboplast 

Time 34.3, Anion Gap 7L, Glomerular Filtration Rate 51.4, Lactic Acid Level 2.0,

 Calcium Level 8.9, Total Bilirubin 0.7, Direct Bilirubin 0.3H, Aspartate Amino 

Transf (AST/SGOT) 9, Alanine Aminotransferase (ALT/SGPT) 13, Alkaline 

Phosphatase 96, Total Creatine Kinase 13L, Creatine Kinase MB < 1.0, Creatine 

Kinase MB Relative Index 7.69H, Troponin I 0.09, C-Reactive Protein, Quantita

tive 15.10H, Total Protein 7.0, Albumin 2.7L, Albumin/Globulin Ratio 0.6, 

Amylase Level 48


CBC/BMP


Laboratory Tests


5/20/21 20:56











Microbiology





Microbiology


5/21/21 Blood Culture, Received


          Pending


5/20/21 Blood Culture, Received


          Pending





Discharge Medications


Scheduled


Aspirin (Aspirin EC) 81 Mg Tablet.dr, 81 MG PO DAILY, (Reported)


Carbidopa/Levodopa (Carbidopa-Levo ER  Tab) 1 Each Tablet.er, 1 TAB PO 

BID, (Reported)


Clopidogrel Bisulfate (Clopidogrel) 75 Mg Tablet, 75 MG PO DAILY, (Reported)


Mirtazapine (Remeron) 15 Mg Tablet, 15 MG PO QHS, (Reported)


Tamsulosin HCl (Flomax) 0.4 Mg Capsule, 0.4 MG PO DAILY, (Reported)





Scheduled PRN


Acetaminophen (Tylenol) 325 Mg Tablet, 650 MG PO Q4H PRN for PAIN / FEVER, 

(Reported)


Hyoscyamine Sulfate (Hyoscyamine Sulfate) 0.125 Mg Tab.subl, 0.125 MG PO Q4HP 

PRN for TERMINAL SECRETIONS


   Use sublingually if unable to swallow 


Lorazepam (Ativan) 0.5 Mg Tablet, 0.5 MG PO Q4HP PRN for ANXIETY/AGITATION


   Use sublingually if unable to swallow 


Milk Of Magnesia (Milk of Magnesia) 2,400 Mg/10 Ml Oral.susp, 10 ML PO DAILY PRN

 for CONSTIPATION, (Reported)


Morphine Sulfate (Morphine Sulfate) 100 Mg/5 Ml Solution, 0.25-1 ML PO Q2H PRN 

for PAIN OR DYSPNEA


   Use sublingually if unable to swallow 


Sodium Phosphate,Mono-Dibasic (Fleet Enema) 133 Ml Enema, 1 CHER MT DAILY PRN for

 CONSTIPATION, (Reported)





Allergies


Coded Allergies:  


     No Known Drug Allergies (Verified  Allergy, Unknown, 9/22/19)











FLORES MCNULTY MD                   May 21, 2021 16:54

## 2021-05-21 NOTE — ECGEPIP
[facility] - ED

                                       

                                       Test Date:    2021

Pat Name:     FELISA SANCHEZ           Department:   

Patient ID:   [id number]                 Room:         [place] [id number]

Gender:       Male                     Technician:   HC

:          [id number]               Requested By: FENG CLAYTON

Order Number: [id number]     Reading MD:   Shelley Aguilar

                                 Measurements

Intervals                              Axis          

Rate:         108                      P:            

ND:                                    QRS:          16

QRSD:         100                      T:            19

QT:           360                                    

QTc:          482                                    

                           Interpretive Statements

Atrial fibrillation with rapid ventricular response with premature

ventricular or 

aberrantly conducted complexes

RSR' or QR pattern in V1 suggests right ventricular conduction delay

Cannot rule out Anterior infarct , age undetermined

NSTTW abnormalities

increased rate 3/10/21

Electronically Signed on 2021 20:27:29 EDT by Shelley Aguilar